# Patient Record
Sex: FEMALE | Race: BLACK OR AFRICAN AMERICAN | NOT HISPANIC OR LATINO | Employment: OTHER | URBAN - METROPOLITAN AREA
[De-identification: names, ages, dates, MRNs, and addresses within clinical notes are randomized per-mention and may not be internally consistent; named-entity substitution may affect disease eponyms.]

---

## 2017-01-03 DIAGNOSIS — M32.9 SLE (SYSTEMIC LUPUS ERYTHEMATOSUS): ICD-10-CM

## 2017-01-03 RX ORDER — HYDROXYCHLOROQUINE SULFATE 200 MG/1
200 TABLET, FILM COATED ORAL 2 TIMES DAILY
Qty: 180 TABLET | Refills: 0 | Status: SHIPPED | OUTPATIENT
Start: 2017-01-03 | End: 2017-03-15 | Stop reason: SDUPTHER

## 2017-01-19 ENCOUNTER — PATIENT MESSAGE (OUTPATIENT)
Dept: INTERNAL MEDICINE | Facility: CLINIC | Age: 57
End: 2017-01-19

## 2017-01-19 DIAGNOSIS — M79.671 PAIN IN BOTH FEET: Primary | ICD-10-CM

## 2017-01-19 DIAGNOSIS — M79.672 PAIN IN BOTH FEET: Primary | ICD-10-CM

## 2017-01-27 ENCOUNTER — TELEPHONE (OUTPATIENT)
Dept: RHEUMATOLOGY | Facility: CLINIC | Age: 57
End: 2017-01-27

## 2017-01-27 ENCOUNTER — LAB VISIT (OUTPATIENT)
Dept: LAB | Facility: HOSPITAL | Age: 57
End: 2017-01-27
Attending: INTERNAL MEDICINE
Payer: MEDICARE

## 2017-01-27 DIAGNOSIS — M32.9 SLE (SYSTEMIC LUPUS ERYTHEMATOSUS): ICD-10-CM

## 2017-01-27 LAB
ALBUMIN SERPL BCP-MCNC: 4 G/DL
ALP SERPL-CCNC: 77 U/L
ALT SERPL W/O P-5'-P-CCNC: 14 U/L
ANION GAP SERPL CALC-SCNC: 6 MMOL/L
AST SERPL-CCNC: 20 U/L
BASOPHILS # BLD AUTO: 0.03 K/UL
BASOPHILS NFR BLD: 0.7 %
BILIRUB SERPL-MCNC: 0.2 MG/DL
BUN SERPL-MCNC: 10 MG/DL
C3 SERPL-MCNC: 150 MG/DL
C4 SERPL-MCNC: 35 MG/DL
CALCIUM SERPL-MCNC: 9.4 MG/DL
CHLORIDE SERPL-SCNC: 107 MMOL/L
CO2 SERPL-SCNC: 27 MMOL/L
CREAT SERPL-MCNC: 0.8 MG/DL
CRP SERPL-MCNC: 1.2 MG/L
DIFFERENTIAL METHOD: ABNORMAL
DSDNA AB SER-ACNC: NORMAL [IU]/ML
EOSINOPHIL # BLD AUTO: 0 K/UL
EOSINOPHIL NFR BLD: 0.7 %
ERYTHROCYTE [DISTWIDTH] IN BLOOD BY AUTOMATED COUNT: 13.4 %
ERYTHROCYTE [SEDIMENTATION RATE] IN BLOOD BY WESTERGREN METHOD: 15 MM/HR
EST. GFR  (AFRICAN AMERICAN): >60 ML/MIN/1.73 M^2
EST. GFR  (NON AFRICAN AMERICAN): >60 ML/MIN/1.73 M^2
GLUCOSE SERPL-MCNC: 94 MG/DL
HCT VFR BLD AUTO: 38.4 %
HGB BLD-MCNC: 13.2 G/DL
LYMPHOCYTES # BLD AUTO: 1.3 K/UL
LYMPHOCYTES NFR BLD: 30.2 %
MCH RBC QN AUTO: 30.8 PG
MCHC RBC AUTO-ENTMCNC: 34.4 %
MCV RBC AUTO: 90 FL
MONOCYTES # BLD AUTO: 0.2 K/UL
MONOCYTES NFR BLD: 5.3 %
NEUTROPHILS # BLD AUTO: 2.6 K/UL
NEUTROPHILS NFR BLD: 62.6 %
PLATELET # BLD AUTO: 195 K/UL
PMV BLD AUTO: 10.5 FL
POTASSIUM SERPL-SCNC: 3.9 MMOL/L
PROT SERPL-MCNC: 7.9 G/DL
RBC # BLD AUTO: 4.29 M/UL
SODIUM SERPL-SCNC: 140 MMOL/L
WBC # BLD AUTO: 4.17 K/UL

## 2017-01-27 PROCEDURE — 86225 DNA ANTIBODY NATIVE: CPT

## 2017-01-27 PROCEDURE — 86140 C-REACTIVE PROTEIN: CPT

## 2017-01-27 PROCEDURE — 85651 RBC SED RATE NONAUTOMATED: CPT

## 2017-01-27 PROCEDURE — 86160 COMPLEMENT ANTIGEN: CPT

## 2017-01-27 PROCEDURE — 80053 COMPREHEN METABOLIC PANEL: CPT

## 2017-01-27 PROCEDURE — 86160 COMPLEMENT ANTIGEN: CPT | Mod: 59

## 2017-01-27 PROCEDURE — 36415 COLL VENOUS BLD VENIPUNCTURE: CPT

## 2017-01-27 PROCEDURE — 85025 COMPLETE CBC W/AUTO DIFF WBC: CPT

## 2017-02-01 ENCOUNTER — PATIENT MESSAGE (OUTPATIENT)
Dept: RHEUMATOLOGY | Facility: CLINIC | Age: 57
End: 2017-02-01

## 2017-02-02 ENCOUNTER — TELEPHONE (OUTPATIENT)
Dept: RHEUMATOLOGY | Facility: CLINIC | Age: 57
End: 2017-02-02

## 2017-02-02 DIAGNOSIS — M79.7 FIBROMYALGIA: ICD-10-CM

## 2017-02-02 RX ORDER — PREGABALIN 225 MG/1
225 CAPSULE ORAL 2 TIMES DAILY
Qty: 180 CAPSULE | Refills: 1 | Status: SHIPPED | OUTPATIENT
Start: 2017-02-02 | End: 2017-03-15 | Stop reason: SDUPTHER

## 2017-02-04 RX ORDER — LEVOTHYROXINE SODIUM 88 UG/1
88 TABLET ORAL DAILY
Qty: 90 TABLET | Refills: 3 | Status: SHIPPED | OUTPATIENT
Start: 2017-02-04 | End: 2017-04-30

## 2017-02-12 ENCOUNTER — PATIENT MESSAGE (OUTPATIENT)
Dept: RHEUMATOLOGY | Facility: CLINIC | Age: 57
End: 2017-02-12

## 2017-02-15 RX ORDER — LISINOPRIL 40 MG/1
40 TABLET ORAL DAILY
Qty: 90 TABLET | Refills: 3 | Status: SHIPPED | OUTPATIENT
Start: 2017-02-15 | End: 2017-02-22 | Stop reason: SDUPTHER

## 2017-02-22 RX ORDER — LISINOPRIL 40 MG/1
40 TABLET ORAL DAILY
Qty: 90 TABLET | Refills: 3 | Status: SHIPPED | OUTPATIENT
Start: 2017-02-22 | End: 2017-06-22 | Stop reason: SDUPTHER

## 2017-02-22 NOTE — TELEPHONE ENCOUNTER
----- Message from Kendra Antonio sent at 2/22/2017  8:36 AM CST -----  Contact: pt  _  1st Request  _  2nd Request  _  3rd Request    Please refill the medication(s) listed below. Please call the patient when the prescription(s) is ready for  at the phone number (___[phone]___-_____) .[phone]    Medication #1lisinopril (PRINIVIL,ZESTRIL) 40 MG tablet    Medication #2      Preferred Pharmacy: [facility]

## 2017-02-27 RX ORDER — AMLODIPINE BESYLATE 5 MG/1
5 TABLET ORAL DAILY
Qty: 90 TABLET | Refills: 0 | Status: SHIPPED | OUTPATIENT
Start: 2017-02-27 | End: 2017-06-22 | Stop reason: SDUPTHER

## 2017-02-27 RX ORDER — BUDESONIDE AND FORMOTEROL FUMARATE DIHYDRATE 160; 4.5 UG/1; UG/1
AEROSOL RESPIRATORY (INHALATION)
Qty: 10.2 G | Refills: 0 | Status: SHIPPED | OUTPATIENT
Start: 2017-02-27 | End: 2017-06-22 | Stop reason: SDUPTHER

## 2017-02-27 RX ORDER — BUDESONIDE AND FORMOTEROL FUMARATE DIHYDRATE 160; 4.5 UG/1; UG/1
AEROSOL RESPIRATORY (INHALATION)
Qty: 10.2 G | Refills: 0 | OUTPATIENT
Start: 2017-02-27

## 2017-03-15 ENCOUNTER — CLINICAL SUPPORT (OUTPATIENT)
Dept: INFECTIOUS DISEASES | Facility: CLINIC | Age: 57
End: 2017-03-15
Payer: MEDICARE

## 2017-03-15 ENCOUNTER — LAB VISIT (OUTPATIENT)
Dept: LAB | Facility: HOSPITAL | Age: 57
End: 2017-03-15
Attending: INTERNAL MEDICINE
Payer: MEDICARE

## 2017-03-15 ENCOUNTER — OFFICE VISIT (OUTPATIENT)
Dept: RHEUMATOLOGY | Facility: CLINIC | Age: 57
End: 2017-03-15
Payer: MEDICARE

## 2017-03-15 VITALS
HEART RATE: 74 BPM | BODY MASS INDEX: 27.08 KG/M2 | HEIGHT: 64 IN | SYSTOLIC BLOOD PRESSURE: 143 MMHG | WEIGHT: 158.63 LBS | DIASTOLIC BLOOD PRESSURE: 87 MMHG

## 2017-03-15 DIAGNOSIS — Z72.0 TOBACCO ABUSE: ICD-10-CM

## 2017-03-15 DIAGNOSIS — M32.19 OTHER SYSTEMIC LUPUS ERYTHEMATOSUS WITH OTHER ORGAN INVOLVEMENT: Chronic | ICD-10-CM

## 2017-03-15 DIAGNOSIS — M79.7 FIBROMYALGIA: Chronic | ICD-10-CM

## 2017-03-15 DIAGNOSIS — M32.9 SLE (SYSTEMIC LUPUS ERYTHEMATOSUS): ICD-10-CM

## 2017-03-15 DIAGNOSIS — M32.19 OTHER SYSTEMIC LUPUS ERYTHEMATOSUS WITH OTHER ORGAN INVOLVEMENT: Primary | Chronic | ICD-10-CM

## 2017-03-15 DIAGNOSIS — H15.001 SCLERITIS OF RIGHT EYE: ICD-10-CM

## 2017-03-15 DIAGNOSIS — M17.0 PRIMARY OSTEOARTHRITIS OF BOTH KNEES: ICD-10-CM

## 2017-03-15 LAB
ALBUMIN SERPL BCP-MCNC: 4.1 G/DL
ALP SERPL-CCNC: 76 U/L
ALT SERPL W/O P-5'-P-CCNC: 16 U/L
ANION GAP SERPL CALC-SCNC: 10 MMOL/L
AST SERPL-CCNC: 24 U/L
BASOPHILS # BLD AUTO: 0.02 K/UL
BASOPHILS NFR BLD: 0.5 %
BILIRUB SERPL-MCNC: 0.3 MG/DL
BUN SERPL-MCNC: 9 MG/DL
C3 SERPL-MCNC: 151 MG/DL
C4 SERPL-MCNC: 36 MG/DL
CALCIUM SERPL-MCNC: 9.6 MG/DL
CHLORIDE SERPL-SCNC: 106 MMOL/L
CHOLEST/HDLC SERPL: 2.8 {RATIO}
CO2 SERPL-SCNC: 26 MMOL/L
CREAT SERPL-MCNC: 0.8 MG/DL
CRP SERPL-MCNC: 0.9 MG/L
DIFFERENTIAL METHOD: NORMAL
EOSINOPHIL # BLD AUTO: 0 K/UL
EOSINOPHIL NFR BLD: 0.9 %
ERYTHROCYTE [DISTWIDTH] IN BLOOD BY AUTOMATED COUNT: 13.1 %
ERYTHROCYTE [SEDIMENTATION RATE] IN BLOOD BY WESTERGREN METHOD: 21 MM/HR
EST. GFR  (AFRICAN AMERICAN): >60 ML/MIN/1.73 M^2
EST. GFR  (NON AFRICAN AMERICAN): >60 ML/MIN/1.73 M^2
FERRITIN SERPL-MCNC: 216 NG/ML
GLUCOSE SERPL-MCNC: 93 MG/DL
HCT VFR BLD AUTO: 39.9 %
HDL/CHOLESTEROL RATIO: 35.1 %
HDLC SERPL-MCNC: 148 MG/DL
HDLC SERPL-MCNC: 52 MG/DL
HGB BLD-MCNC: 13.5 G/DL
IRON SERPL-MCNC: 53 UG/DL
LDLC SERPL CALC-MCNC: 83.6 MG/DL
LYMPHOCYTES # BLD AUTO: 1.8 K/UL
LYMPHOCYTES NFR BLD: 42.2 %
MCH RBC QN AUTO: 30.6 PG
MCHC RBC AUTO-ENTMCNC: 33.8 %
MCV RBC AUTO: 91 FL
MONOCYTES # BLD AUTO: 0.3 K/UL
MONOCYTES NFR BLD: 5.7 %
NEUTROPHILS # BLD AUTO: 2.2 K/UL
NEUTROPHILS NFR BLD: 50.7 %
NONHDLC SERPL-MCNC: 96 MG/DL
PLATELET # BLD AUTO: 201 K/UL
PMV BLD AUTO: 10.8 FL
POTASSIUM SERPL-SCNC: 4.1 MMOL/L
PROT SERPL-MCNC: 8.2 G/DL
RBC # BLD AUTO: 4.41 M/UL
SATURATED IRON: 13 %
SODIUM SERPL-SCNC: 142 MMOL/L
TOTAL IRON BINDING CAPACITY: 404 UG/DL
TRANSFERRIN SERPL-MCNC: 273 MG/DL
TRIGL SERPL-MCNC: 62 MG/DL
WBC # BLD AUTO: 4.36 K/UL

## 2017-03-15 PROCEDURE — 1160F RVW MEDS BY RX/DR IN RCRD: CPT | Mod: GC,S$GLB,, | Performed by: INTERNAL MEDICINE

## 2017-03-15 PROCEDURE — 99214 OFFICE O/P EST MOD 30 MIN: CPT | Mod: GC,S$GLB,, | Performed by: INTERNAL MEDICINE

## 2017-03-15 PROCEDURE — 90732 PPSV23 VACC 2 YRS+ SUBQ/IM: CPT | Mod: S$GLB,,, | Performed by: INTERNAL MEDICINE

## 2017-03-15 PROCEDURE — 99999 PR PBB SHADOW E&M-EST. PATIENT-LVL IV: CPT | Mod: PBBFAC,GC,, | Performed by: INTERNAL MEDICINE

## 2017-03-15 PROCEDURE — 3079F DIAST BP 80-89 MM HG: CPT | Mod: GC,S$GLB,, | Performed by: INTERNAL MEDICINE

## 2017-03-15 PROCEDURE — 99999 PR PBB SHADOW E&M-EST. PATIENT-LVL I: CPT | Mod: PBBFAC,,,

## 2017-03-15 PROCEDURE — G0009 ADMIN PNEUMOCOCCAL VACCINE: HCPCS | Mod: S$GLB,,, | Performed by: INTERNAL MEDICINE

## 2017-03-15 PROCEDURE — 3077F SYST BP >= 140 MM HG: CPT | Mod: GC,S$GLB,, | Performed by: INTERNAL MEDICINE

## 2017-03-15 PROCEDURE — 99499 UNLISTED E&M SERVICE: CPT | Mod: S$PBB,,, | Performed by: INTERNAL MEDICINE

## 2017-03-15 RX ORDER — HYDROXYCHLOROQUINE SULFATE 200 MG/1
200 TABLET, FILM COATED ORAL 2 TIMES DAILY
Qty: 180 TABLET | Refills: 0 | Status: SHIPPED | OUTPATIENT
Start: 2017-03-15 | End: 2017-06-14 | Stop reason: SDUPTHER

## 2017-03-15 RX ORDER — PREGABALIN 225 MG/1
225 CAPSULE ORAL 2 TIMES DAILY
Qty: 180 CAPSULE | Refills: 1 | Status: SHIPPED | OUTPATIENT
Start: 2017-03-15 | End: 2017-06-14 | Stop reason: SDUPTHER

## 2017-03-15 ASSESSMENT — ROUTINE ASSESSMENT OF PATIENT INDEX DATA (RAPID3)
FATIGUE SCORE: 7.5
PSYCHOLOGICAL DISTRESS SCORE: 4.4
AM STIFFNESS SCORE: 1, YES
PAIN SCORE: 5
MDHAQ FUNCTION SCORE: .5
TOTAL RAPID3 SCORE: 3.89
PATIENT GLOBAL ASSESSMENT SCORE: 5
WHEN YOU AWAKENED IN THE MORNING OVER THE LAST WEEK, PLEASE INDICATE THE AMOUNT OF TIME IT TAKES UNTIL YOU ARE AS LIMBER AS YOU WILL BE FOR THE DAY: 20 MINS

## 2017-03-15 ASSESSMENT — SYSTEMIC LUPUS ERYTHEMATOSUS DISEASE ACTIVITY INDEX (SLEDAI): TOTAL_SCORE: 0

## 2017-03-15 NOTE — PROGRESS NOTES
I  Have personally take the history and examined the patient and agree with fellow's note as stated above.      Results for EDI LINARES (MRN [id number]) as of 3/15/2017 09:46   Ref. Range 1/27/2017 09:11 1/27/2017 12:13   WBC Latest Ref Range: 3.90 - 12.70 K/uL 4.17    RBC Latest Ref Range: 4.00 - 5.40 M/uL 4.29    Hemoglobin Latest Ref Range: 12.0 - 16.0 g/dL 13.2    Hematocrit Latest Ref Range: 37.0 - 48.5 % 38.4    MCV Latest Ref Range: 82 - 98 fL 90    MCH Latest Ref Range: 27.0 - 31.0 pg 30.8    MCHC Latest Ref Range: 32.0 - 36.0 % 34.4    RDW Latest Ref Range: 11.5 - 14.5 % 13.4    Platelets Latest Ref Range: 150 - 350 K/uL 195    MPV Latest Ref Range: 9.2 - 12.9 fL 10.5    Gran% Latest Ref Range: 38.0 - 73.0 % 62.6    Gran # Latest Ref Range: 1.8 - 7.7 K/uL 2.6    Lymph% Latest Ref Range: 18.0 - 48.0 % 30.2    Lymph # Latest Ref Range: 1.0 - 4.8 K/uL 1.3    Mono% Latest Ref Range: 4.0 - 15.0 % 5.3    Mono # Latest Ref Range: 0.3 - 1.0 K/uL 0.2 (L)    Eosinophil% Latest Ref Range: 0.0 - 8.0 % 0.7    Eos # Latest Ref Range: 0.0 - 0.5 K/uL 0.0    Basophil% Latest Ref Range: 0.0 - 1.9 % 0.7    Baso # Latest Ref Range: 0.00 - 0.20 K/uL 0.03    Sed Rate Latest Ref Range: 0 - 20 mm/Hr 15    Sodium Latest Ref Range: 136 - 145 mmol/L 140    Potassium Latest Ref Range: 3.5 - 5.1 mmol/L 3.9    Chloride Latest Ref Range: 95 - 110 mmol/L 107    CO2 Latest Ref Range: 23 - 29 mmol/L 27    Anion Gap Latest Ref Range: 8 - 16 mmol/L 6 (L)    BUN, Bld Latest Ref Range: 6 - 20 mg/dL 10    Creatinine Latest Ref Range: 0.5 - 1.4 mg/dL 0.8    eGFR if non African American Latest Ref Range: >60 mL/min/1.73 m^2 >60.0    eGFR if African American Latest Ref Range: >60 mL/min/1.73 m^2 >60.0    Glucose Latest Ref Range: 70 - 110 mg/dL 94    Calcium Latest Ref Range: 8.7 - 10.5 mg/dL 9.4    Alkaline Phosphatase Latest Ref Range: 55 - 135 U/L 77    Total Protein Latest Ref Range: 6.0 - 8.4 g/dL 7.9    Albumin Latest Ref Range: 3.5  - 5.2 g/dL 4.0    Total Bilirubin Latest Ref Range: 0.1 - 1.0 mg/dL 0.2    AST Latest Ref Range: 10 - 40 U/L 20    ALT Latest Ref Range: 10 - 44 U/L 14    CRP Latest Ref Range: 0.0 - 8.2 mg/L 1.2    ds DNA Ab Latest Ref Range: Negative 1:10  Negative 1:10    Complement (C-3) Latest Ref Range: 50 - 180 mg/dL 150    Complement (C-4) Latest Ref Range: 11 - 44 mg/dL 35    Specimen UA Unknown  Urine, Unspecified   Color, UA Latest Ref Range: Yellow, Straw, Nini   Straw   pH, UA Latest Ref Range: 5.0 - 8.0   6.0   Specific Gravity, UA Latest Ref Range: 1.005 - 1.030   1.005   Appearance, UA Latest Ref Range: Clear   Hazy (A)   Protein, UA Latest Ref Range: Negative   Negative   Glucose, UA Latest Ref Range: Negative   Negative   Ketones, UA Latest Ref Range: Negative   Negative   Occult Blood UA Latest Ref Range: Negative   Negative   Nitrite, UA Latest Ref Range: Negative   Negative   Urobilinogen, UA Latest Ref Range: <2.0 EU/dL  Negative   Bilirubin (UA) Latest Ref Range: Negative   Negative   Leukocytes, UA Latest Ref Range: Negative   2+ (A)   RBC, UA Latest Ref Range: 0 - 4 /hpf  5 (H)   WBC, UA Latest Ref Range: 0 - 5 /hpf  9 (H)   Bacteria, UA Latest Ref Range: None-Occ /hpf  Occasional   Squam Epithel, UA Latest Units: /hpf  11   Microscopic Comment Unknown  SEE COMMENT   Prot/Creat Ratio, Ur Latest Ref Range: 0.00 - 0.20   Unable to calculate   Protein, Urine Random Latest Ref Range: 0 - 15 mg/dL  <7               Fibromyalgia   Hypertension, 143/87 not ideally controlled  SLE SLEDAI=0 pending U/A repeat  Scleritis, quiescent , s/p rituximab 1000mg IV x 1 4/29/16, original dosing 1000mg IV x2( 8/24/15& 9/15/15)  Osteopenia < NOF FRAX therapeutic threshold  Tobacco abuse, now 5 cigs/day  OA knees      f/u with Ophthalmology Dr. Meade to check status of scleritis  Lipid panel, Fe/TIBC, ferritin, vitamin D  today  Repeat u/a today  *Pneumovax today  Cont hydroxychloroquine 200mg twice daily  s/p rituximab  8/24/15 and 9/14/15, and 4/29/16  F/u Dr. Meade(Ophthalmology) for status of scleritis she will contact me if active scleritis and will schedule another rituximab, and likely add maintenance DMARD: mtx, aza or mmf.  F/u Dr. Cervantes for Plaquenil check  STOP SMOKING, has been on bupropion 100mg daily x 1 month, Did help her quit in past.   Add Nicotine patch, if still urge to smoke, Dr. Zheng can consider increasing buprobion  Prior auth for Synvisc-one each knee for OA   [employer], elliptical, stationary bike, yoga, TaiChi  Cont duloxetine 90mg daily , pregabalin 225mg twice daily.  Cont low carb diet  RTC 3months with standing lupus labs

## 2017-03-15 NOTE — PROGRESS NOTES
Subjective:       Patient ID: Breanna Guido is a 56 y.o. female.    Chief Complaint: Disease Management and Lupus    HPI     Pt is a 57 yo Female with Bipolar d/o, SLE (alopecia, malar rash, arthritis), scleritis,   and fibromyalgia here for follow up.  Pt was last seen in clinic on 7/15/2016 and reports feeling okay since her last visit. She has intermittent joint pain involving the hands but denies any swollen joints. Other joints include the upper arms, back, neck. Pt reports a feeling of ants crawling on her legs/tingling and burning sensation at night that improves with movement of her legs. Pt was seen by Dr. Cervantes for scleritis but exam did not reveal active disease. Pt reports 2 weeks ago she had a flare of scleritis that resolved in 2 days with the use of naprosyn. Pt has been treated in the past with rituximab 1000 mg*1 on 4/29/16 and 1000 mg IV *2 on 8/24/15 and 9/15/15.  Pt continues to smoke but has cut back with the use of Wellbutrin.  Pt currently denies eye pain, and blurry vision for the past few weeks. Has not been exercising as much as previously, and continues to smoke 5-6 cigarettes a day. She is attempting to quit using gum, patches, and wellbutrin that she receives from her psychiatrist. Denies any rash, fever, chills.    Widespread pain index  Note the areas which the patient has had pain over the last week:                   Shoulder-girdle, left X               Shoulder-girdle, rightX                         Upper arm left X                       Upper arm right X                         Lower arm left                       Lower arm right    Hip (buttock, trochanter) left X  Hip (buttock, trochanter) right                           Upper leg, left X                         Upper leg, right X                           Lower leg, left X                         Lower leg, right                                     Jaw, left                                   Jaw, right                                         Chest X                                  Abdomen                               Upper back                              Lower back X                                        Neck X  Score will be from 0-19:    WPI : 10                                         Symptom severity score  Fatigue 1  Waking Unrefreshed 2  Cognitive Symptoms 0   0 = no problem, 1=slight or mild problem 2= moderate; considerable problems often present and/or at a moderate level, 3 = severe, pervasive, continuous, life disturbing problem  For each of the 3 symptoms, indicate the level of severity over the past week using the Scale.  The symptom severity score is the sum of the severity of the 3 symptoms (fatigue, waking unrefreshed, and cognitive symptoms) plus the number of the following symptoms occurring during the previous 6 months:   Headaches 1  Pain or cramps in the lower abdomen 0  Depression 0  The final score is between 0 and 12    SSS : 4                                      Criteria  Patient has fibromyalgia if the following 3 conditions are met:  1.  Widespread pain index greater than or equal to 7 and symptom severity score greater than or equal to 5 or widespread pain index between 3- 6, and symptom severity score greater than or equal to 9.    2.  Symptoms have been present in a similar level for at least 3 months  3.  The patient does not have a disorder that would otherwise sufficiently explain the pain          Review of Systems   Constitutional: Negative for fatigue, fever and unexpected weight change.   HENT: Negative for mouth sores and trouble swallowing.         Chronic alopecia   Eyes: Negative for pain and redness.   Respiratory: Negative for cough and shortness of breath.    Cardiovascular: Negative for chest pain.   Gastrointestinal: Negative for constipation and diarrhea.   Genitourinary: Negative for dysuria and genital sores.   Musculoskeletal: Positive for arthralgias, back pain, myalgias and  "neck pain. Negative for joint swelling.   Skin: Negative for color change and rash.   Neurological: Positive for headaches (chronic). Negative for weakness and numbness.   Hematological: Negative for adenopathy. Does not bruise/bleed easily.         Objective:     BP (!) 143/87  Pulse 74  Ht 5' 3.6" (1.615 m)  Wt 71.9 kg (158 lb 9.6 oz)  BMI 27.57 kg/m2     Physical Exam   Constitutional: She is oriented to person, place, and time and well-developed, well-nourished, and in no distress. No distress.   HENT:   Head: Atraumatic.   Mouth/Throat: Oropharynx is clear and moist.   Eyes: Conjunctivae and EOM are normal. No scleral icterus.   Neck: Normal range of motion.   Cardiovascular: Normal rate, regular rhythm, normal heart sounds and intact distal pulses.  Exam reveals no gallop and no friction rub.    No murmur heard.  Pulmonary/Chest: Effort normal and breath sounds normal. No respiratory distress. She has no wheezes.   Abdominal: Soft. She exhibits no distension. There is no tenderness.       Right Side Rheumatological Exam     Examination finds the wrist, knee, 1st PIP, 1st MCP, 2nd PIP, 2nd MCP, 3rd PIP, 3rd MCP, 4th PIP, 4th MCP, 5th PIP and 5th MCP normal.    The patient is tender to palpation of the shoulder and elbow    Knee Exam   Patellofemoral Crepitus: positive    Muscle Strength (0-5 scale):  Neck Flexion:  5  Neck Extension: 5  Deltoid:  5  Biceps: 5/5   Triceps:  5  : 5/5   Iliopsoas: 5  Quadriceps:  5   Distal Lower Extremity: 5    Left Side Rheumatological Exam     Examination finds the wrist, knee, 1st PIP, 1st MCP, 2nd PIP, 2nd MCP, 3rd PIP, 3rd MCP, 4th PIP, 4th MCP, 5th PIP and 5th MCP normal.    The patient is tender to palpation of the shoulder and elbow.    Knee Exam     Patellofemoral Crepitus: positive    Muscle Strength (0-5 scale):  Neck Flexion:  5  Neck Extension: 5  Deltoid:  5  Biceps: 5/5   Triceps:  5  :  5/5   Iliopsoas: 5  Quadriceps:  5   Distal Lower Extremity: " 5      Neurological: She is alert and oriented to person, place, and time.   Skin: Skin is warm and dry. No rash noted.     Psychiatric: Mood and affect normal.   Musculoskeletal: Normal range of motion. She exhibits no edema.   Mild tenderness to palpation in upper arms, and thighs bilaterally             Labs:        Results for EDI LINARES (MRN [id number]) as of 3/15/2017 14:47   Ref. Range 3/15/2017 10:45   WBC Latest Ref Range: 3.90 - 12.70 K/uL 4.36   RBC Latest Ref Range: 4.00 - 5.40 M/uL 4.41   Hemoglobin Latest Ref Range: 12.0 - 16.0 g/dL 13.5   Hematocrit Latest Ref Range: 37.0 - 48.5 % 39.9   MCV Latest Ref Range: 82 - 98 fL 91   MCH Latest Ref Range: 27.0 - 31.0 pg 30.6   MCHC Latest Ref Range: 32.0 - 36.0 % 33.8   RDW Latest Ref Range: 11.5 - 14.5 % 13.1   Platelets Latest Ref Range: 150 - 350 K/uL 201       Results for EDI LINARES (MRN [id number]) as of 3/15/2017 14:47   Ref. Range 7/15/2016 09:18 10/12/2016 08:29 1/27/2017 09:11   CRP Latest Ref Range: 0.0 - 8.2 mg/L 1.8 1.0 1.2       Results for EDI LINARES (MRN [id number]) as of 3/15/2017 14:47   Ref. Range 7/15/2016 09:18 10/12/2016 08:29 1/27/2017 09:11   Sed Rate Latest Ref Range: 0 - 20 mm/Hr 20 15 15       Results for EDI LINARES (MRN [id number]) as of 3/15/2017 14:47   Ref. Range 1/27/2017 09:11   ds DNA Ab Latest Ref Range: Negative 1:10  Negative 1:10   Complement (C-3) Latest Ref Range: 50 - 180 mg/dL 150   Complement (C-4) Latest Ref Range: 11 - 44 mg/dL 35         Results for EDI LINARES (MRN [id number]) as of 3/15/2017 14:47   Ref. Range 1/27/2017 12:13   Specimen UA Unknown Urine, Unspecified   Color, UA Latest Ref Range: Yellow, Straw, Nini  Straw   pH, UA Latest Ref Range: 5.0 - 8.0  6.0   Specific Gravity, UA Latest Ref Range: 1.005 - 1.030  1.005   Appearance, UA Latest Ref Range: Clear  Hazy (A)   Protein, UA Latest Ref Range: Negative  Negative   Glucose, UA Latest Ref Range: Negative  Negative    Ketones, UA Latest Ref Range: Negative  Negative   Occult Blood UA Latest Ref Range: Negative  Negative   Nitrite, UA Latest Ref Range: Negative  Negative   Urobilinogen, UA Latest Ref Range: <2.0 EU/dL Negative   Bilirubin (UA) Latest Ref Range: Negative  Negative   Leukocytes, UA Latest Ref Range: Negative  2+ (A)   RBC, UA Latest Ref Range: 0 - 4 /hpf 5 (H)   WBC, UA Latest Ref Range: 0 - 5 /hpf 9 (H)   Bacteria, UA Latest Ref Range: None-Occ /hpf Occasional   Squam Epithel, UA Latest Units: /hpf 11   Microscopic Comment Unknown SEE COMMENT   Prot/Creat Ratio, Ur Latest Ref Range: 0.00 - 0.20  Unable to calculate   Protein, Urine Random Latest Ref Range: 0 - 15 mg/dL <7       4/26/17:     DEXA:       Impression       Low bone mass with a significant decrease of 3% in the lumbar BMD compared with the prior study. The estimated 10 year probability of hip fracture is 0.3% and of a major osteoporotic fracture 2.9%, respectively.    RECOMMENDATIONS:  1. Adequate calcium and Vitamin D, 1200 mg/day and 600 units/day, respectively.  2. Repeat BMD in 2 years.  3. STOP SMOKING!         Assessment:       1. Other systemic lupus erythematosus with other organ involvement    2. Fibromyalgia    3. Scleritis of right eye    4. Tobacco abuse        57 yo Female with Bipolar d/o, SLE (alopecia, malar rash, arthritis), scleritis,  and fibromyalgia here for follow up.  SLE, SLEDAI: 0 pending urinalysis.   Fibromyalgia WPI 10, SSS 4. Pain most likely due to fibromyalgia as it is more targeted to muscle pain instead of joint pain. Standard lupus labs normal. No detected inflammation.   Medication monitoring- no toxic effects from Plaquenil   Scleritis- stable, needs ongoing monitoring  Tobacco abuse- currently reducing amount of cigarettes.  Restless leg syndrome  Moderate OA knees.   Plan:   1. Repeat UA today given RBCs on previous.   2. Follow up with Dr. Meade opthalmology for scleritis. Rituximab therapy if needed  3. STOP  SMOKING - patient taking Wellbutrin 100mg daily.  4. Continue Cymbalta 90mg daily, Lyrica 225mg daily, and seroquel 300mg daily  5 Continue aerobic exercises daily   6. Pneumovax today, lipid panel today   7. Restless leg syndrome- check iron, TIBC, and ferritin. Lyrica can help but would not want to start agents such as requip given combination antidepressants.    8. Moderate OA knees- responded to injections in the past- will get prior authorization for Synvisc 1       RTC 3 months with standing labs.

## 2017-03-15 NOTE — PROGRESS NOTES
I  Have personally take the history and examined the patient and agree with fellow's note as stated above.

## 2017-03-16 LAB — DSDNA AB SER-ACNC: NORMAL [IU]/ML

## 2017-04-30 RX ORDER — LEVOTHYROXINE SODIUM 88 UG/1
TABLET ORAL
Qty: 90 TABLET | Refills: 3 | Status: SHIPPED | OUTPATIENT
Start: 2017-04-30 | End: 2017-06-22 | Stop reason: SDUPTHER

## 2017-05-30 ENCOUNTER — TELEPHONE (OUTPATIENT)
Dept: INTERNAL MEDICINE | Facility: CLINIC | Age: 57
End: 2017-05-30

## 2017-06-09 ENCOUNTER — HOSPITAL ENCOUNTER (OUTPATIENT)
Dept: RADIOLOGY | Facility: HOSPITAL | Age: 57
Discharge: HOME | End: 2017-06-09
Attending: OBSTETRICS & GYNECOLOGY
Payer: MEDICARE

## 2017-06-09 DIAGNOSIS — Z12.31 VISIT FOR SCREENING MAMMOGRAM: ICD-10-CM

## 2017-06-09 PROCEDURE — 77067 SCR MAMMO BI INCL CAD: CPT | Mod: TC

## 2017-06-09 PROCEDURE — 77063 BREAST TOMOSYNTHESIS BI: CPT | Mod: 26,,, | Performed by: RADIOLOGY

## 2017-06-09 PROCEDURE — 77067 SCR MAMMO BI INCL CAD: CPT | Mod: 26,,, | Performed by: RADIOLOGY

## 2017-06-14 ENCOUNTER — OFFICE VISIT (OUTPATIENT)
Dept: RHEUMATOLOGY | Facility: CLINIC | Age: 57
End: 2017-06-14
Payer: MEDICARE

## 2017-06-14 ENCOUNTER — PATIENT MESSAGE (OUTPATIENT)
Dept: RHEUMATOLOGY | Facility: CLINIC | Age: 57
End: 2017-06-14

## 2017-06-14 ENCOUNTER — LAB VISIT (OUTPATIENT)
Dept: LAB | Facility: HOSPITAL | Age: 57
End: 2017-06-14
Payer: MEDICARE

## 2017-06-14 VITALS
WEIGHT: 149.38 LBS | HEIGHT: 64 IN | BODY MASS INDEX: 25.5 KG/M2 | DIASTOLIC BLOOD PRESSURE: 80 MMHG | HEART RATE: 59 BPM | SYSTOLIC BLOOD PRESSURE: 120 MMHG

## 2017-06-14 DIAGNOSIS — Z51.81 MEDICATION MONITORING ENCOUNTER: ICD-10-CM

## 2017-06-14 DIAGNOSIS — M32.19 OTHER SYSTEMIC LUPUS ERYTHEMATOSUS WITH OTHER ORGAN INVOLVEMENT: Primary | Chronic | ICD-10-CM

## 2017-06-14 DIAGNOSIS — M32.19 OTHER SYSTEMIC LUPUS ERYTHEMATOSUS WITH OTHER ORGAN INVOLVEMENT: Chronic | ICD-10-CM

## 2017-06-14 DIAGNOSIS — M79.7 FIBROMYALGIA: Chronic | ICD-10-CM

## 2017-06-14 LAB
BACTERIA #/AREA URNS AUTO: NORMAL /HPF
BILIRUB UR QL STRIP: NEGATIVE
CLARITY UR REFRACT.AUTO: ABNORMAL
COLOR UR AUTO: YELLOW
CREAT UR-MCNC: 169 MG/DL
GLUCOSE UR QL STRIP: NEGATIVE
HGB UR QL STRIP: ABNORMAL
KETONES UR QL STRIP: NEGATIVE
LEUKOCYTE ESTERASE UR QL STRIP: ABNORMAL
MICROSCOPIC COMMENT: NORMAL
NITRITE UR QL STRIP: NEGATIVE
PH UR STRIP: 5 [PH] (ref 5–8)
PROT UR QL STRIP: NEGATIVE
PROT UR-MCNC: 10 MG/DL
PROT/CREAT RATIO, UR: 0.06
RBC #/AREA URNS AUTO: 1 /HPF (ref 0–4)
SP GR UR STRIP: 1.02 (ref 1–1.03)
SQUAMOUS #/AREA URNS AUTO: 7 /HPF
URN SPEC COLLECT METH UR: ABNORMAL
UROBILINOGEN UR STRIP-ACNC: NEGATIVE EU/DL
WBC #/AREA URNS AUTO: 3 /HPF (ref 0–5)

## 2017-06-14 PROCEDURE — 99214 OFFICE O/P EST MOD 30 MIN: CPT | Mod: GC,S$GLB,, | Performed by: INTERNAL MEDICINE

## 2017-06-14 PROCEDURE — 99999 PR PBB SHADOW E&M-EST. PATIENT-LVL IV: CPT | Mod: PBBFAC,GC,, | Performed by: INTERNAL MEDICINE

## 2017-06-14 PROCEDURE — 82570 ASSAY OF URINE CREATININE: CPT

## 2017-06-14 PROCEDURE — 81001 URINALYSIS AUTO W/SCOPE: CPT

## 2017-06-14 RX ORDER — PREGABALIN 150 MG/1
150 CAPSULE ORAL 3 TIMES DAILY
Qty: 270 CAPSULE | Refills: 0 | Status: SHIPPED | OUTPATIENT
Start: 2017-06-14 | End: 2017-09-13 | Stop reason: SDUPTHER

## 2017-06-14 RX ORDER — HYDROXYCHLOROQUINE SULFATE 200 MG/1
200 TABLET, FILM COATED ORAL 2 TIMES DAILY
Qty: 180 TABLET | Refills: 0 | Status: SHIPPED | OUTPATIENT
Start: 2017-06-14 | End: 2018-01-30 | Stop reason: SDUPTHER

## 2017-06-14 RX ORDER — PREGABALIN 150 MG/1
150 CAPSULE ORAL 3 TIMES DAILY
Qty: 90 CAPSULE | Refills: 3 | Status: SHIPPED | OUTPATIENT
Start: 2017-06-14 | End: 2017-06-14 | Stop reason: SDUPTHER

## 2017-06-14 ASSESSMENT — ROUTINE ASSESSMENT OF PATIENT INDEX DATA (RAPID3)
FATIGUE SCORE: 6
MDHAQ FUNCTION SCORE: .3
TOTAL RAPID3 SCORE: 5.33
PSYCHOLOGICAL DISTRESS SCORE: 2.2
AM STIFFNESS SCORE: 1, YES
PATIENT GLOBAL ASSESSMENT SCORE: 8
WHEN YOU AWAKENED IN THE MORNING OVER THE LAST WEEK, PLEASE INDICATE THE AMOUNT OF TIME IT TAKES UNTIL YOU ARE AS LIMBER AS YOU WILL BE FOR THE DAY: 20 MINS
PAIN SCORE: 7

## 2017-06-14 NOTE — PROGRESS NOTES
"Subjective:       Patient ID: Breanna Guido is a 57 y.o. female.    Chief Complaint: Disease Management    HPI     Pt is a 58 yo Female with Bipolar d/o, SLE (alopecia, malar rash, arthritis), scleritis,  and fibromyalgia here for follow up.  Last visit was 3/15/17.  Pt sts that she has been doing poorly over the last week due to diffuse pain from her "neck to her toes". Pt denies recent illness or stress. She has not noticed any swollen joints, mucosal ulcers, alopecia, rashes, fevers. Pt did not follow up with ophthalmology given of her scleritis. Pt reports a mild flare of her scleritis that lasted for 1 day. She has been treated in the past with rituximab 1000 mg*1 on 4/29/16 and 1000 mg IV *2 on 8/24/15 and 9/15/15. Pt has stopped smoking for the last 7 days and is off wellbutrin. She also has a 10 pound weight loss since the last visit with increasing activity. Pt reports taking 3 pills of lyrica 225 mg with increase in pain and notes increased grogginess.       Widespread pain index  Note the areas which the patient has had pain over the last week:                   Shoulder-girdle, left X               Shoulder-girdle, rightX                         Upper arm left X                       Upper arm right X                         Lower arm left x                       Lower arm rightx    Hip (buttock, trochanter) left X  Hip (buttock, trochanter) rightx                           Upper leg, left X                         Upper leg, right X                           Lower leg, left X                         Lower leg, rightx                                     Jaw, left                                   Jaw, right                                        Chest X                                  Abdomen                               Upper backx                              Lower back X                                        Neck X  Score will be from 0-19:    WPI : 15                                         " Symptom severity score  Fatigue 1  Waking Unrefreshed 2  Cognitive Symptoms 0   0 = no problem, 1=slight or mild problem 2= moderate; considerable problems often present and/or at a moderate level, 3 = severe, pervasive, continuous, life disturbing problem  For each of the 3 symptoms, indicate the level of severity over the past week using the Scale.  The symptom severity score is the sum of the severity of the 3 symptoms (fatigue, waking unrefreshed, and cognitive symptoms) plus the number of the following symptoms occurring during the previous 6 months:   Headaches 1  Pain or cramps in the lower abdomen 0  Depression 0  The final score is between 0 and 12    SSS : 4                                      Criteria  Patient has fibromyalgia if the following 3 conditions are met:  1.  Widespread pain index greater than or equal to 7 and symptom severity score greater than or equal to 5 or widespread pain index between 3- 6, and symptom severity score greater than or equal to 9.    2.  Symptoms have been present in a similar level for at least 3 months  3.  The patient does not have a disorder that would otherwise sufficiently explain the pain          Review of Systems   Constitutional: Negative for fatigue, fever and unexpected weight change.   HENT: Negative for mouth sores and trouble swallowing.         Chronic alopecia   Eyes: Negative for pain and redness.   Respiratory: Negative for cough and shortness of breath.    Cardiovascular: Negative for chest pain.   Gastrointestinal: Negative for constipation and diarrhea.   Genitourinary: Negative for dysuria and genital sores.   Musculoskeletal: Positive for arthralgias, back pain, myalgias and neck pain. Negative for joint swelling.   Skin: Negative for color change and rash.   Neurological: Positive for headaches (chronic). Negative for weakness and numbness.   Hematological: Negative for adenopathy. Does not bruise/bleed easily.         Objective:     /80    "Pulse (!) 59   Ht 5' 3.6" (1.615 m)   Wt 67.8 kg (149 lb 6.4 oz)   BMI 25.97 kg/m²      Physical Exam   Constitutional: She is oriented to person, place, and time and well-developed, well-nourished, and in no distress. No distress.   HENT:   Head: Atraumatic.   Mouth/Throat: Oropharynx is clear and moist.   Eyes: Conjunctivae and EOM are normal. No scleral icterus.   Neck: Normal range of motion.   Cardiovascular: Normal rate, regular rhythm, normal heart sounds and intact distal pulses.  Exam reveals no gallop and no friction rub.    No murmur heard.  Pulmonary/Chest: Effort normal and breath sounds normal. No respiratory distress. She has no wheezes.   Abdominal: Soft. She exhibits no distension. There is no tenderness.       Right Side Rheumatological Exam     Examination finds the wrist, 1st PIP, 1st MCP, 2nd PIP, 2nd MCP, 3rd PIP, 3rd MCP, 4th PIP, 4th MCP, 5th PIP and 5th MCP normal.    The patient is tender to palpation of the shoulder, elbow and knee    Knee Exam   Patellofemoral Crepitus: positive    Muscle Strength (0-5 scale):  Neck Flexion:  5  Neck Extension: 5  Deltoid:  5  Biceps: 5/5   Triceps:  5  : 5/5   Iliopsoas: 5  Quadriceps:  5   Distal Lower Extremity: 5    Left Side Rheumatological Exam     Examination finds the wrist, 1st PIP, 1st MCP, 2nd PIP, 2nd MCP, 3rd PIP, 3rd MCP, 4th PIP, 4th MCP, 5th PIP and 5th MCP normal.    The patient is tender to palpation of the shoulder, elbow and knee.    Knee Exam     Patellofemoral Crepitus: positive    Muscle Strength (0-5 scale):  Neck Flexion:  5  Neck Extension: 5  Deltoid:  5  Biceps: 5/5   Triceps:  5  :  5/5   Iliopsoas: 5  Quadriceps:  5   Distal Lower Extremity: 5      Neurological: She is alert and oriented to person, place, and time.   Skin: Skin is warm and dry. No rash noted.     Psychiatric: Mood and affect normal.   Musculoskeletal: Normal range of motion. She exhibits no edema.   Diffuse tenderness on exam               Labs:    " Old labs reviewed.     4/26/17:     DEXA:       Impression       Low bone mass with a significant decrease of 3% in the lumbar BMD compared with the prior study. The estimated 10 year probability of hip fracture is 0.3% and of a major osteoporotic fracture 2.9%, respectively.    RECOMMENDATIONS:  1. Adequate calcium and Vitamin D, 1200 mg/day and 600 units/day, respectively.  2. Repeat BMD in 2 years.  3. STOP SMOKING!         Assessment:       1. Other systemic lupus erythematosus with other organ involvement    2. Fibromyalgia    3. Medication monitoring encounter        56 yo Female with Bipolar d/o, SLE (alopecia, malar rash, arthritis), scleritis,  and fibromyalgia here for follow up.  SLE, SLEDAI:  Pending labs today. Lupus has been stable on plaquenil 200 mg bid   Fibromyalgia WPI 15, SSS 4. Pain most likely due to fibromyalgia and not a SLE flare. Her diffuse pain is likely related to stressors from nicotine cessation    Medication monitoring- no toxic effects from Plaquenil   Scleritis- stable, needs ongoing monitoring  Tobacco abuse- currently stopped smoking   Restless leg syndrome  Moderate OA knees.   Immunizations up to date  Plan:   -  Follow up with Dr. Meade opthalmology for scleritis. Rituximab therapy if needed  - Continue Cymbalta 90mg daily, Lyrica changed from 225 bid to 150 mg tid , and seroquel 300mg daily  - continue Plaquenil 200 mg bid.   - Continue aerobic exercises daily; agree with starting water aerobics    RTC 3 months with standing labs.       Signed,   Vlad Wallace

## 2017-06-22 ENCOUNTER — OFFICE VISIT (OUTPATIENT)
Dept: INTERNAL MEDICINE | Facility: CLINIC | Age: 57
End: 2017-06-22
Payer: MEDICARE

## 2017-06-22 ENCOUNTER — LAB VISIT (OUTPATIENT)
Dept: LAB | Facility: OTHER | Age: 57
End: 2017-06-22
Attending: INTERNAL MEDICINE
Payer: MEDICARE

## 2017-06-22 VITALS
DIASTOLIC BLOOD PRESSURE: 80 MMHG | SYSTOLIC BLOOD PRESSURE: 118 MMHG | WEIGHT: 148.38 LBS | BODY MASS INDEX: 26.29 KG/M2 | HEIGHT: 63 IN | HEART RATE: 71 BPM | OXYGEN SATURATION: 98 %

## 2017-06-22 DIAGNOSIS — J42 CHRONIC BRONCHITIS, UNSPECIFIED CHRONIC BRONCHITIS TYPE: Chronic | ICD-10-CM

## 2017-06-22 DIAGNOSIS — M32.19 OTHER SYSTEMIC LUPUS ERYTHEMATOSUS WITH OTHER ORGAN INVOLVEMENT: Chronic | ICD-10-CM

## 2017-06-22 DIAGNOSIS — F31.81 BIPOLAR 2 DISORDER: Chronic | ICD-10-CM

## 2017-06-22 DIAGNOSIS — R20.2 TINGLING IN EXTREMITIES: ICD-10-CM

## 2017-06-22 DIAGNOSIS — E03.9 ACQUIRED HYPOTHYROIDISM: Chronic | ICD-10-CM

## 2017-06-22 DIAGNOSIS — I10 ESSENTIAL HYPERTENSION, BENIGN: Primary | Chronic | ICD-10-CM

## 2017-06-22 DIAGNOSIS — M85.80 OSTEOPENIA, UNSPECIFIED LOCATION: ICD-10-CM

## 2017-06-22 DIAGNOSIS — Z12.11 COLON CANCER SCREENING: ICD-10-CM

## 2017-06-22 DIAGNOSIS — E78.2 MIXED HYPERLIPIDEMIA: Chronic | ICD-10-CM

## 2017-06-22 DIAGNOSIS — H15.001 SCLERITIS OF RIGHT EYE: ICD-10-CM

## 2017-06-22 DIAGNOSIS — M79.7 FIBROMYALGIA: Chronic | ICD-10-CM

## 2017-06-22 DIAGNOSIS — Z72.0 TOBACCO ABUSE: ICD-10-CM

## 2017-06-22 LAB
TSH SERPL DL<=0.005 MIU/L-ACNC: 1.14 UIU/ML
VIT B12 SERPL-MCNC: 266 PG/ML

## 2017-06-22 PROCEDURE — 99499 UNLISTED E&M SERVICE: CPT | Mod: S$PBB,,, | Performed by: INTERNAL MEDICINE

## 2017-06-22 PROCEDURE — 84165 PROTEIN E-PHORESIS SERUM: CPT | Mod: 26,,, | Performed by: PATHOLOGY

## 2017-06-22 PROCEDURE — 36415 COLL VENOUS BLD VENIPUNCTURE: CPT

## 2017-06-22 PROCEDURE — 84165 PROTEIN E-PHORESIS SERUM: CPT

## 2017-06-22 PROCEDURE — 84443 ASSAY THYROID STIM HORMONE: CPT

## 2017-06-22 PROCEDURE — 99999 PR PBB SHADOW E&M-EST. PATIENT-LVL III: CPT | Mod: PBBFAC,,, | Performed by: INTERNAL MEDICINE

## 2017-06-22 PROCEDURE — 82607 VITAMIN B-12: CPT

## 2017-06-22 PROCEDURE — 99214 OFFICE O/P EST MOD 30 MIN: CPT | Mod: S$GLB,,, | Performed by: INTERNAL MEDICINE

## 2017-06-22 RX ORDER — LISINOPRIL 40 MG/1
40 TABLET ORAL DAILY
Qty: 90 TABLET | Refills: 3 | Status: SHIPPED | OUTPATIENT
Start: 2017-06-22 | End: 2018-07-13 | Stop reason: SDUPTHER

## 2017-06-22 RX ORDER — BUDESONIDE AND FORMOTEROL FUMARATE DIHYDRATE 160; 4.5 UG/1; UG/1
AEROSOL RESPIRATORY (INHALATION)
Qty: 30.6 G | Refills: 3 | Status: SHIPPED | OUTPATIENT
Start: 2017-06-22 | End: 2018-07-13 | Stop reason: SDUPTHER

## 2017-06-22 RX ORDER — AMLODIPINE BESYLATE 5 MG/1
5 TABLET ORAL DAILY
Qty: 90 TABLET | Refills: 3 | Status: SHIPPED | OUTPATIENT
Start: 2017-06-22 | End: 2018-03-05 | Stop reason: SDUPTHER

## 2017-06-22 RX ORDER — LEVOTHYROXINE SODIUM 88 UG/1
88 TABLET ORAL DAILY
Qty: 90 TABLET | Refills: 3 | Status: SHIPPED | OUTPATIENT
Start: 2017-06-22 | End: 2018-07-13 | Stop reason: SDUPTHER

## 2017-06-22 RX ORDER — ATORVASTATIN CALCIUM 20 MG/1
20 TABLET, FILM COATED ORAL DAILY
Qty: 90 TABLET | Refills: 3 | Status: SHIPPED | OUTPATIENT
Start: 2017-06-22 | End: 2018-04-04 | Stop reason: SINTOL

## 2017-06-22 RX ORDER — PREGABALIN 225 MG/1
CAPSULE ORAL
Refills: 1 | COMMUNITY
Start: 2017-05-12 | End: 2017-06-22

## 2017-06-22 NOTE — PROGRESS NOTES
Subjective:       Patient ID: Braenna Guido is a 57 y.o. female.    Chief Complaint: Toe Pain; Headache; and Dizziness    Pt here for f/u. She c/o 15+ years bilat great toe pain and tingling. Sometimes extends up mid L leg. No associated weakness. She does have some sciatica symptoms on that side at times but this feels different. It has not worsened. She reports that she has seen multiple providers in the past and had several studies including nerve conduction studies without definitive diagnosis. No new back pain.     Migraines are fairly well controlled although she has had some generalized HAs with occasional light headedness for 2-3 weeks since stopping smoking. No associated neuro deficits, fever, vision changes. Not needing to take anything for this. Denies cp/sob/palpitations/tinnitus/hearing loss/ataxia/weakness.      BP is well controlled. Denies cp/sob/ha/vision or neuro changes.     HLD has been tx with lipitor which she has tolerated well.     She is clinically euthyroid. Due for updated labs.     Bipolar is followed by psychiatry regularly. No SI/HI. This is stable.     Pt sees Dr. Valencia in [facility] for fibromyalgia dx and lupus dx; however, her serology has been negative. It was recommended she be maintained on plaquenil which she takes. She has considered starting Rituxan or another agent due to ongoing uveitis/scleritis that had failed therapy but there has been some improvement. She is being followed regularly by ophtho as well. Her pain is manageable with baclofen and lyrica.    COPD is stable on current meds.       Hypertension   Associated symptoms include headaches. Pertinent negatives include no chest pain, neck pain, palpitations or shortness of breath.     Review of Systems   Constitutional: Positive for activity change. Negative for unexpected weight change.   HENT: Negative for hearing loss, rhinorrhea and trouble swallowing.    Eyes: Negative for discharge and visual disturbance.    Respiratory: Negative for chest tightness, shortness of breath and wheezing.    Cardiovascular: Negative for chest pain and palpitations.   Gastrointestinal: Negative for abdominal pain, blood in stool, constipation, diarrhea, nausea and vomiting.   Endocrine: Negative for polydipsia and polyuria.   Genitourinary: Negative for difficulty urinating, dysuria, frequency, hematuria and menstrual problem.   Musculoskeletal: Positive for arthralgias. Negative for joint swelling and neck pain.   Neurological: Positive for headaches. Negative for speech difficulty and weakness.   Psychiatric/Behavioral: Negative for confusion and dysphoric mood.       Objective:          Assessment:       1. Essential hypertension, benign    2. Chronic bronchitis, unspecified chronic bronchitis type    3. Bipolar 2 disorder    4. Acquired hypothyroidism    5. Mixed hyperlipidemia    6. Fibromyalgia    7. Other systemic lupus erythematosus with other organ involvement    8. Tobacco abuse    9. Osteopenia, unspecified location    10. Scleritis of right eye    11. Colon cancer screening    12. Tingling in extremities        Plan:       1. continue current meds   2. Keep f/u with specialists  3. Appropriate labs  4. c-scope referral  5. Suspect HA/lightheadedness is due to nicotine withdrawal and expect to improve over next 2 weeks; no evidence for more ominous/concerning process but rtc and ed prompts d/w pt and she understood  6. Will proceed with labs re: possible neuropathy affecting bilat toe pain/numbness but with prior w/u, it does not seem promising we will find a cause or solution; can consider neuro eval pending labs if pt would like to pursue               Physical Exam   Constitutional: She is oriented to person, place, and time. She appears well-developed and well-nourished.   HENT:   Head: Normocephalic and atraumatic.   Eyes: EOM are normal. Pupils are equal, round, and reactive to light.   Neck: Neck supple. Carotid bruit is  not present. No thyroid mass and no thyromegaly present.   Cardiovascular: Normal rate, regular rhythm, S1 normal and S2 normal.    Murmur heard.  Pulmonary/Chest: Effort normal and breath sounds normal. She has no wheezes.   Abdominal: Soft. Bowel sounds are normal. She exhibits no distension and no mass. There is no hepatosplenomegaly. There is no tenderness. There is no CVA tenderness.   Musculoskeletal: She exhibits no edema.        Lumbar back: Normal.   Lymphadenopathy:     She has no cervical adenopathy.   Neurological: She is alert and oriented to person, place, and time. She has normal strength. No cranial nerve deficit or sensory deficit. She displays a negative Romberg sign. Coordination and gait normal.   Reflex Scores:       Bicep reflexes are 2+ on the right side and 2+ on the left side.       Patellar reflexes are 2+ on the right side and 2+ on the left side.  Psychiatric: She has a normal mood and affect. Her behavior is normal.

## 2017-06-23 LAB
ALBUMIN SERPL ELPH-MCNC: 4.14 G/DL
ALPHA1 GLOB SERPL ELPH-MCNC: 0.28 G/DL
ALPHA2 GLOB SERPL ELPH-MCNC: 0.84 G/DL
B-GLOBULIN SERPL ELPH-MCNC: 0.82 G/DL
GAMMA GLOB SERPL ELPH-MCNC: 1.51 G/DL
PATHOLOGIST INTERPRETATION SPE: NORMAL
PROT SERPL-MCNC: 7.6 G/DL

## 2017-07-07 ENCOUNTER — PATIENT MESSAGE (OUTPATIENT)
Dept: INTERNAL MEDICINE | Facility: CLINIC | Age: 57
End: 2017-07-07

## 2017-09-13 ENCOUNTER — OFFICE VISIT (OUTPATIENT)
Dept: RHEUMATOLOGY | Facility: CLINIC | Age: 57
End: 2017-09-13
Payer: MEDICARE

## 2017-09-13 ENCOUNTER — LAB VISIT (OUTPATIENT)
Dept: LAB | Facility: HOSPITAL | Age: 57
End: 2017-09-13
Payer: MEDICARE

## 2017-09-13 VITALS
DIASTOLIC BLOOD PRESSURE: 81 MMHG | HEIGHT: 64 IN | WEIGHT: 148.5 LBS | SYSTOLIC BLOOD PRESSURE: 114 MMHG | HEART RATE: 65 BPM | BODY MASS INDEX: 25.35 KG/M2

## 2017-09-13 DIAGNOSIS — Z79.899 LONG-TERM USE OF PLAQUENIL: ICD-10-CM

## 2017-09-13 DIAGNOSIS — E53.8 DISORDER OF VITAMIN B12: ICD-10-CM

## 2017-09-13 DIAGNOSIS — M32.19 OTHER SYSTEMIC LUPUS ERYTHEMATOSUS WITH OTHER ORGAN INVOLVEMENT: Primary | Chronic | ICD-10-CM

## 2017-09-13 DIAGNOSIS — M32.19 OTHER SYSTEMIC LUPUS ERYTHEMATOSUS WITH OTHER ORGAN INVOLVEMENT: Chronic | ICD-10-CM

## 2017-09-13 DIAGNOSIS — H15.001 SCLERITIS OF RIGHT EYE: ICD-10-CM

## 2017-09-13 DIAGNOSIS — M79.7 FIBROMYALGIA: Chronic | ICD-10-CM

## 2017-09-13 PROCEDURE — 99214 OFFICE O/P EST MOD 30 MIN: CPT | Mod: GC,S$GLB,, | Performed by: INTERNAL MEDICINE

## 2017-09-13 PROCEDURE — 3074F SYST BP LT 130 MM HG: CPT | Mod: GC,S$GLB,, | Performed by: INTERNAL MEDICINE

## 2017-09-13 PROCEDURE — 3008F BODY MASS INDEX DOCD: CPT | Mod: GC,S$GLB,, | Performed by: INTERNAL MEDICINE

## 2017-09-13 PROCEDURE — 99999 PR PBB SHADOW E&M-EST. PATIENT-LVL V: CPT | Mod: PBBFAC,GC,, | Performed by: INTERNAL MEDICINE

## 2017-09-13 PROCEDURE — 3079F DIAST BP 80-89 MM HG: CPT | Mod: GC,S$GLB,, | Performed by: INTERNAL MEDICINE

## 2017-09-13 RX ORDER — PREGABALIN 150 MG/1
150 CAPSULE ORAL 3 TIMES DAILY
Qty: 270 CAPSULE | Refills: 0 | Status: SHIPPED | OUTPATIENT
Start: 2017-09-13 | End: 2018-01-30 | Stop reason: SDUPTHER

## 2017-09-13 ASSESSMENT — SYSTEMIC LUPUS ERYTHEMATOSUS DISEASE ACTIVITY INDEX (SLEDAI): TOTAL_SCORE: 0

## 2017-09-13 NOTE — PATIENT INSTRUCTIONS
Decrease Plaquenil to 1 and 1/2 pills once daily (300 mg total). You can take this all together  Hold statin for 1-2 weeks, let me know how your symptoms are after that time.   It's flu shot season, get your shot in October/November.

## 2017-09-13 NOTE — PROGRESS NOTES
"Subjective:       Patient ID: Breanna Guido is a 57 y.o. female.    Chief Complaint: No chief complaint on file.    HPI     Pt is a 56 yo Female with Bipolar d/o, SLE (alopecia, malar rash, arthritis), scleritis,  and fibromyalgia here for follow up.  Last visit was 3 months ago. Pt sts she has bee doing okay but still reports that for that last week she has had pain  from her "neck to her toes" as on the last visit. She reports some am hand swelling that resolves. She has muscle aches in the thighs and arms. She has decreased exercise during this time. Pt reports flare of her scleritis a week ago that resolved with naprosyn. She sts she was not able to make it to her ophthalmology appointment due to transportation issues. She has been treated in the past with rituximab 1000 mg*1 on 4/29/16 and 1000 mg IV *2 on 8/24/15 and 9/15/15.  Although she stopped smoking on the last visit she has started back with 1-2 cigarettes/d.  She has not noticed any swollen joints, mucosal ulcers, alopecia, rashes, fevers.       Widespread pain index  Note the areas which the patient has had pain over the last week:                   Shoulder-girdle, left X               Shoulder-girdle, rightX                         Upper arm left X                       Upper arm right X                         Lower arm left x                       Lower arm rightx    Hip (buttock, trochanter) left X  Hip (buttock, trochanter) rightx                           Upper leg, left X                         Upper leg, right X                           Lower leg, left X                         Lower leg, rightx                                     Jaw, left                                   Jaw, right                                        Chest X                                  Abdomen                               Upper backx                              Lower back X                                        Neck X  Score will be from 0-19:    WPI : " 15                                         Symptom severity score  Fatigue 1  Waking Unrefreshed 2  Cognitive Symptoms 0   0 = no problem, 1=slight or mild problem 2= moderate; considerable problems often present and/or at a moderate level, 3 = severe, pervasive, continuous, life disturbing problem  For each of the 3 symptoms, indicate the level of severity over the past week using the Scale.  The symptom severity score is the sum of the severity of the 3 symptoms (fatigue, waking unrefreshed, and cognitive symptoms) plus the number of the following symptoms occurring during the previous 6 months:   Headaches 1  Pain or cramps in the lower abdomen 0  Depression 0  The final score is between 0 and 12    SSS : 4                                      Criteria  Patient has fibromyalgia if the following 3 conditions are met:  1.  Widespread pain index greater than or equal to 7 and symptom severity score greater than or equal to 5 or widespread pain index between 3- 6, and symptom severity score greater than or equal to 9.    2.  Symptoms have been present in a similar level for at least 3 months  3.  The patient does not have a disorder that would otherwise sufficiently explain the pain          Review of Systems   Constitutional: Negative for fatigue, fever and unexpected weight change.   HENT: Negative for mouth sores and trouble swallowing.         Chronic alopecia   Eyes: Negative for pain and redness.   Respiratory: Negative for cough and shortness of breath.    Cardiovascular: Negative for chest pain.   Gastrointestinal: Negative for constipation and diarrhea.   Genitourinary: Negative for dysuria and genital sores.   Musculoskeletal: Positive for arthralgias, back pain, myalgias and neck pain. Negative for joint swelling.   Skin: Negative for color change and rash.   Neurological: Positive for headaches (chronic). Negative for weakness and numbness.   Hematological: Negative for adenopathy. Does not bruise/bleed  "easily.         Objective:     /81   Pulse 65   Ht 5' 3.6" (1.615 m)   Wt 67.4 kg (148 lb 8 oz)   BMI 25.81 kg/m²      Physical Exam   Constitutional: She is oriented to person, place, and time and well-developed, well-nourished, and in no distress. No distress.   HENT:   Head: Atraumatic.   Mouth/Throat: Oropharynx is clear and moist.   Eyes: Conjunctivae and EOM are normal. No scleral icterus.   Neck: Normal range of motion.   Cardiovascular: Normal rate, regular rhythm, normal heart sounds and intact distal pulses.  Exam reveals no gallop and no friction rub.    No murmur heard.  Pulmonary/Chest: Effort normal and breath sounds normal. No respiratory distress. She has no wheezes.   Abdominal: Soft. She exhibits no distension. There is no tenderness.       Right Side Rheumatological Exam     Examination finds the wrist, 1st PIP, 1st MCP, 2nd PIP, 2nd MCP, 3rd PIP, 3rd MCP, 4th PIP, 4th MCP, 5th PIP and 5th MCP normal.    The patient is tender to palpation of the shoulder, elbow and knee    Knee Exam   Patellofemoral Crepitus: positive    Muscle Strength (0-5 scale):  Neck Flexion:  5  Neck Extension: 5  Deltoid:  5  Biceps: 5/5   Triceps:  5  : 5/5   Iliopsoas: 5  Quadriceps:  5   Distal Lower Extremity: 5    Left Side Rheumatological Exam     Examination finds the wrist, 1st PIP, 1st MCP, 2nd PIP, 2nd MCP, 3rd PIP, 3rd MCP, 4th PIP, 4th MCP, 5th PIP and 5th MCP normal.    The patient is tender to palpation of the shoulder, elbow and knee.    Knee Exam     Patellofemoral Crepitus: positive    Muscle Strength (0-5 scale):  Neck Flexion:  5  Neck Extension: 5  Deltoid:  5  Biceps: 5/5   Triceps:  5  :  5/5   Iliopsoas: 5  Quadriceps:  5   Distal Lower Extremity: 5      Neurological: She is alert and oriented to person, place, and time.   Skin: Skin is warm and dry. No rash noted.     Psychiatric: Mood and affect normal.   Musculoskeletal: Normal range of motion. She exhibits no edema.   Diffuse " tenderness on exam               Labs:    Old labs reviewed. Lupus labs pending for today    4/26/17:     DEXA:       Impression       Low bone mass with a significant decrease of 3% in the lumbar BMD compared with the prior study. The estimated 10 year probability of hip fracture is 0.3% and of a major osteoporotic fracture 2.9%, respectively.    RECOMMENDATIONS:  1. Adequate calcium and Vitamin D, 1200 mg/day and 600 units/day, respectively.  2. Repeat BMD in 2 years.  3. STOP SMOKING!         Assessment:       1. Other systemic lupus erythematosus with other organ involvement    2. Fibromyalgia    3. Disorder of vitamin B12    4. Scleritis of right eye    5. Long-term use of Plaquenil        58 yo Female with Bipolar d/o, SLE (alopecia, malar rash, arthritis), scleritis,  and fibromyalgia here for follow up.  SLE, SLEDAI:  Pending labs today but expect this to be low. Lupus has been stable on plaquenil 200 mg bid   Fibromyalgia WPI 15, SSS 4. Pain most likely due to fibromyalgia and not a SLE flare.     Medication monitoring- no toxic effects from Plaquenil   Scleritis- stable, needs ongoing monitoring  Tobacco abuse- currently  smoking again  Restless leg syndrome- iron therapy  Moderate OA knees.   Immunizations up to date  Low normal vit B12- pt not on treatment.   Plan:   -  Follow up with Optometry for Plaquenil eye exam and +/- opthalmology for scleritis. Rituximab therapy if needed  - Continue Cymbalta 90mg daily  - Continue Lyrica  150 mg tid , and seroquel 300mg daily  - Decrease Plaquenil to 300 mg daily based on body weight.   - Continue aerobic exercises daily; agree with starting water aerobics  - standing lupus labs today  - check cpk, on statin and with muscle aches. Check A1c pt reports increased thirst and urination.   - Check methylmalonic acid- low normal vitamin B 12  - flu vaccination needed in Oct/November    RTC 3 months with standing labs.       Signed,   Vlad Wallace

## 2017-09-17 ENCOUNTER — PATIENT MESSAGE (OUTPATIENT)
Dept: RHEUMATOLOGY | Facility: CLINIC | Age: 57
End: 2017-09-17

## 2017-09-18 ENCOUNTER — TELEPHONE (OUTPATIENT)
Dept: RHEUMATOLOGY | Facility: CLINIC | Age: 57
End: 2017-09-18

## 2017-09-18 DIAGNOSIS — M54.12 LEFT CERVICAL RADICULOPATHY: Primary | ICD-10-CM

## 2017-09-18 DIAGNOSIS — M54.16 LEFT LUMBAR RADICULOPATHY: ICD-10-CM

## 2017-09-18 NOTE — TELEPHONE ENCOUNTER
Please schedule appt [facility] asap for left cervical radiculopathy and left lumbar radiculopathy Thanks KALEE

## 2017-09-20 ENCOUNTER — TELEPHONE (OUTPATIENT)
Dept: SPINE | Facility: CLINIC | Age: 57
End: 2017-09-20

## 2017-09-20 DIAGNOSIS — M54.2 NECK PAIN: Primary | ICD-10-CM

## 2017-09-22 NOTE — PROGRESS NOTES
Subjective:      Patient ID: Breanna Guido is a 57 y.o. female.    Chief Complaint: Neck Pain (Down to left arm)      HPI     History of bipolar, COPD, HTN, FM, lupus. Referral from rheumatology.     2 week history of constant neck pain with radiation to left arm into her entire hand. No right arm pain. Neck pain < left arm pain. She is right hand dominant. Pain is throbbing in nature. Pain is worse with laying flat and using her arm. Some improvement with heat/cold and lidoderm patches. She rates her pain as a 9 on a scale of 1-10. She has numbness, tingling, and weakness in her left arm. No history of neck issues. History of lumbar sciatica. She had a fall out of bed about 3 weeks ago, but pain started a week later.     Minimal relief with increased lyrica and vicodin. No PT, injections, or surgery on her neck. She's had lumbar ESIs in the past.     Patient denies fevers, chills, nausea, vomiting, and weight loss. She denies changes in handwriting, problems with hand dexterity, and frequent dropping of items. She admits to night sweats. History of chronic balance issues.       Review of Systems   Constitution: Positive for night sweats and weight loss. Negative for fever, weakness, malaise/fatigue and weight gain.   HENT: Negative for hearing loss, nosebleeds and odynophagia.    Eyes: Negative for blurred vision and double vision.        Positive for poor vision.    Cardiovascular: Negative for chest pain, irregular heartbeat and palpitations.   Respiratory: Negative for cough, hemoptysis, shortness of breath and wheezing.    Endocrine: Negative for cold intolerance and polydipsia.   Hematologic/Lymphatic: Does not bruise/bleed easily.   Skin: Positive for dry skin. Negative for poor wound healing, rash and suspicious lesions.   Musculoskeletal: Positive for back pain, muscle cramps and neck pain.        See HPI for pertinent positives.   Gastrointestinal: Negative for bloating, abdominal pain,  constipation, diarrhea, hematochezia, melena, nausea and vomiting.   Genitourinary: Negative for bladder incontinence, dysuria, hematuria, hesitancy and incomplete emptying.   Neurological: Positive for headaches, numbness and paresthesias. Negative for disturbances in coordination, dizziness, focal weakness, loss of balance and seizures.        Positive for abnormal arm/leg sensations and unsteady gait.    Psychiatric/Behavioral: Positive for depression. Negative for hallucinations. The patient is nervous/anxious.         Positive for mood swings.            Objective:        General: Breanna is well-developed, well-nourished, appears stated age, in no acute distress, alert and oriented to time, place and person.     General    Vitals reviewed.  Constitutional: She is oriented to person, place, and time. She appears well-developed and well-nourished.   Pulmonary/Chest: Effort normal.   Abdominal: She exhibits no distension.   Neurological: She is alert and oriented to person, place, and time.   Psychiatric: She has a normal mood and affect. Her behavior is normal. Judgment and thought content normal.           Gait: normal, Romberg is normal, tandem walking is normal and she is able to heel/toe stand.     On exam of the cervical spine, pt has mild posterior and left scapular tenderness.     Patient has reasonable ROM of cervical spine with minimal pain.     Skin in the cervical region is warm to the touch without visible rashes.     Strength Testing of Bilateral UEs shows  Right :  +5/5   Left :  +5/5  Right deltoid:  +5/5   Left deltoid:  +5/5  Right biceps:  +5/5   Left biceps:  +5/5  Right triceps:  +5/5   Left triceps:  +5/5  Right wrist extension:  +5/5  Left wrist extension:  +5/5  Right wrist flexion:  +5/5  Left wrist flexion:  +5/5  Right interosseus:  +5/5  Left interosseus:  +5/5    Sensation is grossly intact to light touch in C5, C6, C7, C8, T1 distribution.     Right shoulder has no pain with  IR/ER. Good ROM of shoulder and no tenderness.   Left shoulder has no pain with IR/ER. Good ROM of shoulder and no tenderness.     negative clonus in bilateral LEs.    negative hoffmans bilateral UEs.    DTRs:  Right biceps:  +2     Left biceps:  +2   Right brachioradialis:  +2  Left brachioradialis:  +2    On gross exam of bilateral LEs, patient has full painfree ROM with no signs of clubbing, laxity, cyanosis, edema, instability, and weakness.       XRAY INTERPRETATION:  X-rays of cervical spine (AP/lat/flex/ext) dated 9/25/17 are personally reviewed and show mild cervical spondylosis and DDD C5-C6 and C6-C7.        Assessment:       1. Fibromyalgia    2. Cervical spondylosis without myelopathy    3. Degeneration of cervical intervertebral disc    4. Brachial neuritis    5. Neck pain           Plan:       Orders Placed This Encounter    MRI Cervical Spine Without Contrast    hydrocodone-acetaminophen 5-325mg (NORCO) 5-325 mg per tablet    ketorolac injection 30 mg       2 week history of constant neck pain with radiation to left arm into her entire hand. No right arm pain. Neck pain < left arm pain. Known  mild cervical spondylosis and DDD C5-C6 and C6-C7. Also with known FM and lupus. Current pain is severe. Treatment options reviewed with patient along with above cervical XRs. Following plan made:     - MRI of cervical spine to further evaluate left UE radiculitis.   - Discussed medrol dose pack- will hold off as this has elevated her BP in the past.   - Continue lyrica from rheumatology.   - One time only prescription for norco. Discussed risks/benefits and side effects.   - Local toradol injection given into left buttock region without difficulty.   - Depending on MRI, will likely consider ESIs.     Follow-up: Return in 2 weeks (on 10/9/2017). If there are any questions prior to this, the patient was instructed to contact the office.

## 2017-09-25 ENCOUNTER — OFFICE VISIT (OUTPATIENT)
Dept: SPINE | Facility: CLINIC | Age: 57
End: 2017-09-25
Payer: MEDICARE

## 2017-09-25 ENCOUNTER — HOSPITAL ENCOUNTER (OUTPATIENT)
Dept: RADIOLOGY | Facility: OTHER | Age: 57
Discharge: HOME | End: 2017-09-25
Attending: PHYSICIAN ASSISTANT
Payer: MEDICARE

## 2017-09-25 VITALS
WEIGHT: 148 LBS | DIASTOLIC BLOOD PRESSURE: 79 MMHG | BODY MASS INDEX: 26.22 KG/M2 | SYSTOLIC BLOOD PRESSURE: 131 MMHG | HEART RATE: 74 BPM | HEIGHT: 63 IN

## 2017-09-25 DIAGNOSIS — M54.12 BRACHIAL NEURITIS: ICD-10-CM

## 2017-09-25 DIAGNOSIS — M47.812 CERVICAL SPONDYLOSIS WITHOUT MYELOPATHY: ICD-10-CM

## 2017-09-25 DIAGNOSIS — M50.30 DEGENERATION OF CERVICAL INTERVERTEBRAL DISC: ICD-10-CM

## 2017-09-25 DIAGNOSIS — M54.2 NECK PAIN: ICD-10-CM

## 2017-09-25 DIAGNOSIS — M79.7 FIBROMYALGIA: Primary | Chronic | ICD-10-CM

## 2017-09-25 PROCEDURE — 99999 PR PBB SHADOW E&M-EST. PATIENT-LVL IV: CPT | Mod: PBBFAC,,, | Performed by: PHYSICIAN ASSISTANT

## 2017-09-25 PROCEDURE — 3008F BODY MASS INDEX DOCD: CPT | Mod: S$GLB,,, | Performed by: PHYSICIAN ASSISTANT

## 2017-09-25 PROCEDURE — 3078F DIAST BP <80 MM HG: CPT | Mod: S$GLB,,, | Performed by: PHYSICIAN ASSISTANT

## 2017-09-25 PROCEDURE — 3075F SYST BP GE 130 - 139MM HG: CPT | Mod: S$GLB,,, | Performed by: PHYSICIAN ASSISTANT

## 2017-09-25 PROCEDURE — 72050 X-RAY EXAM NECK SPINE 4/5VWS: CPT | Mod: TC

## 2017-09-25 PROCEDURE — 96372 THER/PROPH/DIAG INJ SC/IM: CPT | Mod: S$GLB,,, | Performed by: PHYSICIAN ASSISTANT

## 2017-09-25 PROCEDURE — 72050 X-RAY EXAM NECK SPINE 4/5VWS: CPT | Mod: 26,,, | Performed by: RADIOLOGY

## 2017-09-25 PROCEDURE — 99204 OFFICE O/P NEW MOD 45 MIN: CPT | Mod: 25,S$GLB,, | Performed by: PHYSICIAN ASSISTANT

## 2017-09-25 RX ORDER — HYDROCODONE BITARTRATE AND ACETAMINOPHEN 5; 325 MG/1; MG/1
1 TABLET ORAL EVERY 8 HOURS PRN
Qty: 20 TABLET | Refills: 0 | Status: SHIPPED | OUTPATIENT
Start: 2017-09-25 | End: 2018-07-13

## 2017-09-25 RX ORDER — KETOROLAC TROMETHAMINE 30 MG/ML
30 INJECTION, SOLUTION INTRAMUSCULAR; INTRAVENOUS
Status: COMPLETED | OUTPATIENT
Start: 2017-09-25 | End: 2017-09-25

## 2017-09-25 RX ADMIN — KETOROLAC TROMETHAMINE 30 MG: 30 INJECTION, SOLUTION INTRAMUSCULAR; INTRAVENOUS at 09:09

## 2017-09-25 NOTE — LETTER
September 25, 2017      Eduardo Valencia MD  [address]  [facility] [address]           [facility]  [address], [address]  [facility] [address]  Phone: [phone]  Fax: [phone]          Patient: Breanna Guido   MR Number: [id number]   YOB: 1960   Date of Visit: 9/25/2017       Dear Dr. Eduardo Valencia:    Thank you for referring Breanna Guido to me for evaluation. Attached you will find relevant portions of my assessment and plan of care.    If you have questions, please do not hesitate to call me. I look forward to following Breanna Guido along with you.    Sincerely,    CAPO [place]  CC:  No Recipients    If you would like to receive this communication electronically, please contact [email or web address] or [phone] to request more information on [employer] Link access.    For providers and/or their staff who would like to refer a patient to [facility], please contact us through our one-stop-shop provider referral line, [facility], at [phone].    If you feel you have received this communication in error or would no longer like to receive these types of communications, please e-mail [email or web address]

## 2017-10-24 ENCOUNTER — INITIAL CONSULT (OUTPATIENT)
Dept: OPTOMETRY | Facility: CLINIC | Age: 57
End: 2017-10-24
Payer: MEDICARE

## 2017-10-24 ENCOUNTER — CLINICAL SUPPORT (OUTPATIENT)
Dept: OPHTHALMOLOGY | Facility: CLINIC | Age: 57
End: 2017-10-24
Payer: MEDICARE

## 2017-10-24 DIAGNOSIS — H52.4 HYPEROPIA WITH ASTIGMATISM AND PRESBYOPIA, BILATERAL: ICD-10-CM

## 2017-10-24 DIAGNOSIS — H52.03 HYPEROPIA WITH ASTIGMATISM AND PRESBYOPIA, BILATERAL: ICD-10-CM

## 2017-10-24 DIAGNOSIS — H15.001 SCLERITIS, RIGHT: ICD-10-CM

## 2017-10-24 DIAGNOSIS — H25.13 NUCLEAR SCLEROSIS, BILATERAL: ICD-10-CM

## 2017-10-24 DIAGNOSIS — Z79.899 HIGH RISK MEDICATION USE: Primary | ICD-10-CM

## 2017-10-24 DIAGNOSIS — H52.203 HYPEROPIA WITH ASTIGMATISM AND PRESBYOPIA, BILATERAL: ICD-10-CM

## 2017-10-24 DIAGNOSIS — M32.9 SYSTEMIC LUPUS ERYTHEMATOSUS, UNSPECIFIED SLE TYPE, UNSPECIFIED ORGAN INVOLVEMENT STATUS: ICD-10-CM

## 2017-10-24 PROCEDURE — 92134 CPTRZ OPH DX IMG PST SGM RTA: CPT | Mod: S$GLB,,, | Performed by: OPTOMETRIST

## 2017-10-24 PROCEDURE — 99999 PR PBB SHADOW E&M-EST. PATIENT-LVL II: CPT | Mod: PBBFAC,,, | Performed by: OPTOMETRIST

## 2017-10-24 PROCEDURE — 92014 COMPRE OPH EXAM EST PT 1/>: CPT | Mod: S$GLB,,, | Performed by: OPTOMETRIST

## 2017-10-24 PROCEDURE — 92083 EXTENDED VISUAL FIELD XM: CPT | Mod: S$GLB,,, | Performed by: OPTOMETRIST

## 2017-10-24 PROCEDURE — 92015 DETERMINE REFRACTIVE STATE: CPT | Mod: S$GLB,,, | Performed by: OPTOMETRIST

## 2017-10-24 PROCEDURE — 99499 UNLISTED E&M SERVICE: CPT | Mod: S$PBB,,, | Performed by: OPTOMETRIST

## 2017-10-24 NOTE — LETTER
October 25, 2017      Vlad Wallace MD  [address]  [facility] [address]           [facility]  [address]  [facility] [address]  Phone: [phone]  Fax: [phone]          Patient: Breanna Guido   MR Number: [id number]   YOB: 1960   Date of Visit: 10/24/2017       Dear Dr. Vlad Wallace:    Thank you for referring Breanna Guido to me for evaluation. Attached you will find relevant portions of my assessment and plan of care.    If you have questions, please do not hesitate to call me. I look forward to following Breanna Guido along with you.    Sincerely,    Kourtney Cervantes, OD    Enclosure  CC:  No Recipients    If you would like to receive this communication electronically, please contact [email or web address] or [phone] to request more information on [employer] Link access.    For providers and/or their staff who would like to refer a patient to [facility], please contact us through our one-stop-shop provider referral line, [facility], at [phone].    If you feel you have received this communication in error or would no longer like to receive these types of communications, please e-mail [email or web address]

## 2017-10-25 NOTE — PROGRESS NOTES
HPI     Last eye exam was 8/11/16 with Dr. Cervantes.    Plaquenil 200 mg Twice Daily PO. Pt c/o blurred vision for distance,   glasses are aprrx  2+years old. Patient denies flashes, floaters, pain and   double vision. Not using any gtts, no other ocular complaints. Pt has had   a few flare ups with sclerititis OD every couple months, treats with   naproxen. Feels when she is stressed it causes her to flare up.         Last edited by JAH Mcneal on 10/24/2017 10:28 AM.   (History)            Assessment /Plan     For exam results, see Encounter Report.    High risk medication use  -     [facility] - OU - Extended - Both Eyes  -     OCT- Retina    Systemic lupus erythematosus, unspecified SLE type, unspecified organ involvement status  -     [facility] - OU - Extended - Both Eyes  -     OCT- Retina    Nuclear sclerosis, bilateral    Scleritis, right    Hyperopia with astigmatism and presbyopia, bilateral            1-2.  All testing normal OU-no retinopathy.  Monitor 6 months.  3.  Early-monitor.  4.  Conjunctiva white and quiet today.  Flare-up on occasion but will resolve in 2-3 days with Naproxen.  5.  Bifocal rx given

## 2017-11-06 ENCOUNTER — TELEPHONE (OUTPATIENT)
Dept: SPINE | Facility: CLINIC | Age: 57
End: 2017-11-06

## 2017-11-06 NOTE — TELEPHONE ENCOUNTER
Called patient to inform her that she had to have her MRI taken before Herminia Leon could see.  Patient stated that she will reschedule her MRI and call us back to reschedule follow up with Herminia Leon.

## 2017-12-18 ENCOUNTER — PATIENT MESSAGE (OUTPATIENT)
Dept: OPHTHALMOLOGY | Facility: CLINIC | Age: 57
End: 2017-12-18

## 2017-12-22 ENCOUNTER — OFFICE VISIT (OUTPATIENT)
Dept: OPHTHALMOLOGY | Facility: CLINIC | Age: 57
End: 2017-12-22
Payer: MEDICARE

## 2017-12-22 DIAGNOSIS — H15.001 SCLERITIS OF RIGHT EYE: Primary | ICD-10-CM

## 2017-12-22 PROCEDURE — 99999 PR PBB SHADOW E&M-EST. PATIENT-LVL I: CPT | Mod: PBBFAC,,, | Performed by: OPHTHALMOLOGY

## 2017-12-22 PROCEDURE — 92014 COMPRE OPH EXAM EST PT 1/>: CPT | Mod: S$GLB,,, | Performed by: OPHTHALMOLOGY

## 2017-12-26 NOTE — PROGRESS NOTES
HPI     Eye Problem    Additional comments: check per patient           Comments   Billy pt    SLE - (The ds-DNA test for active lupus is negative. Lupus appears to be   in remission. RJQ )   Fibromyalgia     H/o scleritis     lyrica 225mg BID   Plaquenil 200 mg BID   Naproxen    4 days ago patient started with red painful eye OD. She started taking   naproxen which helped. Eye cleared up and no longer having pain       Last edited by Zita Meade MD on 12/22/2017  2:16 PM. (History)            Assessment /Plan     For exam results, see Encounter Report.    Scleritis of right eye        recurrent scleritis OD  - pt had severe flare 1 week ago and has been on naproxen ever since - continue for 2 more days.  - had had issues in the past with blood pressure and oral steroids     Fup as scheduled with Dr. Valencia.      Dr. Cervantes 3 mo.

## 2017-12-28 ENCOUNTER — HOSPITAL ENCOUNTER (EMERGENCY)
Facility: HOSPITAL | Age: 57
Discharge: HOME | End: 2017-12-28
Attending: FAMILY MEDICINE
Payer: MEDICARE

## 2017-12-28 VITALS
BODY MASS INDEX: 25.78 KG/M2 | OXYGEN SATURATION: 98 % | HEIGHT: 63 IN | HEART RATE: 97 BPM | WEIGHT: 145.5 LBS | RESPIRATION RATE: 18 BRPM | SYSTOLIC BLOOD PRESSURE: 138 MMHG | TEMPERATURE: 98 F | DIASTOLIC BLOOD PRESSURE: 76 MMHG

## 2017-12-28 DIAGNOSIS — M32.9 LUPUS: Chronic | ICD-10-CM

## 2017-12-28 DIAGNOSIS — R05.9 COUGH: ICD-10-CM

## 2017-12-28 DIAGNOSIS — B34.9 ACUTE VIRAL SYNDROME: Primary | ICD-10-CM

## 2017-12-28 LAB
ANION GAP SERPL CALC-SCNC: 9 MMOL/L
BACTERIA #/AREA URNS AUTO: ABNORMAL /HPF
BASOPHILS # BLD AUTO: 0.01 K/UL
BASOPHILS NFR BLD: 0.4 %
BILIRUB UR QL STRIP: NEGATIVE
BUN SERPL-MCNC: 11 MG/DL
CALCIUM SERPL-MCNC: 9 MG/DL
CHLORIDE SERPL-SCNC: 101 MMOL/L
CLARITY UR REFRACT.AUTO: ABNORMAL
CO2 SERPL-SCNC: 25 MMOL/L
COLOR UR AUTO: YELLOW
CREAT SERPL-MCNC: 0.8 MG/DL
DIFFERENTIAL METHOD: ABNORMAL
EOSINOPHIL # BLD AUTO: 0 K/UL
EOSINOPHIL NFR BLD: 0 %
ERYTHROCYTE [DISTWIDTH] IN BLOOD BY AUTOMATED COUNT: 12.7 %
EST. GFR  (AFRICAN AMERICAN): >60 ML/MIN/1.73 M^2
EST. GFR  (NON AFRICAN AMERICAN): >60 ML/MIN/1.73 M^2
FLUAV AG SPEC QL IA: NEGATIVE
FLUBV AG SPEC QL IA: NEGATIVE
GLUCOSE SERPL-MCNC: 90 MG/DL
GLUCOSE UR QL STRIP: NEGATIVE
HCT VFR BLD AUTO: 38.7 %
HGB BLD-MCNC: 13.7 G/DL
HGB UR QL STRIP: ABNORMAL
IMM GRANULOCYTES # BLD AUTO: 0.01 K/UL
IMM GRANULOCYTES NFR BLD AUTO: 0.4 %
KETONES UR QL STRIP: ABNORMAL
LEUKOCYTE ESTERASE UR QL STRIP: ABNORMAL
LYMPHOCYTES # BLD AUTO: 0.8 K/UL
LYMPHOCYTES NFR BLD: 29.8 %
MCH RBC QN AUTO: 30.9 PG
MCHC RBC AUTO-ENTMCNC: 35.4 G/DL
MCV RBC AUTO: 87 FL
MICROSCOPIC COMMENT: ABNORMAL
MONOCYTES # BLD AUTO: 0.4 K/UL
MONOCYTES NFR BLD: 14.9 %
NEUTROPHILS # BLD AUTO: 1.5 K/UL
NEUTROPHILS NFR BLD: 54.5 %
NITRITE UR QL STRIP: NEGATIVE
NRBC BLD-RTO: 0 /100 WBC
PH UR STRIP: 5 [PH] (ref 5–8)
PLATELET # BLD AUTO: 119 K/UL
PMV BLD AUTO: 10.8 FL
POTASSIUM SERPL-SCNC: 3.4 MMOL/L
PROT UR QL STRIP: NEGATIVE
RBC # BLD AUTO: 4.44 M/UL
RBC #/AREA URNS AUTO: 5 /HPF (ref 0–4)
SODIUM SERPL-SCNC: 135 MMOL/L
SP GR UR STRIP: 1.02 (ref 1–1.03)
SPECIMEN SOURCE: NORMAL
SQUAMOUS #/AREA URNS AUTO: 1 /HPF
URN SPEC COLLECT METH UR: ABNORMAL
UROBILINOGEN UR STRIP-ACNC: NEGATIVE EU/DL
WBC # BLD AUTO: 2.75 K/UL
WBC #/AREA URNS AUTO: 12 /HPF (ref 0–5)

## 2017-12-28 PROCEDURE — 25000003 PHARM REV CODE 250: Performed by: FAMILY MEDICINE

## 2017-12-28 PROCEDURE — 99284 EMERGENCY DEPT VISIT MOD MDM: CPT | Mod: 25

## 2017-12-28 PROCEDURE — 96374 THER/PROPH/DIAG INJ IV PUSH: CPT

## 2017-12-28 PROCEDURE — 63600175 PHARM REV CODE 636 W HCPCS: Performed by: FAMILY MEDICINE

## 2017-12-28 PROCEDURE — 87400 INFLUENZA A/B EACH AG IA: CPT

## 2017-12-28 PROCEDURE — 87086 URINE CULTURE/COLONY COUNT: CPT

## 2017-12-28 PROCEDURE — 80048 BASIC METABOLIC PNL TOTAL CA: CPT

## 2017-12-28 PROCEDURE — 85025 COMPLETE CBC W/AUTO DIFF WBC: CPT

## 2017-12-28 PROCEDURE — 81001 URINALYSIS AUTO W/SCOPE: CPT

## 2017-12-28 PROCEDURE — 96361 HYDRATE IV INFUSION ADD-ON: CPT

## 2017-12-28 PROCEDURE — 99284 EMERGENCY DEPT VISIT MOD MDM: CPT | Mod: ,,, | Performed by: FAMILY MEDICINE

## 2017-12-28 RX ORDER — ONDANSETRON 4 MG/1
4 TABLET, FILM COATED ORAL EVERY 8 HOURS PRN
Qty: 12 TABLET | Refills: 0 | Status: SHIPPED | OUTPATIENT
Start: 2017-12-28 | End: 2018-04-04

## 2017-12-28 RX ORDER — ONDANSETRON 2 MG/ML
4 INJECTION INTRAMUSCULAR; INTRAVENOUS
Status: COMPLETED | OUTPATIENT
Start: 2017-12-28 | End: 2017-12-28

## 2017-12-28 RX ORDER — BENZONATATE 200 MG/1
200 CAPSULE ORAL 3 TIMES DAILY PRN
Qty: 20 CAPSULE | Refills: 0 | Status: SHIPPED | OUTPATIENT
Start: 2017-12-28 | End: 2018-04-04

## 2017-12-28 RX ADMIN — ONDANSETRON 4 MG: 2 INJECTION INTRAMUSCULAR; INTRAVENOUS at 11:12

## 2017-12-28 RX ADMIN — SODIUM CHLORIDE 1000 ML: 0.9 INJECTION, SOLUTION INTRAVENOUS at 11:12

## 2017-12-28 NOTE — ED TRIAGE NOTES
Presents to ER with chest congestion, nasal congestion and soreness to her rib area and back from coughing.  States that she has been sick since 12/25/17.    GENERAL: The patient is well-developed and well-nourished in no apparent distress. Alert and oriented x4.                                                HEENT: Head is normocephalic and atraumatic. Extraocular muscles are intact. Pupils are equal, round, and reactive to light and accommodation. Nares appeared normal. Mouth is well hydrated and without lesions. Mucous membranes are moist. Posterior pharynx clear of any exudate or lesions.    NECK: Supple. No carotid bruits. No lymphadenopathy or thyromegaly.    LUNGS: Clear to auscultation.    HEART: Regular rate and rhythm without murmur.     ABDOMEN: Soft, nontender, and nondistended. Positive bowel sounds. No hepatosplenomegaly was noted.     EXTREMITIES: Without any cyanosis, clubbing, rash, lesions or edema.     NEUROLOGIC: Cranial nerves II through XII are grossly intact.     PSYCHIATRIC: Flat affect, but denies suicidal or homicidal ideations.    SKIN: No ulceration or induration present.

## 2017-12-28 NOTE — ED PROVIDER NOTES
Encounter Date: 12/28/2017    SCRIBE #1 NOTE: I, Bianca Guevara, am scribing for, and in the presence of,  Dr. Coon. I have scribed the entire note.       History     Chief Complaint   Patient presents with    Generalized Body Aches     cough, headache     Time patient was seen by the provider: 11:05 AM      The patient is a 57 y.o. female with hx of: COPD, HLD, HTN, thyroid disease, fibromyalgia, arthritis, who presents to the ED with a complaint of chest pain and tightness along with back pain that presented early this morning. The patient also states that on Turner morning she woke up with a sore throat and cough and is still on-going. Patient states that her last meal was on Turner day and has only been drinking some water this past week to avoid vomiting. Patient denies any blood in her cough, stool, or urine.          The history is provided by the patient and medical records.     Review of patient's allergies indicates:   Allergen Reactions    Bactrim [sulfamethoxazole-trimethoprim] Nausea And Vomiting    Sulfa (sulfonamide antibiotics)      Agitation^     Past Medical History:   Diagnosis Date    Anxiety     Arthritis     Bipolar 1 disorder     Cataract     COPD (chronic obstructive pulmonary disease)     Depression     bipolar 2    Eyelid lesion     Fibromyalgia     H/O corneal abrasion 2001    left eye with lead-based paint chips    Hyperlipidemia     Hypertension     Lupus     Migraine headache     Thyroid disease      Past Surgical History:   Procedure Laterality Date    HYSTERECTOMY      for sterilization, ovaries intact     Family History   Problem Relation Age of Onset    Depression Father      suicide    Cataracts Mother     Rheum arthritis Mother     Lupus Mother     Cataracts Sister     Rheum arthritis Sister     Breast cancer Maternal Aunt     Thyroid disease Daughter     No Known Problems Daughter     No Known Problems Daughter     Colon cancer Brother      No Known Problems Maternal Uncle     No Known Problems Paternal Aunt     No Known Problems Paternal Uncle     No Known Problems Maternal Grandmother     No Known Problems Maternal Grandfather     No Known Problems Paternal Grandmother     No Known Problems Paternal Grandfather     Ovarian cancer Neg Hx     Melanoma Neg Hx     Amblyopia Neg Hx     Blindness Neg Hx     Cancer Neg Hx     Diabetes Neg Hx     Glaucoma Neg Hx     Hypertension Neg Hx     Macular degeneration Neg Hx     Retinal detachment Neg Hx     Strabismus Neg Hx     Stroke Neg Hx      Social History   Substance Use Topics    Smoking status: Current Every Day Smoker     Packs/day: 0.25     Years: 40.00     Types: Cigarettes     Last attempt to quit: 6/1/2017    Smokeless tobacco: Never Used    Alcohol use No      Comment: rare     Review of Systems   Constitutional: Negative for fever.   HENT: Positive for sore throat.    Eyes: Negative for visual disturbance.   Respiratory: Positive for cough and chest tightness.    Cardiovascular: Positive for chest pain.   Gastrointestinal: Negative for abdominal pain.   Genitourinary: Negative for dysuria.   Musculoskeletal: Positive for back pain.   Skin: Negative for rash.   Neurological: Negative for syncope.       Physical Exam     Initial Vitals [12/28/17 1024]   BP Pulse Resp Temp SpO2   135/69 97 18 98.7 °F (37.1 °C) 97 %      MAP       91         Physical Exam    Constitutional: She appears well-developed.   HENT:   Head: Atraumatic.   Mouth/Throat: Oropharynx is clear and moist.   Mucus membranes are tacky   Eyes: EOM are normal. Pupils are equal, round, and reactive to light.   Neck: Normal range of motion. Neck supple.   Cardiovascular: Normal rate, regular rhythm and normal heart sounds.   Pulmonary/Chest: Breath sounds normal.   Abdominal: There is tenderness.   Mild abdominal muscular tenderness   Musculoskeletal: Normal range of motion.   Neurological: She is alert and oriented to  person, place, and time.         ED Course   Procedures  Labs Reviewed   INFLUENZA A AND B ANTIGEN   CBC W/ AUTO DIFFERENTIAL   BASIC METABOLIC PANEL   URINALYSIS, REFLEX TO URINE CULTURE             Medical Decision Making:   ED Management:  Patient instructions:  Chest x-ray shows no pneumonia.   Blood and urine tests do not show evidence of dangerous infection or dehydration.  Kidney function is normal.   Your influenza swab is negative, so the anti-flu medicine will not work for you.   We will write prescriptions to help control your cough and nausea.  Use OTC Tylenol for aches and pains.   F/U w/ your DrMelissa If not feeling better in 3-4 days.             Scribe Attestation:   Scribe #1: I performed the above scribed service and the documentation accurately describes the services I performed. I attest to the accuracy of the note.            ED Course      Clinical Impression:   The primary encounter diagnosis was Acute viral syndrome. A diagnosis of Cough was also pertinent to this visit.     I, Dr. Jose Antonio Coon, personally performed the services described in this documentation. All medical record entries made by the scribe were at my direction and in my presence.  I have reviewed the chart and agree that the record reflects my personal performance and is accurate and complete. Jose Antonio Coon MD.  12:33 PM 12/28/2017                             Jose Antonio Coon MD  12/28/17 [id number]

## 2017-12-28 NOTE — DISCHARGE INSTRUCTIONS
Chest x-ray shows no pneumonia.   Blood and urine tests do not show evidence of dangerous infection or dehydration.  Kidney function is normal.   Your influenza swab is negative, so the anti-flu medicine will not work for you.   We will write prescriptions to help control your cough and nausea.  Use OTC Tylenol for aches and pains.   F/U w/ your Dr. If not feeling better in 3-4 days.

## 2017-12-29 LAB — BACTERIA UR CULT: NORMAL

## 2018-01-30 DIAGNOSIS — M79.7 FIBROMYALGIA: Chronic | ICD-10-CM

## 2018-01-30 DIAGNOSIS — M32.19 OTHER SYSTEMIC LUPUS ERYTHEMATOSUS WITH OTHER ORGAN INVOLVEMENT: Chronic | ICD-10-CM

## 2018-01-30 RX ORDER — HYDROXYCHLOROQUINE SULFATE 200 MG/1
200 TABLET, FILM COATED ORAL 2 TIMES DAILY
Qty: 180 TABLET | Refills: 0 | Status: SHIPPED | OUTPATIENT
Start: 2018-01-30 | End: 2018-02-20 | Stop reason: SDUPTHER

## 2018-01-30 RX ORDER — PREGABALIN 150 MG/1
150 CAPSULE ORAL 3 TIMES DAILY
Qty: 270 CAPSULE | Refills: 0 | Status: SHIPPED | OUTPATIENT
Start: 2018-01-30 | End: 2018-02-20 | Stop reason: SDUPTHER

## 2018-02-19 ENCOUNTER — PATIENT MESSAGE (OUTPATIENT)
Dept: RHEUMATOLOGY | Facility: CLINIC | Age: 58
End: 2018-02-19

## 2018-02-20 DIAGNOSIS — M79.7 FIBROMYALGIA: Chronic | ICD-10-CM

## 2018-02-20 DIAGNOSIS — M32.19 OTHER SYSTEMIC LUPUS ERYTHEMATOSUS WITH OTHER ORGAN INVOLVEMENT: Chronic | ICD-10-CM

## 2018-02-20 RX ORDER — PREGABALIN 150 MG/1
150 CAPSULE ORAL 3 TIMES DAILY
Qty: 270 CAPSULE | Refills: 0 | Status: SHIPPED | OUTPATIENT
Start: 2018-02-20 | End: 2018-08-06 | Stop reason: SDUPTHER

## 2018-02-20 RX ORDER — HYDROXYCHLOROQUINE SULFATE 200 MG/1
200 TABLET, FILM COATED ORAL 2 TIMES DAILY
Qty: 180 TABLET | Refills: 3 | Status: SHIPPED | OUTPATIENT
Start: 2018-02-20 | End: 2019-04-16 | Stop reason: SDUPTHER

## 2018-03-05 RX ORDER — AMLODIPINE BESYLATE 5 MG/1
5 TABLET ORAL DAILY
Qty: 90 TABLET | Refills: 3 | Status: SHIPPED | OUTPATIENT
Start: 2018-03-05 | End: 2019-04-13 | Stop reason: SDUPTHER

## 2018-04-04 ENCOUNTER — OFFICE VISIT (OUTPATIENT)
Dept: RHEUMATOLOGY | Facility: CLINIC | Age: 58
End: 2018-04-04
Payer: MEDICARE

## 2018-04-04 ENCOUNTER — LAB VISIT (OUTPATIENT)
Dept: LAB | Facility: HOSPITAL | Age: 58
End: 2018-04-04
Payer: MEDICARE

## 2018-04-04 VITALS
HEIGHT: 64 IN | SYSTOLIC BLOOD PRESSURE: 124 MMHG | BODY MASS INDEX: 24.69 KG/M2 | HEART RATE: 70 BPM | DIASTOLIC BLOOD PRESSURE: 86 MMHG | WEIGHT: 144.63 LBS

## 2018-04-04 DIAGNOSIS — Z79.899 LONG-TERM USE OF PLAQUENIL: ICD-10-CM

## 2018-04-04 DIAGNOSIS — H15.001 SCLERITIS OF RIGHT EYE: ICD-10-CM

## 2018-04-04 DIAGNOSIS — M32.19 OTHER SYSTEMIC LUPUS ERYTHEMATOSUS WITH OTHER ORGAN INVOLVEMENT: Chronic | ICD-10-CM

## 2018-04-04 DIAGNOSIS — R41.3 MEMORY DIFFICULTIES: ICD-10-CM

## 2018-04-04 DIAGNOSIS — Z51.81 MEDICATION MONITORING ENCOUNTER: ICD-10-CM

## 2018-04-04 DIAGNOSIS — M79.7 FIBROMYALGIA: ICD-10-CM

## 2018-04-04 DIAGNOSIS — M32.19 OTHER SYSTEMIC LUPUS ERYTHEMATOSUS WITH OTHER ORGAN INVOLVEMENT: Primary | ICD-10-CM

## 2018-04-04 DIAGNOSIS — Z72.0 TOBACCO ABUSE: ICD-10-CM

## 2018-04-04 LAB
ALBUMIN SERPL BCP-MCNC: 3.9 G/DL
ALP SERPL-CCNC: 67 U/L
ALT SERPL W/O P-5'-P-CCNC: 13 U/L
ANION GAP SERPL CALC-SCNC: 8 MMOL/L
AST SERPL-CCNC: 20 U/L
BASOPHILS # BLD AUTO: 0.05 K/UL
BASOPHILS NFR BLD: 1.1 %
BILIRUB SERPL-MCNC: 0.4 MG/DL
BUN SERPL-MCNC: 11 MG/DL
C3 SERPL-MCNC: 146 MG/DL
C4 SERPL-MCNC: 33 MG/DL
CALCIUM SERPL-MCNC: 9.5 MG/DL
CHLORIDE SERPL-SCNC: 106 MMOL/L
CHOLEST SERPL-MCNC: 212 MG/DL
CHOLEST/HDLC SERPL: 4.1 {RATIO}
CK SERPL-CCNC: 200 U/L
CO2 SERPL-SCNC: 25 MMOL/L
CREAT SERPL-MCNC: 0.7 MG/DL
CRP SERPL-MCNC: 1.3 MG/L
DIFFERENTIAL METHOD: ABNORMAL
EOSINOPHIL # BLD AUTO: 0.1 K/UL
EOSINOPHIL NFR BLD: 1.1 %
ERYTHROCYTE [DISTWIDTH] IN BLOOD BY AUTOMATED COUNT: 13.7 %
ERYTHROCYTE [SEDIMENTATION RATE] IN BLOOD BY WESTERGREN METHOD: 23 MM/HR
EST. GFR  (AFRICAN AMERICAN): >60 ML/MIN/1.73 M^2
EST. GFR  (NON AFRICAN AMERICAN): >60 ML/MIN/1.73 M^2
GLUCOSE SERPL-MCNC: 84 MG/DL
HCT VFR BLD AUTO: 39 %
HDLC SERPL-MCNC: 52 MG/DL
HDLC SERPL: 24.5 %
HGB BLD-MCNC: 13.1 G/DL
IGA SERPL-MCNC: 167 MG/DL
IGG SERPL-MCNC: 1535 MG/DL
IGM SERPL-MCNC: 52 MG/DL
IMM GRANULOCYTES # BLD AUTO: 0 K/UL
IMM GRANULOCYTES NFR BLD AUTO: 0 %
LDLC SERPL CALC-MCNC: 141.2 MG/DL
LYMPHOCYTES # BLD AUTO: 2.3 K/UL
LYMPHOCYTES NFR BLD: 49.4 %
MCH RBC QN AUTO: 31.1 PG
MCHC RBC AUTO-ENTMCNC: 33.6 G/DL
MCV RBC AUTO: 93 FL
MONOCYTES # BLD AUTO: 0.4 K/UL
MONOCYTES NFR BLD: 9.1 %
NEUTROPHILS # BLD AUTO: 1.9 K/UL
NEUTROPHILS NFR BLD: 39.3 %
NONHDLC SERPL-MCNC: 160 MG/DL
NRBC BLD-RTO: 0 /100 WBC
PLATELET # BLD AUTO: 164 K/UL
PLATELET BLD QL SMEAR: ABNORMAL
PMV BLD AUTO: 10.6 FL
POTASSIUM SERPL-SCNC: 3.7 MMOL/L
PROT SERPL-MCNC: 7.8 G/DL
RBC # BLD AUTO: 4.21 M/UL
SODIUM SERPL-SCNC: 139 MMOL/L
TRIGL SERPL-MCNC: 94 MG/DL
WBC # BLD AUTO: 4.72 K/UL

## 2018-04-04 PROCEDURE — 85025 COMPLETE CBC W/AUTO DIFF WBC: CPT

## 2018-04-04 PROCEDURE — 36415 COLL VENOUS BLD VENIPUNCTURE: CPT

## 2018-04-04 PROCEDURE — 99214 OFFICE O/P EST MOD 30 MIN: CPT | Mod: GC,S$GLB,, | Performed by: INTERNAL MEDICINE

## 2018-04-04 PROCEDURE — 3079F DIAST BP 80-89 MM HG: CPT | Mod: CPTII,GC,S$GLB, | Performed by: INTERNAL MEDICINE

## 2018-04-04 PROCEDURE — 86160 COMPLEMENT ANTIGEN: CPT

## 2018-04-04 PROCEDURE — 82550 ASSAY OF CK (CPK): CPT

## 2018-04-04 PROCEDURE — 80053 COMPREHEN METABOLIC PANEL: CPT

## 2018-04-04 PROCEDURE — 80061 LIPID PANEL: CPT

## 2018-04-04 PROCEDURE — 86140 C-REACTIVE PROTEIN: CPT

## 2018-04-04 PROCEDURE — 86160 COMPLEMENT ANTIGEN: CPT | Mod: 59

## 2018-04-04 PROCEDURE — 99999 PR PBB SHADOW E&M-EST. PATIENT-LVL IV: CPT | Mod: PBBFAC,GC,, | Performed by: INTERNAL MEDICINE

## 2018-04-04 PROCEDURE — 82784 ASSAY IGA/IGD/IGG/IGM EACH: CPT | Mod: 59

## 2018-04-04 PROCEDURE — 85651 RBC SED RATE NONAUTOMATED: CPT

## 2018-04-04 PROCEDURE — 3074F SYST BP LT 130 MM HG: CPT | Mod: CPTII,GC,S$GLB, | Performed by: INTERNAL MEDICINE

## 2018-04-04 ASSESSMENT — ROUTINE ASSESSMENT OF PATIENT INDEX DATA (RAPID3)
PSYCHOLOGICAL DISTRESS SCORE: 3.3
FATIGUE SCORE: 3.5
PATIENT GLOBAL ASSESSMENT SCORE: 5
WHEN YOU AWAKENED IN THE MORNING OVER THE LAST WEEK, PLEASE INDICATE THE AMOUNT OF TIME IT TAKES UNTIL YOU ARE AS LIMBER AS YOU WILL BE FOR THE DAY: 20 MINS
AM STIFFNESS SCORE: 1, YES
TOTAL RAPID3 SCORE: 3.28
PAIN SCORE: 3.5
MDHAQ FUNCTION SCORE: .4

## 2018-04-04 ASSESSMENT — SYSTEMIC LUPUS ERYTHEMATOSUS DISEASE ACTIVITY INDEX (SLEDAI): TOTAL_SCORE: 0

## 2018-04-04 NOTE — PROGRESS NOTES
The 10-year ASCVD risk score (Ashley NAVA Jr., et al., 2013) is: 8.9%    Values used to calculate the score:      Age: 57 years      Sex: Female      Is Non- : Yes      Diabetic: No      Tobacco smoker: Yes      Systolic Blood Pressure: 124 mmHg      Is BP treated: Yes      HDL Cholesterol: 52 mg/dL      Total Cholesterol: 148 mg/dL

## 2018-04-04 NOTE — PATIENT INSTRUCTIONS
Decrease Lyrica to twice daily instead of three times daily  If you are able cut back on the Xanax as well.

## 2018-04-04 NOTE — PROGRESS NOTES
I  Have personally take the history and examined the patient and agree with fellow's note as stated above.She is doing excellently, she feels the best since I have followed her. Only 1 cigarette/day compared with 2-3 ppd in past, physically more active, including yardwork/gardening. SLEDAI=0 pending labs. BP well controlled. Only brief flare scleritis responded to short course of naproxen/omeprazole. Myalgias resolved after stopping atorvastatin after last visit. Has been taking pregabalin 150m tid only several times/week. Remains on duloxetine, quietipine and alprazolam from Dr. Zheng her psychiatrist.  Agree with Rodrigo's assessment and plans. KALEE

## 2018-04-04 NOTE — PROGRESS NOTES
Subjective:       Patient ID: Breanna Guido is a 57 y.o. female.    Chief Complaint: No chief complaint on file.    HPI     Pt is a 58 yo Female with Bipolar d/o, SLE (alopecia, malar rash, arthritis), scleritis,  and fibromyalgia here for follow up.  Last visit was 9/2017. Pt sts she has been doing very well. She reports feeling the best she has felt in a long time. Pt reports exercising more and has cut down on her cigarettes significantly. She uses them intermittently instead of 2 packs per day. Pt reports her previous muscle aches in the arms and thighs resolved after stopping atorvastatin. CPK was minimally elevated at 230.   Her scleritis has been stable but she reports a flare last week that resolved quickly with Naprosyn. Ms. Guido sts her eye will usually flare with stress and resolves after the stress improves.    She has been treated in the past with rituximab 1000 mg*1 on 4/29/16 and 1000 mg IV *2 on 8/24/15 and 9/15/15 for scleritis.    She has not noticed any swollen joints, mucosal ulcers, alopecia, rashes, fevers, SOB, pleurisy.   She takes  mg/d and Cymbalta 90 mg/d and uses lyrica prn.     Pt reports difficulties with finding words, which has seem to worsen over the years, she is concerned given her family history of dementia.         Review of Systems   Constitutional: Negative for fatigue, fever and unexpected weight change.   HENT: Negative for mouth sores and trouble swallowing.         Chronic alopecia   Eyes: Negative for pain and redness.   Respiratory: Negative for cough and shortness of breath.    Cardiovascular: Negative for chest pain.   Gastrointestinal: Negative for constipation and diarrhea.   Genitourinary: Negative for dysuria and genital sores.   Musculoskeletal: Negative for arthralgias, back pain, joint swelling, myalgias and neck pain.   Skin: Negative for color change and rash.   Neurological: Positive for headaches (chronic). Negative for weakness and numbness.  "       Memory difficulties     Hematological: Negative for adenopathy. Does not bruise/bleed easily.         Objective:     /86   Pulse 70   Ht 5' 3.6" (1.615 m)   Wt 65.6 kg (144 lb 9.6 oz)   BMI 25.13 kg/m²      Physical Exam   Constitutional: She is oriented to person, place, and time and well-developed, well-nourished, and in no distress. No distress.   HENT:   Head: Atraumatic.   Mouth/Throat: Oropharynx is clear and moist.   Eyes: Conjunctivae and EOM are normal. No scleral icterus.   Neck: Normal range of motion.   Cardiovascular: Normal rate, regular rhythm, normal heart sounds and intact distal pulses.  Exam reveals no gallop and no friction rub.    No murmur heard.  Pulmonary/Chest: Effort normal and breath sounds normal. No respiratory distress. She has no wheezes.   Abdominal: Soft. She exhibits no distension. There is no tenderness.       Right Side Rheumatological Exam     Examination finds the shoulder, elbow, wrist, knee, 1st PIP, 1st MCP, 2nd PIP, 2nd MCP, 3rd PIP, 3rd MCP, 4th PIP, 4th MCP, 5th PIP and 5th MCP normal.    Knee Exam   Patellofemoral Crepitus: positive    Muscle Strength (0-5 scale):  Neck Flexion:  5  Neck Extension: 5  Deltoid:  5  Biceps: 5/5   Triceps:  5  : 5/5   Iliopsoas: 5  Quadriceps:  5   Distal Lower Extremity: 5    Left Side Rheumatological Exam     Examination finds the shoulder, elbow, wrist, knee, 1st PIP, 1st MCP, 2nd PIP, 2nd MCP, 3rd PIP, 3rd MCP, 4th PIP, 4th MCP, 5th PIP and 5th MCP normal.    Knee Exam     Patellofemoral Crepitus: positive    Muscle Strength (0-5 scale):  Neck Flexion:  5  Neck Extension: 5  Deltoid:  5  Biceps: 5/5   Triceps:  5  :  5/5   Iliopsoas: 5  Quadriceps:  5   Distal Lower Extremity: 5      Neurological: She is alert and oriented to person, place, and time.   Skin: Skin is warm and dry. No rash noted.     Psychiatric: Mood and affect normal.   Musculoskeletal: Normal range of motion. She exhibits no edema.   Diffuse " tenderness on exam               Labs:    Old labs reviewed. Lupus labs pending for today    Results for orders placed or performed during the hospital encounter of 12/28/17   CBC auto differential   Result Value Ref Range    WBC 2.75 (L) 3.90 - 12.70 K/uL    RBC 4.44 4.00 - 5.40 M/uL    Hemoglobin 13.7 12.0 - 16.0 g/dL    Hematocrit 38.7 37.0 - 48.5 %    MCV 87 82 - 98 fL    MCH 30.9 27.0 - 31.0 pg    MCHC 35.4 32.0 - 36.0 g/dL    RDW 12.7 11.5 - 14.5 %    Platelets 119 (L) 150 - 350 K/uL    MPV 10.8 9.2 - 12.9 fL    Immature Granulocytes 0.4 0.0 - 0.5 %    Gran # (ANC) 1.5 (L) 1.8 - 7.7 K/uL    Immature Grans (Abs) 0.01 0.00 - 0.04 K/uL    Lymph # 0.8 (L) 1.0 - 4.8 K/uL    Mono # 0.4 0.3 - 1.0 K/uL    Eos # 0.0 0.0 - 0.5 K/uL    Baso # 0.01 0.00 - 0.20 K/uL    nRBC 0 0 /100 WBC    Gran% 54.5 38.0 - 73.0 %    Lymph% 29.8 18.0 - 48.0 %    Mono% 14.9 4.0 - 15.0 %    Eosinophil% 0.0 0.0 - 8.0 %    Basophil% 0.4 0.0 - 1.9 %    Differential Method Automated      Results for orders placed or performed in visit on 09/13/17   COMPREHENSIVE METABOLIC PANEL   Result Value Ref Range    Sodium 142 136 - 145 mmol/L    Potassium 4.0 3.5 - 5.1 mmol/L    Chloride 107 95 - 110 mmol/L    CO2 26 23 - 29 mmol/L    Glucose 91 70 - 110 mg/dL    BUN, Bld 15 6 - 20 mg/dL    Creatinine 0.8 0.5 - 1.4 mg/dL    Calcium 9.5 8.7 - 10.5 mg/dL    Total Protein 8.4 6.0 - 8.4 g/dL    Albumin 4.1 3.5 - 5.2 g/dL    Total Bilirubin 0.5 0.1 - 1.0 mg/dL    Alkaline Phosphatase 82 55 - 135 U/L    AST 21 10 - 40 U/L    ALT 15 10 - 44 U/L    Anion Gap 9 8 - 16 mmol/L    eGFR if African American >60.0 >60 mL/min/1.73 m^2    eGFR if non African American >60.0 >60 mL/min/1.73 m^2     Results for orders placed or performed in visit on 01/13/15   Vitamin D   Result Value Ref Range    Vit D, 25-Hydroxy 31 30 - 96 ng/mL     No results found for this or any previous visit.  Results for orders placed or performed in visit on 07/15/15   Quantiferon Gold TB   Result  Value Ref Range    NIL 0.03 See text IU/mL    TB Antigen 0.11 See text IU/mL    TB Antigen - Nil 0.09 See text IU/mL    Mitogen - Nil 9.61 See text IU/mL    TB Gold Negative      Results for orders placed or performed in visit on 09/13/17   SEDIMENTATION RATE, MANUAL   Result Value Ref Range    Sed Rate 20 0 - 20 mm/Hr     Results for orders placed or performed in visit on 09/13/17   C-REACTIVE PROTEIN   Result Value Ref Range    CRP 3.2 0.0 - 8.2 mg/L     Results for orders placed or performed in visit on 09/10/13   DELON   Result Value Ref Range    DELON Screen Negative <1:160 Negative <1:160     No results found for this or any previous visit.  Results for orders placed or performed in visit on 08/17/15   IGG 1, 2, 3, AND 4   Result Value Ref Range    IgG 1 1,070 490 - 1140 mg/dL    IgG 2 248 150 - 640 mg/dL    IgG 3 43 11 - 85 mg/dL    IgG 4 10 3 - 201 mg/dL     No results found for this or any previous visit.  No results found for this or any previous visit.  Results for orders placed or performed in visit on 09/13/17   ANTI-DNA ANTIBODY, DOUBLE-STRANDED   Result Value Ref Range    ds DNA Ab Negative 1:10 Negative 1:10     No results found for this or any previous visit.  Results for orders placed or performed in visit on 07/15/15   Proteinase 3 Autoantibodies   Result Value Ref Range    ANCA Proteinase 3 <0.2 <0.4 (Negative) U     Results for orders placed or performed in visit on 07/15/15   ANTI-NEUTROPHILIC CYTOPLASMIC ANTIBODY   Result Value Ref Range    Cytoplasmic Neutrophilic Ab <1:20 <1:20 Titer    Perinuclear (P-ANCA) <1:20 <1:20 Titer     Results for orders placed or performed in visit on 07/15/15   Myeloperoxidase Antibody (MPO)   Result Value Ref Range    MPO 1 <=20 UNITS     No results found for this or any previous visit.  Results for orders placed or performed in visit on 09/13/17   Urinalysis   Result Value Ref Range    Specimen UA Urine, Unspecified     Color, UA Yellow Yellow, Straw, Nini     Appearance, UA Hazy (A) Clear    pH, UA 5.0 5.0 - 8.0    Specific Gravity, UA 1.025 1.005 - 1.030    Protein, UA Negative Negative    Glucose, UA Negative Negative    Ketones, UA Negative Negative    Bilirubin (UA) Negative Negative    Occult Blood UA 1+ (A) Negative    Nitrite, UA Negative Negative    Urobilinogen, UA Negative <2.0 EU/dL    Leukocytes, UA 2+ (A) Negative     Results for orders placed or performed in visit on 09/13/17   Protein / creatinine ratio, urine   Result Value Ref Range    Protein, Urine Random 14 0 - 15 mg/dL    Creatinine, Random Ur 217.0 15.0 - 325.0 mg/dL    Prot/Creat Ratio, Ur 0.06 0.00 - 0.20         The 10-year ASCVD risk score (Ashley NAVA Jr., et al., 2013) is: 8.9%    Values used to calculate the score:      Age: 57 years      Sex: Female      Is Non- : Yes      Diabetic: No      Tobacco smoker: Yes      Systolic Blood Pressure: 124 mmHg      Is BP treated: Yes      HDL Cholesterol: 52 mg/dL      Total Cholesterol: 148 mg/dL      4/26/17:     DEXA:       Impression       Low bone mass with a significant decrease of 3% in the lumbar BMD compared with the prior study. The estimated 10 year probability of hip fracture is 0.3% and of a major osteoporotic fracture 2.9%, respectively.    RECOMMENDATIONS:  1. Adequate calcium and Vitamin D, 1200 mg/day and 600 units/day, respectively.  2. Repeat BMD in 2 years.  3. STOP SMOKING!         Assessment:       1. Other systemic lupus erythematosus with other organ involvement    2. Fibromyalgia    3. Memory difficulties    4. Tobacco abuse    5. Medication monitoring encounter    6. Scleritis of right eye    7. Long-term use of Plaquenil        58 yo Female with Bipolar d/o, SLE (alopecia, malar rash, arthritis), scleritis,  and fibromyalgia here for follow up.  SLE, SLEDAI:0  Pending labs today but expect this to be low. Lupus has been stable on plaquenil 200 mg bid   Fibromyalgia much improved.     Medication monitoring-  no toxic effects from Plaquenil, has upcoming eye exam this month   Scleritis- stable, needs ongoing monitoring, will hold RTX unless non responsive to naprosyn, seems to be stress related.   Tobacco abuse- currently  smoking but pt has decreased this significantly   Myalgias- likely statin induced, has been off since 9/2017.   Immunizations up to date  Memory Difficulties- pt has issue with recalling words, possibly medication related given high dose of Lyrica 150 mg bid and Xanax.   Plan:   -  Follow up with Optometry for Plaquenil eye exam and +/- opthalmology for scleritis. Rituximab therapy if needed but will reserve for now. Get updated immunoglobulins  - Continue Cymbalta 90mg daily  - Continue Lyrica  150 mg but decrease from tid to bid. Advised pt to cut down/wean off Xanax.   - Continue Plaquenil 300 mg daily based on body weight.   - Continue aerobic exercises daily  - standing lupus labs today and urine studies.   - check cpk to see if a change off statin therapy.  Also will repeat lipids off statin  - Referral to neurology (Dr. Garcia)  for memory evaluation.   - DEXA prior to next visit.     RTC 3 months with standing labs.       Signed,   Vlad Wallace

## 2018-04-30 ENCOUNTER — HOSPITAL ENCOUNTER (OUTPATIENT)
Dept: RADIOLOGY | Facility: OTHER | Age: 58
Discharge: HOME | End: 2018-04-30
Attending: INTERNAL MEDICINE
Payer: MEDICARE

## 2018-04-30 DIAGNOSIS — M32.19 OTHER SYSTEMIC LUPUS ERYTHEMATOSUS WITH OTHER ORGAN INVOLVEMENT: ICD-10-CM

## 2018-04-30 DIAGNOSIS — Z72.0 TOBACCO ABUSE: ICD-10-CM

## 2018-04-30 PROCEDURE — 77080 DXA BONE DENSITY AXIAL: CPT | Mod: TC

## 2018-04-30 PROCEDURE — 77080 DXA BONE DENSITY AXIAL: CPT | Mod: 26,,, | Performed by: RADIOLOGY

## 2018-07-03 RX ORDER — LISINOPRIL 40 MG/1
40 TABLET ORAL DAILY
Qty: 90 TABLET | Refills: 3 | OUTPATIENT
Start: 2018-07-03 | End: 2019-07-03

## 2018-07-03 RX ORDER — LEVOTHYROXINE SODIUM 88 UG/1
88 TABLET ORAL DAILY
Qty: 90 TABLET | Refills: 3 | OUTPATIENT
Start: 2018-07-03

## 2018-07-03 RX ORDER — ATORVASTATIN CALCIUM 20 MG/1
20 TABLET, FILM COATED ORAL DAILY
Qty: 90 TABLET | Refills: 3 | OUTPATIENT
Start: 2018-07-03

## 2018-07-03 NOTE — TELEPHONE ENCOUNTER
----- Message from Johana Oliva sent at 7/3/2018  4:43 PM CDT -----  Contact: EDI LINARES [[id number]]  Can the clinic reply in [facility]: Yes      Please refill the medication(s) listed below. The patient can be reached at this phone number [phone] once it is called into the pharmacy.      Medication #1 levothyroxine (SYNTHROID) 88 MCG tablet      Medication #2 lisinopril (PRINIVIL,ZESTRIL) 40 MG tablet      Preferred Pharmacy: [facility] [id number]

## 2018-07-05 ENCOUNTER — TELEPHONE (OUTPATIENT)
Dept: INTERNAL MEDICINE | Facility: CLINIC | Age: 58
End: 2018-07-05

## 2018-07-05 NOTE — TELEPHONE ENCOUNTER
----- Message from Yakelin Hamilton sent at 7/5/2018 12:52 PM CDT -----            Name of Who is Calling: EDI LINARES [[id number]]    What is the request in detail: pt is returning a call from your office    Can the clinic reply by [facility]: no      What Number to Call Back if not in [facility]: [phone]

## 2018-07-05 NOTE — TELEPHONE ENCOUNTER
Hello, left message in regards to scheduling appt so pt can continue receiving refills as it has been over a year since the doctor has seen her.

## 2018-07-12 ENCOUNTER — PATIENT OUTREACH (OUTPATIENT)
Dept: ADMINISTRATIVE | Facility: HOSPITAL | Age: 58
End: 2018-07-12

## 2018-07-13 ENCOUNTER — OFFICE VISIT (OUTPATIENT)
Dept: INTERNAL MEDICINE | Facility: CLINIC | Age: 58
End: 2018-07-13
Payer: MEDICARE

## 2018-07-13 VITALS
DIASTOLIC BLOOD PRESSURE: 72 MMHG | HEART RATE: 76 BPM | SYSTOLIC BLOOD PRESSURE: 112 MMHG | HEIGHT: 64 IN | BODY MASS INDEX: 24.42 KG/M2 | WEIGHT: 143.06 LBS

## 2018-07-13 DIAGNOSIS — J41.0 SIMPLE CHRONIC BRONCHITIS: Chronic | ICD-10-CM

## 2018-07-13 DIAGNOSIS — M85.89 OSTEOPENIA OF MULTIPLE SITES: ICD-10-CM

## 2018-07-13 DIAGNOSIS — Z72.0 TOBACCO ABUSE: ICD-10-CM

## 2018-07-13 DIAGNOSIS — H15.001 SCLERITIS OF RIGHT EYE: ICD-10-CM

## 2018-07-13 DIAGNOSIS — Z12.31 ENCOUNTER FOR SCREENING MAMMOGRAM FOR BREAST CANCER: ICD-10-CM

## 2018-07-13 DIAGNOSIS — E03.9 ACQUIRED HYPOTHYROIDISM: ICD-10-CM

## 2018-07-13 DIAGNOSIS — E78.2 MIXED HYPERLIPIDEMIA: Chronic | ICD-10-CM

## 2018-07-13 DIAGNOSIS — F31.81 BIPOLAR 2 DISORDER: Chronic | ICD-10-CM

## 2018-07-13 DIAGNOSIS — M79.7 FIBROMYALGIA: Chronic | ICD-10-CM

## 2018-07-13 DIAGNOSIS — I10 ESSENTIAL HYPERTENSION, BENIGN: Primary | Chronic | ICD-10-CM

## 2018-07-13 DIAGNOSIS — M32.8 OTHER FORMS OF SYSTEMIC LUPUS ERYTHEMATOSUS, UNSPECIFIED ORGAN INVOLVEMENT STATUS: Chronic | ICD-10-CM

## 2018-07-13 DIAGNOSIS — Z51.81 MEDICATION MONITORING ENCOUNTER: ICD-10-CM

## 2018-07-13 PROCEDURE — 99499 UNLISTED E&M SERVICE: CPT | Mod: S$GLB,,, | Performed by: INTERNAL MEDICINE

## 2018-07-13 PROCEDURE — 3008F BODY MASS INDEX DOCD: CPT | Mod: CPTII,S$GLB,, | Performed by: INTERNAL MEDICINE

## 2018-07-13 PROCEDURE — 99214 OFFICE O/P EST MOD 30 MIN: CPT | Mod: S$GLB,,, | Performed by: INTERNAL MEDICINE

## 2018-07-13 PROCEDURE — 3078F DIAST BP <80 MM HG: CPT | Mod: CPTII,S$GLB,, | Performed by: INTERNAL MEDICINE

## 2018-07-13 PROCEDURE — 3074F SYST BP LT 130 MM HG: CPT | Mod: CPTII,S$GLB,, | Performed by: INTERNAL MEDICINE

## 2018-07-13 PROCEDURE — 99999 PR PBB SHADOW E&M-EST. PATIENT-LVL IV: CPT | Mod: PBBFAC,,, | Performed by: INTERNAL MEDICINE

## 2018-07-13 RX ORDER — ROSUVASTATIN CALCIUM 40 MG/1
40 TABLET, COATED ORAL NIGHTLY
Qty: 90 TABLET | Refills: 3 | Status: SHIPPED | OUTPATIENT
Start: 2018-07-13 | End: 2019-04-15 | Stop reason: SDUPTHER

## 2018-07-13 RX ORDER — LEVOTHYROXINE SODIUM 88 UG/1
88 TABLET ORAL DAILY
Qty: 90 TABLET | Refills: 3 | Status: SHIPPED | OUTPATIENT
Start: 2018-07-13 | End: 2019-10-05 | Stop reason: SDUPTHER

## 2018-07-13 RX ORDER — BUDESONIDE AND FORMOTEROL FUMARATE DIHYDRATE 160; 4.5 UG/1; UG/1
AEROSOL RESPIRATORY (INHALATION)
Qty: 30.6 G | Refills: 3 | Status: SHIPPED | OUTPATIENT
Start: 2018-07-13 | End: 2019-05-22 | Stop reason: SDUPTHER

## 2018-07-13 RX ORDER — ATORVASTATIN CALCIUM 40 MG/1
40 TABLET, FILM COATED ORAL DAILY
COMMUNITY
End: 2018-07-13

## 2018-07-13 RX ORDER — LISINOPRIL 40 MG/1
40 TABLET ORAL DAILY
Qty: 90 TABLET | Refills: 3 | Status: SHIPPED | OUTPATIENT
Start: 2018-07-13 | End: 2019-02-06

## 2018-07-13 NOTE — PROGRESS NOTES
Subjective:       Patient ID: Breanna Guido is a 58 y.o. female.    Chief Complaint: Annual Exam    Pt here for f/u. Migraines are well controlled on current meds.      BP is well controlled. Denies cp/sob/ha/vision or neuro changes. Not checking at home regularly.     HLD has been tx with lipitor which she has tolerated well but most recent lipid panel still elevated so we discussed changing to crestor which she is agreeable to.     She is clinically euthyroid. Due for updated labs.     Bipolar is followed by psychiatry regularly. No SI/HI. This is stable.     Pt sees Dr. Valencia in [facility] for fibromyalgia dx and lupus dx; however, her serology has been negative. It was recommended she be maintained on plaquenil which she takes. She is being followed regularly by ophtho as well. Her pain is manageable with baclofen and lyrica.    COPD is stable on current meds.     She has osteopenia on most recent DEXA 4/2018. She is on ca/d.       Hypertension   Pertinent negatives include no chest pain, headaches, neck pain, palpitations or shortness of breath.     Review of Systems   Constitutional: Positive for activity change. Negative for unexpected weight change.   HENT: Negative for hearing loss, rhinorrhea and trouble swallowing.    Eyes: Negative for discharge and visual disturbance.   Respiratory: Negative for chest tightness, shortness of breath and wheezing.    Cardiovascular: Negative for chest pain and palpitations.   Gastrointestinal: Negative for abdominal pain, blood in stool, constipation, diarrhea, nausea and vomiting.   Endocrine: Negative for polydipsia and polyuria.   Genitourinary: Negative for difficulty urinating, dysuria, frequency, hematuria and menstrual problem.   Musculoskeletal: Positive for arthralgias. Negative for joint swelling and neck pain.   Neurological: Negative for speech difficulty, weakness and headaches.   Psychiatric/Behavioral: Negative for confusion and dysphoric mood.        Objective:          Assessment:       1. Essential hypertension, benign    2. Mixed hyperlipidemia    3. Simple chronic bronchitis    4. Scleritis of right eye    5. Bipolar 2 disorder    6. Other forms of systemic lupus erythematosus, unspecified organ involvement status    7. Fibromyalgia    8. Osteopenia of multiple sites    9. Tobacco abuse    10. Medication monitoring encounter    11. Acquired hypothyroidism    12. Encounter for screening mammogram for breast cancer        Plan:       1. continue current meds   2. Keep f/u with specialists  3. Recent labs reviewed  4. Change lipitor to crestor and recheck labs in 8 wks  5. Tobacco cessation  6. Mammogram           Physical Exam   Constitutional: She is oriented to person, place, and time. She appears well-developed and well-nourished.   HENT:   Head: Normocephalic and atraumatic.   Eyes: EOM are normal. Pupils are equal, round, and reactive to light.   Neck: Neck supple. Carotid bruit is not present. No thyroid mass and no thyromegaly present.   Cardiovascular: Normal rate, regular rhythm, S1 normal and S2 normal.    Murmur heard.  Pulmonary/Chest: Effort normal and breath sounds normal. She has no wheezes.   Abdominal: Soft. Bowel sounds are normal. She exhibits no distension and no mass. There is no hepatosplenomegaly. There is no tenderness. There is no CVA tenderness.   Musculoskeletal: She exhibits no edema.        Lumbar back: Normal.   Lymphadenopathy:     She has no cervical adenopathy.   Neurological: She is alert and oriented to person, place, and time. She has normal strength. No cranial nerve deficit or sensory deficit. She displays a negative Romberg sign. Coordination and gait normal.   Reflex Scores:       Bicep reflexes are 2+ on the right side and 2+ on the left side.       Patellar reflexes are 2+ on the right side and 2+ on the left side.  Psychiatric: She has a normal mood and affect. Her behavior is normal.

## 2018-07-27 ENCOUNTER — TELEPHONE (OUTPATIENT)
Dept: ADMINISTRATIVE | Facility: HOSPITAL | Age: 58
End: 2018-07-27

## 2018-08-01 ENCOUNTER — PATIENT MESSAGE (OUTPATIENT)
Dept: INTERNAL MEDICINE | Facility: CLINIC | Age: 58
End: 2018-08-01

## 2018-08-01 DIAGNOSIS — Z72.0 TOBACCO ABUSE: Primary | ICD-10-CM

## 2018-08-06 ENCOUNTER — LAB VISIT (OUTPATIENT)
Dept: LAB | Facility: HOSPITAL | Age: 58
End: 2018-08-06
Payer: MEDICARE

## 2018-08-06 ENCOUNTER — TELEPHONE (OUTPATIENT)
Dept: RHEUMATOLOGY | Facility: CLINIC | Age: 58
End: 2018-08-06

## 2018-08-06 ENCOUNTER — OFFICE VISIT (OUTPATIENT)
Dept: RHEUMATOLOGY | Facility: CLINIC | Age: 58
End: 2018-08-06
Payer: MEDICARE

## 2018-08-06 VITALS
HEART RATE: 68 BPM | BODY MASS INDEX: 24.69 KG/M2 | DIASTOLIC BLOOD PRESSURE: 74 MMHG | HEIGHT: 64 IN | SYSTOLIC BLOOD PRESSURE: 119 MMHG | WEIGHT: 144.63 LBS

## 2018-08-06 DIAGNOSIS — Z23 NEED FOR SHINGLES VACCINE: ICD-10-CM

## 2018-08-06 DIAGNOSIS — H15.001 SCLERITIS OF RIGHT EYE: ICD-10-CM

## 2018-08-06 DIAGNOSIS — M32.19 OTHER SYSTEMIC LUPUS ERYTHEMATOSUS WITH OTHER ORGAN INVOLVEMENT: Chronic | ICD-10-CM

## 2018-08-06 DIAGNOSIS — Z79.899 LONG-TERM USE OF PLAQUENIL: ICD-10-CM

## 2018-08-06 DIAGNOSIS — Z72.0 TOBACCO ABUSE: ICD-10-CM

## 2018-08-06 DIAGNOSIS — M32.8 OTHER FORMS OF SYSTEMIC LUPUS ERYTHEMATOSUS, UNSPECIFIED ORGAN INVOLVEMENT STATUS: Primary | ICD-10-CM

## 2018-08-06 DIAGNOSIS — R74.8 ABNORMAL CK: ICD-10-CM

## 2018-08-06 DIAGNOSIS — E87.6 HYPOKALEMIA: Primary | ICD-10-CM

## 2018-08-06 DIAGNOSIS — M79.7 FIBROMYALGIA: Chronic | ICD-10-CM

## 2018-08-06 LAB
ALBUMIN SERPL BCP-MCNC: 3.9 G/DL
ALP SERPL-CCNC: 68 U/L
ALT SERPL W/O P-5'-P-CCNC: 15 U/L
ANION GAP SERPL CALC-SCNC: 9 MMOL/L
AST SERPL-CCNC: 21 U/L
BASOPHILS # BLD AUTO: 0.04 K/UL
BASOPHILS NFR BLD: 1.1 %
BILIRUB SERPL-MCNC: 0.5 MG/DL
BUN SERPL-MCNC: 10 MG/DL
C3 SERPL-MCNC: 135 MG/DL
C4 SERPL-MCNC: 33 MG/DL
CALCIUM SERPL-MCNC: 9.4 MG/DL
CHLORIDE SERPL-SCNC: 108 MMOL/L
CO2 SERPL-SCNC: 24 MMOL/L
CREAT SERPL-MCNC: 0.8 MG/DL
CRP SERPL-MCNC: 0.6 MG/L
DIFFERENTIAL METHOD: ABNORMAL
EOSINOPHIL # BLD AUTO: 0 K/UL
EOSINOPHIL NFR BLD: 1.1 %
ERYTHROCYTE [DISTWIDTH] IN BLOOD BY AUTOMATED COUNT: 13 %
ERYTHROCYTE [SEDIMENTATION RATE] IN BLOOD BY WESTERGREN METHOD: 16 MM/HR
EST. GFR  (AFRICAN AMERICAN): >60 ML/MIN/1.73 M^2
EST. GFR  (NON AFRICAN AMERICAN): >60 ML/MIN/1.73 M^2
GLUCOSE SERPL-MCNC: 128 MG/DL
HCT VFR BLD AUTO: 40.3 %
HGB BLD-MCNC: 13.7 G/DL
IMM GRANULOCYTES # BLD AUTO: 0.01 K/UL
IMM GRANULOCYTES NFR BLD AUTO: 0.3 %
LYMPHOCYTES # BLD AUTO: 1.4 K/UL
LYMPHOCYTES NFR BLD: 38.8 %
MCH RBC QN AUTO: 31.6 PG
MCHC RBC AUTO-ENTMCNC: 34 G/DL
MCV RBC AUTO: 93 FL
MONOCYTES # BLD AUTO: 0.3 K/UL
MONOCYTES NFR BLD: 8.1 %
NEUTROPHILS # BLD AUTO: 1.9 K/UL
NEUTROPHILS NFR BLD: 50.6 %
NRBC BLD-RTO: 0 /100 WBC
PLATELET # BLD AUTO: 181 K/UL
PMV BLD AUTO: 10.8 FL
POTASSIUM SERPL-SCNC: 3.4 MMOL/L
PROT SERPL-MCNC: 7.7 G/DL
RBC # BLD AUTO: 4.33 M/UL
SODIUM SERPL-SCNC: 141 MMOL/L
WBC # BLD AUTO: 3.71 K/UL

## 2018-08-06 PROCEDURE — 99999 PR PBB SHADOW E&M-EST. PATIENT-LVL III: CPT | Mod: PBBFAC,GC,, | Performed by: INTERNAL MEDICINE

## 2018-08-06 PROCEDURE — 86160 COMPLEMENT ANTIGEN: CPT

## 2018-08-06 PROCEDURE — 86140 C-REACTIVE PROTEIN: CPT

## 2018-08-06 PROCEDURE — 86160 COMPLEMENT ANTIGEN: CPT | Mod: 59

## 2018-08-06 PROCEDURE — 3008F BODY MASS INDEX DOCD: CPT | Mod: CPTII,GC,S$GLB, | Performed by: INTERNAL MEDICINE

## 2018-08-06 PROCEDURE — 3074F SYST BP LT 130 MM HG: CPT | Mod: CPTII,GC,S$GLB, | Performed by: INTERNAL MEDICINE

## 2018-08-06 PROCEDURE — 85025 COMPLETE CBC W/AUTO DIFF WBC: CPT

## 2018-08-06 PROCEDURE — 3078F DIAST BP <80 MM HG: CPT | Mod: CPTII,GC,S$GLB, | Performed by: INTERNAL MEDICINE

## 2018-08-06 PROCEDURE — 99214 OFFICE O/P EST MOD 30 MIN: CPT | Mod: GC,S$GLB,, | Performed by: INTERNAL MEDICINE

## 2018-08-06 PROCEDURE — 36415 COLL VENOUS BLD VENIPUNCTURE: CPT

## 2018-08-06 PROCEDURE — 80053 COMPREHEN METABOLIC PANEL: CPT

## 2018-08-06 PROCEDURE — 85651 RBC SED RATE NONAUTOMATED: CPT

## 2018-08-06 RX ORDER — PREGABALIN 150 MG/1
150 CAPSULE ORAL 2 TIMES DAILY
Qty: 180 CAPSULE | Refills: 0 | Status: SHIPPED | OUTPATIENT
Start: 2018-08-06 | End: 2019-04-15 | Stop reason: SDUPTHER

## 2018-08-06 ASSESSMENT — ROUTINE ASSESSMENT OF PATIENT INDEX DATA (RAPID3)
PAIN SCORE: 6.5
MDHAQ FUNCTION SCORE: .7
TOTAL RAPID3 SCORE: 4.78
PATIENT GLOBAL ASSESSMENT SCORE: 5.5
FATIGUE SCORE: 5.5
AM STIFFNESS SCORE: 0, NO
PSYCHOLOGICAL DISTRESS SCORE: 4.4

## 2018-08-06 ASSESSMENT — SYSTEMIC LUPUS ERYTHEMATOSUS DISEASE ACTIVITY INDEX (SLEDAI): TOTAL_SCORE: 0

## 2018-08-06 NOTE — PROGRESS NOTES
Subjective:       Patient ID: Breanna Guido is a 58 y.o. female.    Chief Complaint: follow up for fibromyalgia and lupus  HPI     Pt is a 58 yo Female with Bipolar d/o, SLE (alopecia, malar rash, arthritis dx at [facility] in late 1990s), COPD, HTN, HLD, scleritis, and fibromyalgia here for follow up. Patient is currently plaquenil 200mg BID and cymbalta 90mg and Lyrica 150mg daily.    Patient stated she has her good days and her bad days, today is an okay day, but she has some slight pain in her knees and shoulders b/l.  Admitted to intermittent joint pains depending on the day.  Pt reports exercising more and has cut down on her cigarettes significantly. She uses them intermittently smoking 2 cigarettes per day and has an appointment on Wednesday to start smoking cessation.     Pt previously reported previous muscle aches in the arms and thighs resolved after stopping atorvastatin.  Pt just started rosuvastatin a few weeks ago, no worsening muscle aches since starting that medication.    She has been treated in the past with rituximab 1000 mg*1 on 4/29/16 and 1000 mg IV *2 on 8/24/15 and 9/15/15 for scleritis. Pt stated her last flare was around December 2017 which she took naprosyn for which helped clear it up.  No flares since that time.  She has not noticed any worsening or new swollen joints, mucosal ulcers, alopecia, rashes, fevers, SOB, pleurisy.     Pt is currently on disability but previously worked as a  for a hotel.  Older sister has lupus and RA and fibromyalgia, her mother had lupus and arthritis and niece with lupus.    Review of Systems   Constitutional: Negative for fatigue, fever and unexpected weight change.   HENT: Negative for congestion, mouth sores and trouble swallowing.         Chronic alopecia   Eyes: Negative for pain and redness.   Respiratory: Negative for cough and shortness of breath.    Cardiovascular: Negative for chest pain.   Gastrointestinal: Negative for  "abdominal pain, constipation, diarrhea and nausea.   Genitourinary: Negative for dysuria, genital sores and hematuria.   Musculoskeletal: Positive for arthralgias and myalgias. Negative for back pain, joint swelling and neck pain.   Skin: Negative for color change and rash.   Neurological: Negative for weakness, numbness and headaches (chronic).        Memory difficulties           Objective:     /74   Pulse 68   Ht 5' 3.6" (1.615 m)   Wt 65.6 kg (144 lb 9.6 oz)   BMI 25.13 kg/m²      Physical Exam   Constitutional: She is oriented to person, place, and time and well-developed, well-nourished, and in no distress. No distress.   HENT:   Head: Atraumatic.   Mouth/Throat: Oropharynx is clear and moist.   Eyes: Conjunctivae and EOM are normal. No scleral icterus.   Neck: Normal range of motion.   Cardiovascular: Normal rate, regular rhythm and normal heart sounds.  Exam reveals no gallop and no friction rub.    No murmur heard.  Pulmonary/Chest: Effort normal and breath sounds normal. No respiratory distress. She has no wheezes.   Abdominal: Soft. Bowel sounds are normal. She exhibits no distension. There is no tenderness.       Right Side Rheumatological Exam     Muscle Strength (0-5 scale):  Deltoid:  5  Biceps: 5/5   Triceps:  5  : 5/5   Iliopsoas: 5  Quadriceps:  5   Distal Lower Extremity: 5    Left Side Rheumatological Exam     Muscle Strength (0-5 scale):  Deltoid:  5  Biceps: 5/5   Triceps:  5  :  5/5   Iliopsoas: 5  Quadriceps:  5   Distal Lower Extremity: 5      Neurological: She is alert and oriented to person, place, and time.   Skin: Skin is warm and dry. No rash noted.     Psychiatric: Mood and affect normal.   Musculoskeletal: Normal range of motion. She exhibits no edema.   Cspine no tenderness  Tspine  no tenderness  Lspine  no tenderness.  Shoulders: FROM; no synovitis;  Elbows: FROM; no synovitis; no tophi or nodules  Wrists: FROM; no synovitis;   MCPs: FROM; no synovitis; no " metacarpalgia;  ok;  PIPs:FROM; no synovitis;   DIPs: FROM; no synovitis;   HIPS: FROM  Knees: FROM; no synovitis; no instability; b/l crepitus  Ankles: FROM: no synovitis   Toes: ok; no metatarsalgia                 Labs:       Results for orders placed or performed in visit on 08/06/18   CBC W/ AUTO DIFFERENTIAL   Result Value Ref Range    WBC 3.71 (L) 3.90 - 12.70 K/uL    RBC 4.33 4.00 - 5.40 M/uL    Hemoglobin 13.7 12.0 - 16.0 g/dL    Hematocrit 40.3 37.0 - 48.5 %    MCV 93 82 - 98 fL    MCH 31.6 (H) 27.0 - 31.0 pg    MCHC 34.0 32.0 - 36.0 g/dL    RDW 13.0 11.5 - 14.5 %    Platelets 181 150 - 350 K/uL    MPV 10.8 9.2 - 12.9 fL    Immature Granulocytes 0.3 0.0 - 0.5 %    Gran # (ANC) 1.9 1.8 - 7.7 K/uL    Immature Grans (Abs) 0.01 0.00 - 0.04 K/uL    Lymph # 1.4 1.0 - 4.8 K/uL    Mono # 0.3 0.3 - 1.0 K/uL    Eos # 0.0 0.0 - 0.5 K/uL    Baso # 0.04 0.00 - 0.20 K/uL    nRBC 0 0 /100 WBC    Gran% 50.6 38.0 - 73.0 %    Lymph% 38.8 18.0 - 48.0 %    Mono% 8.1 4.0 - 15.0 %    Eosinophil% 1.1 0.0 - 8.0 %    Basophil% 1.1 0.0 - 1.9 %    Differential Method Automated      Results for orders placed or performed in visit on 08/06/18   COMPREHENSIVE METABOLIC PANEL   Result Value Ref Range    Sodium 141 136 - 145 mmol/L    Potassium 3.4 (L) 3.5 - 5.1 mmol/L    Chloride 108 95 - 110 mmol/L    CO2 24 23 - 29 mmol/L    Glucose 128 (H) 70 - 110 mg/dL    BUN, Bld 10 6 - 20 mg/dL    Creatinine 0.8 0.5 - 1.4 mg/dL    Calcium 9.4 8.7 - 10.5 mg/dL    Total Protein 7.7 6.0 - 8.4 g/dL    Albumin 3.9 3.5 - 5.2 g/dL    Total Bilirubin 0.5 0.1 - 1.0 mg/dL    Alkaline Phosphatase 68 55 - 135 U/L    AST 21 10 - 40 U/L    ALT 15 10 - 44 U/L    Anion Gap 9 8 - 16 mmol/L    eGFR if African American >60.0 >60 mL/min/1.73 m^2    eGFR if non African American >60.0 >60 mL/min/1.73 m^2     Results for orders placed or performed in visit on 01/13/15   Vitamin D   Result Value Ref Range    Vit D, 25-Hydroxy 31 30 - 96 ng/mL     No results found  for this or any previous visit.  Results for orders placed or performed in visit on 07/15/15   Quantiferon Gold TB   Result Value Ref Range    NIL 0.03 See text IU/mL    TB Antigen 0.11 See text IU/mL    TB Antigen - Nil 0.09 See text IU/mL    Mitogen - Nil 9.61 See text IU/mL    TB Gold Negative      Results for orders placed or performed in visit on 04/04/18   SEDIMENTATION RATE, MANUAL   Result Value Ref Range    Sed Rate 23 (H) 0 - 20 mm/Hr     Results for orders placed or performed in visit on 08/06/18   C-REACTIVE PROTEIN   Result Value Ref Range    CRP 0.6 0.0 - 8.2 mg/L     Results for orders placed or performed in visit on 09/10/13   DELON   Result Value Ref Range    DELON Screen Negative <1:160 Negative <1:160     No results found for this or any previous visit.  Results for orders placed or performed in visit on 08/17/15   IGG 1, 2, 3, AND 4   Result Value Ref Range    IgG 1 1,070 490 - 1140 mg/dL    IgG 2 248 150 - 640 mg/dL    IgG 3 43 11 - 85 mg/dL    IgG 4 10 3 - 201 mg/dL       Results for orders placed or performed in visit on 09/13/17   ANTI-DNA ANTIBODY, DOUBLE-STRANDED   Result Value Ref Range    ds DNA Ab Negative 1:10 Negative 1:10     Results for orders placed or performed in visit on 07/15/15   Proteinase 3 Autoantibodies   Result Value Ref Range    ANCA Proteinase 3 <0.2 <0.4 (Negative) U     Results for orders placed or performed in visit on 07/15/15   ANTI-NEUTROPHILIC CYTOPLASMIC ANTIBODY   Result Value Ref Range    Cytoplasmic Neutrophilic Ab <1:20 <1:20 Titer    Perinuclear (P-ANCA) <1:20 <1:20 Titer     Results for orders placed or performed in visit on 07/15/15   Myeloperoxidase Antibody (MPO)   Result Value Ref Range    MPO 1 <=20 UNITS     Results for orders placed or performed in visit on 04/04/18   Urinalysis   Result Value Ref Range    Specimen UA Urine, Unspecified     Color, UA Yellow Yellow, Straw, Nini    Appearance, UA Hazy (A) Clear    pH, UA 6.0 5.0 - 8.0    Specific Gravity,  UA 1.015 1.005 - 1.030    Protein, UA Negative Negative    Glucose, UA Negative Negative    Ketones, UA Negative Negative    Bilirubin (UA) Negative Negative    Occult Blood UA Negative Negative    Nitrite, UA Negative Negative    Urobilinogen, UA Negative <2.0 EU/dL    Leukocytes, UA 2+ (A) Negative     Results for orders placed or performed in visit on 09/13/17   Protein / creatinine ratio, urine   Result Value Ref Range    Protein, Urine Random 14 0 - 15 mg/dL    Creatinine, Random Ur 217.0 15.0 - 325.0 mg/dL    Prot/Creat Ratio, Ur 0.06 0.00 - 0.20       4/26/17:     DEXA:       Impression       Low bone mass with a significant decrease of 3% in the lumbar BMD compared with the prior study. The estimated 10 year probability of hip fracture is 0.3% and of a major osteoporotic fracture 2.9%, respectively.    RECOMMENDATIONS:  1. Adequate calcium and Vitamin D, 1200 mg/day and 600 units/day, respectively.  2. Repeat BMD in 2 years.  3. STOP SMOKING!         Assessment:       1. Other forms of systemic lupus erythematosus, unspecified organ involvement status    2. Abnormal CK    3. Fibromyalgia    4. Need for shingles vaccine    5. Tobacco abuse    6. Long-term use of Plaquenil    7. Scleritis of right eye        56 yo Female with Bipolar d/o, SLE (alopecia, malar rash, arthritis), scleritis,  and fibromyalgia here for follow up.  SLE, SLEDAI:0  Pending labs today. Lupus has been stable on plaquenil 200 mg bid   Plan:   -  Follow up with Optometry for Plaquenil eye exam and +/- opthalmology for scleritis. Rituximab therapy in future if needed but will reserve for now.   -check dsDNA and urine studies  -referral for shingrix given today.  - Continue Cymbalta 90mg daily  - Continue Lyrica  150 mg BID. Refill given today.  - Continue Plaquenil 200 mg BID  - Continue aerobic exercises daily   - check cpk  - patient to call office back to schedule neurology appointment for memory loss.    RTC 3 months with standing  labs.

## 2018-08-12 ENCOUNTER — NURSE TRIAGE (OUTPATIENT)
Dept: ADMINISTRATIVE | Facility: CLINIC | Age: 58
End: 2018-08-12

## 2018-08-12 NOTE — TELEPHONE ENCOUNTER
Patient called to report the following:     -patient fell in street yesterday   -abrasions on left side of body   -I felt ok yesterday  -today my neck started to hurts  -across my shoulder hurts   -my neck feels like whiplash   -hx lupus arthritis and fibromyalgia       Education completed per [facility] On Call Care Advice including follow up with provider within 4 hours, home care, and when to call back. Patient verbalized understating.       Reason for Disposition   High-risk adult (e.g., rheumatoid arthritis, ankylosing spondylitis, cervical spine abnormalities)    Protocols used: ST TRAUMA - NECK-A-AH

## 2018-08-13 ENCOUNTER — HOSPITAL ENCOUNTER (EMERGENCY)
Facility: HOSPITAL | Age: 58
Discharge: HOME | End: 2018-08-13
Attending: EMERGENCY MEDICINE
Payer: MEDICARE

## 2018-08-13 VITALS
RESPIRATION RATE: 16 BRPM | HEIGHT: 63 IN | TEMPERATURE: 98 F | DIASTOLIC BLOOD PRESSURE: 73 MMHG | HEART RATE: 77 BPM | BODY MASS INDEX: 25.52 KG/M2 | WEIGHT: 144 LBS | SYSTOLIC BLOOD PRESSURE: 119 MMHG | OXYGEN SATURATION: 96 %

## 2018-08-13 DIAGNOSIS — T14.8XXA MUSCLE STRAIN: ICD-10-CM
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
## 2018-08-13 PROCEDURE — 99283 EMERGENCY DEPT VISIT LOW MDM: CPT | Mod: ,,, | Performed by: EMERGENCY MEDICINE

## 2018-08-13 PROCEDURE — 99283 EMERGENCY DEPT VISIT LOW MDM: CPT

## 2018-08-13 PROCEDURE — 25000003 PHARM REV CODE 250: Performed by: EMERGENCY MEDICINE

## 2018-08-13 RX ORDER — CYCLOBENZAPRINE HCL 10 MG
10 TABLET ORAL
Status: COMPLETED | OUTPATIENT
Start: 2018-08-13 | End: 2018-08-13

## 2018-08-13 RX ORDER — FAMOTIDINE 20 MG/1
20 TABLET, FILM COATED ORAL 2 TIMES DAILY
Qty: 30 TABLET | Refills: 0 | Status: SHIPPED | OUTPATIENT
Start: 2018-08-13 | End: 2020-02-28

## 2018-08-13 RX ORDER — CYCLOBENZAPRINE HCL 10 MG
10 TABLET ORAL 3 TIMES DAILY PRN
Qty: 15 TABLET | Refills: 0 | Status: SHIPPED | OUTPATIENT
Start: 2018-08-13 | End: 2018-08-18

## 2018-08-13 RX ORDER — NAPROXEN 500 MG/1
500 TABLET ORAL 2 TIMES DAILY PRN
Qty: 20 TABLET | Refills: 0 | Status: SHIPPED | OUTPATIENT
Start: 2018-08-13 | End: 2020-02-28

## 2018-08-13 RX ORDER — FAMOTIDINE 20 MG/1
20 TABLET, FILM COATED ORAL
Status: COMPLETED | OUTPATIENT
Start: 2018-08-13 | End: 2018-08-13

## 2018-08-13 RX ORDER — NAPROXEN 500 MG/1
500 TABLET ORAL
Status: COMPLETED | OUTPATIENT
Start: 2018-08-13 | End: 2018-08-13

## 2018-08-13 RX ADMIN — NAPROXEN 500 MG: 500 TABLET ORAL at 11:08

## 2018-08-13 RX ADMIN — FAMOTIDINE 20 MG: 20 TABLET, FILM COATED ORAL at 11:08

## 2018-08-13 RX ADMIN — CYCLOBENZAPRINE HYDROCHLORIDE 10 MG: 10 TABLET, FILM COATED ORAL at 11:08

## 2018-08-13 NOTE — ED PROVIDER NOTES
"Encounter Date: 8/13/2018    SCRIBE #1 NOTE: IRamses, am scribing for, and in the presence of,  John . I have scribed the entire note.       History     Chief Complaint   Patient presents with    Fall     fell on sat, neck and side pain, denies loc     Time patient was seen by the provider: 11:10 AM    The patient is a 58 y.o. female who presents to the ED with a chief complaint of fall. Pt tripped while walking in the street 2 days prior. Patient does not completely remember what hit the ground first, but denies LOC. Witness told her that she apparently stumbled in the street. She woke up yesterday AM with worsening stiffness along left neck, left shoulder pain, left back pain, and "throbbing" L shin pain. Patient is currently on baclofen and lyrica for chronic pain. No weakness or paresthesias. Tetanus UTD. Pt has not taken baclofen, because she uses it as needed.             Review of patient's allergies indicates:   Allergen Reactions    Bactrim [sulfamethoxazole-trimethoprim] Nausea And Vomiting    Sulfa (sulfonamide antibiotics)      Agitation^     Past Medical History:   Diagnosis Date    Anxiety     Arthritis     Bipolar 1 disorder     Cataract     COPD (chronic obstructive pulmonary disease)     Depression     bipolar 2    Eyelid lesion     Fibromyalgia     H/O corneal abrasion 2001    left eye with lead-based paint chips    Hyperlipidemia     Hypertension     Lupus     Migraine headache     Thyroid disease      Past Surgical History:   Procedure Laterality Date    HYSTERECTOMY      for sterilization, ovaries intact     Family History   Problem Relation Age of Onset    Depression Father         suicide    Cataracts Mother     Rheum arthritis Mother     Lupus Mother     Cataracts Sister     Rheum arthritis Sister     Breast cancer Maternal Aunt     Thyroid disease Daughter     No Known Problems Daughter     No Known Problems Daughter     Colon cancer Brother     No Known " Problems Maternal Uncle     No Known Problems Paternal Aunt     No Known Problems Paternal Uncle     No Known Problems Maternal Grandmother     No Known Problems Maternal Grandfather     No Known Problems Paternal Grandmother     No Known Problems Paternal Grandfather     Ovarian cancer Neg Hx     Melanoma Neg Hx     Amblyopia Neg Hx     Blindness Neg Hx     Cancer Neg Hx     Diabetes Neg Hx     Glaucoma Neg Hx     Hypertension Neg Hx     Macular degeneration Neg Hx     Retinal detachment Neg Hx     Strabismus Neg Hx     Stroke Neg Hx      Social History     Tobacco Use    Smoking status: Current Every Day Smoker     Packs/day: 0.25     Years: 40.00     Pack years: 10.00     Types: Cigarettes     Last attempt to quit: 2017     Years since quittin.2    Smokeless tobacco: Never Used   Substance Use Topics    Alcohol use: No     Comment: rare    Drug use: No     Review of Systems   Constitutional: Negative for fever.   HENT: Negative for congestion and ear discharge.    Eyes: Negative for pain.   Respiratory: Negative for choking.    Cardiovascular: Negative for chest pain.   Gastrointestinal: Negative for abdominal pain.   Genitourinary: Negative for hematuria.   Musculoskeletal: Positive for back pain, myalgias (L sided), neck pain and neck stiffness.   Skin: Negative for rash.   Neurological: Negative for dizziness.       Physical Exam     Initial Vitals [18 1031]   BP Pulse Resp Temp SpO2   119/73 77 16 98.2 °F (36.8 °C) 96 %      MAP       --         Physical Exam    Nursing note and vitals reviewed.  Constitutional: She appears well-developed and well-nourished. She is not diaphoretic. No distress.   HENT:   Head: Normocephalic and atraumatic.   Mouth/Throat: Oropharynx is clear and moist.   Eyes: EOM are normal. Right eye exhibits no discharge. Left eye exhibits no discharge.   Neck: Normal range of motion. Neck supple.   Cardiovascular: Normal rate, regular rhythm, normal  heart sounds and intact distal pulses. Exam reveals no gallop and no friction rub.    No murmur heard.  Peripheral pulses intact   Pulmonary/Chest: Breath sounds normal. No respiratory distress.   Abdominal: Soft. Bowel sounds are normal. She exhibits no distension. There is no tenderness. There is no rebound and no guarding.   Musculoskeletal: Normal range of motion. She exhibits no tenderness.   no midline cervical tenderness, without step offs or deformities  tenderness w/ spasms to L scapula  pelvis stable   FROM in shoulder  no decreased sensation to light touch to calves bilaterally    Neurological: She is alert and oriented to person, place, and time. She has normal strength.   Skin: Skin is warm and dry. Capillary refill takes less than 2 seconds.   1cm abraision on L knee  1 cm abrasion on dorsum left foot          ED Course   Procedures  Labs Reviewed - No data to display       Imaging Results    None          Medical Decision Making:   History:   Old Medical Records: I decided to obtain old medical records.  Initial Assessment:   59 yo W presents s/p fall. There is no bony tenderness to palpation throughout to suggest acute fracture. She has desi muscular spasms. Will discharge on NSAIDS and antispasmodics with return precautions. Her tetanus is UTD.             Scribe Attestation:   Scribe #1: I performed the above scribed service and the documentation accurately describes the services I performed. I attest to the accuracy of the note.    Attending Attestation:   Physician Attestation Statement for Resident:  As the supervising MD I have reviewed the following: old records at this facility.          Physician Attestation for Scribe:      Comments: I, Dr. London Marte, personally performed the services described in this documentation. All medical record entries made by the scribe were at my direction and in my presence.  I have reviewed the chart and agree that the record reflects my personal performance  and is accurate and complete. London Marte MD.  4:20 PM 08/13/2018                 Clinical Impression:   The primary encounter diagnosis was Fall, initial encounter. A diagnosis of Muscle strain was also pertinent to this visit.      Disposition:   Disposition: Discharged  Condition: Stable                        London Marte MD  08/13/18 [id number]

## 2018-08-13 NOTE — ED TRIAGE NOTES
Presents to ER with compliant of left neck and shoulder pain after falling Saturday.  Patient's name and date of birth checked and is correct.  LOC: The patient is awake, alert and aware of environment with an appropriate affect, the patient is oriented x 3 and speaking appropriately.  APPEARANCE: Patient resting comfortably and in no acute distress, patient is clean and well groomed, patient's clothing is properly fastened.  CARDIOVASCULAR:  Heart rate regular and even with no peripheral edema noted.  SKIN: The skin is warm and dry, patient has normal skin turgor and moist mucus membranes, skin intact, no breakdown or brusing noted. MUSKULOSKELETAL: Patient moving all extremities well, no obvious swelling or deformities noted.  RESPIRATORY: Airway is open and patent, respirations are spontaneous, patient has a normal effort and rate.

## 2018-08-14 ENCOUNTER — TELEPHONE (OUTPATIENT)
Dept: SMOKING CESSATION | Facility: CLINIC | Age: 58
End: 2018-08-14

## 2018-08-14 NOTE — TELEPHONE ENCOUNTER
Patient rescheduled visit for tobacco cessation program for 8/22/18 at 10 am. Patient requested a reminder call on 8/21 or 8/20.

## 2018-08-20 ENCOUNTER — LAB VISIT (OUTPATIENT)
Dept: LAB | Facility: OTHER | Age: 58
End: 2018-08-20
Payer: MEDICARE

## 2018-08-20 DIAGNOSIS — E87.6 HYPOKALEMIA: ICD-10-CM

## 2018-08-20 LAB — POTASSIUM SERPL-SCNC: 3.9 MMOL/L

## 2018-08-20 PROCEDURE — 84132 ASSAY OF SERUM POTASSIUM: CPT

## 2018-08-20 PROCEDURE — 36415 COLL VENOUS BLD VENIPUNCTURE: CPT

## 2018-08-21 ENCOUNTER — TELEPHONE (OUTPATIENT)
Dept: SMOKING CESSATION | Facility: CLINIC | Age: 58
End: 2018-08-21

## 2018-08-21 NOTE — TELEPHONE ENCOUNTER
Left the patient a message about intake appointment on 8/22/18 at 10 am on the 9th floor of the clinic tower at the main campus of [facility]. With my phone number of [phone].

## 2018-08-22 ENCOUNTER — CLINICAL SUPPORT (OUTPATIENT)
Dept: SMOKING CESSATION | Facility: CLINIC | Age: 58
End: 2018-08-22
Payer: COMMERCIAL

## 2018-08-22 DIAGNOSIS — F17.210 LIGHT CIGARETTE SMOKER (1-9 CIGARETTES PER DAY): Primary | ICD-10-CM

## 2018-08-22 PROCEDURE — 99404 PREV MED CNSL INDIV APPRX 60: CPT | Mod: S$GLB,,,

## 2018-08-22 PROCEDURE — 99999 PR PBB SHADOW E&M-EST. PATIENT-LVL I: CPT | Mod: PBBFAC,,,

## 2018-08-22 RX ORDER — IBUPROFEN 200 MG
1 TABLET ORAL DAILY
Qty: 14 PATCH | Refills: 0 | Status: SHIPPED | OUTPATIENT
Start: 2018-08-22 | End: 2019-08-28

## 2018-08-22 NOTE — Clinical Note
Patient will be participating in weekly tobacco cessation meetings and will begin the prescribed tobacco cessation medication regimen of 21 mg patches.FTND of 0 indicates no dependence to tobacco but patient is still smoking 3 cigarettes per day and not able to quit. YANNI-D of 1 is perceived as no mental distress or depression at this time.

## 2018-08-27 ENCOUNTER — TELEPHONE (OUTPATIENT)
Dept: SMOKING CESSATION | Facility: CLINIC | Age: 58
End: 2018-08-27

## 2018-08-27 NOTE — TELEPHONE ENCOUNTER
Left message about missed group session and about medication regimen. Left my name Dayna Sanders and phone number [phone].

## 2018-09-10 ENCOUNTER — TELEPHONE (OUTPATIENT)
Dept: SMOKING CESSATION | Facility: CLINIC | Age: 58
End: 2018-09-10

## 2018-09-10 NOTE — TELEPHONE ENCOUNTER
Left message about missed group session and about medication regimen. Left my name Dayna Sanders and phone number [phone].

## 2018-11-02 ENCOUNTER — TELEPHONE (OUTPATIENT)
Dept: RHEUMATOLOGY | Facility: CLINIC | Age: 58
End: 2018-11-02

## 2018-11-02 ENCOUNTER — LAB VISIT (OUTPATIENT)
Dept: LAB | Facility: OTHER | Age: 58
End: 2018-11-02
Payer: MEDICARE

## 2018-11-02 DIAGNOSIS — R74.8 ABNORMAL CK: ICD-10-CM

## 2018-11-02 DIAGNOSIS — M32.8 OTHER FORMS OF SYSTEMIC LUPUS ERYTHEMATOSUS, UNSPECIFIED ORGAN INVOLVEMENT STATUS: ICD-10-CM

## 2018-11-02 LAB
ALBUMIN SERPL BCP-MCNC: 4.2 G/DL
ALP SERPL-CCNC: 62 U/L
ALT SERPL W/O P-5'-P-CCNC: 18 U/L
ANION GAP SERPL CALC-SCNC: 11 MMOL/L
AST SERPL-CCNC: 27 U/L
BASOPHILS # BLD AUTO: 0.04 K/UL
BASOPHILS NFR BLD: 1.1 %
BILIRUB SERPL-MCNC: 0.3 MG/DL
BUN SERPL-MCNC: 7 MG/DL
C3 SERPL-MCNC: 145 MG/DL
C4 SERPL-MCNC: 35 MG/DL
CALCIUM SERPL-MCNC: 9.7 MG/DL
CHLORIDE SERPL-SCNC: 104 MMOL/L
CK SERPL-CCNC: 193 U/L
CO2 SERPL-SCNC: 23 MMOL/L
CREAT SERPL-MCNC: 0.8 MG/DL
CRP SERPL-MCNC: 0.7 MG/L
DIFFERENTIAL METHOD: ABNORMAL
EOSINOPHIL # BLD AUTO: 0.1 K/UL
EOSINOPHIL NFR BLD: 1.3 %
ERYTHROCYTE [DISTWIDTH] IN BLOOD BY AUTOMATED COUNT: 13.3 %
ERYTHROCYTE [SEDIMENTATION RATE] IN BLOOD: 20 MM/HR
EST. GFR  (AFRICAN AMERICAN): >60 ML/MIN/1.73 M^2
EST. GFR  (NON AFRICAN AMERICAN): >60 ML/MIN/1.73 M^2
GLUCOSE SERPL-MCNC: 96 MG/DL
HCT VFR BLD AUTO: 40.4 %
HGB BLD-MCNC: 13.6 G/DL
LYMPHOCYTES # BLD AUTO: 1.6 K/UL
LYMPHOCYTES NFR BLD: 41.7 %
MCH RBC QN AUTO: 30.7 PG
MCHC RBC AUTO-ENTMCNC: 33.7 G/DL
MCV RBC AUTO: 91 FL
MONOCYTES # BLD AUTO: 0.4 K/UL
MONOCYTES NFR BLD: 10.6 %
NEUTROPHILS # BLD AUTO: 1.7 K/UL
NEUTROPHILS NFR BLD: 45 %
PLATELET # BLD AUTO: 192 K/UL
PMV BLD AUTO: 11 FL
POTASSIUM SERPL-SCNC: 3.9 MMOL/L
PROT SERPL-MCNC: 8.4 G/DL
RBC # BLD AUTO: 4.43 M/UL
SODIUM SERPL-SCNC: 138 MMOL/L
WBC # BLD AUTO: 3.79 K/UL

## 2018-11-02 PROCEDURE — 86225 DNA ANTIBODY NATIVE: CPT

## 2018-11-02 PROCEDURE — 86140 C-REACTIVE PROTEIN: CPT

## 2018-11-02 PROCEDURE — 86160 COMPLEMENT ANTIGEN: CPT

## 2018-11-02 PROCEDURE — 85025 COMPLETE CBC W/AUTO DIFF WBC: CPT

## 2018-11-02 PROCEDURE — 80053 COMPREHEN METABOLIC PANEL: CPT

## 2018-11-02 PROCEDURE — 85651 RBC SED RATE NONAUTOMATED: CPT

## 2018-11-02 PROCEDURE — 86160 COMPLEMENT ANTIGEN: CPT | Mod: 59

## 2018-11-02 PROCEDURE — 36415 COLL VENOUS BLD VENIPUNCTURE: CPT

## 2018-11-02 PROCEDURE — 82550 ASSAY OF CK (CPK): CPT

## 2018-11-04 ENCOUNTER — PATIENT MESSAGE (OUTPATIENT)
Dept: RHEUMATOLOGY | Facility: CLINIC | Age: 58
End: 2018-11-04

## 2018-11-04 ENCOUNTER — TELEPHONE (OUTPATIENT)
Dept: RHEUMATOLOGY | Facility: CLINIC | Age: 58
End: 2018-11-04

## 2018-11-04 DIAGNOSIS — N30.00 ACUTE CYSTITIS WITHOUT HEMATURIA: Primary | ICD-10-CM

## 2018-11-04 RX ORDER — NITROFURANTOIN 25; 75 MG/1; MG/1
100 CAPSULE ORAL 2 TIMES DAILY
Qty: 10 CAPSULE | Refills: 0 | Status: SHIPPED | OUTPATIENT
Start: 2018-11-04 | End: 2019-02-06

## 2018-11-05 ENCOUNTER — TELEPHONE (OUTPATIENT)
Dept: RHEUMATOLOGY | Facility: CLINIC | Age: 58
End: 2018-11-05

## 2018-11-05 LAB — DSDNA AB SER-ACNC: NORMAL [IU]/ML

## 2018-11-06 ENCOUNTER — OFFICE VISIT (OUTPATIENT)
Dept: RHEUMATOLOGY | Facility: CLINIC | Age: 58
End: 2018-11-06
Payer: MEDICARE

## 2018-11-06 VITALS
BODY MASS INDEX: 25.51 KG/M2 | SYSTOLIC BLOOD PRESSURE: 115 MMHG | HEIGHT: 63 IN | HEART RATE: 70 BPM | DIASTOLIC BLOOD PRESSURE: 80 MMHG

## 2018-11-06 DIAGNOSIS — H15.001 SCLERITIS OF RIGHT EYE: ICD-10-CM

## 2018-11-06 DIAGNOSIS — M85.80 OSTEOPENIA, UNSPECIFIED LOCATION: ICD-10-CM

## 2018-11-06 DIAGNOSIS — M32.8 OTHER FORMS OF SYSTEMIC LUPUS ERYTHEMATOSUS, UNSPECIFIED ORGAN INVOLVEMENT STATUS: Primary | ICD-10-CM

## 2018-11-06 DIAGNOSIS — L65.9 HAIR LOSS: ICD-10-CM

## 2018-11-06 DIAGNOSIS — N39.0 URINARY TRACT INFECTION WITHOUT HEMATURIA, SITE UNSPECIFIED: ICD-10-CM

## 2018-11-06 DIAGNOSIS — Z79.899 LONG-TERM USE OF PLAQUENIL: ICD-10-CM

## 2018-11-06 DIAGNOSIS — R41.3 MEMORY LOSS: ICD-10-CM

## 2018-11-06 DIAGNOSIS — Z51.81 MEDICATION MONITORING ENCOUNTER: ICD-10-CM

## 2018-11-06 DIAGNOSIS — M17.0 PRIMARY OSTEOARTHRITIS OF BOTH KNEES: ICD-10-CM

## 2018-11-06 DIAGNOSIS — M79.7 FIBROMYALGIA: ICD-10-CM

## 2018-11-06 PROCEDURE — 3079F DIAST BP 80-89 MM HG: CPT | Mod: CPTII,GC,S$GLB, | Performed by: INTERNAL MEDICINE

## 2018-11-06 PROCEDURE — 99999 PR PBB SHADOW E&M-EST. PATIENT-LVL IV: CPT | Mod: PBBFAC,GC,, | Performed by: INTERNAL MEDICINE

## 2018-11-06 PROCEDURE — 99214 OFFICE O/P EST MOD 30 MIN: CPT | Mod: GC,S$GLB,, | Performed by: INTERNAL MEDICINE

## 2018-11-06 PROCEDURE — 3008F BODY MASS INDEX DOCD: CPT | Mod: CPTII,GC,S$GLB, | Performed by: INTERNAL MEDICINE

## 2018-11-06 PROCEDURE — 3074F SYST BP LT 130 MM HG: CPT | Mod: CPTII,GC,S$GLB, | Performed by: INTERNAL MEDICINE

## 2018-11-06 RX ORDER — PNEUMOCOCCAL VACCINE POLYVALENT 25; 25; 25; 25; 25; 25; 25; 25; 25; 25; 25; 25; 25; 25; 25; 25; 25; 25; 25; 25; 25; 25; 25 UG/.5ML; UG/.5ML; UG/.5ML; UG/.5ML; UG/.5ML; UG/.5ML; UG/.5ML; UG/.5ML; UG/.5ML; UG/.5ML; UG/.5ML; UG/.5ML; UG/.5ML; UG/.5ML; UG/.5ML; UG/.5ML; UG/.5ML; UG/.5ML; UG/.5ML; UG/.5ML; UG/.5ML; UG/.5ML; UG/.5ML
INJECTION, SOLUTION INTRAMUSCULAR; SUBCUTANEOUS
Refills: 0 | COMMUNITY
Start: 2018-09-29 | End: 2019-02-06

## 2018-11-06 ASSESSMENT — SYSTEMIC LUPUS ERYTHEMATOSUS DISEASE ACTIVITY INDEX (SLEDAI): TOTAL_SCORE: 1

## 2018-11-06 NOTE — PROGRESS NOTES
I  Have personally take the history and examined the patient and agree with fellow's note as stated above. The SLEDAI = 0, as the leukopenia is > 3000. KALEE

## 2018-11-06 NOTE — PROGRESS NOTES
Subjective:       Patient ID: Breanna Guido is a 58 y.o. female.    Chief Complaint: follow up for fibromyalgia and lupus  HPI     Pt is a 57 yo Female with Bipolar d/o, SLE (alopecia, malar rash, arthritis dx at [facility] in late 1990s), COPD, HTN, HLD, scleritis, and fibromyalgia here for follow up. Patient is currently plaquenil 200mg BID and cymbalta 90mg and Lyrica 150mg BID.    Pt currently on Macrobid for UTI which she started yesterday.  She feels her lower back pain has improved slightly since starting the macrobid.    Patient admitted to intermittent joint pain in her wrists and knees, rating the pain a 5-7/10.  Pt does not take OTC pain medication unless its really bad then she takes a naprosyn which helps.  Admitted to intermittent joint pains depending on the day.  Morning stiffness lasts about 30 minutes.  Pt reports exercising more and has been using the patch and gum to help her quit smoking.  She stated she had not had a cigarette in over a month, but she had 1 just yesterday.    Pt previously reported previous muscle aches in the arms and thighs resolved after stopping atorvastatin.  Pt is now taking rosuvastatin, no worsening muscle aches since starting that medication.    She has been treated in the past with rituximab 1000 mg*1 on 4/29/16 and 1000 mg IV *2 on 8/24/15 and 9/15/15 for scleritis. Pt stated her last flare was around December 2017 which she took naprosyn for which helped clear it up.  No flares since that time.    She has not noticed any worsening or new swollen joints, mucosal ulcers, alopecia, rashes, fevers, SOB, pleurisy.     Pt is currently on disability but previously worked as a  for a [employer].  Older sister has lupus and RA and fibromyalgia, her mother had lupus and arthritis and niece with lupus.    Review of Systems   Constitutional: Negative for chills, diaphoresis, fatigue, fever and unexpected weight change.   HENT: Negative for congestion, mouth  "sores and trouble swallowing.         Chronic alopecia   Eyes: Negative for pain and redness.   Respiratory: Negative for cough and shortness of breath.    Cardiovascular: Negative for chest pain.   Gastrointestinal: Negative for abdominal pain, constipation, diarrhea and nausea.   Genitourinary: Negative for dysuria, genital sores and hematuria.   Musculoskeletal: Positive for arthralgias and myalgias. Negative for back pain, joint swelling and neck pain.   Skin: Negative for color change and rash.   Neurological: Negative for weakness, numbness and headaches (chronic).        Memory difficulties     Hematological: Does not bruise/bleed easily.         Objective:     /80   Pulse 70   Ht 5' 3" (1.6 m)   BMI 25.51 kg/m²      Physical Exam   Constitutional: She is oriented to person, place, and time and well-developed, well-nourished, and in no distress. No distress.   HENT:   Head: Atraumatic.   Mouth/Throat: Oropharynx is clear and moist.   Eyes: Conjunctivae and EOM are normal. No scleral icterus.   Neck: Normal range of motion.   Cardiovascular: Normal rate, regular rhythm and normal heart sounds.  Exam reveals no gallop and no friction rub.    No murmur heard.  Pulmonary/Chest: Effort normal and breath sounds normal. No respiratory distress. She has no wheezes.   Abdominal: Soft. Bowel sounds are normal. She exhibits no distension. There is no tenderness.       Right Side Rheumatological Exam     Muscle Strength (0-5 scale):  Deltoid:  5  Biceps: 5/5   Triceps:  5  : 5/5   Iliopsoas: 5  Quadriceps:  5   Distal Lower Extremity: 5    Left Side Rheumatological Exam     Muscle Strength (0-5 scale):  Deltoid:  5  Biceps: 5/5   Triceps:  5  :  5/5   Iliopsoas: 5  Quadriceps:  5   Distal Lower Extremity: 5      Neurological: She is alert and oriented to person, place, and time.   Skin: Skin is warm and dry. No rash noted.     Psychiatric: Mood and affect normal.   Musculoskeletal: Normal range of " motion. She exhibits no edema.   Cspine no tenderness  Tspine  no tenderness  Lspine  no tenderness.  Shoulders: FROM; no synovitis;  Elbows: FROM; no synovitis; no tophi or nodules  Wrists: FROM; no synovitis;   MCPs: FROM; no synovitis; no metacarpalgia;  ok;  PIPs:FROM; no synovitis;   DIPs: FROM; no synovitis;   HIPS: FROM  Knees: FROM; no synovitis; no instability; b/l crepitus  Ankles: FROM: no synovitis   Toes: ok; no metatarsalgia                 Labs:       Results for orders placed or performed in visit on 11/02/18   CBC auto differential   Result Value Ref Range    WBC 3.79 (L) 3.90 - 12.70 K/uL    RBC 4.43 4.00 - 5.40 M/uL    Hemoglobin 13.6 12.0 - 16.0 g/dL    Hematocrit 40.4 37.0 - 48.5 %    MCV 91 82 - 98 fL    MCH 30.7 27.0 - 31.0 pg    MCHC 33.7 32.0 - 36.0 g/dL    RDW 13.3 11.5 - 14.5 %    Platelets 192 150 - 350 K/uL    MPV 11.0 9.2 - 12.9 fL    Gran # (ANC) 1.7 (L) 1.8 - 7.7 K/uL    Lymph # 1.6 1.0 - 4.8 K/uL    Mono # 0.4 0.3 - 1.0 K/uL    Eos # 0.1 0.0 - 0.5 K/uL    Baso # 0.04 0.00 - 0.20 K/uL    Gran% 45.0 38.0 - 73.0 %    Lymph% 41.7 18.0 - 48.0 %    Mono% 10.6 4.0 - 15.0 %    Eosinophil% 1.3 0.0 - 8.0 %    Basophil% 1.1 0.0 - 1.9 %    Differential Method Automated      Results for orders placed or performed in visit on 11/02/18   Comprehensive metabolic panel   Result Value Ref Range    Sodium 138 136 - 145 mmol/L    Potassium 3.9 3.5 - 5.1 mmol/L    Chloride 104 95 - 110 mmol/L    CO2 23 23 - 29 mmol/L    Glucose 96 70 - 110 mg/dL    BUN, Bld 7 6 - 20 mg/dL    Creatinine 0.8 0.5 - 1.4 mg/dL    Calcium 9.7 8.7 - 10.5 mg/dL    Total Protein 8.4 6.0 - 8.4 g/dL    Albumin 4.2 3.5 - 5.2 g/dL    Total Bilirubin 0.3 0.1 - 1.0 mg/dL    Alkaline Phosphatase 62 55 - 135 U/L    AST 27 10 - 40 U/L    ALT 18 10 - 44 U/L    Anion Gap 11 8 - 16 mmol/L    eGFR if African American >60 >60 mL/min/1.73 m^2    eGFR if non African American >60 >60 mL/min/1.73 m^2     Results for orders placed or performed  in visit on 01/13/15   Vitamin D   Result Value Ref Range    Vit D, 25-Hydroxy 31 30 - 96 ng/mL     No results found for this or any previous visit.  Results for orders placed or performed in visit on 07/15/15   Quantiferon Gold TB   Result Value Ref Range    NIL 0.03 See text IU/mL    TB Antigen 0.11 See text IU/mL    TB Antigen - Nil 0.09 See text IU/mL    Mitogen - Nil 9.61 See text IU/mL    TB Gold Negative      Results for orders placed or performed in visit on 11/02/18   Sedimentation rate   Result Value Ref Range    Sed Rate 20 0 - 20 mm/Hr     Results for orders placed or performed in visit on 11/02/18   C-reactive protein   Result Value Ref Range    CRP 0.7 0.0 - 8.2 mg/L     Results for orders placed or performed in visit on 09/10/13   DELON   Result Value Ref Range    DELON Screen Negative <1:160 Negative <1:160     No results found for this or any previous visit.  Results for orders placed or performed in visit on 08/17/15   IGG 1, 2, 3, AND 4   Result Value Ref Range    IgG 1 1,070 490 - 1140 mg/dL    IgG 2 248 150 - 640 mg/dL    IgG 3 43 11 - 85 mg/dL    IgG 4 10 3 - 201 mg/dL       Results for orders placed or performed in visit on 11/02/18   Anti-DNA antibody, double-stranded   Result Value Ref Range    ds DNA Ab Negative 1:10 Negative 1:10     Results for orders placed or performed in visit on 07/15/15   Proteinase 3 Autoantibodies   Result Value Ref Range    ANCA Proteinase 3 <0.2 <0.4 (Negative) U     Results for orders placed or performed in visit on 07/15/15   ANTI-NEUTROPHILIC CYTOPLASMIC ANTIBODY   Result Value Ref Range    Cytoplasmic Neutrophilic Ab <1:20 <1:20 Titer    Perinuclear (P-ANCA) <1:20 <1:20 Titer     Results for orders placed or performed in visit on 07/15/15   Myeloperoxidase Antibody (MPO)   Result Value Ref Range    MPO 1 <=20 UNITS     Results for orders placed or performed in visit on 11/02/18   Urinalysis   Result Value Ref Range    Specimen UA Urine, Unspecified     Color, UA  Yellow Yellow, Straw, Nini    Appearance, UA Hazy (A) Clear    pH, UA 6.0 5.0 - 8.0    Specific Gravity, UA 1.010 1.005 - 1.030    Protein, UA Negative Negative    Glucose, UA Negative Negative    Ketones, UA Negative Negative    Bilirubin (UA) Negative Negative    Occult Blood UA Trace (A) Negative    Nitrite, UA Negative Negative    Urobilinogen, UA Negative <2.0 EU/dL    Leukocytes, UA 3+ (A) Negative     Results for orders placed or performed in visit on 11/02/18   Protein / creatinine ratio, urine   Result Value Ref Range    Protein, Urine Random <7 0 - 15 mg/dL    Creatinine, Random Ur 61.8 15.0 - 325.0 mg/dL    Prot/Creat Ratio, Ur Unable to calculate 0.00 - 0.20       4/26/17:     DEXA:       Impression       Low bone mass with a significant decrease of 3% in the lumbar BMD compared with the prior study. The estimated 10 year probability of hip fracture is 0.3% and of a major osteoporotic fracture 2.9%, respectively.    RECOMMENDATIONS:  1. Adequate calcium and Vitamin D, 1200 mg/day and 600 units/day, respectively.  2. Repeat BMD in 2 years.  3. STOP SMOKING!         Assessment:       1. Other forms of systemic lupus erythematosus, unspecified organ involvement status    2. Fibromyalgia    3. Long-term use of Plaquenil    4. Scleritis of right eye    5. Medication monitoring encounter    6. Primary osteoarthritis of both knees    7. Osteopenia, unspecified location    8. Hair loss    9. Memory loss    10. Urinary tract infection without hematuria, site unspecified        57 yo Female with Bipolar d/o, SLE (alopecia, malar rash, arthritis), scleritis,  and fibromyalgia here for follow up.  SLE, SLEDAI: 1 given leukopenia. Lupus has been stable on plaquenil 200 mg bid   Plan:   -  Follow up with Opthalmology for Plaquenil eye exam and +/- opthalmology for scleritis  -recent labs reviewed  - Continue Cymbalta 90mg daily  - Continue Lyrica 150 mg BID.  -continue Macrobid for UTI.  - Continue Plaquenil 200 mg  BID  - Continue aerobic exercises daily   -will need repeat BMD in 4/2020  -pt up to date on immunizations  - referral for neurology given for memory loss  -given referral to dermatology for hair loss    RTC 3 months with standing labs.

## 2018-12-04 ENCOUNTER — TELEPHONE (OUTPATIENT)
Dept: INTERNAL MEDICINE | Facility: CLINIC | Age: 58
End: 2018-12-04

## 2018-12-04 NOTE — TELEPHONE ENCOUNTER
Call place to pt regarding recall on medication amlodipine. Left message for pt to call office with any questions or concerns.   Madeline Mireles LPN

## 2018-12-04 NOTE — TELEPHONE ENCOUNTER
----- Message from Paulkishore Devin sent at 12/4/2018 11:35 AM CST -----  Contact: EDI LINARES [[id number]]  Name of Who is Calling: EDI LINARES [[id number]]      What is the request in detail: Pt called to advise amLODIPine (NORVASC) 5 MG tablet is on the recall list. Please advise pt if she is to continue taking medication.  Thanks-          Can the clinic reply by [facility]: N    What Number to Call Back if not in [facility]: [phone]

## 2018-12-20 ENCOUNTER — CLINICAL SUPPORT (OUTPATIENT)
Dept: SMOKING CESSATION | Facility: CLINIC | Age: 58
End: 2018-12-20
Payer: COMMERCIAL

## 2018-12-20 DIAGNOSIS — F17.200 NICOTINE DEPENDENCE: Primary | ICD-10-CM

## 2018-12-20 PROCEDURE — 99407 BEHAV CHNG SMOKING > 10 MIN: CPT | Mod: S$GLB,,,

## 2018-12-20 NOTE — PROGRESS NOTES
Successful contact with patient regarding tobacco cessation quit #1. Pt states, she was unable to become tobacco free, she currently smoke six cigarettes per day, and she is determined to quit. Pt scheduled for quit #2 on 1/15/2019.  Pt informed of her benefit status, future telephone follow ups, and contact information. Will update the tobacco cessation smart form for 3 months on quit #1.

## 2019-01-08 ENCOUNTER — PATIENT MESSAGE (OUTPATIENT)
Dept: INTERNAL MEDICINE | Facility: CLINIC | Age: 59
End: 2019-01-08

## 2019-01-09 RX ORDER — OSELTAMIVIR PHOSPHATE 75 MG/1
75 CAPSULE ORAL 2 TIMES DAILY
Qty: 10 CAPSULE | Refills: 0 | Status: SHIPPED | OUTPATIENT
Start: 2019-01-09 | End: 2019-01-14

## 2019-02-06 ENCOUNTER — TELEPHONE (OUTPATIENT)
Dept: INTERNAL MEDICINE | Facility: CLINIC | Age: 59
End: 2019-02-06

## 2019-02-06 ENCOUNTER — LAB VISIT (OUTPATIENT)
Dept: LAB | Facility: OTHER | Age: 59
End: 2019-02-06
Payer: MEDICARE

## 2019-02-06 ENCOUNTER — OFFICE VISIT (OUTPATIENT)
Dept: INTERNAL MEDICINE | Facility: CLINIC | Age: 59
End: 2019-02-06
Payer: MEDICARE

## 2019-02-06 VITALS
WEIGHT: 145.06 LBS | BODY MASS INDEX: 25.7 KG/M2 | HEART RATE: 71 BPM | HEIGHT: 63 IN | DIASTOLIC BLOOD PRESSURE: 80 MMHG | SYSTOLIC BLOOD PRESSURE: 132 MMHG

## 2019-02-06 DIAGNOSIS — M85.89 OSTEOPENIA OF MULTIPLE SITES: ICD-10-CM

## 2019-02-06 DIAGNOSIS — I10 ESSENTIAL HYPERTENSION, BENIGN: Chronic | ICD-10-CM

## 2019-02-06 DIAGNOSIS — E03.9 ACQUIRED HYPOTHYROIDISM: ICD-10-CM

## 2019-02-06 DIAGNOSIS — F31.81 BIPOLAR 2 DISORDER: Chronic | ICD-10-CM

## 2019-02-06 DIAGNOSIS — E03.9 ACQUIRED HYPOTHYROIDISM: Chronic | ICD-10-CM

## 2019-02-06 DIAGNOSIS — J41.0 SIMPLE CHRONIC BRONCHITIS: Primary | Chronic | ICD-10-CM

## 2019-02-06 DIAGNOSIS — M32.8 OTHER FORMS OF SYSTEMIC LUPUS ERYTHEMATOSUS, UNSPECIFIED ORGAN INVOLVEMENT STATUS: ICD-10-CM

## 2019-02-06 DIAGNOSIS — E78.2 MIXED HYPERLIPIDEMIA: Chronic | ICD-10-CM

## 2019-02-06 DIAGNOSIS — M32.8 OTHER FORMS OF SYSTEMIC LUPUS ERYTHEMATOSUS, UNSPECIFIED ORGAN INVOLVEMENT STATUS: Chronic | ICD-10-CM

## 2019-02-06 DIAGNOSIS — M79.7 FIBROMYALGIA: Chronic | ICD-10-CM

## 2019-02-06 LAB
ALBUMIN SERPL BCP-MCNC: 4.1 G/DL
ALP SERPL-CCNC: 65 U/L
ALT SERPL W/O P-5'-P-CCNC: 31 U/L
ANION GAP SERPL CALC-SCNC: 8 MMOL/L
AST SERPL-CCNC: 34 U/L
BASOPHILS # BLD AUTO: 0.02 K/UL
BASOPHILS NFR BLD: 0.4 %
BILIRUB SERPL-MCNC: 0.4 MG/DL
BUN SERPL-MCNC: 14 MG/DL
C3 SERPL-MCNC: 145 MG/DL
C4 SERPL-MCNC: 33 MG/DL
CALCIUM SERPL-MCNC: 9.5 MG/DL
CHLORIDE SERPL-SCNC: 106 MMOL/L
CHOLEST SERPL-MCNC: 129 MG/DL
CHOLEST/HDLC SERPL: 2.5 {RATIO}
CK SERPL-CCNC: 277 U/L
CO2 SERPL-SCNC: 27 MMOL/L
CREAT SERPL-MCNC: 0.7 MG/DL
CRP SERPL-MCNC: 1.9 MG/L
DIFFERENTIAL METHOD: NORMAL
EOSINOPHIL # BLD AUTO: 0.1 K/UL
EOSINOPHIL NFR BLD: 1.5 %
ERYTHROCYTE [DISTWIDTH] IN BLOOD BY AUTOMATED COUNT: 13.3 %
ERYTHROCYTE [SEDIMENTATION RATE] IN BLOOD: 25 MM/HR
EST. GFR  (AFRICAN AMERICAN): >60 ML/MIN/1.73 M^2
EST. GFR  (NON AFRICAN AMERICAN): >60 ML/MIN/1.73 M^2
GLUCOSE SERPL-MCNC: 90 MG/DL
HCT VFR BLD AUTO: 38.7 %
HDLC SERPL-MCNC: 51 MG/DL
HDLC SERPL: 39.5 %
HGB BLD-MCNC: 13.1 G/DL
LDLC SERPL CALC-MCNC: 68.4 MG/DL
LYMPHOCYTES # BLD AUTO: 1.9 K/UL
LYMPHOCYTES NFR BLD: 41.5 %
MCH RBC QN AUTO: 30.6 PG
MCHC RBC AUTO-ENTMCNC: 33.9 G/DL
MCV RBC AUTO: 90 FL
MONOCYTES # BLD AUTO: 0.6 K/UL
MONOCYTES NFR BLD: 12.2 %
NEUTROPHILS # BLD AUTO: 2.1 K/UL
NEUTROPHILS NFR BLD: 44.2 %
NONHDLC SERPL-MCNC: 78 MG/DL
PLATELET # BLD AUTO: 180 K/UL
PMV BLD AUTO: 11 FL
POTASSIUM SERPL-SCNC: 4.4 MMOL/L
PROT SERPL-MCNC: 7.9 G/DL
RBC # BLD AUTO: 4.28 M/UL
SODIUM SERPL-SCNC: 141 MMOL/L
TRIGL SERPL-MCNC: 48 MG/DL
TSH SERPL DL<=0.005 MIU/L-ACNC: 0.51 UIU/ML
WBC # BLD AUTO: 4.68 K/UL

## 2019-02-06 PROCEDURE — 99499 UNLISTED E&M SERVICE: CPT | Mod: S$GLB,,, | Performed by: INTERNAL MEDICINE

## 2019-02-06 PROCEDURE — 86160 COMPLEMENT ANTIGEN: CPT

## 2019-02-06 PROCEDURE — 3008F BODY MASS INDEX DOCD: CPT | Mod: CPTII,S$GLB,, | Performed by: INTERNAL MEDICINE

## 2019-02-06 PROCEDURE — 85651 RBC SED RATE NONAUTOMATED: CPT

## 2019-02-06 PROCEDURE — 99214 PR OFFICE/OUTPT VISIT, EST, LEVL IV, 30-39 MIN: ICD-10-PCS | Mod: S$GLB,,, | Performed by: INTERNAL MEDICINE

## 2019-02-06 PROCEDURE — 82550 ASSAY OF CK (CPK): CPT

## 2019-02-06 PROCEDURE — 3075F PR MOST RECENT SYSTOLIC BLOOD PRESS GE 130-139MM HG: ICD-10-PCS | Mod: CPTII,S$GLB,, | Performed by: INTERNAL MEDICINE

## 2019-02-06 PROCEDURE — 3008F PR BODY MASS INDEX (BMI) DOCUMENTED: ICD-10-PCS | Mod: CPTII,S$GLB,, | Performed by: INTERNAL MEDICINE

## 2019-02-06 PROCEDURE — 85025 COMPLETE CBC W/AUTO DIFF WBC: CPT

## 2019-02-06 PROCEDURE — 3079F DIAST BP 80-89 MM HG: CPT | Mod: CPTII,S$GLB,, | Performed by: INTERNAL MEDICINE

## 2019-02-06 PROCEDURE — 99499 RISK ADDL DX/OHS AUDIT: ICD-10-PCS | Mod: S$GLB,,, | Performed by: INTERNAL MEDICINE

## 2019-02-06 PROCEDURE — 99999 PR PBB SHADOW E&M-EST. PATIENT-LVL III: CPT | Mod: PBBFAC,,, | Performed by: INTERNAL MEDICINE

## 2019-02-06 PROCEDURE — 86160 COMPLEMENT ANTIGEN: CPT | Mod: 59

## 2019-02-06 PROCEDURE — 80053 COMPREHEN METABOLIC PANEL: CPT

## 2019-02-06 PROCEDURE — 84443 ASSAY THYROID STIM HORMONE: CPT

## 2019-02-06 PROCEDURE — 86140 C-REACTIVE PROTEIN: CPT

## 2019-02-06 PROCEDURE — 99214 OFFICE O/P EST MOD 30 MIN: CPT | Mod: S$GLB,,, | Performed by: INTERNAL MEDICINE

## 2019-02-06 PROCEDURE — 36415 COLL VENOUS BLD VENIPUNCTURE: CPT

## 2019-02-06 PROCEDURE — 80061 LIPID PANEL: CPT

## 2019-02-06 PROCEDURE — 86225 DNA ANTIBODY NATIVE: CPT

## 2019-02-06 PROCEDURE — 3075F SYST BP GE 130 - 139MM HG: CPT | Mod: CPTII,S$GLB,, | Performed by: INTERNAL MEDICINE

## 2019-02-06 PROCEDURE — 99999 PR PBB SHADOW E&M-EST. PATIENT-LVL III: ICD-10-PCS | Mod: PBBFAC,,, | Performed by: INTERNAL MEDICINE

## 2019-02-06 PROCEDURE — 3079F PR MOST RECENT DIASTOLIC BLOOD PRESSURE 80-89 MM HG: ICD-10-PCS | Mod: CPTII,S$GLB,, | Performed by: INTERNAL MEDICINE

## 2019-02-06 RX ORDER — LOSARTAN POTASSIUM 100 MG/1
100 TABLET ORAL DAILY
Qty: 90 TABLET | Refills: 3 | Status: SHIPPED | OUTPATIENT
Start: 2019-02-06 | End: 2020-01-23

## 2019-02-06 NOTE — PROGRESS NOTES
Subjective:       Patient ID: Breanna Guido is a 58 y.o. female.    Chief Complaint: Adenopathy    Pt here for f/u. Migraines are well controlled on current meds.      BP is well controlled. Denies cp/sob/ha/vision or neuro changes. Not checking at home regularly.     HLD has been tx with crestor which she has tolerated well.     She is clinically euthyroid. Due for updated labs.     Bipolar is followed by psychiatry regularly. No SI/HI. This is stable.     Pt sees Dr. Valencia in [facility] for fibromyalgia dx and lupus dx; however, her serology has been negative. It was recommended she be maintained on plaquenil which she takes. She is being followed regularly by ophtho as well. Her pain is manageable with baclofen and lyrica.    COPD is stable on current meds.     She has osteopenia on most recent DEXA 4/2018. She is on ca/d.     Pt had teeth pulled 2 days ago b/c of dental caries. She started with tender and enlarged LNs in cervical region a few days prior. No fever. No sore throat or URI symptoms. She was given abx by dentist which she has been taking for 2 days thus far.       Hypertension   Pertinent negatives include no chest pain, headaches, neck pain, palpitations or shortness of breath.     Review of Systems   Constitutional: Positive for activity change. Negative for unexpected weight change.   HENT: Negative for hearing loss, rhinorrhea and trouble swallowing.    Eyes: Negative for discharge and visual disturbance.   Respiratory: Negative for chest tightness, shortness of breath and wheezing.    Cardiovascular: Negative for chest pain and palpitations.   Gastrointestinal: Negative for abdominal pain, blood in stool, constipation, diarrhea, nausea and vomiting.   Endocrine: Negative for polydipsia and polyuria.   Genitourinary: Negative for difficulty urinating, dysuria, frequency, hematuria and menstrual problem.   Musculoskeletal: Positive for arthralgias. Negative for joint swelling and neck pain.    Neurological: Negative for speech difficulty, weakness and headaches.   Hematological: Positive for adenopathy.   Psychiatric/Behavioral: Negative for confusion and dysphoric mood.       Objective:          Assessment:       1. Simple chronic bronchitis    2. Bipolar 2 disorder    3. Essential hypertension, benign    4. Mixed hyperlipidemia    5. Other forms of systemic lupus erythematosus, unspecified organ involvement status    6. Acquired hypothyroidism    7. Fibromyalgia    8. Osteopenia of multiple sites        Plan:       1. continue current meds   2. Keep f/u with specialists  3. Appropriate labs previously ordered  4. Tobacco cessation  5. Change lisinopril to losartan considering recent study linking ACEI and lung ca  6. Suspect cervical LAD is due to dental infections; complete PCN from dentist; if LAD is not resolving in next 1-2 weeks, she understands to let me know               Physical Exam   Constitutional: She is oriented to person, place, and time. She appears well-developed and well-nourished.   HENT:   Head: Normocephalic and atraumatic.   Eyes: EOM are normal. Pupils are equal, round, and reactive to light.   Neck: Neck supple. Carotid bruit is not present. No thyroid mass and no thyromegaly present.   Cardiovascular: Normal rate, regular rhythm, S1 normal and S2 normal.   Murmur heard.  Pulmonary/Chest: Effort normal and breath sounds normal. She has no wheezes.   Abdominal: Soft. Bowel sounds are normal. She exhibits no distension and no mass. There is no hepatosplenomegaly. There is no tenderness. There is no CVA tenderness.   Musculoskeletal: She exhibits no edema.        Lumbar back: Normal.   Lymphadenopathy:     She has cervical adenopathy.   Tender cervical LNs bilat   Neurological: She is alert and oriented to person, place, and time. She has normal strength. No cranial nerve deficit or sensory deficit. She displays a negative Romberg sign. Coordination and gait normal.   Reflex  Scores:       Bicep reflexes are 2+ on the right side and 2+ on the left side.       Patellar reflexes are 2+ on the right side and 2+ on the left side.  Psychiatric: She has a normal mood and affect. Her behavior is normal.

## 2019-02-07 LAB — DSDNA AB SER-ACNC: NORMAL [IU]/ML

## 2019-02-18 ENCOUNTER — OFFICE VISIT (OUTPATIENT)
Dept: RHEUMATOLOGY | Facility: CLINIC | Age: 59
End: 2019-02-18
Payer: MEDICARE

## 2019-02-18 VITALS
DIASTOLIC BLOOD PRESSURE: 77 MMHG | BODY MASS INDEX: 25.59 KG/M2 | HEIGHT: 64 IN | SYSTOLIC BLOOD PRESSURE: 124 MMHG | WEIGHT: 149.88 LBS | HEART RATE: 76 BPM

## 2019-02-18 DIAGNOSIS — R74.8 ELEVATED CPK: ICD-10-CM

## 2019-02-18 DIAGNOSIS — M85.80 OSTEOPENIA, UNSPECIFIED LOCATION: ICD-10-CM

## 2019-02-18 DIAGNOSIS — Z72.0 TOBACCO ABUSE: ICD-10-CM

## 2019-02-18 DIAGNOSIS — M79.7 FIBROMYALGIA: ICD-10-CM

## 2019-02-18 DIAGNOSIS — R82.81 PYURIA: ICD-10-CM

## 2019-02-18 DIAGNOSIS — M32.8 OTHER FORMS OF SYSTEMIC LUPUS ERYTHEMATOSUS, UNSPECIFIED ORGAN INVOLVEMENT STATUS: Primary | ICD-10-CM

## 2019-02-18 DIAGNOSIS — Z79.899 HIGH RISK MEDICATIONS (NOT ANTICOAGULANTS) LONG-TERM USE: ICD-10-CM

## 2019-02-18 DIAGNOSIS — Z79.899 LONG-TERM USE OF PLAQUENIL: ICD-10-CM

## 2019-02-18 DIAGNOSIS — M17.0 PRIMARY OSTEOARTHRITIS OF BOTH KNEES: ICD-10-CM

## 2019-02-18 PROCEDURE — 3008F PR BODY MASS INDEX (BMI) DOCUMENTED: ICD-10-PCS | Mod: CPTII,GC,S$GLB, | Performed by: INTERNAL MEDICINE

## 2019-02-18 PROCEDURE — 3078F DIAST BP <80 MM HG: CPT | Mod: CPTII,GC,S$GLB, | Performed by: INTERNAL MEDICINE

## 2019-02-18 PROCEDURE — 99215 PR OFFICE/OUTPT VISIT, EST, LEVL V, 40-54 MIN: ICD-10-PCS | Mod: GC,S$GLB,, | Performed by: INTERNAL MEDICINE

## 2019-02-18 PROCEDURE — 99215 OFFICE O/P EST HI 40 MIN: CPT | Mod: GC,S$GLB,, | Performed by: INTERNAL MEDICINE

## 2019-02-18 PROCEDURE — 3078F PR MOST RECENT DIASTOLIC BLOOD PRESSURE < 80 MM HG: ICD-10-PCS | Mod: CPTII,GC,S$GLB, | Performed by: INTERNAL MEDICINE

## 2019-02-18 PROCEDURE — 3074F SYST BP LT 130 MM HG: CPT | Mod: CPTII,GC,S$GLB, | Performed by: INTERNAL MEDICINE

## 2019-02-18 PROCEDURE — 3008F BODY MASS INDEX DOCD: CPT | Mod: CPTII,GC,S$GLB, | Performed by: INTERNAL MEDICINE

## 2019-02-18 PROCEDURE — 3074F PR MOST RECENT SYSTOLIC BLOOD PRESSURE < 130 MM HG: ICD-10-PCS | Mod: CPTII,GC,S$GLB, | Performed by: INTERNAL MEDICINE

## 2019-02-18 PROCEDURE — 99999 PR PBB SHADOW E&M-EST. PATIENT-LVL IV: ICD-10-PCS | Mod: PBBFAC,GC,, | Performed by: INTERNAL MEDICINE

## 2019-02-18 PROCEDURE — 99999 PR PBB SHADOW E&M-EST. PATIENT-LVL IV: CPT | Mod: PBBFAC,GC,, | Performed by: INTERNAL MEDICINE

## 2019-02-18 ASSESSMENT — ROUTINE ASSESSMENT OF PATIENT INDEX DATA (RAPID3)
PATIENT GLOBAL ASSESSMENT SCORE: 5
TOTAL RAPID3 SCORE: 3.44
PSYCHOLOGICAL DISTRESS SCORE: 3.3
AM STIFFNESS SCORE: 0, NO
PAIN SCORE: 4
MDHAQ FUNCTION SCORE: .4
FATIGUE SCORE: 8

## 2019-02-18 ASSESSMENT — SYSTEMIC LUPUS ERYTHEMATOSUS DISEASE ACTIVITY INDEX (SLEDAI): TOTAL_SCORE: 0

## 2019-02-18 NOTE — PROGRESS NOTES
Subjective:       Patient ID: Breanna Guido is a 58 y.o. female.    Chief Complaint: follow up for fibromyalgia and lupus  HPI     Pt is a 57 yo Female with Bipolar d/o, SLE (alopecia, malar rash, arthritis dx at [facility] in late 1990s), COPD, HTN, HLD, scleritis, and fibromyalgia here for follow up. Patient is currently plaquenil 200mg BID and cymbalta 90mg and Lyrica 150mg BID.    Patient admitted to intermittent joint pain in her wrists and knees, rating the pain a 4/10.  Pt does not take OTC pain medication unless its really bad then she takes a naprosyn which helps.  Admitted to intermittent joint pains depending on the day.  Morning stiffness lasts about 30 minutes which is not everyday. She stated as the weather improves and it is warmer outside she is doing more yardwork and her joint pain and stiffness improves.  She stated the more she moves the less she hurts.    Pt reports exercising more and has been using the patches to help her quit smoking.  She stated she has about 2-3 cigarettes a week. She has not noticed any worsening or new swollen joints, mucosal ulcers, alopecia, rashes, fevers, SOB, pleurisy.     Pt stated she has gone from a size 14 to a size 9 over the last year.  She thought at first it was due to some dieting she was doing, but she has since not been dieting.  Per the weight chart in computer however pt has gained weight.   Pt discussed this with her PCP as well she stated.  Per patient she is up to date on her c-scope, mammogram, papsmear which were normal.    Pt previously reported previous muscle aches in the arms and thighs resolved after stopping atorvastatin.  Pt is now taking rosuvastatin, no worsening muscle aches since starting that medication.    She has been treated in the past with rituximab 1000 mg*1 on 4/29/16 and 1000 mg IV *2 on 8/24/15 and 9/15/15 for scleritis. Pt stated her last flare was around December 2017 which she took naprosyn for which helped clear  "it up.  No flares since that time.    Previously worked as a  for a hotel.  Older sister has lupus and RA and fibromyalgia, her mother had lupus and arthritis and niece with lupus.    Review of Systems   Constitutional: Negative for chills, diaphoresis, fatigue, fever and unexpected weight change.   HENT: Negative for congestion, mouth sores and trouble swallowing.         Chronic alopecia   Eyes: Negative for pain and redness.   Respiratory: Negative for cough and shortness of breath.    Cardiovascular: Negative for chest pain.   Gastrointestinal: Negative for abdominal pain, constipation, diarrhea and nausea.   Genitourinary: Negative for dysuria, genital sores and hematuria.   Musculoskeletal: Positive for arthralgias. Negative for back pain, joint swelling, myalgias and neck pain.   Skin: Negative for color change and rash.   Neurological: Negative for weakness, numbness and headaches (chronic).        Memory difficulties     Hematological: Does not bruise/bleed easily.         Objective:     /77   Pulse 76   Ht 5' 3.6" (1.615 m)   Wt 68 kg (149 lb 14.4 oz)   BMI 26.05 kg/m²      Physical Exam   Constitutional: She is oriented to person, place, and time and well-developed, well-nourished, and in no distress. No distress.   HENT:   Head: Normocephalic and atraumatic.   Right Ear: External ear normal.   Left Ear: External ear normal.   Mouth/Throat: Oropharynx is clear and moist. No oropharyngeal exudate.   Eyes: Conjunctivae and EOM are normal. Right eye exhibits no discharge. Left eye exhibits no discharge. No scleral icterus.   Neck: Normal range of motion.   nontender submandibular adenopathy b/l   Cardiovascular: Normal rate, regular rhythm and normal heart sounds.  Exam reveals no gallop and no friction rub.    No murmur heard.  Pulmonary/Chest: Effort normal and breath sounds normal. No respiratory distress. She has no wheezes.   Abdominal: Soft. Bowel sounds are normal. She exhibits no " distension. There is no tenderness.       Right Side Rheumatological Exam     Muscle Strength (0-5 scale):  Deltoid:  5  Biceps: 5/5   Triceps:  5  : 5/5   Iliopsoas: 5  Quadriceps:  5   Distal Lower Extremity: 5    Left Side Rheumatological Exam     Muscle Strength (0-5 scale):  Deltoid:  5  Biceps: 5/5   Triceps:  5  :  5/5   Iliopsoas: 5  Quadriceps:  5   Distal Lower Extremity: 5      Neurological: She is alert and oriented to person, place, and time.   Skin: Skin is warm and dry. No rash noted. She is not diaphoretic.     Psychiatric: Mood and affect normal.   Musculoskeletal: Normal range of motion. She exhibits no edema.   Cspine no tenderness  Tspine  no tenderness  Lspine  no tenderness.  Shoulders: FROM; no synovitis;  Elbows: FROM; no synovitis; no tophi or nodules  Wrists: FROM; no synovitis;   MCPs: FROM; no synovitis; no metacarpalgia;  ok;  PIPs:FROM; no synovitis;   DIPs: FROM; no synovitis;   HIPS: FROM  Knees: FROM; no synovitis; no instability; b/l crepitus  Ankles: FROM: no synovitis   Toes: ok; no metatarsalgia             Labs:   Results for EDI LINARES (MRN [id number]) as of 2/18/2019 09:59   Ref. Range 2/6/2019 09:09   WBC Latest Ref Range: 3.90 - 12.70 K/uL 4.68   RBC Latest Ref Range: 4.00 - 5.40 M/uL 4.28   Hemoglobin Latest Ref Range: 12.0 - 16.0 g/dL 13.1   Hematocrit Latest Ref Range: 37.0 - 48.5 % 38.7   MCV Latest Ref Range: 82 - 98 fL 90   MCH Latest Ref Range: 27.0 - 31.0 pg 30.6   MCHC Latest Ref Range: 32.0 - 36.0 g/dL 33.9   RDW Latest Ref Range: 11.5 - 14.5 % 13.3   Platelets Latest Ref Range: 150 - 350 K/uL 180   MPV Latest Ref Range: 9.2 - 12.9 fL 11.0   Gran% Latest Ref Range: 38.0 - 73.0 % 44.2   Gran # (ANC) Latest Ref Range: 1.8 - 7.7 K/uL 2.1   Lymph% Latest Ref Range: 18.0 - 48.0 % 41.5   Lymph # Latest Ref Range: 1.0 - 4.8 K/uL 1.9   Mono% Latest Ref Range: 4.0 - 15.0 % 12.2   Mono # Latest Ref Range: 0.3 - 1.0 K/uL 0.6   Eosinophil% Latest Ref Range:  0.0 - 8.0 % 1.5   Eos # Latest Ref Range: 0.0 - 0.5 K/uL 0.1   Basophil% Latest Ref Range: 0.0 - 1.9 % 0.4   Baso # Latest Ref Range: 0.00 - 0.20 K/uL 0.02   Differential Method Unknown Automated   Sed Rate Latest Ref Range: 0 - 20 mm/Hr 25 (H)   Sodium Latest Ref Range: 136 - 145 mmol/L 141   Potassium Latest Ref Range: 3.5 - 5.1 mmol/L 4.4   Chloride Latest Ref Range: 95 - 110 mmol/L 106   CO2 Latest Ref Range: 23 - 29 mmol/L 27   Anion Gap Latest Ref Range: 8 - 16 mmol/L 8   BUN, Bld Latest Ref Range: 6 - 20 mg/dL 14   Creatinine Latest Ref Range: 0.5 - 1.4 mg/dL 0.7   eGFR if non African American Latest Ref Range: >60 mL/min/1.73 m^2 >60   eGFR if  Latest Ref Range: >60 mL/min/1.73 m^2 >60   Glucose Latest Ref Range: 70 - 110 mg/dL 90   Calcium Latest Ref Range: 8.7 - 10.5 mg/dL 9.5   Alkaline Phosphatase Latest Ref Range: 55 - 135 U/L 65   Total Protein Latest Ref Range: 6.0 - 8.4 g/dL 7.9   Albumin Latest Ref Range: 3.5 - 5.2 g/dL 4.1   Total Bilirubin Latest Ref Range: 0.1 - 1.0 mg/dL 0.4   AST Latest Ref Range: 10 - 40 U/L 34   ALT Latest Ref Range: 10 - 44 U/L 31   CRP Latest Ref Range: 0.0 - 8.2 mg/L 1.9   CPK Latest Ref Range: 20 - 180 U/L 277 (H)   ds DNA Ab Latest Ref Range: Negative 1:10  Negative 1:10   Complement (C-3) Latest Ref Range: 50 - 180 mg/dL 145   Complement (C-4) Latest Ref Range: 11 - 44 mg/dL 33     Results for VIJAY STEFANIMARILIA FLOWERS (MRN [id number]) as of 2/18/2019 09:59   Ref. Range 8/29/2013 08:13   DELON Latest Ref Range: Neg <1:160  Pos, Titer to follow (A)   DELON HEP-2 Titer Latest Ref Range: Neg <1:160 titer Pos 1:160 (A)   DELON Hep-2 Pattern Unknown Atypical Speckled (A)     Results for STEFANI LINARESTIKIEMERSON LIONEL (MRN [id number]) as of 2/18/2019 09:59   Ref. Range 2/6/2019 09:08   Specimen UA Unknown Urine, Unspecified   Color, UA Latest Ref Range: Yellow, Straw, Nini  Yellow   pH, UA Latest Ref Range: 5.0 - 8.0  6.0   Specific Gravity, UA Latest Ref Range: 1.005 - 1.030  1.025    Appearance, UA Latest Ref Range: Clear  Clear   Protein, UA Latest Ref Range: Negative  Negative   Glucose, UA Latest Ref Range: Negative  Negative   Ketones, UA Latest Ref Range: Negative  Negative   Occult Blood UA Latest Ref Range: Negative  Trace (A)   Nitrite, UA Latest Ref Range: Negative  Negative   Urobilinogen, UA Latest Ref Range: <2.0 EU/dL Negative   Bilirubin (UA) Latest Ref Range: Negative  Negative   Leukocytes, UA Latest Ref Range: Negative  2+ (A)   RBC, UA Latest Ref Range: 0 - 4 /hpf 0   WBC, UA Latest Ref Range: 0 - 5 /hpf 10 (H)   WBC Clumps, UA Latest Ref Range: None-Rare  Rare   Bacteria, UA Latest Ref Range: None-Occ /hpf Moderate (A)   Yeast, UA Latest Ref Range: None  None   Squam Epithel, UA Latest Units: /hpf 12   Hyaline Casts, UA Latest Ref Range: 0-1/lpf /lpf 0   Non-Squam Epith Latest Ref Range: <1/hpf /hpf 0   Microscopic Comment Unknown SEE COMMENT   Prot/Creat Ratio, Ur Latest Ref Range: 0.00 - 0.20  0.07   Protein, Urine Random Latest Ref Range: 0 - 15 mg/dL 9   Creatinine, Random Ur Latest Ref Range: 15.0 - 325.0 mg/dL 136.1         DEXA: 4/4/2018: Compared to the young normal reference, this study indicates normal bone mineral density of the Lumbar spine and osteopenia bilateral hips.    Assessment:       1. Other forms of systemic lupus erythematosus, unspecified organ involvement status    2. Fibromyalgia    3. Long-term use of Plaquenil    4. Primary osteoarthritis of both knees    5. Tobacco abuse    6. Osteopenia, unspecified location    7. High risk medications (not anticoagulants) long-term use    8. Elevated CPK    9. Pyuria        59 yo Female with Bipolar d/o, SLE (alopecia, malar rash, arthritis), scleritis,  and fibromyalgia here for follow up.  SLE, SLEDAI: 0 Lupus has been stable on plaquenil 200 mg bid  Plan:   -  Follow up with Opthalmology for Plaquenil eye exam and ophthalmology for scleritis  -pt to follow up with gynecology for her pyuria. Check urine  culture today  -recent labs reviewed  - Continue Cymbalta 90mg daily  - Continue Lyrica 150 mg BID.  - Continue Plaquenil 200 mg BID  - Continue aerobic exercises daily   -will need repeat BMD in 4/2020, current DEXA in 2018 showing osteopenia of both hips, but no indication for therapy.  -pt up to date on immunizations  - referred to neurology given for memory loss  -referred to dermatology for hair loss  -if patient develops muscle weakness will then pursue MRI to evaluate for myopathy however no weakness on exam currently.    RTC 3 months with standing labs and HMGCR, myomarker and quantiferon gold prior to visit.

## 2019-02-18 NOTE — PROGRESS NOTES
I  Have personally take the history and examined the patient and agree with fellow's note as stated above. Pt advised also to get Shingrix. KALEE

## 2019-03-11 ENCOUNTER — PATIENT MESSAGE (OUTPATIENT)
Dept: INTERNAL MEDICINE | Facility: CLINIC | Age: 59
End: 2019-03-11

## 2019-03-11 NOTE — TELEPHONE ENCOUNTER
DAVIDSONM scheduled her to come tomorrow 3/12 with Dr. Murrieta at 10:40 am.  msg on [facility] also to call us to r/s if needed

## 2019-03-12 ENCOUNTER — NURSE TRIAGE (OUTPATIENT)
Dept: ADMINISTRATIVE | Facility: CLINIC | Age: 59
End: 2019-03-12

## 2019-03-12 ENCOUNTER — LAB VISIT (OUTPATIENT)
Dept: LAB | Facility: OTHER | Age: 59
End: 2019-03-12
Attending: INTERNAL MEDICINE
Payer: MEDICARE

## 2019-03-12 ENCOUNTER — OFFICE VISIT (OUTPATIENT)
Dept: INTERNAL MEDICINE | Facility: CLINIC | Age: 59
End: 2019-03-12
Attending: INTERNAL MEDICINE
Payer: MEDICARE

## 2019-03-12 VITALS
SYSTOLIC BLOOD PRESSURE: 120 MMHG | WEIGHT: 143.94 LBS | HEIGHT: 63 IN | HEART RATE: 70 BPM | OXYGEN SATURATION: 98 % | BODY MASS INDEX: 25.5 KG/M2 | DIASTOLIC BLOOD PRESSURE: 80 MMHG

## 2019-03-12 DIAGNOSIS — R04.2 HEMOPTYSIS: ICD-10-CM

## 2019-03-12 DIAGNOSIS — Z12.9 SCREENING FOR CANCER: ICD-10-CM

## 2019-03-12 DIAGNOSIS — J44.9 CHRONIC OBSTRUCTIVE PULMONARY DISEASE, UNSPECIFIED COPD TYPE: ICD-10-CM

## 2019-03-12 DIAGNOSIS — R05.9 COUGH: Primary | ICD-10-CM

## 2019-03-12 DIAGNOSIS — Z72.0 TOBACCO ABUSE: ICD-10-CM

## 2019-03-12 DIAGNOSIS — R59.1 LAD (LYMPHADENOPATHY): ICD-10-CM

## 2019-03-12 DIAGNOSIS — F17.210 CIGARETTE NICOTINE DEPENDENCE, UNCOMPLICATED: ICD-10-CM

## 2019-03-12 PROCEDURE — 99999 PR PBB SHADOW E&M-EST. PATIENT-LVL V: ICD-10-PCS | Mod: PBBFAC,,, | Performed by: INTERNAL MEDICINE

## 2019-03-12 PROCEDURE — 99214 OFFICE O/P EST MOD 30 MIN: CPT | Mod: S$GLB,,, | Performed by: INTERNAL MEDICINE

## 2019-03-12 PROCEDURE — 36415 COLL VENOUS BLD VENIPUNCTURE: CPT

## 2019-03-12 PROCEDURE — 3008F BODY MASS INDEX DOCD: CPT | Mod: CPTII,S$GLB,, | Performed by: INTERNAL MEDICINE

## 2019-03-12 PROCEDURE — 3079F DIAST BP 80-89 MM HG: CPT | Mod: CPTII,S$GLB,, | Performed by: INTERNAL MEDICINE

## 2019-03-12 PROCEDURE — 3008F PR BODY MASS INDEX (BMI) DOCUMENTED: ICD-10-PCS | Mod: CPTII,S$GLB,, | Performed by: INTERNAL MEDICINE

## 2019-03-12 PROCEDURE — 99499 RISK ADDL DX/OHS AUDIT: ICD-10-PCS | Mod: S$GLB,,, | Performed by: INTERNAL MEDICINE

## 2019-03-12 PROCEDURE — 99214 PR OFFICE/OUTPT VISIT, EST, LEVL IV, 30-39 MIN: ICD-10-PCS | Mod: S$GLB,,, | Performed by: INTERNAL MEDICINE

## 2019-03-12 PROCEDURE — 99499 UNLISTED E&M SERVICE: CPT | Mod: S$GLB,,, | Performed by: INTERNAL MEDICINE

## 2019-03-12 PROCEDURE — 99999 PR PBB SHADOW E&M-EST. PATIENT-LVL V: CPT | Mod: PBBFAC,,, | Performed by: INTERNAL MEDICINE

## 2019-03-12 PROCEDURE — 3079F PR MOST RECENT DIASTOLIC BLOOD PRESSURE 80-89 MM HG: ICD-10-PCS | Mod: CPTII,S$GLB,, | Performed by: INTERNAL MEDICINE

## 2019-03-12 PROCEDURE — 3074F PR MOST RECENT SYSTOLIC BLOOD PRESSURE < 130 MM HG: ICD-10-PCS | Mod: CPTII,S$GLB,, | Performed by: INTERNAL MEDICINE

## 2019-03-12 PROCEDURE — 86480 TB TEST CELL IMMUN MEASURE: CPT

## 2019-03-12 PROCEDURE — 3074F SYST BP LT 130 MM HG: CPT | Mod: CPTII,S$GLB,, | Performed by: INTERNAL MEDICINE

## 2019-03-12 RX ORDER — ALPRAZOLAM 2 MG/1
TABLET ORAL
COMMUNITY
Start: 2013-08-02 | End: 2019-03-20 | Stop reason: SDUPTHER

## 2019-03-12 RX ORDER — CHLORHEXIDINE GLUCONATE ORAL RINSE 1.2 MG/ML
SOLUTION DENTAL
Refills: 0 | COMMUNITY
Start: 2019-02-21 | End: 2019-08-20

## 2019-03-12 RX ORDER — CELECOXIB 200 MG/1
CAPSULE ORAL
COMMUNITY
Start: 2013-08-02 | End: 2019-08-20

## 2019-03-12 RX ORDER — LEVOTHYROXINE SODIUM 200 UG/1
200 TABLET ORAL
COMMUNITY
Start: 2013-08-02 | End: 2019-08-28

## 2019-03-12 RX ORDER — AZITHROMYCIN 250 MG/1
TABLET, FILM COATED ORAL
Qty: 6 TABLET | Refills: 0 | Status: SHIPPED | OUTPATIENT
Start: 2019-03-12 | End: 2019-03-17

## 2019-03-12 RX ORDER — PREDNISONE 10 MG/1
TABLET ORAL
Qty: 11 TABLET | Refills: 0 | Status: SHIPPED | OUTPATIENT
Start: 2019-03-12 | End: 2019-08-20

## 2019-03-12 RX ORDER — OXCARBAZEPINE 300 MG/1
TABLET, FILM COATED ORAL
COMMUNITY
Start: 2013-08-02 | End: 2019-08-20

## 2019-03-12 RX ORDER — BENZTROPINE MESYLATE 0.5 MG/1
0.5 TABLET ORAL
COMMUNITY
Start: 2013-08-02 | End: 2019-08-20

## 2019-03-12 RX ORDER — PENICILLIN V POTASSIUM 500 MG/1
TABLET, FILM COATED ORAL
Refills: 0 | COMMUNITY
Start: 2019-02-21 | End: 2019-08-20

## 2019-03-12 RX ORDER — ARIPIPRAZOLE 15 MG/1
15 TABLET ORAL
COMMUNITY
Start: 2013-08-02 | End: 2019-08-28

## 2019-03-12 NOTE — PROGRESS NOTES
"Subjective:   Patient ID: Breanna Guido is a 58 y.o. female  Chief complaint:   Chief Complaint   Patient presents with    Cough     bloody mucus       HPI   Pt here for UC appt   Pcp: Dr. Arambula    Pt has hx of tob dependence, COPD on symbicort and albuterol, SLE on plaquenil, cymbalta and lyrica  Here for UC appt to evaluate cough and hemoptysis and LAD    Reports had 'bad flu' in Jan and has had inc cough from baseline since then  Coughing up blood intermittently x 3-4 days - at times it is small streaks - no clots   - has periods of coughing during the day   - No nosebleeds  - No aspirin use    Hx of COPD  - still smoking 4-5 cigs/week - ha cut down significantly over past year  No fevers or chills but does have night sweats since Jan - does not occur every night - no measured fevers    Has neg Quantiferon TB in 2015  - no incarceration or homelessness      - reports feels like LN are more prominent - first noticed enlarged and tender with flu and they have decreased in size - less prominent but not back to baseline   - had dental work in Feb and went through 2 courses of PCN after pulled a couple of teeth   - no oral pain or tooth infection that she has noticed at this time     Hx of tob since high school and inc tob after [place] to 1-2 ppd x 14 years and cut back over past year     No sore throat or ear pain   Is having wheezing at night   Is c/w symbicort and albuterol    Sister with pulm fibrosis and sarcoid  Other sister has pulm HTN    - old brother dx with lung cancer + tob use 4ppd    LAD: no cats of her own - has stray cats in yard   Review of Systems    Objective:  Vitals:    03/12/19 1047   BP: 120/80   Pulse: 70   SpO2: 98%   Weight: 65.3 kg (143 lb 15.4 oz)   Height: 5' 3" (1.6 m)     Body mass index is 25.5 kg/m².    Physical Exam   Constitutional: She is oriented to person, place, and time. She appears well-developed and well-nourished.   HENT:   Head: Normocephalic and atraumatic.   Right " Ear: External ear normal.   Left Ear: External ear normal.   Nose: Nose normal.   Mouth/Throat: Oropharynx is clear and moist. No oropharyngeal exudate.   No blood in nares  Cerumen in B ears obstructing view of TM   Poor dentition   No ttp over gums or teeth  No ttp over max sinuses   No skin lesions or cellulitis at scalp or face    Eyes: Conjunctivae and EOM are normal.   Neck: Normal range of motion. Neck supple.   Cardiovascular: Normal rate, regular rhythm and intact distal pulses.   Pulmonary/Chest: Effort normal. No stridor. No respiratory distress. She has no wheezes. She has no rales.   Abdominal: Soft. Normal appearance and bowel sounds are normal.   Musculoskeletal: She exhibits no edema or tenderness.   Lymphadenopathy:        Head (right side): Submandibular adenopathy present.        Head (left side): Submandibular adenopathy present.     She has no axillary adenopathy.        Right: No supraclavicular and no epitrochlear adenopathy present.        Left: No supraclavicular and no epitrochlear adenopathy present.   Neurological: She is alert and oriented to person, place, and time. She has normal strength. Gait normal.   Skin: Skin is warm, dry and intact. No cyanosis. Nails show no clubbing.   Psychiatric: She has a normal mood and affect. Her speech is normal and behavior is normal. Cognition and memory are normal.   Vitals reviewed.      Assessment:  1. Cough    2. Screening for cancer    3. Chronic obstructive pulmonary disease, unspecified COPD type    4. Tobacco abuse    5. Hemoptysis    6. Cigarette nicotine dependence, uncomplicated     7. LAD (lymphadenopathy)        Plan:  Breanna was seen today for cough.    Diagnoses and all orders for this visit:    Cough  -     X-Ray Chest PA And Lateral; Future  Check cxr   Cont copd inhalers   Sx worse x 2 months   Give pdn taper as pt reports did not alberta high dose taper in past   If any issues with pdn will stop   zpack   Check  Quant gold test   If  does not resolve will get diagnostic ct lung and cancel screening in 4 weeks     Screening for cancer  -     CT Chest Lung Screening Low Dose; Future    Chronic obstructive pulmonary disease, unspecified COPD type  -     QUANTIFERON GOLD TB; Future  -     CT Chest Lung Screening Low Dose; Future  -     Complete PFT with bronchodilator; Future  -     Ambulatory Referral to Pulmonology  Cont inhalers   Check PFTs and screening lung CT in 4 weeks if sx above resolve   - pt meets 30pack year hx after further discussion per hpi    Tobacco abuse  -     CT Chest Lung Screening Low Dose; Future  counseled to quit    Hemoptysis  -     QUANTIFERON GOLD TB; Future  -     Ambulatory Referral to Pulmonology  suspect due to bronchitis - see above   Check cxr and quant gold   If does not resolve or worsens then f/u with pulm     Cigarette nicotine dependence, uncomplicated   -     CT Chest Lung Screening Low Dose; Future  As above   counseled to quit tob    LAD (lymphadenopathy)  -     Comprehensive metabolic panel; Future  -     CBC auto differential; Future  -     US Soft Tissue Head Neck Thyroid; Future  -     Ambulatory Referral to ENT  Will get US  Check basic labs as above   Per pt prominent LN inc from baseline x 2 months   Will check studies above and zpack for c/f copd exacerbation   No cats but reports exposed to stool in her yard from strays   - will consider toxo testing pending f/u   - also hx of tob use, cough and tooth likely contributing   Refer to ent in 3-4 weeks if does not return to baseline or worsens     Other orders  -     azithromycin (Z-RHONDA) 250 MG tablet; Take 2 tablets by mouth on day 1; Take 1 tablet by mouth on days 2-5  -     predniSONE (DELTASONE) 10 MG tablet; Take 4 tabs daily x 1 day, then 3 tabs daily x 1 day, then 2 tabs daily x 1 day then 1 tab daily x 2 days    rec f/u with pcp in 1 week or sooner prn for recheck   Health Maintenance   Topic Date Due    Zoster Vaccine  05/02/2018     Mammogram  06/09/2019    TETANUS VACCINE  07/17/2023    Lipid Panel  02/06/2024    Colonoscopy  05/22/2028    Hepatitis C Screening  Completed    Pneumococcal Vaccine (Medium Risk)  Completed    Influenza Vaccine  Completed

## 2019-03-12 NOTE — TELEPHONE ENCOUNTER
Saw the md today, she prescribed prednisone, tapering dose.  She thinks she messed up, tok 4 prednisone at once.  Unsure if this is correct.  Advised this is ok.    Reason for Disposition   Caller has medication question only, adult not sick, and triager answers question    Protocols used: MEDICATION QUESTION CALL-A-

## 2019-03-13 ENCOUNTER — TELEPHONE (OUTPATIENT)
Dept: INTERNAL MEDICINE | Facility: CLINIC | Age: 59
End: 2019-03-13

## 2019-03-13 NOTE — TELEPHONE ENCOUNTER
Left voice message for patient to schedule appointment from referral to ENT and Pulmonology Clinics.  Dani LUKE  [phone]

## 2019-03-14 ENCOUNTER — HOSPITAL ENCOUNTER (OUTPATIENT)
Dept: RADIOLOGY | Facility: OTHER | Age: 59
Discharge: HOME | End: 2019-03-14
Attending: INTERNAL MEDICINE
Payer: MEDICARE

## 2019-03-14 ENCOUNTER — TELEPHONE (OUTPATIENT)
Dept: INTERNAL MEDICINE | Facility: CLINIC | Age: 59
End: 2019-03-14

## 2019-03-14 DIAGNOSIS — R77.8 ELEVATED TOTAL PROTEIN: Primary | ICD-10-CM

## 2019-03-14 DIAGNOSIS — R05.9 COUGH: ICD-10-CM

## 2019-03-14 DIAGNOSIS — R59.1 LAD (LYMPHADENOPATHY): ICD-10-CM

## 2019-03-14 LAB
M TB IFN-G CD4+ BCKGRND COR BLD-ACNC: -0.02 IU/ML
MITOGEN IGNF BCKGRD COR BLD-ACNC: 7.95 IU/ML
MITOGEN IGNF BCKGRD COR BLD-ACNC: NEGATIVE [IU]/ML
NIL: 0.06 IU/ML
TB2 - NIL: -0.03 IU/ML

## 2019-03-14 PROCEDURE — 71046 X-RAY EXAM CHEST 2 VIEWS: CPT | Mod: TC,FY

## 2019-03-14 PROCEDURE — 76536 US EXAM OF HEAD AND NECK: CPT | Mod: 26,,, | Performed by: RADIOLOGY

## 2019-03-14 PROCEDURE — 76536 US EXAM OF HEAD AND NECK: CPT | Mod: TC

## 2019-03-14 PROCEDURE — 71046 X-RAY EXAM CHEST 2 VIEWS: CPT | Mod: 26,,, | Performed by: RADIOLOGY

## 2019-03-14 PROCEDURE — 71046 XR CHEST PA AND LATERAL: ICD-10-PCS | Mod: 26,,, | Performed by: RADIOLOGY

## 2019-03-14 PROCEDURE — 76536 US SOFT TISSUE HEAD NECK THYROID: ICD-10-PCS | Mod: 26,,, | Performed by: RADIOLOGY

## 2019-03-14 NOTE — TELEPHONE ENCOUNTER
Message sent to pt via my chart with lab results and updates to plan.     Please arrange spep and light chains labs and UPEP (pt will need 24h urine collection container to complete this- please arrange for her to pick this up from our clinic)

## 2019-03-18 ENCOUNTER — TELEPHONE (OUTPATIENT)
Dept: INTERNAL MEDICINE | Facility: CLINIC | Age: 59
End: 2019-03-18

## 2019-03-18 NOTE — TELEPHONE ENCOUNTER
----- Message from Zulma Romero sent at 3/18/2019  9:26 AM CDT -----  Contact: Pt   Name of Who is Calling: EDI LINARES [[id number]]    What is the request in detail: Pt came to  her culture and states it's for a stool sample. Pt wanted to make sure that is the test she suppose to take. Please call to further discuss and advise.     Can the clinic reply by [facility]: No     What Number to Call Back if not in [facility]: [phone]

## 2019-03-18 NOTE — TELEPHONE ENCOUNTER
Spoke to pt and explained she had wrong instructions and this is a 24 hr urine collection and gave her the instructions over the phone.pt verbalized understanding

## 2019-03-19 ENCOUNTER — PATIENT OUTREACH (OUTPATIENT)
Dept: ADMINISTRATIVE | Facility: HOSPITAL | Age: 59
End: 2019-03-19

## 2019-03-20 ENCOUNTER — LAB VISIT (OUTPATIENT)
Dept: LAB | Facility: OTHER | Age: 59
End: 2019-03-20
Attending: INTERNAL MEDICINE
Payer: MEDICARE

## 2019-03-20 ENCOUNTER — OFFICE VISIT (OUTPATIENT)
Dept: INTERNAL MEDICINE | Facility: CLINIC | Age: 59
End: 2019-03-20
Payer: MEDICARE

## 2019-03-20 VITALS
BODY MASS INDEX: 25.9 KG/M2 | HEART RATE: 79 BPM | DIASTOLIC BLOOD PRESSURE: 70 MMHG | OXYGEN SATURATION: 98 % | SYSTOLIC BLOOD PRESSURE: 124 MMHG | WEIGHT: 146.19 LBS | HEIGHT: 63 IN

## 2019-03-20 DIAGNOSIS — R05.9 COUGH: Primary | ICD-10-CM

## 2019-03-20 DIAGNOSIS — Z12.39 SCREENING FOR BREAST CANCER: ICD-10-CM

## 2019-03-20 DIAGNOSIS — R77.8 ELEVATED TOTAL PROTEIN: ICD-10-CM

## 2019-03-20 LAB
PROT 24H UR-MRATE: 100 MG/SPEC
PROT UR-MCNC: 8 MG/DL
URINE COLLECTION DURATION: 24 HR
URINE VOLUME: 1250 ML

## 2019-03-20 PROCEDURE — 3074F PR MOST RECENT SYSTOLIC BLOOD PRESSURE < 130 MM HG: ICD-10-PCS | Mod: CPTII,S$GLB,, | Performed by: INTERNAL MEDICINE

## 2019-03-20 PROCEDURE — 84166 PROTEIN E-PHORESIS/URINE/CSF: CPT | Mod: 26,,, | Performed by: PATHOLOGY

## 2019-03-20 PROCEDURE — 99999 PR PBB SHADOW E&M-EST. PATIENT-LVL III: ICD-10-PCS | Mod: PBBFAC,,, | Performed by: INTERNAL MEDICINE

## 2019-03-20 PROCEDURE — 99999 PR PBB SHADOW E&M-EST. PATIENT-LVL III: CPT | Mod: PBBFAC,,, | Performed by: INTERNAL MEDICINE

## 2019-03-20 PROCEDURE — 84156 ASSAY OF PROTEIN URINE: CPT

## 2019-03-20 PROCEDURE — 3008F PR BODY MASS INDEX (BMI) DOCUMENTED: ICD-10-PCS | Mod: CPTII,S$GLB,, | Performed by: INTERNAL MEDICINE

## 2019-03-20 PROCEDURE — 99214 OFFICE O/P EST MOD 30 MIN: CPT | Mod: S$GLB,,, | Performed by: INTERNAL MEDICINE

## 2019-03-20 PROCEDURE — 3074F SYST BP LT 130 MM HG: CPT | Mod: CPTII,S$GLB,, | Performed by: INTERNAL MEDICINE

## 2019-03-20 PROCEDURE — 99214 PR OFFICE/OUTPT VISIT, EST, LEVL IV, 30-39 MIN: ICD-10-PCS | Mod: S$GLB,,, | Performed by: INTERNAL MEDICINE

## 2019-03-20 PROCEDURE — 84166 PATHOLOGIST INTERPRETATION UPE: ICD-10-PCS | Mod: 26,,, | Performed by: PATHOLOGY

## 2019-03-20 PROCEDURE — 84166 PROTEIN E-PHORESIS/URINE/CSF: CPT

## 2019-03-20 PROCEDURE — 3078F PR MOST RECENT DIASTOLIC BLOOD PRESSURE < 80 MM HG: ICD-10-PCS | Mod: CPTII,S$GLB,, | Performed by: INTERNAL MEDICINE

## 2019-03-20 PROCEDURE — 3078F DIAST BP <80 MM HG: CPT | Mod: CPTII,S$GLB,, | Performed by: INTERNAL MEDICINE

## 2019-03-20 PROCEDURE — 3008F BODY MASS INDEX DOCD: CPT | Mod: CPTII,S$GLB,, | Performed by: INTERNAL MEDICINE

## 2019-03-20 NOTE — PROGRESS NOTES
Subjective:       Patient ID: Breanna Guido is a 58 y.o. female.    Chief Complaint: Cough    Pt here for f/u as she recently saw one of my partners for cough with hemoptysis and what appeared to be enlarged cervical LNs. W/u thus far has been normal which we reviewed. She was placed on zpak which she completed and has helped with symptoms. U/s of neck showed large submandibular gland but no LAD. This is not bothering her now. We reviewed r/b of CT low dose for lung ca screening which was ordered and she would like to proceed which is reasonable. She was also found to have elevated total protein so SPEP/UPEP was ordered and is pending.       Review of Systems   Constitutional: Negative for activity change, fever and unexpected weight change.   HENT: Negative for ear pain, hearing loss, rhinorrhea, sore throat and trouble swallowing.    Eyes: Negative for discharge and visual disturbance.   Respiratory: Negative for cough, chest tightness, shortness of breath and wheezing.    Cardiovascular: Negative for chest pain and palpitations.   Gastrointestinal: Negative for blood in stool, constipation, diarrhea and vomiting.   Endocrine: Negative for polydipsia and polyuria.   Genitourinary: Negative for difficulty urinating, dysuria, hematuria and menstrual problem.   Musculoskeletal: Positive for arthralgias and joint swelling. Negative for neck pain.   Neurological: Negative for weakness and headaches.   Psychiatric/Behavioral: Negative for confusion and dysphoric mood.       Objective:      Physical Exam   Constitutional: She is oriented to person, place, and time. She appears well-developed and well-nourished.   HENT:   Right Ear: Tympanic membrane, external ear and ear canal normal.   Left Ear: Tympanic membrane, external ear and ear canal normal.   Nose: No mucosal edema or rhinorrhea.   Mouth/Throat: No oropharyngeal exudate or posterior oropharyngeal erythema.   Neck: Neck supple. No thyromegaly present.    Cardiovascular: Normal rate, regular rhythm and normal heart sounds.   Pulmonary/Chest: Effort normal and breath sounds normal. She has no wheezes. She has no rales.   Lymphadenopathy:     She has no cervical adenopathy.   Neurological: She is alert and oriented to person, place, and time.   Psychiatric: She has a normal mood and affect.       Assessment:       1. Cough    2. Screening for breast cancer        Plan:       1. mammo  2. Symptoms have resolved but will proceed with CT low dose lung scan next month  3. Complete w/u for elevated total protein

## 2019-03-21 LAB — PROT PATTERN UR ELPH-IMP: NORMAL

## 2019-03-22 LAB — PATHOLOGIST INTERPRETATION UPE: NORMAL

## 2019-04-09 ENCOUNTER — HOSPITAL ENCOUNTER (OUTPATIENT)
Dept: RADIOLOGY | Facility: OTHER | Age: 59
Discharge: HOME | End: 2019-04-09
Attending: INTERNAL MEDICINE
Payer: MEDICARE

## 2019-04-09 DIAGNOSIS — J44.9 CHRONIC OBSTRUCTIVE PULMONARY DISEASE, UNSPECIFIED COPD TYPE: ICD-10-CM

## 2019-04-09 DIAGNOSIS — Z12.9 SCREENING FOR CANCER: ICD-10-CM

## 2019-04-09 DIAGNOSIS — F17.210 CIGARETTE NICOTINE DEPENDENCE, UNCOMPLICATED: ICD-10-CM

## 2019-04-09 DIAGNOSIS — Z72.0 TOBACCO ABUSE: ICD-10-CM

## 2019-04-09 DIAGNOSIS — Z12.39 SCREENING FOR BREAST CANCER: ICD-10-CM

## 2019-04-09 PROCEDURE — 77063 BREAST TOMOSYNTHESIS BI: CPT | Mod: 26,,, | Performed by: RADIOLOGY

## 2019-04-09 PROCEDURE — 77067 SCR MAMMO BI INCL CAD: CPT | Mod: TC

## 2019-04-09 PROCEDURE — 77067 MAMMO DIGITAL SCREENING BILAT WITH TOMOSYNTHESIS_CAD: ICD-10-PCS | Mod: 26,,, | Performed by: RADIOLOGY

## 2019-04-09 PROCEDURE — 77067 SCR MAMMO BI INCL CAD: CPT | Mod: 26,,, | Performed by: RADIOLOGY

## 2019-04-09 PROCEDURE — 77063 MAMMO DIGITAL SCREENING BILAT WITH TOMOSYNTHESIS_CAD: ICD-10-PCS | Mod: 26,,, | Performed by: RADIOLOGY

## 2019-04-09 PROCEDURE — G0297 LDCT FOR LUNG CA SCREEN: HCPCS | Mod: 26,,, | Performed by: RADIOLOGY

## 2019-04-09 PROCEDURE — G0297 CT CHEST LUNG SCREENING LOW DOSE: ICD-10-PCS | Mod: 26,,, | Performed by: RADIOLOGY

## 2019-04-09 PROCEDURE — G0297 LDCT FOR LUNG CA SCREEN: HCPCS | Mod: TC

## 2019-04-11 ENCOUNTER — TELEPHONE (OUTPATIENT)
Dept: INTERNAL MEDICINE | Facility: CLINIC | Age: 59
End: 2019-04-11

## 2019-04-11 DIAGNOSIS — R91.1 LUNG NODULE: ICD-10-CM

## 2019-04-11 DIAGNOSIS — N28.1 RENAL CYST: Primary | ICD-10-CM

## 2019-04-11 DIAGNOSIS — Z87.891 HISTORY OF TOBACCO ABUSE: ICD-10-CM

## 2019-04-11 NOTE — TELEPHONE ENCOUNTER
Message sent to pt via my chart with lab results and updates to plan.     Please schedule CT chest in 6 months   Please schedule renal US in 1-2 weeks

## 2019-04-13 RX ORDER — AMLODIPINE BESYLATE 5 MG/1
TABLET ORAL
Qty: 90 TABLET | Refills: 3 | Status: SHIPPED | OUTPATIENT
Start: 2019-04-13 | End: 2019-11-27 | Stop reason: SDUPTHER

## 2019-04-15 DIAGNOSIS — M79.7 FIBROMYALGIA: Chronic | ICD-10-CM

## 2019-04-15 RX ORDER — ROSUVASTATIN CALCIUM 40 MG/1
40 TABLET, COATED ORAL NIGHTLY
Qty: 90 TABLET | Refills: 3 | Status: SHIPPED | OUTPATIENT
Start: 2019-04-15 | End: 2019-11-27 | Stop reason: SDUPTHER

## 2019-04-15 RX ORDER — PREGABALIN 150 MG/1
150 CAPSULE ORAL 2 TIMES DAILY
Qty: 180 CAPSULE | Refills: 1 | Status: SHIPPED | OUTPATIENT
Start: 2019-04-15 | End: 2019-10-07 | Stop reason: SDUPTHER

## 2019-04-16 DIAGNOSIS — M32.19 OTHER SYSTEMIC LUPUS ERYTHEMATOSUS WITH OTHER ORGAN INVOLVEMENT: Chronic | ICD-10-CM

## 2019-04-17 ENCOUNTER — HOSPITAL ENCOUNTER (OUTPATIENT)
Dept: RADIOLOGY | Facility: OTHER | Age: 59
Discharge: HOME | End: 2019-04-17
Attending: INTERNAL MEDICINE
Payer: MEDICARE

## 2019-04-17 DIAGNOSIS — N28.1 RENAL CYST: ICD-10-CM

## 2019-04-17 PROCEDURE — 76770 US EXAM ABDO BACK WALL COMP: CPT | Mod: 26,,, | Performed by: INTERNAL MEDICINE

## 2019-04-17 PROCEDURE — 76770 US RETROPERITONEAL COMPLETE: ICD-10-PCS | Mod: 26,,, | Performed by: INTERNAL MEDICINE

## 2019-04-17 PROCEDURE — 76770 US EXAM ABDO BACK WALL COMP: CPT | Mod: TC

## 2019-04-17 RX ORDER — HYDROXYCHLOROQUINE SULFATE 200 MG/1
200 TABLET, FILM COATED ORAL 2 TIMES DAILY
Qty: 180 TABLET | Refills: 3 | Status: SHIPPED | OUTPATIENT
Start: 2019-04-17 | End: 2020-01-09 | Stop reason: SDUPTHER

## 2019-04-18 ENCOUNTER — PATIENT MESSAGE (OUTPATIENT)
Dept: INTERNAL MEDICINE | Facility: CLINIC | Age: 59
End: 2019-04-18

## 2019-04-18 ENCOUNTER — PATIENT MESSAGE (OUTPATIENT)
Dept: OPHTHALMOLOGY | Facility: CLINIC | Age: 59
End: 2019-04-18

## 2019-04-18 ENCOUNTER — PATIENT MESSAGE (OUTPATIENT)
Dept: RHEUMATOLOGY | Facility: CLINIC | Age: 59
End: 2019-04-18

## 2019-05-22 RX ORDER — BUDESONIDE AND FORMOTEROL FUMARATE DIHYDRATE 160; 4.5 UG/1; UG/1
AEROSOL RESPIRATORY (INHALATION)
Qty: 10.2 G | Refills: 5 | Status: SHIPPED | OUTPATIENT
Start: 2019-05-22 | End: 2019-11-27 | Stop reason: SDUPTHER

## 2019-06-19 ENCOUNTER — TELEPHONE (OUTPATIENT)
Dept: RHEUMATOLOGY | Facility: CLINIC | Age: 59
End: 2019-06-19

## 2019-06-19 ENCOUNTER — PATIENT MESSAGE (OUTPATIENT)
Dept: RHEUMATOLOGY | Facility: CLINIC | Age: 59
End: 2019-06-19

## 2019-08-06 ENCOUNTER — TELEPHONE (OUTPATIENT)
Dept: INTERNAL MEDICINE | Facility: CLINIC | Age: 59
End: 2019-08-06

## 2019-08-20 ENCOUNTER — OFFICE VISIT (OUTPATIENT)
Dept: OPTOMETRY | Facility: CLINIC | Age: 59
End: 2019-08-20
Payer: MEDICARE

## 2019-08-20 ENCOUNTER — CLINICAL SUPPORT (OUTPATIENT)
Dept: OPHTHALMOLOGY | Facility: CLINIC | Age: 59
End: 2019-08-20
Payer: MEDICARE

## 2019-08-20 DIAGNOSIS — Z79.899 HIGH RISK MEDICATION USE: Primary | ICD-10-CM

## 2019-08-20 DIAGNOSIS — H52.03 HYPEROPIA WITH ASTIGMATISM AND PRESBYOPIA, BILATERAL: ICD-10-CM

## 2019-08-20 DIAGNOSIS — H25.13 NUCLEAR SCLEROSIS, BILATERAL: ICD-10-CM

## 2019-08-20 DIAGNOSIS — H52.203 HYPEROPIA WITH ASTIGMATISM AND PRESBYOPIA, BILATERAL: ICD-10-CM

## 2019-08-20 DIAGNOSIS — M32.9 SYSTEMIC LUPUS ERYTHEMATOSUS, UNSPECIFIED SLE TYPE, UNSPECIFIED ORGAN INVOLVEMENT STATUS: ICD-10-CM

## 2019-08-20 DIAGNOSIS — H15.001 SCLERITIS, RIGHT: ICD-10-CM

## 2019-08-20 DIAGNOSIS — H02.9 LESION OF LEFT EYELID: ICD-10-CM

## 2019-08-20 DIAGNOSIS — H52.4 HYPEROPIA WITH ASTIGMATISM AND PRESBYOPIA, BILATERAL: ICD-10-CM

## 2019-08-20 PROCEDURE — 99999 PR PBB SHADOW E&M-EST. PATIENT-LVL II: CPT | Mod: PBBFAC,,, | Performed by: OPTOMETRIST

## 2019-08-20 PROCEDURE — 92015 DETERMINE REFRACTIVE STATE: CPT | Mod: S$GLB,,, | Performed by: OPTOMETRIST

## 2019-08-20 PROCEDURE — 99499 RISK ADDL DX/OHS AUDIT: ICD-10-PCS | Mod: S$GLB,,, | Performed by: OPTOMETRIST

## 2019-08-20 PROCEDURE — 92134 CPTRZ OPH DX IMG PST SGM RTA: CPT | Mod: S$GLB,,, | Performed by: OPTOMETRIST

## 2019-08-20 PROCEDURE — 92134 POSTERIOR SEGMENT OCT RETINA (OCULAR COHERENCE TOMOGRAPHY)-BOTH EYES: ICD-10-PCS | Mod: S$GLB,,, | Performed by: OPTOMETRIST

## 2019-08-20 PROCEDURE — 92014 COMPRE OPH EXAM EST PT 1/>: CPT | Mod: S$GLB,,, | Performed by: OPTOMETRIST

## 2019-08-20 PROCEDURE — 92083 EXTENDED VISUAL FIELD XM: CPT | Mod: S$GLB,,, | Performed by: OPTOMETRIST

## 2019-08-20 PROCEDURE — 99999 PR PBB SHADOW E&M-EST. PATIENT-LVL II: ICD-10-PCS | Mod: PBBFAC,,, | Performed by: OPTOMETRIST

## 2019-08-20 PROCEDURE — 99499 UNLISTED E&M SERVICE: CPT | Mod: S$GLB,,, | Performed by: OPTOMETRIST

## 2019-08-20 PROCEDURE — 92015 PR REFRACTION: ICD-10-PCS | Mod: S$GLB,,, | Performed by: OPTOMETRIST

## 2019-08-20 PROCEDURE — 92014 PR EYE EXAM, EST PATIENT,COMPREHESV: ICD-10-PCS | Mod: S$GLB,,, | Performed by: OPTOMETRIST

## 2019-08-20 PROCEDURE — 92083 HUMPHREY VISUAL FIELD - OU - BOTH EYES: ICD-10-PCS | Mod: S$GLB,,, | Performed by: OPTOMETRIST

## 2019-08-20 RX ORDER — VARENICLINE TARTRATE 1 MG/1
TABLET, FILM COATED ORAL
Refills: 0 | COMMUNITY
Start: 2019-07-31 | End: 2020-01-09

## 2019-08-20 NOTE — PROGRESS NOTES
HPI     Last eye exam was 12/22/17 with Dr. Medae.  Patient states overall vision has decreased since last exam-didn't update   rx after last visit. Wanted to get an updated glasses rx today.  Patient denies diplopia, headaches, flashes/floaters, and pain.    Plaquenil 200 mg Twice Daily PO        Last edited by aBrbara Rodriguez on 8/20/2019 10:14 AM. (History)            Assessment /Plan     For exam results, see Encounter Report.    High risk medication use  -     Wood Visual Field - OU - Extended - Both Eyes  -     OCT- Retina    Systemic lupus erythematosus, unspecified SLE type, unspecified organ involvement status  -     Wood Visual Field - OU - Extended - Both Eyes  -     OCT- Retina    Nuclear sclerosis, bilateral    Scleritis, right    Lesion of left eyelid    Hyperopia with astigmatism and presbyopia, bilateral            1-2.  All testing normal OU-no retinopathy.  Monitor yearly.    3.  Early-monitor.  4.  Conjunctiva white and quiet today.  Patient reports no recent flare-ups.  Monitor.    5.  Growth outer canthus of left eye.  Patient states it is growing and becoming bothersome.  Refer to Dr. Fox.    6.  Bifocal rx given

## 2019-08-28 ENCOUNTER — PATIENT MESSAGE (OUTPATIENT)
Dept: INTERNAL MEDICINE | Facility: CLINIC | Age: 59
End: 2019-08-28

## 2019-08-28 ENCOUNTER — OFFICE VISIT (OUTPATIENT)
Dept: INTERNAL MEDICINE | Facility: CLINIC | Age: 59
End: 2019-08-28
Payer: MEDICARE

## 2019-08-28 VITALS
DIASTOLIC BLOOD PRESSURE: 74 MMHG | SYSTOLIC BLOOD PRESSURE: 110 MMHG | HEIGHT: 63 IN | WEIGHT: 153.25 LBS | HEART RATE: 75 BPM | OXYGEN SATURATION: 98 % | BODY MASS INDEX: 27.15 KG/M2

## 2019-08-28 DIAGNOSIS — F31.81 BIPOLAR 2 DISORDER: Chronic | ICD-10-CM

## 2019-08-28 DIAGNOSIS — M32.8 OTHER FORMS OF SYSTEMIC LUPUS ERYTHEMATOSUS, UNSPECIFIED ORGAN INVOLVEMENT STATUS: Chronic | ICD-10-CM

## 2019-08-28 DIAGNOSIS — J41.0 SIMPLE CHRONIC BRONCHITIS: Chronic | ICD-10-CM

## 2019-08-28 DIAGNOSIS — I10 ESSENTIAL HYPERTENSION, BENIGN: Primary | Chronic | ICD-10-CM

## 2019-08-28 DIAGNOSIS — Z72.0 TOBACCO ABUSE: ICD-10-CM

## 2019-08-28 DIAGNOSIS — H15.001 SCLERITIS OF RIGHT EYE: ICD-10-CM

## 2019-08-28 DIAGNOSIS — E78.2 MIXED HYPERLIPIDEMIA: Chronic | ICD-10-CM

## 2019-08-28 DIAGNOSIS — M85.89 OSTEOPENIA OF MULTIPLE SITES: ICD-10-CM

## 2019-08-28 DIAGNOSIS — E03.9 ACQUIRED HYPOTHYROIDISM: Chronic | ICD-10-CM

## 2019-08-28 DIAGNOSIS — M79.7 FIBROMYALGIA: Chronic | ICD-10-CM

## 2019-08-28 PROCEDURE — 99214 PR OFFICE/OUTPT VISIT, EST, LEVL IV, 30-39 MIN: ICD-10-PCS | Mod: S$GLB,,, | Performed by: INTERNAL MEDICINE

## 2019-08-28 PROCEDURE — 3008F PR BODY MASS INDEX (BMI) DOCUMENTED: ICD-10-PCS | Mod: CPTII,S$GLB,, | Performed by: INTERNAL MEDICINE

## 2019-08-28 PROCEDURE — 3008F BODY MASS INDEX DOCD: CPT | Mod: CPTII,S$GLB,, | Performed by: INTERNAL MEDICINE

## 2019-08-28 PROCEDURE — 3074F SYST BP LT 130 MM HG: CPT | Mod: CPTII,S$GLB,, | Performed by: INTERNAL MEDICINE

## 2019-08-28 PROCEDURE — 99999 PR PBB SHADOW E&M-EST. PATIENT-LVL III: CPT | Mod: PBBFAC,,, | Performed by: INTERNAL MEDICINE

## 2019-08-28 PROCEDURE — 3074F PR MOST RECENT SYSTOLIC BLOOD PRESSURE < 130 MM HG: ICD-10-PCS | Mod: CPTII,S$GLB,, | Performed by: INTERNAL MEDICINE

## 2019-08-28 PROCEDURE — 3078F DIAST BP <80 MM HG: CPT | Mod: CPTII,S$GLB,, | Performed by: INTERNAL MEDICINE

## 2019-08-28 PROCEDURE — 3078F PR MOST RECENT DIASTOLIC BLOOD PRESSURE < 80 MM HG: ICD-10-PCS | Mod: CPTII,S$GLB,, | Performed by: INTERNAL MEDICINE

## 2019-08-28 PROCEDURE — 99214 OFFICE O/P EST MOD 30 MIN: CPT | Mod: S$GLB,,, | Performed by: INTERNAL MEDICINE

## 2019-08-28 PROCEDURE — 99999 PR PBB SHADOW E&M-EST. PATIENT-LVL III: ICD-10-PCS | Mod: PBBFAC,,, | Performed by: INTERNAL MEDICINE

## 2019-08-28 NOTE — PROGRESS NOTES
Subjective:       Patient ID: Breanna Guido is a 59 y.o. female.    Chief Complaint: Hypertension    Pt here for f/u. Counseled on exercise goals.    Migraines are well controlled on current meds. No new features or red flag symptoms.       BP is well controlled. Denies cp/sob/ha/vision or neuro changes. Not checking at home regularly.     HLD has been tx with crestor which she has tolerated well.     She is clinically euthyroid. Due for updated labs.     Bipolar is followed by psychiatry regularly. No SI/HI. This is stable.     Pt sees Dr. Valencia in [facility] for fibromyalgia dx and lupus dx; however, her serology has been negative. It was recommended she be maintained on plaquenil which she takes. She is being followed regularly by ophtho as well. 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 Her pain is manageable with baclofen and lyrica.    COPD is stable on current meds.     She has osteopenia on most recent DEXA 4/2018. She is on ca/d.       Hypertension   Pertinent negatives include no chest pain, headaches, neck pain, palpitations or shortness of breath.     Review of Systems   Constitutional: Positive for activity change. Negative for unexpected weight change.   HENT: Negative for hearing loss, rhinorrhea and trouble swallowing.    Eyes: Negative for discharge and visual disturbance.   Respiratory: Negative for chest tightness, shortness of breath and wheezing.    Cardiovascular: Negative for chest pain and palpitations.   Gastrointestinal: Negative for abdominal pain, blood in stool, constipation, diarrhea, nausea and vomiting.   Endocrine: Negative for polydipsia and polyuria.   Genitourinary: Negative for difficulty urinating, dysuria, frequency, hematuria and menstrual problem.   Musculoskeletal: Positive for arthralgias. Negative for joint swelling and neck pain.   Neurological: Negative for speech difficulty, weakness and headaches.   Hematological: Negative for adenopathy.    Psychiatric/Behavioral: Negative for confusion and dysphoric mood.       Objective:          Assessment:       1. Essential hypertension, benign    2. Simple chronic bronchitis    3. Bipolar 2 disorder    4. Scleritis of right eye    5. Mixed hyperlipidemia    6. Acquired hypothyroidism    7. Other forms of systemic lupus erythematosus, unspecified organ involvement status    8. Fibromyalgia    9. Osteopenia of multiple sites    10. Tobacco abuse        Plan:       1. continue current meds   2. Keep f/u with specialists  3. Prior labs reviewed  4. Tobacco cessation             Physical Exam   Constitutional: She is oriented to person, place, and time. She appears well-developed and well-nourished.   HENT:   Head: Normocephalic and atraumatic.   Eyes: Pupils are equal, round, and reactive to light. EOM are normal.   Neck: Neck supple. Carotid bruit is not present. No thyroid mass and no thyromegaly present.   Cardiovascular: Normal rate, regular rhythm, S1 normal and S2 normal.   Murmur heard.  Pulmonary/Chest: Effort normal and breath sounds normal. She has no wheezes.   Abdominal: Soft. Bowel sounds are normal. She exhibits no distension and no mass. There is no hepatosplenomegaly. There is no tenderness. There is no CVA tenderness.   Musculoskeletal: She exhibits no edema.        Lumbar back: Normal.   Neurological: She is alert and oriented to person, place, and time. She has normal strength. No cranial nerve deficit or sensory deficit. She displays a negative Romberg sign. Coordination and gait normal.   Reflex Scores:       Bicep reflexes are 2+ on the right side and 2+ on the left side.       Patellar reflexes are 2+ on the right side and 2+ on the left side.  Psychiatric: She has a normal mood and affect. Her behavior is normal.

## 2019-09-16 ENCOUNTER — NURSE TRIAGE (OUTPATIENT)
Dept: ADMINISTRATIVE | Facility: CLINIC | Age: 59
End: 2019-09-16

## 2019-09-17 ENCOUNTER — TELEPHONE (OUTPATIENT)
Dept: INTERNAL MEDICINE | Facility: CLINIC | Age: 59
End: 2019-09-17

## 2019-09-17 ENCOUNTER — HOSPITAL ENCOUNTER (EMERGENCY)
Facility: OTHER | Age: 59
Discharge: HOME | End: 2019-09-17
Attending: EMERGENCY MEDICINE
Payer: MEDICARE

## 2019-09-17 VITALS
SYSTOLIC BLOOD PRESSURE: 136 MMHG | HEART RATE: 69 BPM | HEIGHT: 63 IN | WEIGHT: 153.25 LBS | RESPIRATION RATE: 16 BRPM | TEMPERATURE: 98 F | DIASTOLIC BLOOD PRESSURE: 85 MMHG | BODY MASS INDEX: 27.15 KG/M2 | OXYGEN SATURATION: 99 %

## 2019-09-17 DIAGNOSIS — S61.211A LACERATION OF LEFT INDEX FINGER WITHOUT FOREIGN BODY WITHOUT DAMAGE TO NAIL, INITIAL ENCOUNTER: Primary | ICD-10-CM

## 2019-09-17 DIAGNOSIS — H57.89 IRRITATION OF RIGHT EYE: ICD-10-CM

## 2019-09-17 PROCEDURE — 25000003 PHARM REV CODE 250: Performed by: EMERGENCY MEDICINE

## 2019-09-17 PROCEDURE — 99283 EMERGENCY DEPT VISIT LOW MDM: CPT

## 2019-09-17 RX ORDER — PROPARACAINE HYDROCHLORIDE 5 MG/ML
1 SOLUTION/ DROPS OPHTHALMIC
Status: COMPLETED | OUTPATIENT
Start: 2019-09-17 | End: 2019-09-17

## 2019-09-17 RX ORDER — ACETAMINOPHEN 500 MG
1000 TABLET ORAL
Status: COMPLETED | OUTPATIENT
Start: 2019-09-17 | End: 2019-09-17

## 2019-09-17 RX ORDER — ERYTHROMYCIN 5 MG/G
OINTMENT OPHTHALMIC
Qty: 1 TUBE | Refills: 0 | Status: SHIPPED | OUTPATIENT
Start: 2019-09-17 | End: 2020-02-28

## 2019-09-17 RX ADMIN — BACITRACIN ZINC, NEOMYCIN SULFATE, AND POLYMYXIN B SULFATE 1 EACH: 400; 3.5; 5 OINTMENT TOPICAL at 09:09

## 2019-09-17 RX ADMIN — FLUORESCEIN SODIUM 1 EACH: 1 STRIP OPHTHALMIC at 09:09

## 2019-09-17 RX ADMIN — PROPARACAINE HYDROCHLORIDE 1 DROP: 5 SOLUTION/ DROPS OPHTHALMIC at 09:09

## 2019-09-17 RX ADMIN — ACETAMINOPHEN 1000 MG: 500 TABLET ORAL at 11:09

## 2019-09-17 NOTE — ED PROVIDER NOTES
"Encounter Date: 9/17/2019    SCRIBE #1 NOTE: I, Linda Clay, am scribing for, and in the presence of, Dr. Plascencia.       History     Chief Complaint   Patient presents with    Laceration     Pt c/o left index finger pain, swelling & laceration. Pt cut her finger on razor wire when pulling down zaina yesterday. Last tetanus >10 yrs.    Eye Injury     Pt c/o right eye tearing after being stuck in the eye by a pineapple plant yesterday.     Time seen by provider: 9:43 AM    This is a 59 y.o. female who presents with multiple complaints. She c/o R eye laceration and pain stating "I was poked by a pineapple plant, it feels like there is trash in my eye, and it ran all night". She also c/o L index finger pain, swelling & laceration from a razor wire when pulling down zaina yesterday. Reports rinsing out the area immediately and applying iodine. She has a ring on the finger. Her tetanus is UTD.     The history is provided by the patient.     Review of patient's allergies indicates:   Allergen Reactions    Bactrim [sulfamethoxazole-trimethoprim] Nausea And Vomiting    Sulfa (sulfonamide antibiotics)      Agitation^     Past Medical History:   Diagnosis Date    Anxiety     Arthritis     Bipolar 1 disorder     Cataract     COPD (chronic obstructive pulmonary disease)     Depression     bipolar 2    Eyelid lesion     Fibromyalgia     H/O corneal abrasion 2001    left eye with lead-based paint chips    Hyperlipidemia     Hypertension     Lupus     Migraine headache     Scleritis     Thyroid disease      Past Surgical History:   Procedure Laterality Date    HYSTERECTOMY      for sterilization, ovaries intact     Family History   Problem Relation Age of Onset    Depression Father         suicide    Cataracts Mother     Rheum arthritis Mother     Lupus Mother     Cataracts Sister     Rheum arthritis Sister     Breast cancer Maternal Aunt     Thyroid disease Daughter     No Known Problems Daughter "     No Known Problems Daughter     Colon cancer Brother     No Known Problems Maternal Uncle     No Known Problems Paternal Aunt     No Known Problems Paternal Uncle     No Known Problems Maternal Grandmother     No Known Problems Maternal Grandfather     No Known Problems Paternal Grandmother     No Known Problems Paternal Grandfather     Ovarian cancer Neg Hx     Melanoma Neg Hx     Amblyopia Neg Hx     Blindness Neg Hx     Cancer Neg Hx     Diabetes Neg Hx     Glaucoma Neg Hx     Hypertension Neg Hx     Macular degeneration Neg Hx     Retinal detachment Neg Hx     Strabismus Neg Hx     Stroke Neg Hx      Social History     Tobacco Use    Smoking status: Current Every Day Smoker     Packs/day: 0.25     Years: 40.00     Pack years: 10.00     Types: Cigarettes     Last attempt to quit: 2017     Years since quittin.2    Smokeless tobacco: Never Used    Tobacco comment: reports more tob after 2005 1-2ppd x 14years - cut down 2018   Substance Use Topics    Alcohol use: No     Comment: rare    Drug use: No     Review of Systems   Constitutional: Negative for chills and fever.   HENT: Negative for congestion, rhinorrhea and sore throat.    Eyes: Positive for pain. Negative for visual disturbance.   Respiratory: Negative for cough and shortness of breath.    Cardiovascular: Negative for chest pain.   Gastrointestinal: Negative for abdominal pain, diarrhea, nausea and vomiting.   Genitourinary: Negative for dysuria.   Musculoskeletal: Negative for back pain.        Positive for laceration, swelling and pain to L index finger.    Skin: Negative for rash.   Neurological: Negative for dizziness, weakness and light-headedness.   Psychiatric/Behavioral: Negative for confusion.       Physical Exam     Initial Vitals [19 0850]   BP Pulse Resp Temp SpO2   120/81 78 18 99.8 °F (37.7 °C) 99 %      MAP       --         Physical Exam    Nursing note and vitals reviewed.  Constitutional: She  appears well-developed and well-nourished. She is not diaphoretic. No distress.   HENT:   Head: Normocephalic and atraumatic.   Right Ear: External ear normal.   Left Ear: External ear normal.   Eyes: Left eye exhibits no discharge.   No conjunctival injection. Clear tearing. No chemosis. No fluorescein uptake. No obvious foreign body.    Neck: Normal range of motion. Neck supple.   Pulmonary/Chest: No respiratory distress.   Musculoskeletal: Normal range of motion.   1 cm diagonal laceration overlying to L index finger over MCP joint.    Neurological: She is alert and oriented to person, place, and time.   Skin: Skin is warm and dry. No rash noted. No erythema.   Psychiatric: She has a normal mood and affect. Her behavior is normal.         ED Course   Procedures  Labs Reviewed - No data to display       Imaging Results    None          Medical Decision Making:   History:   Old Medical Records: I decided to obtain old medical records.  Initial Assessment:   60 yo F with recent superficial laceration to the L index finger on wire and then facial trauma, sustaining scratch to the R eye yesterday while working in the garden. Here VA improved from priors, no photophobia. I do not suspect globe rupture at this time. No fluoro uptake and Michael lens used with improvement in irritation. L finger with clean superficial lac- tet UTD and no need to repair given size. Pt educated regarding wound care, dc home with emiric abx ointment.             Scribe Attestation:   Scribe #1: I performed the above scribed service and the documentation accurately describes the services I performed. I attest to the accuracy of the note.    Attending Attestation:           Physician Attestation for Scribe:  Physician Attestation Statement for Scribe #1: I, Dr. Plascencia, reviewed documentation, as scribed by Linda Clay in my presence, and it is both accurate and complete.                    Clinical Impression:   No diagnosis  found.        Disposition:   Disposition: Discharged  Condition: Juan Plascencia MD  09/17/19 [id number]

## 2019-09-17 NOTE — TELEPHONE ENCOUNTER
----- Message from Kyaw Boiwe sent at 9/17/2019 10:19 AM CDT -----  Contact: pt  Type:  Patient Returning Call    Who Called: EDI LINARES [[id number]]    Who Left Message for Patient: Mirlande     Does the patient know what this is regarding?:Yes     Best Call Back Number:[phone]    Additional Information:

## 2019-09-17 NOTE — TELEPHONE ENCOUNTER
Pt informed office that she was evaluated in the ED. Everything ok and discharged home. Informed pt that if finger continues to swell, call office to make appt. Pt verbalized understanding.

## 2019-09-17 NOTE — ED NOTES
1cm diagonal laceration to left index finger joint, swelling proximal to laceration.  Some tearing to right eye

## 2019-10-05 RX ORDER — LEVOTHYROXINE SODIUM 88 UG/1
TABLET ORAL
Qty: 90 TABLET | Refills: 3 | Status: SHIPPED | OUTPATIENT
Start: 2019-10-05 | End: 2019-10-07

## 2019-10-07 ENCOUNTER — TELEPHONE (OUTPATIENT)
Dept: RHEUMATOLOGY | Facility: CLINIC | Age: 59
End: 2019-10-07

## 2019-10-07 DIAGNOSIS — M79.7 FIBROMYALGIA: Chronic | ICD-10-CM

## 2019-10-07 DIAGNOSIS — M32.19 OTHER SYSTEMIC LUPUS ERYTHEMATOSUS WITH OTHER ORGAN INVOLVEMENT: Primary | ICD-10-CM

## 2019-10-07 DIAGNOSIS — E03.9 ACQUIRED HYPOTHYROIDISM: Primary | Chronic | ICD-10-CM

## 2019-10-07 RX ORDER — PREGABALIN 150 MG/1
150 CAPSULE ORAL 2 TIMES DAILY
Qty: 180 CAPSULE | Refills: 1 | Status: SHIPPED | OUTPATIENT
Start: 2019-10-07 | End: 2020-01-09 | Stop reason: SDUPTHER

## 2019-10-07 RX ORDER — LEVOTHYROXINE SODIUM 88 UG/1
88 TABLET ORAL DAILY
Qty: 90 TABLET | Refills: 2 | Status: SHIPPED | OUTPATIENT
Start: 2019-10-07 | End: 2020-08-28 | Stop reason: SDUPTHER

## 2019-10-07 NOTE — TELEPHONE ENCOUNTER
Please schedule standing lupus labs including urines this week and also needs f/u appt. Thanks KALEE

## 2019-10-19 ENCOUNTER — PATIENT MESSAGE (OUTPATIENT)
Dept: INTERNAL MEDICINE | Facility: CLINIC | Age: 59
End: 2019-10-19

## 2019-10-21 NOTE — TELEPHONE ENCOUNTER
Ok to go ahead and schedule CT as ordered by Dr. Carrasco previously. Does not need to see me before this.

## 2019-10-22 ENCOUNTER — PATIENT MESSAGE (OUTPATIENT)
Dept: RHEUMATOLOGY | Facility: CLINIC | Age: 59
End: 2019-10-22

## 2019-10-28 ENCOUNTER — HOSPITAL ENCOUNTER (OUTPATIENT)
Dept: RADIOLOGY | Facility: OTHER | Age: 59
Discharge: HOME | End: 2019-10-28
Attending: INTERNAL MEDICINE
Payer: MEDICARE

## 2019-10-28 DIAGNOSIS — Z87.891 HISTORY OF TOBACCO ABUSE: ICD-10-CM

## 2019-10-28 DIAGNOSIS — R91.1 LUNG NODULE: ICD-10-CM

## 2019-10-28 PROCEDURE — 71250 CT THORAX DX C-: CPT | Mod: 26,,, | Performed by: RADIOLOGY

## 2019-10-28 PROCEDURE — 71250 CT THORAX DX C-: CPT | Mod: TC

## 2019-10-28 PROCEDURE — 71250 CT CHEST WITHOUT CONTRAST: ICD-10-PCS | Mod: 26,,, | Performed by: RADIOLOGY

## 2019-10-29 ENCOUNTER — TELEPHONE (OUTPATIENT)
Dept: RHEUMATOLOGY | Facility: CLINIC | Age: 59
End: 2019-10-29

## 2019-10-29 ENCOUNTER — PATIENT MESSAGE (OUTPATIENT)
Dept: RHEUMATOLOGY | Facility: CLINIC | Age: 59
End: 2019-10-29

## 2019-10-29 NOTE — TELEPHONE ENCOUNTER
Trichomonas is considered STD and if found screening for other STDs and treatment is recommended. As this is an incidental finding, unrelated to our checking urine for lupus involvement, would suggest you contact/make appt with Dr. Iris Pisano your gynecologist or Dr. Arambula your internist for advice about treatment and screening for other STD, as it is far afield of my rheumatologic area of expertise. KALEE

## 2019-10-30 ENCOUNTER — PATIENT MESSAGE (OUTPATIENT)
Dept: INTERNAL MEDICINE | Facility: CLINIC | Age: 59
End: 2019-10-30

## 2019-10-30 NOTE — TELEPHONE ENCOUNTER
Patient would benefit from being seen by gyn.  She could use [facility] downstairs.

## 2019-11-04 ENCOUNTER — TELEPHONE (OUTPATIENT)
Dept: INTERNAL MEDICINE | Facility: CLINIC | Age: 59
End: 2019-11-04

## 2019-11-04 DIAGNOSIS — R91.1 PULMONARY NODULE: ICD-10-CM

## 2019-11-04 DIAGNOSIS — N28.1 RENAL CYST: Primary | ICD-10-CM

## 2019-11-04 DIAGNOSIS — Z72.0 TOBACCO ABUSE: ICD-10-CM

## 2019-11-04 NOTE — TELEPHONE ENCOUNTER
Message sent to pt via my chart with CT results and updates to plan.     - please arrange appt with urology   - please arrange appt with pulm

## 2019-11-05 ENCOUNTER — OFFICE VISIT (OUTPATIENT)
Dept: UROLOGY | Facility: CLINIC | Age: 59
End: 2019-11-05
Attending: INTERNAL MEDICINE
Payer: MEDICARE

## 2019-11-05 ENCOUNTER — OFFICE VISIT (OUTPATIENT)
Dept: OBSTETRICS AND GYNECOLOGY | Facility: CLINIC | Age: 59
End: 2019-11-05
Payer: MEDICARE

## 2019-11-05 VITALS
SYSTOLIC BLOOD PRESSURE: 142 MMHG | BODY MASS INDEX: 26.64 KG/M2 | WEIGHT: 150.38 LBS | DIASTOLIC BLOOD PRESSURE: 83 MMHG | HEIGHT: 63 IN | SYSTOLIC BLOOD PRESSURE: 142 MMHG | HEIGHT: 63 IN | DIASTOLIC BLOOD PRESSURE: 86 MMHG | HEART RATE: 68 BPM | BODY MASS INDEX: 26.58 KG/M2 | WEIGHT: 150 LBS

## 2019-11-05 DIAGNOSIS — A59.01 TRICHOMONAL VULVOVAGINITIS: ICD-10-CM

## 2019-11-05 DIAGNOSIS — N28.1 RENAL CYST: Primary | ICD-10-CM

## 2019-11-05 DIAGNOSIS — N89.8 VAGINAL DISCHARGE: Primary | ICD-10-CM

## 2019-11-05 LAB
BILIRUB SERPL-MCNC: ABNORMAL MG/DL
BLOOD URINE, POC: ABNORMAL
COLOR, POC UA: ABNORMAL
GLUCOSE UR QL STRIP: ABNORMAL
KETONES UR QL STRIP: ABNORMAL
LEUKOCYTE ESTERASE URINE, POC: ABNORMAL
NITRITE, POC UA: ABNORMAL
PH, POC UA: 6
PROTEIN, POC: ABNORMAL
SPECIFIC GRAVITY, POC UA: 1.02
UROBILINOGEN, POC UA: ABNORMAL

## 2019-11-05 PROCEDURE — 3077F SYST BP >= 140 MM HG: CPT | Mod: CPTII,S$GLB,, | Performed by: OBSTETRICS & GYNECOLOGY

## 2019-11-05 PROCEDURE — 3008F BODY MASS INDEX DOCD: CPT | Mod: CPTII,S$GLB,, | Performed by: OBSTETRICS & GYNECOLOGY

## 2019-11-05 PROCEDURE — 3077F PR MOST RECENT SYSTOLIC BLOOD PRESSURE >= 140 MM HG: ICD-10-PCS | Mod: CPTII,S$GLB,, | Performed by: OBSTETRICS & GYNECOLOGY

## 2019-11-05 PROCEDURE — 3079F PR MOST RECENT DIASTOLIC BLOOD PRESSURE 80-89 MM HG: ICD-10-PCS | Mod: CPTII,S$GLB,, | Performed by: OBSTETRICS & GYNECOLOGY

## 2019-11-05 PROCEDURE — 99202 PR OFFICE/OUTPT VISIT, NEW, LEVL II, 15-29 MIN: ICD-10-PCS | Mod: S$GLB,,, | Performed by: OBSTETRICS & GYNECOLOGY

## 2019-11-05 PROCEDURE — 99999 PR PBB SHADOW E&M-EST. PATIENT-LVL III: CPT | Mod: PBBFAC,,, | Performed by: OBSTETRICS & GYNECOLOGY

## 2019-11-05 PROCEDURE — 87481 CANDIDA DNA AMP PROBE: CPT | Mod: 59

## 2019-11-05 PROCEDURE — 87801 DETECT AGNT MULT DNA AMPLI: CPT

## 2019-11-05 PROCEDURE — 3079F DIAST BP 80-89 MM HG: CPT | Mod: CPTII,S$GLB,, | Performed by: UROLOGY

## 2019-11-05 PROCEDURE — 3079F DIAST BP 80-89 MM HG: CPT | Mod: CPTII,S$GLB,, | Performed by: OBSTETRICS & GYNECOLOGY

## 2019-11-05 PROCEDURE — 81002 URINALYSIS NONAUTO W/O SCOPE: CPT | Mod: S$GLB,,, | Performed by: UROLOGY

## 2019-11-05 PROCEDURE — 3079F PR MOST RECENT DIASTOLIC BLOOD PRESSURE 80-89 MM HG: ICD-10-PCS | Mod: CPTII,S$GLB,, | Performed by: UROLOGY

## 2019-11-05 PROCEDURE — 99203 PR OFFICE/OUTPT VISIT, NEW, LEVL III, 30-44 MIN: ICD-10-PCS | Mod: 25,S$GLB,, | Performed by: UROLOGY

## 2019-11-05 PROCEDURE — 3008F BODY MASS INDEX DOCD: CPT | Mod: CPTII,S$GLB,, | Performed by: UROLOGY

## 2019-11-05 PROCEDURE — 99203 OFFICE O/P NEW LOW 30 MIN: CPT | Mod: 25,S$GLB,, | Performed by: UROLOGY

## 2019-11-05 PROCEDURE — 99999 PR PBB SHADOW E&M-EST. PATIENT-LVL III: ICD-10-PCS | Mod: PBBFAC,,, | Performed by: OBSTETRICS & GYNECOLOGY

## 2019-11-05 PROCEDURE — 3077F PR MOST RECENT SYSTOLIC BLOOD PRESSURE >= 140 MM HG: ICD-10-PCS | Mod: CPTII,S$GLB,, | Performed by: UROLOGY

## 2019-11-05 PROCEDURE — 87491 CHLMYD TRACH DNA AMP PROBE: CPT | Mod: 59

## 2019-11-05 PROCEDURE — 3077F SYST BP >= 140 MM HG: CPT | Mod: CPTII,S$GLB,, | Performed by: UROLOGY

## 2019-11-05 PROCEDURE — 99202 OFFICE O/P NEW SF 15 MIN: CPT | Mod: S$GLB,,, | Performed by: OBSTETRICS & GYNECOLOGY

## 2019-11-05 PROCEDURE — 81002 POCT URINE DIPSTICK WITHOUT MICROSCOPE: ICD-10-PCS | Mod: S$GLB,,, | Performed by: UROLOGY

## 2019-11-05 PROCEDURE — 3008F PR BODY MASS INDEX (BMI) DOCUMENTED: ICD-10-PCS | Mod: CPTII,S$GLB,, | Performed by: UROLOGY

## 2019-11-05 PROCEDURE — 3008F PR BODY MASS INDEX (BMI) DOCUMENTED: ICD-10-PCS | Mod: CPTII,S$GLB,, | Performed by: OBSTETRICS & GYNECOLOGY

## 2019-11-05 NOTE — PROGRESS NOTES
Gynecology    SUBJECTIVE:     Chief Complaint: STD CHECK       History of Present Illness:  59 year old who presents because there was a urine test that was positive for trichomonas.  Patient has not been sexually active since 2008.  Does not use any sex toys.  Denies discahrge, odor, burning, irritation lately.  S/p hysterectomy.    Review of Systems:  Review of Systems   Constitutional: Negative for fever.   Gastrointestinal: Negative for abdominal pain.   Genitourinary: Negative for menorrhagia, menstrual problem, pelvic pain, vaginal bleeding, vaginal discharge, vaginal pain, postmenopausal bleeding and vaginal odor.   Musculoskeletal: Positive for back pain.        OBJECTIVE:     Physical Exam:  Physical Exam   Constitutional: She is oriented to person, place, and time. She appears well-developed and well-nourished.   Pulmonary/Chest: Effort normal.   Abdominal: Soft.   Genitourinary: No labial fusion. There is no rash, tenderness, lesion or injury on the right labia. There is no rash, tenderness, lesion or injury on the left labia. No erythema, tenderness or bleeding in the vagina. No foreign body in the vagina. No signs of injury around the vagina. Vaginal discharge found.   Genitourinary Comments: Urethra: normal appearing urethra with no masses, tenderness or lesions  Urethral meatus: normal size, anterior vaginal wall with no prolapse, no lesions   Neurological: She is alert and oriented to person, place, and time.   Psychiatric: She has a normal mood and affect. Her behavior is normal. Judgment and thought content normal.   Nursing note and vitals reviewed.        ASSESSMENT:       ICD-10-CM ICD-9-CM    1. Vaginal discharge N89.8 623.5 Vaginosis Screen by DNA Probe      C. trachomatis/N. gonorrhoeae by AMP DNA   2. Trichomonal vulvovaginitis  A59.01 131.01 C. trachomatis/N. gonorrhoeae by AMP DNA          Plan:      Breanna was seen today for std check.    Diagnoses and all orders for this  visit:    Vaginal discharge  -     Vaginosis Screen by DNA Probe  -     C. trachomatis/N. gonorrhoeae by AMP DNA    Trichomonal vulvovaginitis   -     C. trachomatis/N. gonorrhoeae by AMP DNA    - vaginosis screening today   - gc/ct in urine  - has had HIV, Syphilis (RPR), Hepatitis in 2015, all negative    Orders Placed This Encounter   Procedures    Vaginosis Screen by DNA Probe    C. trachomatis/N. gonorrhoeae by AMP DNA       Follow up for annual or prn.    Alice Spann

## 2019-11-05 NOTE — LETTER
November 5, 2019      Jazmyn Carrasco MD  [address]  [facility] [address]           [facility] [address] [address]  Phone: [phone]  Fax: [phone]          Patient: Breanna Guido   MR Number: [id number]   YOB: 1960   Date of Visit: 11/5/2019       Dear Dr. Jazmyn Carrasco:    Thank you for referring Breanna Guido to me for evaluation. Attached you will find relevant portions of my assessment and plan of care.    If you have questions, please do not hesitate to call me. I look forward to following Breanna Guido along with you.    Sincerely,    Farhad Clemons MD    Enclosure  CC:  No Recipients    If you would like to receive this communication electronically, please contact [email or web address] or [phone] to request more information on [employer] Link access.    For providers and/or their staff who would like to refer a patient to [facility], please contact us through our one-stop-shop provider referral line, [facility], at [phone].    If you feel you have received this communication in error or would no longer like to receive these types of communications, please e-mail [email or web address]

## 2019-11-05 NOTE — PROGRESS NOTES
"Subjective:      Breanna Guido is a 59 y.o. female who was referred by PCP for evaluation of renal cyst.    Abdominal ultrasound earlier this year reveals bilateral less than 1 cm renal cyst.  Not symptomatic          The following portions of the patient's history were reviewed and updated as appropriate: allergies, current medications, past family history, past medical history, past social history, past surgical history and problem list.         Objective:   Vital Signs:BP (!) 142/83   Pulse 68   Ht 5' 3" (1.6 m)   Wt 68 kg (150 lb)   BMI 26.57 kg/m²     Physical Exam   General: alert and oriented, no acute distress  Head: normocephalic, atraumatic  Neck: normal ROM  Respiratory: Symmetric expansion, non-labored breathing  Cardiovascular: no peripheral edema  Abdomen:  Nondistended  Neuro: alert and oriented x3, no gross deficits  Psych: normal judgment and insight, normal mood/affect and non-anxious    Physical Exam    Lab Review   Urinalysis demonstrates dipstick trace leukocytes and blood  Microscopic urinalysis 0-2 white cells, 0-1 red cells no bacteria    Lab Results   Component Value Date    WBC 4.27 10/28/2019    HGB 14.2 10/28/2019    HCT 41.6 10/28/2019    MCV 90 10/28/2019     10/28/2019     Lab Results   Component Value Date    CREATININE 0.8 10/28/2019    BUN 10 10/28/2019       Imaging  CLINICAL HISTORY:  Cyst of kidney, acquired    TECHNIQUE:  Ultrasound of the kidneys and urinary bladder was performed including color flow and Doppler evaluation of the kidneys.    COMPARISON:  None.    FINDINGS:  Right kidney: The right kidney measures 9.9 cm.  Perfusion is satisfactory.  The resistive index is normal, 0.63.  There is a 8 mm cyst at the lower pole.  There is a 9 mm cyst at the upper pole.  No calculus or hydronephrosis.    Left kidney: The left kidney measures 10.1 cm.  Perfusion is satisfactory.  The resistive index is normal, 0.67.  There is a 9 mm cyst at the upper pole.  There " is no calculus or hydronephrosis.    Bladder is unremarkable      Impression       Tiny cyst bilaterally      Electronically signed by: Princess Roque MD  Date: 04/17/2019  Time: 12:02     Reviewed images.  The cyst are very small and appears simple  Assessment:     1. Renal cyst      Simple  Plan:       No follow-up is needed  Follow-up with primary physician    Return to clinic p.r.n.

## 2019-11-05 NOTE — TELEPHONE ENCOUNTER
lvm   I see pulmonology and urology appts were already scheduled if you have any questions you can call us back

## 2019-11-06 ENCOUNTER — TELEPHONE (OUTPATIENT)
Dept: OBSTETRICS AND GYNECOLOGY | Facility: CLINIC | Age: 59
End: 2019-11-06

## 2019-11-06 DIAGNOSIS — B37.9 CANDIDA GLABRATA INFECTION: ICD-10-CM

## 2019-11-06 DIAGNOSIS — A59.9 TRICHOMONIASIS: Primary | ICD-10-CM

## 2019-11-06 LAB
BACTERIAL VAGINOSIS DNA: NEGATIVE
C TRACH DNA SPEC QL NAA+PROBE: NOT DETECTED
CANDIDA GLABRATA DNA: POSITIVE
CANDIDA KRUSEI DNA: NEGATIVE
CANDIDA RRNA VAG QL PROBE: NEGATIVE
N GONORRHOEA DNA SPEC QL NAA+PROBE: NOT DETECTED
T VAGINALIS RRNA GENITAL QL PROBE: POSITIVE

## 2019-11-06 RX ORDER — METRONIDAZOLE 500 MG/1
2000 TABLET ORAL ONCE
Qty: 4 TABLET | Refills: 0 | Status: SHIPPED | OUTPATIENT
Start: 2019-11-06 | End: 2019-11-09

## 2019-11-06 NOTE — TELEPHONE ENCOUNTER
Patient called and notified of candida glabrata and trichomonas.  Discussed treatment for both.  rx to kim.

## 2019-11-07 ENCOUNTER — TELEPHONE (OUTPATIENT)
Dept: PULMONOLOGY | Facility: CLINIC | Age: 59
End: 2019-11-07

## 2019-11-07 NOTE — TELEPHONE ENCOUNTER
I called to offer patient a sooner appointment due to patient being on wait list for today at 11am with Dr Chang. Patient declined and stated she would need 3 day notice for transportation. I will call back when something else is available and enough notice for patient. Patient verbalized understanding.

## 2019-11-27 DIAGNOSIS — J44.9 CHRONIC OBSTRUCTIVE PULMONARY DISEASE, UNSPECIFIED COPD TYPE: Primary | ICD-10-CM

## 2019-11-27 RX ORDER — ROSUVASTATIN CALCIUM 40 MG/1
40 TABLET, COATED ORAL NIGHTLY
Qty: 90 TABLET | Refills: 3 | Status: SHIPPED | OUTPATIENT
Start: 2019-11-27 | End: 2020-08-28 | Stop reason: SDUPTHER

## 2019-11-27 RX ORDER — AMLODIPINE BESYLATE 5 MG/1
TABLET ORAL
Qty: 90 TABLET | Refills: 3 | Status: SHIPPED | OUTPATIENT
Start: 2019-11-27 | End: 2020-06-15

## 2019-11-27 RX ORDER — ALBUTEROL SULFATE 90 UG/1
2 AEROSOL, METERED RESPIRATORY (INHALATION) EVERY 6 HOURS PRN
Qty: 8.5 G | Refills: 5 | Status: SHIPPED | OUTPATIENT
Start: 2019-11-27 | End: 2021-08-08 | Stop reason: SDUPTHER

## 2019-11-27 RX ORDER — BUDESONIDE AND FORMOTEROL FUMARATE DIHYDRATE 160; 4.5 UG/1; UG/1
AEROSOL RESPIRATORY (INHALATION)
Qty: 10.2 G | Refills: 5 | Status: SHIPPED | OUTPATIENT
Start: 2019-11-27 | End: 2021-08-08 | Stop reason: SDUPTHER

## 2020-01-09 ENCOUNTER — LAB VISIT (OUTPATIENT)
Dept: LAB | Facility: HOSPITAL | Age: 60
End: 2020-01-09
Attending: INTERNAL MEDICINE
Payer: MEDICARE

## 2020-01-09 ENCOUNTER — OFFICE VISIT (OUTPATIENT)
Dept: PULMONOLOGY | Facility: CLINIC | Age: 60
End: 2020-01-09
Payer: MEDICARE

## 2020-01-09 ENCOUNTER — OFFICE VISIT (OUTPATIENT)
Dept: RHEUMATOLOGY | Facility: CLINIC | Age: 60
End: 2020-01-09
Payer: MEDICARE

## 2020-01-09 ENCOUNTER — TELEPHONE (OUTPATIENT)
Dept: RHEUMATOLOGY | Facility: CLINIC | Age: 60
End: 2020-01-09

## 2020-01-09 VITALS
HEART RATE: 80 BPM | BODY MASS INDEX: 27.87 KG/M2 | HEIGHT: 62 IN | OXYGEN SATURATION: 98 % | DIASTOLIC BLOOD PRESSURE: 62 MMHG | SYSTOLIC BLOOD PRESSURE: 120 MMHG | WEIGHT: 151.44 LBS

## 2020-01-09 VITALS
SYSTOLIC BLOOD PRESSURE: 146 MMHG | HEART RATE: 76 BPM | DIASTOLIC BLOOD PRESSURE: 94 MMHG | BODY MASS INDEX: 28.43 KG/M2 | HEIGHT: 62 IN | WEIGHT: 154.5 LBS

## 2020-01-09 DIAGNOSIS — M32.19 OTHER SYSTEMIC LUPUS ERYTHEMATOSUS WITH OTHER ORGAN INVOLVEMENT: Chronic | ICD-10-CM

## 2020-01-09 DIAGNOSIS — R06.02 SHORTNESS OF BREATH: ICD-10-CM

## 2020-01-09 DIAGNOSIS — R77.8 ELEVATED TOTAL PROTEIN: Primary | ICD-10-CM

## 2020-01-09 DIAGNOSIS — M85.80 OSTEOPENIA, UNSPECIFIED LOCATION: ICD-10-CM

## 2020-01-09 DIAGNOSIS — L65.9 HAIR LOSS: ICD-10-CM

## 2020-01-09 DIAGNOSIS — R91.1 LUNG NODULE: Primary | ICD-10-CM

## 2020-01-09 DIAGNOSIS — M32.19 OTHER SYSTEMIC LUPUS ERYTHEMATOSUS WITH OTHER ORGAN INVOLVEMENT: ICD-10-CM

## 2020-01-09 DIAGNOSIS — L65.9 ALOPECIA: ICD-10-CM

## 2020-01-09 DIAGNOSIS — M32.9 LUPUS: Chronic | ICD-10-CM

## 2020-01-09 DIAGNOSIS — F17.200 SMOKER: ICD-10-CM

## 2020-01-09 DIAGNOSIS — Z72.0 TOBACCO ABUSE: Primary | ICD-10-CM

## 2020-01-09 DIAGNOSIS — M81.8 OTHER OSTEOPOROSIS WITHOUT CURRENT PATHOLOGICAL FRACTURE: ICD-10-CM

## 2020-01-09 DIAGNOSIS — M79.7 FIBROMYALGIA: Chronic | ICD-10-CM

## 2020-01-09 LAB
ALBUMIN SERPL BCP-MCNC: 4.4 G/DL (ref 3.5–5.2)
ALP SERPL-CCNC: 72 U/L (ref 55–135)
ALT SERPL W/O P-5'-P-CCNC: 18 U/L (ref 10–44)
ANION GAP SERPL CALC-SCNC: 9 MMOL/L (ref 8–16)
AST SERPL-CCNC: 25 U/L (ref 10–40)
BASOPHILS # BLD AUTO: 0.04 K/UL (ref 0–0.2)
BASOPHILS NFR BLD: 1 % (ref 0–1.9)
BILIRUB SERPL-MCNC: 0.6 MG/DL (ref 0.1–1)
BUN SERPL-MCNC: 7 MG/DL (ref 8–23)
C3 SERPL-MCNC: 174 MG/DL (ref 50–180)
C4 SERPL-MCNC: 39 MG/DL (ref 11–44)
CALCIUM SERPL-MCNC: 10 MG/DL (ref 8.7–10.5)
CHLORIDE SERPL-SCNC: 105 MMOL/L (ref 95–110)
CO2 SERPL-SCNC: 28 MMOL/L (ref 23–29)
CREAT SERPL-MCNC: 0.7 MG/DL (ref 0.5–1.4)
CRP SERPL-MCNC: 1.4 MG/L (ref 0–8.2)
DIFFERENTIAL METHOD: NORMAL
EOSINOPHIL # BLD AUTO: 0 K/UL (ref 0–0.5)
EOSINOPHIL NFR BLD: 0.5 % (ref 0–8)
ERYTHROCYTE [DISTWIDTH] IN BLOOD BY AUTOMATED COUNT: 13.1 % (ref 11.5–14.5)
ERYTHROCYTE [SEDIMENTATION RATE] IN BLOOD BY WESTERGREN METHOD: 18 MM/HR (ref 0–36)
EST. GFR  (AFRICAN AMERICAN): >60 ML/MIN/1.73 M^2
EST. GFR  (NON AFRICAN AMERICAN): >60 ML/MIN/1.73 M^2
GLUCOSE SERPL-MCNC: 99 MG/DL (ref 70–110)
HCT VFR BLD AUTO: 45 % (ref 37–48.5)
HGB BLD-MCNC: 14.6 G/DL (ref 12–16)
IMM GRANULOCYTES # BLD AUTO: 0.01 K/UL (ref 0–0.04)
IMM GRANULOCYTES NFR BLD AUTO: 0.2 % (ref 0–0.5)
LYMPHOCYTES # BLD AUTO: 1.9 K/UL (ref 1–4.8)
LYMPHOCYTES NFR BLD: 47.7 % (ref 18–48)
MCH RBC QN AUTO: 30.7 PG (ref 27–31)
MCHC RBC AUTO-ENTMCNC: 32.4 G/DL (ref 32–36)
MCV RBC AUTO: 95 FL (ref 82–98)
MONOCYTES # BLD AUTO: 0.3 K/UL (ref 0.3–1)
MONOCYTES NFR BLD: 7.4 % (ref 4–15)
NEUTROPHILS # BLD AUTO: 1.8 K/UL (ref 1.8–7.7)
NEUTROPHILS NFR BLD: 43.2 % (ref 38–73)
NRBC BLD-RTO: 0 /100 WBC
PLATELET # BLD AUTO: 174 K/UL (ref 150–350)
PMV BLD AUTO: 11.5 FL (ref 9.2–12.9)
POTASSIUM SERPL-SCNC: 3.7 MMOL/L (ref 3.5–5.1)
PROT SERPL-MCNC: 8.7 G/DL (ref 6–8.4)
RBC # BLD AUTO: 4.76 M/UL (ref 4–5.4)
SODIUM SERPL-SCNC: 142 MMOL/L (ref 136–145)
WBC # BLD AUTO: 4.05 K/UL (ref 3.9–12.7)

## 2020-01-09 PROCEDURE — 85652 RBC SED RATE AUTOMATED: CPT

## 2020-01-09 PROCEDURE — 99999 PR PBB SHADOW E&M-EST. PATIENT-LVL V: ICD-10-PCS | Mod: PBBFAC,,, | Performed by: INTERNAL MEDICINE

## 2020-01-09 PROCEDURE — 99499 UNLISTED E&M SERVICE: CPT | Mod: S$GLB,,, | Performed by: INTERNAL MEDICINE

## 2020-01-09 PROCEDURE — 99204 OFFICE O/P NEW MOD 45 MIN: CPT | Mod: S$GLB,,, | Performed by: NURSE PRACTITIONER

## 2020-01-09 PROCEDURE — 3080F DIAST BP >= 90 MM HG: CPT | Mod: CPTII,S$GLB,, | Performed by: INTERNAL MEDICINE

## 2020-01-09 PROCEDURE — 3008F PR BODY MASS INDEX (BMI) DOCUMENTED: ICD-10-PCS | Mod: CPTII,S$GLB,, | Performed by: NURSE PRACTITIONER

## 2020-01-09 PROCEDURE — 86225 DNA ANTIBODY NATIVE: CPT

## 2020-01-09 PROCEDURE — 86160 COMPLEMENT ANTIGEN: CPT | Mod: 59

## 2020-01-09 PROCEDURE — 3074F PR MOST RECENT SYSTOLIC BLOOD PRESSURE < 130 MM HG: ICD-10-PCS | Mod: CPTII,S$GLB,, | Performed by: NURSE PRACTITIONER

## 2020-01-09 PROCEDURE — 3080F PR MOST RECENT DIASTOLIC BLOOD PRESSURE >= 90 MM HG: ICD-10-PCS | Mod: CPTII,S$GLB,, | Performed by: INTERNAL MEDICINE

## 2020-01-09 PROCEDURE — 99204 PR OFFICE/OUTPT VISIT, NEW, LEVL IV, 45-59 MIN: ICD-10-PCS | Mod: S$GLB,,, | Performed by: NURSE PRACTITIONER

## 2020-01-09 PROCEDURE — 85025 COMPLETE CBC W/AUTO DIFF WBC: CPT

## 2020-01-09 PROCEDURE — 99214 PR OFFICE/OUTPT VISIT, EST, LEVL IV, 30-39 MIN: ICD-10-PCS | Mod: S$GLB,,, | Performed by: INTERNAL MEDICINE

## 2020-01-09 PROCEDURE — 99999 PR PBB SHADOW E&M-EST. PATIENT-LVL III: CPT | Mod: PBBFAC,,, | Performed by: NURSE PRACTITIONER

## 2020-01-09 PROCEDURE — 3008F BODY MASS INDEX DOCD: CPT | Mod: CPTII,S$GLB,, | Performed by: INTERNAL MEDICINE

## 2020-01-09 PROCEDURE — 99499 RISK ADDL DX/OHS AUDIT: ICD-10-PCS | Mod: S$GLB,,, | Performed by: INTERNAL MEDICINE

## 2020-01-09 PROCEDURE — 86140 C-REACTIVE PROTEIN: CPT

## 2020-01-09 PROCEDURE — 3008F BODY MASS INDEX DOCD: CPT | Mod: CPTII,S$GLB,, | Performed by: NURSE PRACTITIONER

## 2020-01-09 PROCEDURE — 80053 COMPREHEN METABOLIC PANEL: CPT

## 2020-01-09 PROCEDURE — 99214 OFFICE O/P EST MOD 30 MIN: CPT | Mod: S$GLB,,, | Performed by: INTERNAL MEDICINE

## 2020-01-09 PROCEDURE — 3077F SYST BP >= 140 MM HG: CPT | Mod: CPTII,S$GLB,, | Performed by: INTERNAL MEDICINE

## 2020-01-09 PROCEDURE — 99999 PR PBB SHADOW E&M-EST. PATIENT-LVL V: CPT | Mod: PBBFAC,,, | Performed by: INTERNAL MEDICINE

## 2020-01-09 PROCEDURE — 3008F PR BODY MASS INDEX (BMI) DOCUMENTED: ICD-10-PCS | Mod: CPTII,S$GLB,, | Performed by: INTERNAL MEDICINE

## 2020-01-09 PROCEDURE — 3074F SYST BP LT 130 MM HG: CPT | Mod: CPTII,S$GLB,, | Performed by: NURSE PRACTITIONER

## 2020-01-09 PROCEDURE — 99999 PR PBB SHADOW E&M-EST. PATIENT-LVL III: ICD-10-PCS | Mod: PBBFAC,,, | Performed by: NURSE PRACTITIONER

## 2020-01-09 PROCEDURE — 3078F PR MOST RECENT DIASTOLIC BLOOD PRESSURE < 80 MM HG: ICD-10-PCS | Mod: CPTII,S$GLB,, | Performed by: NURSE PRACTITIONER

## 2020-01-09 PROCEDURE — 36415 COLL VENOUS BLD VENIPUNCTURE: CPT

## 2020-01-09 PROCEDURE — 86160 COMPLEMENT ANTIGEN: CPT

## 2020-01-09 PROCEDURE — 3077F PR MOST RECENT SYSTOLIC BLOOD PRESSURE >= 140 MM HG: ICD-10-PCS | Mod: CPTII,S$GLB,, | Performed by: INTERNAL MEDICINE

## 2020-01-09 PROCEDURE — 3078F DIAST BP <80 MM HG: CPT | Mod: CPTII,S$GLB,, | Performed by: NURSE PRACTITIONER

## 2020-01-09 RX ORDER — PREGABALIN 150 MG/1
150 CAPSULE ORAL 2 TIMES DAILY
Qty: 180 CAPSULE | Refills: 1 | Status: SHIPPED | OUTPATIENT
Start: 2020-01-09 | End: 2020-07-07 | Stop reason: SDUPTHER

## 2020-01-09 RX ORDER — HYDROXYCHLOROQUINE SULFATE 200 MG/1
200 TABLET, FILM COATED ORAL 2 TIMES DAILY
Qty: 180 TABLET | Refills: 1 | Status: SHIPPED | OUTPATIENT
Start: 2020-01-09 | End: 2020-07-07 | Stop reason: SDUPTHER

## 2020-01-09 ASSESSMENT — ROUTINE ASSESSMENT OF PATIENT INDEX DATA (RAPID3)
PAIN SCORE: 5.5
FATIGUE SCORE: .5
AM STIFFNESS SCORE: 1, YES
TOTAL RAPID3 SCORE: 4.44
PSYCHOLOGICAL DISTRESS SCORE: 2.2
WHEN YOU AWAKENED IN THE MORNING OVER THE LAST WEEK, PLEASE INDICATE THE AMOUNT OF TIME IT TAKES UNTIL YOU ARE AS LIMBER AS YOU WILL BE FOR THE DAY: 20 MINS
MDHAQ FUNCTION SCORE: .7
PATIENT GLOBAL ASSESSMENT SCORE: 5.5

## 2020-01-09 ASSESSMENT — SYSTEMIC LUPUS ERYTHEMATOSUS DISEASE ACTIVITY INDEX (SLEDAI): TOTAL_SCORE: 2

## 2020-01-09 NOTE — PROGRESS NOTES
Subjective:       Patient ID: Breanna Guido is a 59 y.o. female.    Chief Complaint: SLE; Fibromyalgia    Pt is a 59 yo Female with Bipolar d/o, SLE, COPD, HTN, HLD, scleritis, and fibromyalgia here for follow up. She was last seen in our clinic in February 2019. In the interim, she has done well. She has been at her baseline except for a couple instances of brief, transient rash across her shoulders and one instance of increased shoulder pain. She says bilateral shoulder pain is typical of a fibromyalgia flare for her. She has rare headaches which resolve with Excedrin. She got her flu shot and is interested in the Shingrix series. Unfortunately, she is smoking more again. She would like to return to smoking cessation.     Review of Systems   Constitutional: Negative for activity change, appetite change, fatigue, fever and unexpected weight change.   HENT: Positive for postnasal drip. Negative for congestion, ear pain, facial swelling, mouth sores, nosebleeds, sore throat, trouble swallowing and voice change.    Eyes: Negative for pain and redness.   Respiratory: Positive for cough. Negative for chest tightness, shortness of breath and wheezing.         Chronic smoker's cough   Cardiovascular: Negative for chest pain and palpitations.   Gastrointestinal: Negative for abdominal pain, constipation, diarrhea, nausea and vomiting.   Genitourinary: Positive for urgency. Negative for dysuria.   Musculoskeletal: Positive for arthralgias. Negative for gait problem, joint swelling and myalgias.        Mild knee stiffness in the morning (resolves in 10 minutes)   Skin: Negative for color change and rash.   Neurological: Negative for dizziness, syncope, weakness and numbness.   Hematological: Negative for adenopathy.   Psychiatric/Behavioral: Negative for sleep disturbance.         Objective:   There were no vitals taken for this visit.     Physical Exam   Constitutional: She is oriented to person, place, and time and  well-developed, well-nourished, and in no distress. No distress.   HENT:   Head: Normocephalic and atraumatic.   Right Ear: External ear normal.   Left Ear: External ear normal.   Mouth/Throat: No oropharyngeal exudate.   Mild erythema of the retrotonsillar throat   Eyes: EOM are normal. Pupils are equal, round, and reactive to light. Right eye exhibits no discharge. Left eye exhibits no discharge. No scleral icterus.   Neck: Normal range of motion.   Cardiovascular: Normal rate and regular rhythm.    Pulmonary/Chest: Effort normal and breath sounds normal. She has no wheezes. She has no rales.   Abdominal: Soft. There is no tenderness.   Lymphadenopathy:     She has no cervical adenopathy.   Neurological: She is alert and oriented to person, place, and time. No cranial nerve deficit. Gait normal.   Skin: Skin is warm and dry. No rash noted. She is not diaphoretic. No erythema.     Psychiatric: Mood, memory, affect and judgment normal.   Musculoskeletal: Normal range of motion. She exhibits no edema, tenderness or deformity.           Assessment/Plan   - Continue Cymbalta 90mg daily  - Continue Lyrica 150 mg BID  - Continue Plaquenil 200 mg BID  - Continue aerobic exercises daily   - rpt DXA in May 2020  - standing labs + lupus studies today  - Shingrix #1 ordered  - Referrals to Dermatology re: hair loss and Smoking Cessation  - RTC 6m      Juan Mitchell MD  [employer] [facility], PGY-1          Tobacco abuse  -     Ambulatory referral to Smoking Cessation Program    Other systemic lupus erythematosus with other organ involvement  -     DXA Bone Density Spine And Hip; Future; Expected date: 05/09/2020  -     hydroxychloroquine (PLAQUENIL) 200 mg tablet; Take 1 tablet (200 mg total) by mouth 2 (two) times daily.  Dispense: 180 tablet; Refill: 1    Fibromyalgia    Hair loss  -     Ambulatory Referral to Dermatology    Osteopenia, unspecified location  -     DXA Bone Density Spine And Hip; Future; Expected date:  05/09/2020    Other osteoporosis without current pathological fracture   -     DXA Bone Density Spine And Hip; Future; Expected date: 05/09/2020    Alopecia    Lupus    Other orders  -     (In Office Administered) Zoster Recombinant Vaccine       Problem List Items Addressed This Visit        Derm    Alopecia       Immunology/Multi System    Lupus (Chronic)    Relevant Medications    hydroxychloroquine (PLAQUENIL) 200 mg tablet       Orthopedic    Fibromyalgia (Chronic)    Osteopenia    Relevant Orders    DXA Bone Density Spine And Hip       Other    Tobacco abuse - Primary    Relevant Orders    Ambulatory referral to Smoking Cessation Program      Other Visit Diagnoses     Other systemic lupus erythematosus with other organ involvement  (Chronic)       Relevant Medications    hydroxychloroquine (PLAQUENIL) 200 mg tablet    Other Relevant Orders    DXA Bone Density Spine And Hip    Hair loss        Relevant Orders    Ambulatory Referral to Dermatology    Other osteoporosis without current pathological fracture         Relevant Orders    DXA Bone Density Spine And Hip

## 2020-01-09 NOTE — PROGRESS NOTES
Subjective:       Patient ID: Breanna Guido is a 59 y.o. female.    Chief Complaint: Pulmonary Nodules    HPI:   Breanna Guido is a 59 y.o. female who presents with evaluation for pulmonary nodules.   Started smoking at age 14, has been trying to quit.  Up to about a pack. Ready to quit  Uses albuterol PRN, not every day, very rarely  Taking Plaquenil, followed by Rheum for Lupus  Using Symbicort, finds that it helps  Has had some increasing night sweats    Review of Systems   Constitutional: Positive for night sweats (increasing over last 2 months). Negative for chills, activity change and fatigue.   HENT: Negative for postnasal drip, rhinorrhea, trouble swallowing and congestion.    Eyes: Negative for itching.   Respiratory: Positive for cough, sputum production and wheezing. Negative for hemoptysis, choking, chest tightness, shortness of breath, dyspnea on extertion and use of rescue inhaler.    Cardiovascular: Negative for chest pain and palpitations.   Genitourinary: Negative for difficulty urinating.   Endocrine: Negative for cold intolerance and heat intolerance.    Musculoskeletal: Negative for arthralgias.   Skin: Negative for rash.   Gastrointestinal: Negative for nausea, vomiting and acid reflux.   Neurological: Negative for dizziness and light-headedness.   Hematological: Negative for adenopathy.   Psychiatric/Behavioral: Positive for sleep disturbance.         Social History     Tobacco Use    Smoking status: Current Every Day Smoker     Packs/day: 0.25     Years: 40.00     Pack years: 10.00     Types: Cigarettes     Last attempt to quit: 2017     Years since quittin.6    Smokeless tobacco: Never Used    Tobacco comment: reports more tob after 2005 1-2ppd x 14years - cut down 2018   Substance Use Topics    Alcohol use: No     Comment: rare       Review of patient's allergies indicates:   Allergen Reactions    Bactrim [sulfamethoxazole-trimethoprim] Nausea And Vomiting     Sulfa (sulfonamide antibiotics)      Agitation^     Past Medical History:   Diagnosis Date    Anxiety     Arthritis     Bipolar 1 disorder     Cataract     COPD (chronic obstructive pulmonary disease)     Depression     bipolar 2    Eyelid lesion     Fibromyalgia     H/O corneal abrasion 2001    left eye with lead-based paint chips    Hyperlipidemia     Hypertension     Lupus     Migraine headache     Scleritis     Thyroid disease      Past Surgical History:   Procedure Laterality Date    HYSTERECTOMY      for sterilization, ovaries intact     Current Outpatient Medications on File Prior to Visit   Medication Sig    albuterol (PROAIR HFA) 90 mcg/actuation inhaler Inhale 2 puffs into the lungs every 6 (six) hours as needed for Wheezing or Shortness of Breath. Rescue    alprazolam (XANAX) 2 MG Tab Take 2 mg by mouth 2 (two) times daily as needed (pt takes 1/2 tab BID and 1 tab  at night).    amLODIPine (NORVASC) 5 MG tablet TAKE ONE TABLET DAILY BY MOUTH EVERY DAY    budesonide-formoterol 160-4.5 mcg (SYMBICORT) 160-4.5 mcg/actuation HFAA INHALE 2 PUFFS BY MOUTH EVERY 12 HOURS    duloxetine (CYMBALTA) 30 MG capsule Take 30 mg by mouth once daily.     duloxetine (CYMBALTA) 60 MG capsule Take one capsule along with cymbalta 30mg daily to equal 90mg daily    erythromycin (ROMYCIN) ophthalmic ointment Place a 1/2 inch ribbon of ointment into the lower eyelid.    hydroxychloroquine (PLAQUENIL) 200 mg tablet Take 1 tablet (200 mg total) by mouth 2 (two) times daily.    levothyroxine (SYNTHROID) 88 MCG tablet Take 1 tablet (88 mcg total) by mouth once daily.    losartan (COZAAR) 100 MG tablet Take 1 tablet (100 mg total) by mouth once daily.    naproxen (NAPROSYN) 500 MG tablet Take 1 tablet (500 mg total) by mouth 2 (two) times daily as needed.    pregabalin (LYRICA) 150 MG capsule Take 1 capsule (150 mg total) by mouth 2 (two) times daily.    quetiapine (SEROQUEL) 300 MG Tab Take 300 mg by  mouth once daily.     rosuvastatin (CRESTOR) 40 MG Tab Take 1 tablet (40 mg total) by mouth every evening.    vitamin D 1000 units Tab Take 185 mg by mouth once daily.    baclofen (LIORESAL) 10 MG tablet Take 1 tablet (10 mg total) by mouth daily as needed. (Patient not taking: Reported on 11/5/2019)    famotidine (PEPCID) 20 MG tablet Take 1 tablet (20 mg total) by mouth 2 (two) times daily. for 15 days (Patient not taking: Reported on 11/5/2019)    [DISCONTINUED] CHANTIX CONTINUING MONTH BOX 1 mg Tab TK 1 T PO UTD    [DISCONTINUED] hydroxychloroquine (PLAQUENIL) 200 mg tablet Take 1 tablet (200 mg total) by mouth 2 (two) times daily.    [DISCONTINUED] pregabalin (LYRICA) 150 MG capsule Take 1 capsule (150 mg total) by mouth 2 (two) times daily.     No current facility-administered medications on file prior to visit.        Objective:      Vitals:    01/09/20 1103   BP: 120/62   Pulse: 80     Physical Exam   Constitutional: She is oriented to person, place, and time. She appears well-developed and well-nourished. No distress.   HENT:   Head: Normocephalic.   Neck: Normal range of motion. Neck supple.   Cardiovascular: Normal rate and regular rhythm.   No murmur heard.  Pulmonary/Chest: Normal expansion, symmetric chest wall expansion, effort normal and breath sounds normal. No respiratory distress. She has no decreased breath sounds. She has no wheezes. She has no rhonchi. She has no rales.   Abdominal: Soft. She exhibits no distension. There is no hepatosplenomegaly. There is no tenderness.   Musculoskeletal: Normal range of motion. She exhibits no edema.   Lymphadenopathy:     She has no cervical adenopathy.   Neurological: She is alert and oriented to person, place, and time. Gait normal.   Skin: Skin is warm and dry. No cyanosis. Nails show no clubbing.   Psychiatric: She has a normal mood and affect.   Vitals reviewed.    Personal Diagnostic Review    Imaging personally reviewed with patient CT  10/28/19        Assessment:     Problem List Items Addressed This Visit        Pulmonary    Lung nodule - Primary    Overview     Noted on CT chest in April 2019.  Repeat CT showed an interval decrease in size, nodule now measures 6-7 mm. She is a current daily smoker and does report night sweats.         Current Assessment & Plan     Plan to repeat CT in 6 months, possibly sooner based on discussion with staff MD.          Relevant Orders    CT Chest Without Contrast      Other Visit Diagnoses     Shortness of breath        Relevant Orders    Spirometry with/without bronchodilator    DLCO-Carbon Monoxide Diffusing Capacity    LUNG VOLUMES    Stress test, pulmonary    Smoker        Relevant Orders    Ambulatory referral to Smoking Cessation Program

## 2020-01-09 NOTE — PROGRESS NOTES
I have personally taken the history and examined the patient and agree with the resident,s note as stated above     SLE, SLEDAI 2  Fibromyalgia stable  Tobacco abuse, worse. Headache with Chantix, has tried Nicorette gum and patch, not together. Motivated to quit  Hypertension 146/94, but aggravated as drive 45 min late to bring her to appt  Scleritis in remission    Standing lupus labs today  Continue duloxetine 90mg daily  Continue pregabalin 150mg twice daily  Continue hydroxychloroquine 200mg twice daily  Continue quetiapine 300mg daily  Continue rosuvastatin 40mg nightly  Ref to Dermatology for alopecia  Ref back to Smoking Cessation  DXA May 2020  RTC 6 months with standing lupus labs.

## 2020-01-10 ENCOUNTER — TELEPHONE (OUTPATIENT)
Dept: RHEUMATOLOGY | Facility: CLINIC | Age: 60
End: 2020-01-10

## 2020-01-10 LAB — DSDNA AB SER-ACNC: NORMAL [IU]/ML

## 2020-01-13 ENCOUNTER — PATIENT MESSAGE (OUTPATIENT)
Dept: RHEUMATOLOGY | Facility: CLINIC | Age: 60
End: 2020-01-13

## 2020-01-14 ENCOUNTER — PATIENT MESSAGE (OUTPATIENT)
Dept: PULMONOLOGY | Facility: CLINIC | Age: 60
End: 2020-01-14

## 2020-01-14 PROBLEM — R91.1 LUNG NODULE: Status: ACTIVE | Noted: 2020-01-14

## 2020-01-15 ENCOUNTER — LAB VISIT (OUTPATIENT)
Dept: LAB | Facility: OTHER | Age: 60
End: 2020-01-15
Attending: INTERNAL MEDICINE
Payer: MEDICARE

## 2020-01-15 DIAGNOSIS — R77.8 ELEVATED TOTAL PROTEIN: ICD-10-CM

## 2020-01-15 PROCEDURE — 84165 PROTEIN E-PHORESIS SERUM: CPT | Mod: 26,,, | Performed by: PATHOLOGY

## 2020-01-15 PROCEDURE — 84165 PROTEIN E-PHORESIS SERUM: CPT

## 2020-01-15 PROCEDURE — 86334 IMMUNOFIX E-PHORESIS SERUM: CPT

## 2020-01-15 PROCEDURE — 84165 PATHOLOGIST INTERPRETATION SPE: ICD-10-PCS | Mod: 26,,, | Performed by: PATHOLOGY

## 2020-01-15 PROCEDURE — 86334 PATHOLOGIST INTERPRETATION IFE: ICD-10-PCS | Mod: 26,,, | Performed by: PATHOLOGY

## 2020-01-15 PROCEDURE — 86334 IMMUNOFIX E-PHORESIS SERUM: CPT | Mod: 26,,, | Performed by: PATHOLOGY

## 2020-01-15 PROCEDURE — 36415 COLL VENOUS BLD VENIPUNCTURE: CPT

## 2020-01-17 LAB
ALBUMIN SERPL ELPH-MCNC: 4.1 G/DL (ref 3.35–5.55)
ALPHA1 GLOB SERPL ELPH-MCNC: 0.29 G/DL (ref 0.17–0.41)
ALPHA2 GLOB SERPL ELPH-MCNC: 0.87 G/DL (ref 0.43–0.99)
B-GLOBULIN SERPL ELPH-MCNC: 0.8 G/DL (ref 0.5–1.1)
GAMMA GLOB SERPL ELPH-MCNC: 1.45 G/DL (ref 0.67–1.58)
INTERPRETATION SERPL IFE-IMP: NORMAL
PATHOLOGIST INTERPRETATION IFE: NORMAL
PATHOLOGIST INTERPRETATION SPE: NORMAL
PROT SERPL-MCNC: 7.5 G/DL (ref 6–8.4)

## 2020-01-23 RX ORDER — LOSARTAN POTASSIUM 100 MG/1
TABLET ORAL
Qty: 90 TABLET | Refills: 3 | Status: SHIPPED | OUTPATIENT
Start: 2020-01-23 | End: 2021-01-26 | Stop reason: SDUPTHER

## 2020-02-28 ENCOUNTER — OFFICE VISIT (OUTPATIENT)
Dept: INTERNAL MEDICINE | Facility: CLINIC | Age: 60
End: 2020-02-28
Payer: MEDICARE

## 2020-02-28 ENCOUNTER — LAB VISIT (OUTPATIENT)
Dept: LAB | Facility: OTHER | Age: 60
End: 2020-02-28
Attending: INTERNAL MEDICINE
Payer: MEDICARE

## 2020-02-28 VITALS
OXYGEN SATURATION: 97 % | WEIGHT: 153.44 LBS | BODY MASS INDEX: 28.24 KG/M2 | HEART RATE: 68 BPM | HEIGHT: 62 IN | DIASTOLIC BLOOD PRESSURE: 78 MMHG | SYSTOLIC BLOOD PRESSURE: 112 MMHG

## 2020-02-28 DIAGNOSIS — I10 ESSENTIAL HYPERTENSION, BENIGN: Primary | Chronic | ICD-10-CM

## 2020-02-28 DIAGNOSIS — Z72.0 TOBACCO ABUSE: ICD-10-CM

## 2020-02-28 DIAGNOSIS — I10 ESSENTIAL HYPERTENSION, BENIGN: Chronic | ICD-10-CM

## 2020-02-28 DIAGNOSIS — J41.0 SIMPLE CHRONIC BRONCHITIS: Chronic | ICD-10-CM

## 2020-02-28 DIAGNOSIS — R91.1 LUNG NODULE: ICD-10-CM

## 2020-02-28 DIAGNOSIS — M85.89 OSTEOPENIA OF MULTIPLE SITES: ICD-10-CM

## 2020-02-28 DIAGNOSIS — E03.9 ACQUIRED HYPOTHYROIDISM: Chronic | ICD-10-CM

## 2020-02-28 DIAGNOSIS — E78.2 MIXED HYPERLIPIDEMIA: Chronic | ICD-10-CM

## 2020-02-28 DIAGNOSIS — H15.001 SCLERITIS OF RIGHT EYE: ICD-10-CM

## 2020-02-28 DIAGNOSIS — Z12.31 ENCOUNTER FOR SCREENING MAMMOGRAM FOR BREAST CANCER: ICD-10-CM

## 2020-02-28 DIAGNOSIS — M32.9 LUPUS: Chronic | ICD-10-CM

## 2020-02-28 DIAGNOSIS — F31.81 BIPOLAR 2 DISORDER: Chronic | ICD-10-CM

## 2020-02-28 DIAGNOSIS — M79.7 FIBROMYALGIA: Chronic | ICD-10-CM

## 2020-02-28 LAB
ALBUMIN SERPL BCP-MCNC: 4.3 G/DL (ref 3.5–5.2)
ALP SERPL-CCNC: 69 U/L (ref 55–135)
ALT SERPL W/O P-5'-P-CCNC: 14 U/L (ref 10–44)
ANION GAP SERPL CALC-SCNC: 8 MMOL/L (ref 8–16)
AST SERPL-CCNC: 21 U/L (ref 10–40)
BASOPHILS # BLD AUTO: 0.04 K/UL (ref 0–0.2)
BASOPHILS NFR BLD: 0.9 % (ref 0–1.9)
BILIRUB SERPL-MCNC: 0.5 MG/DL (ref 0.1–1)
BUN SERPL-MCNC: 10 MG/DL (ref 8–23)
CALCIUM SERPL-MCNC: 9.4 MG/DL (ref 8.7–10.5)
CHLORIDE SERPL-SCNC: 107 MMOL/L (ref 95–110)
CHOLEST SERPL-MCNC: 127 MG/DL (ref 120–199)
CHOLEST/HDLC SERPL: 2.4 {RATIO} (ref 2–5)
CO2 SERPL-SCNC: 26 MMOL/L (ref 23–29)
CREAT SERPL-MCNC: 0.8 MG/DL (ref 0.5–1.4)
DIFFERENTIAL METHOD: NORMAL
EOSINOPHIL # BLD AUTO: 0.1 K/UL (ref 0–0.5)
EOSINOPHIL NFR BLD: 1.5 % (ref 0–8)
ERYTHROCYTE [DISTWIDTH] IN BLOOD BY AUTOMATED COUNT: 13.2 % (ref 11.5–14.5)
EST. GFR  (AFRICAN AMERICAN): >60 ML/MIN/1.73 M^2
EST. GFR  (NON AFRICAN AMERICAN): >60 ML/MIN/1.73 M^2
GLUCOSE SERPL-MCNC: 96 MG/DL (ref 70–110)
HCT VFR BLD AUTO: 41.6 % (ref 37–48.5)
HDLC SERPL-MCNC: 53 MG/DL (ref 40–75)
HDLC SERPL: 41.7 % (ref 20–50)
HGB BLD-MCNC: 13.7 G/DL (ref 12–16)
IMM GRANULOCYTES # BLD AUTO: 0.01 K/UL (ref 0–0.04)
IMM GRANULOCYTES NFR BLD AUTO: 0.2 % (ref 0–0.5)
LDLC SERPL CALC-MCNC: 63.8 MG/DL (ref 63–159)
LYMPHOCYTES # BLD AUTO: 2.1 K/UL (ref 1–4.8)
LYMPHOCYTES NFR BLD: 45.4 % (ref 18–48)
MCH RBC QN AUTO: 30.6 PG (ref 27–31)
MCHC RBC AUTO-ENTMCNC: 32.9 G/DL (ref 32–36)
MCV RBC AUTO: 93 FL (ref 82–98)
MONOCYTES # BLD AUTO: 0.4 K/UL (ref 0.3–1)
MONOCYTES NFR BLD: 9.5 % (ref 4–15)
NEUTROPHILS # BLD AUTO: 2 K/UL (ref 1.8–7.7)
NEUTROPHILS NFR BLD: 42.5 % (ref 38–73)
NONHDLC SERPL-MCNC: 74 MG/DL
NRBC BLD-RTO: 0 /100 WBC
PLATELET # BLD AUTO: 193 K/UL (ref 150–350)
PMV BLD AUTO: 11.5 FL (ref 9.2–12.9)
POTASSIUM SERPL-SCNC: 3.8 MMOL/L (ref 3.5–5.1)
PROT SERPL-MCNC: 8 G/DL (ref 6–8.4)
RBC # BLD AUTO: 4.47 M/UL (ref 4–5.4)
SODIUM SERPL-SCNC: 141 MMOL/L (ref 136–145)
TRIGL SERPL-MCNC: 51 MG/DL (ref 30–150)
TSH SERPL DL<=0.005 MIU/L-ACNC: 1.12 UIU/ML (ref 0.4–4)
WBC # BLD AUTO: 4.65 K/UL (ref 3.9–12.7)

## 2020-02-28 PROCEDURE — 99214 PR OFFICE/OUTPT VISIT, EST, LEVL IV, 30-39 MIN: ICD-10-PCS | Mod: S$GLB,,, | Performed by: INTERNAL MEDICINE

## 2020-02-28 PROCEDURE — 99999 PR PBB SHADOW E&M-EST. PATIENT-LVL III: CPT | Mod: PBBFAC,,, | Performed by: INTERNAL MEDICINE

## 2020-02-28 PROCEDURE — 84443 ASSAY THYROID STIM HORMONE: CPT

## 2020-02-28 PROCEDURE — 99999 PR PBB SHADOW E&M-EST. PATIENT-LVL III: ICD-10-PCS | Mod: PBBFAC,,, | Performed by: INTERNAL MEDICINE

## 2020-02-28 PROCEDURE — 3008F BODY MASS INDEX DOCD: CPT | Mod: CPTII,S$GLB,, | Performed by: INTERNAL MEDICINE

## 2020-02-28 PROCEDURE — 36415 COLL VENOUS BLD VENIPUNCTURE: CPT

## 2020-02-28 PROCEDURE — 99499 UNLISTED E&M SERVICE: CPT | Mod: S$GLB,,, | Performed by: INTERNAL MEDICINE

## 2020-02-28 PROCEDURE — 80053 COMPREHEN METABOLIC PANEL: CPT

## 2020-02-28 PROCEDURE — 3074F PR MOST RECENT SYSTOLIC BLOOD PRESSURE < 130 MM HG: ICD-10-PCS | Mod: CPTII,S$GLB,, | Performed by: INTERNAL MEDICINE

## 2020-02-28 PROCEDURE — 3008F PR BODY MASS INDEX (BMI) DOCUMENTED: ICD-10-PCS | Mod: CPTII,S$GLB,, | Performed by: INTERNAL MEDICINE

## 2020-02-28 PROCEDURE — 85025 COMPLETE CBC W/AUTO DIFF WBC: CPT

## 2020-02-28 PROCEDURE — 80061 LIPID PANEL: CPT

## 2020-02-28 PROCEDURE — 3074F SYST BP LT 130 MM HG: CPT | Mod: CPTII,S$GLB,, | Performed by: INTERNAL MEDICINE

## 2020-02-28 PROCEDURE — 99499 RISK ADDL DX/OHS AUDIT: ICD-10-PCS | Mod: S$GLB,,, | Performed by: INTERNAL MEDICINE

## 2020-02-28 PROCEDURE — 3078F PR MOST RECENT DIASTOLIC BLOOD PRESSURE < 80 MM HG: ICD-10-PCS | Mod: CPTII,S$GLB,, | Performed by: INTERNAL MEDICINE

## 2020-02-28 PROCEDURE — 3078F DIAST BP <80 MM HG: CPT | Mod: CPTII,S$GLB,, | Performed by: INTERNAL MEDICINE

## 2020-02-28 PROCEDURE — 99214 OFFICE O/P EST MOD 30 MIN: CPT | Mod: S$GLB,,, | Performed by: INTERNAL MEDICINE

## 2020-02-28 NOTE — PROGRESS NOTES
Subjective:       Patient ID: Breanna Guido is a 59 y.o. female.    Chief Complaint: Hypertension    Pt here for f/u. Counseled on exercise goals. Feeling well but does report a couple of months of night sweats that wet her night gown and hair. No wt loss or LAD. Feels well o/w. She is being followed by pulm for lung nodule but has been stable. She also had SPEP with rheum due to mildly elevated total protein but this was normal.     Migraines are well controlled on current meds. No new features or red flag symptoms.       BP is well controlled. Denies cp/sob/ha/vision or neuro changes. Not checking at home regularly.     HLD has been tx with crestor which she has tolerated well.     She is clinically euthyroid. Due for updated labs.     Bipolar is followed by psychiatry regularly. No SI/HI. This is stable.     Pt sees Dr. Valencia in [facility] for fibromyalgia dx and lupus dx; however, her serology has been negative. It was recommended she be maintained on plaquenil which she takes. She is being followed regularly by ophtho as well. 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 Her pain is manageable with baclofen and lyrica.    COPD is stable on current meds. She has RUL lung nodule for which she saw pulmonary. This has been stable and she has f/u with pulmonary in April with plans for repeat CT.     She has osteopenia on most recent DEXA 4/2018. She is on ca/d.       Hypertension   Pertinent negatives include no chest pain, headaches, neck pain, palpitations or shortness of breath.     Review of Systems   Constitutional: Positive for activity change. Negative for unexpected weight change.   HENT: Negative for hearing loss, rhinorrhea and trouble swallowing.    Eyes: Negative for discharge and visual disturbance.   Respiratory: Negative for chest tightness, shortness of breath and wheezing.    Cardiovascular: Negative for chest pain and palpitations.   Gastrointestinal: Negative for abdominal  pain, blood in stool, constipation, diarrhea, nausea and vomiting.   Endocrine: Negative for polydipsia and polyuria.   Genitourinary: Negative for difficulty urinating, dysuria, frequency, hematuria and menstrual problem.   Musculoskeletal: Positive for arthralgias. Negative for joint swelling and neck pain.   Neurological: Negative for speech difficulty, weakness and headaches.   Hematological: Negative for adenopathy.   Psychiatric/Behavioral: Negative for confusion and dysphoric mood.       Objective:          Assessment:       1. Essential hypertension, benign    2. Mixed hyperlipidemia    3. Simple chronic bronchitis    4. Lung nodule    5. Bipolar 2 disorder    6. Scleritis of right eye    7. Lupus    8. Acquired hypothyroidism    9. Fibromyalgia    10. Osteopenia of multiple sites    11. Tobacco abuse    12. Encounter for screening mammogram for breast cancer        Plan:       1. continue current meds   2. Keep f/u with specialists  3. labs pending  4. Tobacco cessation  5. Mammogram  6. She understands to let us know if wt loss occurs or if night sweats continue over next 4 weeks           Physical Exam   Constitutional: She is oriented to person, place, and time. She appears well-developed and well-nourished.   HENT:   Head: Normocephalic and atraumatic.   Eyes: Pupils are equal, round, and reactive to light. EOM are normal.   Neck: Neck supple. Carotid bruit is not present. No thyroid mass and no thyromegaly present.   Cardiovascular: Normal rate, regular rhythm, S1 normal and S2 normal.   Murmur heard.  Pulmonary/Chest: Effort normal and breath sounds normal. She has no wheezes.   Abdominal: Soft. Bowel sounds are normal. She exhibits no distension and no mass. There is no hepatosplenomegaly. There is no tenderness. There is no CVA tenderness.   Musculoskeletal: She exhibits no edema.        Lumbar back: Normal.   Neurological: She is alert and oriented to person, place, and time. She has normal  strength. No cranial nerve deficit or sensory deficit. She displays a negative Romberg sign. Coordination and gait normal.   Reflex Scores:       Bicep reflexes are 2+ on the right side and 2+ on the left side.       Patellar reflexes are 2+ on the right side and 2+ on the left side.  Psychiatric: She has a normal mood and affect. Her behavior is normal.

## 2020-02-28 NOTE — MEDICAL/APP STUDENT
Subjective:       Patient ID: Braenna Guido is a 59 y.o. female.    Chief Complaint: Hypertension    HPI   Hypertension: well-controlled.On losartan 100mg & amlodipine 5mg daily. BP is 112/78 today. Denies cp/sob/ha/vision or neuro changes. Checks BP at home intermittently, readings 110s/70s. Occasional lightheadedness with exertion (gardening, tending to grandchildren).     Counseled on exercise goals. Currently walking about 1 hour per day.      Migraines are well controlled. Last episode >1 year ago. Not currently taking any prescription medications for this, using Excedrin migraine PRN. No new features or red flag symptoms.        HLD: on rosuvastatin, tolerating well.      She is clinically euthyroid. Due for updated labs. Patient reports night sweats, onset 2 months ago, occur every night. No weight loss. No fatigue. Hx hysterectomy w/o oophorectomy in 1984, feels menopause symptoms (hot flashes etc.) 15 years ago. Denies peripheral edema, palpitations, visual changes, fevers, chills.      Bipolar is followed by psychiatry every 2 months. No SI/HI. This is stable. Taking Xanax 1mg bid. On quetiapine 300mg & duloxetine 90mg.      Patient followed by Dr. Valencia in rheumatology for fibromyalgia dx and lupus dx; however, her serology has been negative. It was recommended she be maintained on plaquenil which she takes. Her pain is manageable with lyrica. Using Baclofen PRN for diffuse 'muscle spasms', last episode >1 year ago.     H/O R eye scleritis. Resolved & stable. Followed regularly by ophtho as well. 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      COPD is stable on current meds. No SOB, chest pain, PND. On Symbicort bid & albuterol PRN.      She has osteopenia on most recent DEXA 4/2018. She is taking calcium & vitamin D.     Patient had lab work done this AM, prior to appointment.     Drinks alcohol occasionally.   Currently smoking 1/2 pack per day. 1/2 - 2 ppd for 45 years.  Trying to quit. Was on Chantix, working well, started having intense headaches. Has tried Wellbutrin in the past, quit for >1 year.     Review of Systems   Constitutional: Negative for activity change, appetite change, chills, fatigue, fever and unexpected weight change.        Night sweats   HENT: Negative for congestion, facial swelling, hearing loss, nosebleeds, rhinorrhea, sinus pressure, sinus pain, sneezing, sore throat, tinnitus, trouble swallowing and voice change.    Eyes: Negative for photophobia, pain, discharge, redness and visual disturbance.   Respiratory: Negative for apnea, cough, choking, chest tightness, shortness of breath and wheezing.    Cardiovascular: Negative for chest pain, palpitations and leg swelling.   Gastrointestinal: Negative for abdominal distention, abdominal pain, blood in stool, constipation, diarrhea, nausea and vomiting.   Endocrine: Negative for polydipsia, polyphagia and polyuria.   Genitourinary: Negative for difficulty urinating, dysuria, flank pain, frequency, hematuria, urgency, vaginal bleeding, vaginal discharge and vaginal pain.   Musculoskeletal: Negative for arthralgias, back pain, gait problem, joint swelling, myalgias, neck pain and neck stiffness.   Skin: Negative for rash and wound.   Neurological: Negative for dizziness, tremors, seizures, syncope, weakness, light-headedness, numbness and headaches.   Hematological: Does not bruise/bleed easily.   Psychiatric/Behavioral: Negative for confusion, decreased concentration, hallucinations, sleep disturbance and suicidal ideas. The patient is not nervous/anxious.        Objective:      Physical Exam   Constitutional: She is oriented to person, place, and time. She appears well-developed and well-nourished. No distress.   HENT:   Head: Normocephalic and atraumatic.   Right Ear: External ear normal.   Left Ear: External ear normal.   Nose: Nose normal.   Mouth/Throat: Oropharynx is clear and moist. No oropharyngeal  exudate.   Eyes: Pupils are equal, round, and reactive to light. Conjunctivae and EOM are normal. Right eye exhibits no discharge. Left eye exhibits no discharge. No scleral icterus.   Neck: Normal range of motion. Neck supple. No JVD present. No tracheal deviation present. No thyromegaly present.   Bilateral enlargement of sub-mandibular glands   Cardiovascular: Normal rate, regular rhythm, normal heart sounds and intact distal pulses. Exam reveals no gallop and no friction rub.   No murmur heard.  Pulmonary/Chest: Effort normal and breath sounds normal. No stridor. No respiratory distress. She has no wheezes. She has no rales. She exhibits no tenderness.   Abdominal: Soft. Bowel sounds are normal. She exhibits no distension and no mass. There is no tenderness. There is no rebound and no guarding.   Musculoskeletal: Normal range of motion. She exhibits no edema, tenderness or deformity.   Lymphadenopathy:     She has no cervical adenopathy.   Neurological: She is alert and oriented to person, place, and time. No cranial nerve deficit.   Skin: Skin is warm and dry. Capillary refill takes less than 2 seconds. No rash noted. She is not diaphoretic.   Psychiatric: She has a normal mood and affect. Her behavior is normal. Judgment and thought content normal.       Assessment & Plan:     Health Maintenance   - Due for shingles vaccine, patient declined  - Due for mammogram in April, 2020, scheduled   - DEXA due, scheduled  - Continue on calcium & vitamin D supplementation for osteopenia    Essential Hypertension  - BP today is 112/78, well controlled  - Continue on Losartan 100mg & amlodipine 5mg    Hyperlipidemia  - Lipid panel ordered, completed, results pending  - Continue on rosuvastatin 40mg    Hypothyroidism  - TSH ordered, completed, results pending  - Will evaluate dose of levothyroxine in light of new onset night sweats  - For now, continue on levothyroxine 88mcg    Bipolar Disorder  - Well controlled, mood is  stable  - Continue on quetiapine 300mg & duloxetine 90mg, xanax 2mg PRN  - Follow with psychiatry    Tobacco Use Disorder  - Motivated to quit smoking!  - Discuss retrial of Chantix or Wellbutrin with psychiatrist  - Recommend dual rout nicotine replacement with low dose patch + gum PRN    Scleritis of right eye  - Followed by ophthalmology    Lung Nodule  - Recommend repeat CT scan due to new onset night sweats  - Followed by pulmonology    Lupus  - Doing well on hydroxychloroquine  - Pain well managed on pregabalin & baclofen PRN  - Follows with rheumatology

## 2020-03-18 ENCOUNTER — PATIENT MESSAGE (OUTPATIENT)
Dept: INTERNAL MEDICINE | Facility: CLINIC | Age: 60
End: 2020-03-18

## 2020-04-06 ENCOUNTER — HOSPITAL ENCOUNTER (EMERGENCY)
Facility: OTHER | Age: 60
Discharge: HOME | End: 2020-04-06
Attending: EMERGENCY MEDICINE
Payer: MEDICARE

## 2020-04-06 ENCOUNTER — NURSE TRIAGE (OUTPATIENT)
Dept: ADMINISTRATIVE | Facility: CLINIC | Age: 60
End: 2020-04-06

## 2020-04-06 VITALS
BODY MASS INDEX: 27.11 KG/M2 | HEART RATE: 75 BPM | RESPIRATION RATE: 17 BRPM | SYSTOLIC BLOOD PRESSURE: 175 MMHG | OXYGEN SATURATION: 97 % | TEMPERATURE: 98 F | HEIGHT: 63 IN | DIASTOLIC BLOOD PRESSURE: 78 MMHG | WEIGHT: 153 LBS

## 2020-04-06 DIAGNOSIS — R06.02 SOB (SHORTNESS OF BREATH): ICD-10-CM

## 2020-04-06 DIAGNOSIS — R05.9 COUGH: Primary | ICD-10-CM

## 2020-04-06 LAB — SARS-COV-2 RNA AMPLIFICATION, QUAL: NEGATIVE

## 2020-04-06 PROCEDURE — 93010 EKG 12-LEAD: ICD-10-PCS | Mod: ,,, | Performed by: INTERNAL MEDICINE

## 2020-04-06 PROCEDURE — 93010 ELECTROCARDIOGRAM REPORT: CPT | Mod: ,,, | Performed by: INTERNAL MEDICINE

## 2020-04-06 PROCEDURE — 93005 ELECTROCARDIOGRAM TRACING: CPT

## 2020-04-06 PROCEDURE — 99285 EMERGENCY DEPT VISIT HI MDM: CPT | Mod: 25

## 2020-04-06 PROCEDURE — U0002 COVID-19 LAB TEST NON-CDC: HCPCS

## 2020-04-06 RX ORDER — BENZONATATE 100 MG/1
100 CAPSULE ORAL 3 TIMES DAILY PRN
Qty: 30 CAPSULE | Refills: 0 | Status: SHIPPED | OUTPATIENT
Start: 2020-04-06 | End: 2020-04-16

## 2020-04-06 NOTE — TELEPHONE ENCOUNTER
Reason for Disposition   SEVERE or constant chest pain (Exception: mild central chest pain, present only when coughing)    Additional Information   Negative: SEVERE difficulty breathing (e.g., struggling for each breath, speaks in single words)   Negative: Difficult to awaken or acting confused (e.g., disoriented, slurred speech)   Negative: Bluish (or gray) lips or face now   Negative: Shock suspected (e.g., cold/pale/clammy skin, too weak to stand, low BP, rapid pulse)   Negative: Sounds like a life-threatening emergency to the triager    Protocols used: CORONAVIRUS (COVID-19) DIAGNOSED OR [id number]    Patient has history of COPD and states her chest feels tight right now. Her daughter is recovering from covid19 and she was around her 2 weeks ago before she was diagnosed. Patient advised to call 911 because she doesn't have anyone but her daughter who could take her. Patient verbalized understanding of care advice.

## 2020-04-06 NOTE — ED PROVIDER NOTES
Encounter Date: 4/6/2020    SCRIBE #1 NOTE: IPaige, am scribing for, and in the presence of, Dr. Nielsen.       History     Chief Complaint   Patient presents with    Cough     Cough for the past several days with chest tightness since last night. Recently had visit from her daughter who is covid pos. Denies fever     Time seen by provider: 7:34 AM    This is a 59 y.o. female who presents with complaint of cough that began 3 days ago. There is associated mild sore throat that she believes is from the coughing. Patient reports slight chest tightness that began last night at 7PM also associated with the coughing, but that has resolved. She denies shortness of breath at this time.  Patient states she does not feel like she is having COPD exacerbation.  Her daughter tested positive for COVID 3 weeks ago, and states her daughter visited her prior to the positive result. She denies fever, chills, nausea, diarrhea, loss of taste or smell or vomiting.    The history is provided by the patient.     Review of patient's allergies indicates:   Allergen Reactions    Bactrim [sulfamethoxazole-trimethoprim] Nausea And Vomiting    Sulfa (sulfonamide antibiotics)      Agitation^     Past Medical History:   Diagnosis Date    Anxiety     Arthritis     Bipolar 1 disorder     Cataract     COPD (chronic obstructive pulmonary disease)     Depression     bipolar 2    Eyelid lesion     Fibromyalgia     H/O corneal abrasion 2001    left eye with lead-based paint chips    Hyperlipidemia     Hypertension     Lupus     Migraine headache     Scleritis     Thyroid disease      Past Surgical History:   Procedure Laterality Date    HYSTERECTOMY      for sterilization, ovaries intact     Family History   Problem Relation Age of Onset    Depression Father         suicide    Cataracts Mother     Rheum arthritis Mother     Lupus Mother     Cataracts Sister     Rheum arthritis Sister     Breast cancer Maternal Aunt      Thyroid disease Daughter     No Known Problems Daughter     No Known Problems Daughter     Colon cancer Brother     No Known Problems Maternal Uncle     No Known Problems Paternal Aunt     No Known Problems Paternal Uncle     No Known Problems Maternal Grandmother     No Known Problems Maternal Grandfather     No Known Problems Paternal Grandmother     No Known Problems Paternal Grandfather     Ovarian cancer Neg Hx     Melanoma Neg Hx     Amblyopia Neg Hx     Blindness Neg Hx     Cancer Neg Hx     Diabetes Neg Hx     Glaucoma Neg Hx     Hypertension Neg Hx     Macular degeneration Neg Hx     Retinal detachment Neg Hx     Strabismus Neg Hx     Stroke Neg Hx      Social History     Tobacco Use    Smoking status: Current Every Day Smoker     Packs/day: 0.25     Years: 40.00     Pack years: 10.00     Types: Cigarettes     Last attempt to quit: 2017     Years since quittin.8    Smokeless tobacco: Never Used    Tobacco comment: reports more tob after 2005 1-2ppd x 14years - cut down 2018   Substance Use Topics    Alcohol use: No     Comment: rare    Drug use: No     Review of Systems   Constitutional: Negative for chills and fever.   HENT: Positive for sore throat. Negative for congestion.    Respiratory: Positive for cough and chest tightness.    Cardiovascular: Negative for palpitations.   Gastrointestinal: Negative for nausea and vomiting.   Neurological: Negative for weakness.   All other systems reviewed and are negative.      Physical Exam     Initial Vitals [20 0718]   BP Pulse Resp Temp SpO2   (!) 144/70 95 20 98 °F (36.7 °C) 99 %      MAP       --         Physical Exam    Nursing note and vitals reviewed.  Constitutional: She appears well-developed and well-nourished. She is not diaphoretic. No distress.   HENT:   Head: Normocephalic and atraumatic.   Right Ear: External ear normal.   Left Ear: External ear normal.   Eyes: Conjunctivae and EOM are normal. Pupils are  equal, round, and reactive to light.   Neck: Normal range of motion. Neck supple. No tracheal deviation present. No JVD present.   Cardiovascular: Normal rate, regular rhythm, normal heart sounds and intact distal pulses. Exam reveals no gallop and no friction rub.    No murmur heard.  Intact distal pulses.   Pulmonary/Chest: Breath sounds normal. No tachypnea. No respiratory distress. She has no wheezes. She has no rhonchi. She has no rales. She exhibits no tenderness.   Not tachypneic. Speaking in full sentences. No retractions.   Abdominal: Soft. Bowel sounds are normal. She exhibits no distension and no mass. There is no tenderness. There is no rebound and no guarding.   Musculoskeletal: Normal range of motion. She exhibits no edema or tenderness.   Neurological: She is alert and oriented to person, place, and time. She has normal strength. No cranial nerve deficit.   Skin: Skin is warm and dry. Capillary refill takes less than 2 seconds. No rash noted. No erythema.   Psychiatric:   Anxious, cooperative         ED Course   Procedures  Labs Reviewed   SARS-COV-2 RNA AMPLIFICATION, QUAL     EKG Readings: (Independently Interpreted)   Initial Reading: No STEMI.   Normal sinus rhythm at a rate of 80 bpm. Baseline artifact inhibits complete interpretation.       Imaging Results          X-Ray Chest AP Portable (Final result)  Result time 04/06/20 08:04:46    Final result by Benitez Aguilar MD (04/06/20 08:04:46)                 Impression:      There is no radiographic evidence for acute intrathoracic process.      Electronically signed by: Benitez Aguilar  Date:    04/06/2020  Time:    08:04             Narrative:    EXAMINATION:  XR CHEST AP PORTABLE    CLINICAL HISTORY:  Cough    TECHNIQUE:  Single frontal view of the chest was performed.    COMPARISON:  March 14, 2019    FINDINGS:  Single chest view is submitted.  The cardiomediastinal silhouette appears stable.  The intrathoracic appearance is stable there is  no evidence for superimposed confluent infiltrate or consolidation, significant pleural effusion or pneumothorax.  The osseous structures appear intact with chronic change noted.                                 Medical Decision Making:   History:   Old Medical Records: I decided to obtain old medical records.  Differential Diagnosis:   COVID19, Viral syndrome, influenza, sepsis, pneumonia, central nervous system infection, thyroid storm, drug reaction, neuroleptic malignant syndrome, serotonin syndrome, malignant hyperthermia, cavernous sinus thrombosis, pneumonia, acute respiratory distress syndrome, respiratory failure, endocarditis, pericarditis, meningitis, encephalitis, malaria, novel viruses, acute retroviral infection, peritonsillar abscess, retropharyngeal abscess, epiglottitis, dental infections, COPD exacerbation, CHF exacerbation    Independently Interpreted Test(s):   I have ordered and independently interpreted X-rays - see prior notes.  I have ordered and independently interpreted EKG Reading(s) - see prior notes  Clinical Tests:   Lab Tests: Ordered and Reviewed  Radiological Study: Ordered and Reviewed  Medical Tests: Ordered and Reviewed  ED Management:  Patient without any significant findings either on exam or chest x-ray, room air saturation is 100% and her anxiety was resolved and I told her chest x-ray was clear.  Patient states that her main concern was her cough.  I did inform her that although her POCT COVID test was negative, if she has early admit not detected and therefore I am instructing her to maintain social distancing and home quarantine.  Patient acknowledged understanding            Scribe Attestation:   Scribe #1: I performed the above scribed service and the documentation accurately describes the services I performed. I attest to the accuracy of the note.    Attending Attestation:           Physician Attestation for Scribe:  Physician Attestation Statement for Scribe #1: I  Morales, reviewed documentation, as scribed by Paige Almeida in my presence, and it is both accurate and complete.                               Clinical Impression:     1. Cough    2. SOB (shortness of breath)              ED Disposition Condition    Discharge Stable        ED Prescriptions     Medication Sig Dispense Start Date End Date Auth. Provider    benzonatate (TESSALON) 100 MG capsule Take 1 capsule (100 mg total) by mouth 3 (three) times daily as needed for Cough. 30 capsule 4/6/2020 4/16/2020 Demian Nielsen MD        Follow-up Information     Follow up With Specialties Details Why Contact Info    Bolivar Arambula MD Internal Medicine Schedule an appointment as soon as possible for a visit in 3 days For follow-up and re-evaluation [address] [facility] [address]  [phone]      [facility] Emergency Medicine  As needed, for any new or worsening symptoms [address] [address]  [phone]                                     Demian Nielsen MD  04/06/20 0851

## 2020-04-06 NOTE — ED NOTES
Pt presented to the ED via pov. Pt c/o cough, sob, and chest tightness x 3 days. Pt alert. Pt has a hx of lupus and COPD. Pt states she has been in contact with her daughter who is COVID +. Pt afebrile. Pt c/o productive cough.

## 2020-04-07 ENCOUNTER — TELEPHONE (OUTPATIENT)
Dept: EMERGENCY MEDICINE | Facility: OTHER | Age: 60
End: 2020-04-07

## 2020-04-07 NOTE — TELEPHONE ENCOUNTER
"Pt called regarding regarding ED visit and discharge yesterday. Pt states "I feel a whole lot better." Discussed for any change is status she may return and Pt voiced understanding.   "

## 2020-04-15 ENCOUNTER — NURSE TRIAGE (OUTPATIENT)
Dept: ADMINISTRATIVE | Facility: CLINIC | Age: 60
End: 2020-04-15

## 2020-04-15 ENCOUNTER — HOSPITAL ENCOUNTER (EMERGENCY)
Facility: OTHER | Age: 60
Discharge: HOME | End: 2020-04-15
Attending: EMERGENCY MEDICINE
Payer: MEDICARE

## 2020-04-15 VITALS
HEART RATE: 63 BPM | BODY MASS INDEX: 27.46 KG/M2 | OXYGEN SATURATION: 100 % | WEIGHT: 155 LBS | HEIGHT: 63 IN | TEMPERATURE: 98 F | SYSTOLIC BLOOD PRESSURE: 133 MMHG | DIASTOLIC BLOOD PRESSURE: 76 MMHG | RESPIRATION RATE: 16 BRPM

## 2020-04-15 DIAGNOSIS — M25.561 RIGHT KNEE PAIN: ICD-10-CM

## 2020-04-15 DIAGNOSIS — M25.561 ACUTE PAIN OF RIGHT KNEE: Primary | ICD-10-CM

## 2020-04-15 PROCEDURE — 96372 THER/PROPH/DIAG INJ SC/IM: CPT | Mod: 59

## 2020-04-15 PROCEDURE — 99284 EMERGENCY DEPT VISIT MOD MDM: CPT | Mod: 25

## 2020-04-15 PROCEDURE — 63600175 PHARM REV CODE 636 W HCPCS: Performed by: EMERGENCY MEDICINE

## 2020-04-15 PROCEDURE — 29505 APPLICATION LONG LEG SPLINT: CPT | Mod: RT

## 2020-04-15 RX ORDER — HYDROCODONE BITARTRATE AND ACETAMINOPHEN 5; 325 MG/1; MG/1
1 TABLET ORAL EVERY 8 HOURS PRN
Qty: 10 TABLET | Refills: 0 | Status: SHIPPED | OUTPATIENT
Start: 2020-04-15 | End: 2021-11-22

## 2020-04-15 RX ORDER — KETOROLAC TROMETHAMINE 30 MG/ML
15 INJECTION, SOLUTION INTRAMUSCULAR; INTRAVENOUS
Status: COMPLETED | OUTPATIENT
Start: 2020-04-15 | End: 2020-04-15

## 2020-04-15 RX ADMIN — KETOROLAC TROMETHAMINE 15 MG: 30 INJECTION, SOLUTION INTRAMUSCULAR; INTRAVENOUS at 02:04

## 2020-04-15 NOTE — TELEPHONE ENCOUNTER
Right knee is swollen cannot bend or walk/stand on it and     Fell 2 times. First time 2 weeks ago- fell off a chair and hit knee coming down- felt something pull. Iced elevated and had swelling . started to feel better, running from a spider and fell down stairs off of porch and hit same knee- felt something pull- swollen and painful. Using ace wrap and swelling improving. Pain with ambulation. This am stepped off curb- felt ruber band pop-- cannot place pressure and is swollen and iced.  Protocol instructions given and  pt verbalizes understanding. Pt to go to ED.     Reason for Disposition   Followed a knee injury   Can't stand (bear weight) or walk    Additional Information   Negative: Sounds like a life-threatening emergency to the triager    Protocols used: KNEE PAIN-A-OH, KNEE INJURY-A-OH

## 2020-04-15 NOTE — ED PROVIDER NOTES
Encounter Date: 4/15/2020    SCRIBE #1 NOTE: I, Keke Boyer, am scribing for, and in the presence of, Dr. Andrew.       History     Chief Complaint   Patient presents with    Knee Pain     +Pt reporting R knee pain and swelling after stepping off of a curb earlier this AM, unable to bear weight. Reports injury to R knee approx 1.5 week ago.      This is a 59 y.o. female with h/o rheumatoid arthritis, lupus, fibromyalgia and HTN who presents with complaint of right knee pain s/p three falls in the past week. During the first fall, she fell out of her chair and landed directly on her right knee one week ago. Two days later, she was running from a spider and fell down the stairs, landing on her right knee. Both times, her knee had increased swelling but was able to ambulate and had applied ice. She felt like her right knee gave out on her while walking today, In the process, she felt pain over the ligament to back of her right knee. She has chronic arthritis in both knees, but has more issues in her left knee then right knee. She now has difficulty weight bearing and feels like the right knee is unstable. She denies other injuries. No cough, fever, or SOB.    The history is provided by the patient.     Review of patient's allergies indicates:   Allergen Reactions    Bactrim [sulfamethoxazole-trimethoprim] Nausea And Vomiting    Sulfa (sulfonamide antibiotics)      Agitation^     Past Medical History:   Diagnosis Date    Anxiety     Arthritis     Bipolar 1 disorder     Cataract     COPD (chronic obstructive pulmonary disease)     Depression     bipolar 2    Eyelid lesion     Fibromyalgia     H/O corneal abrasion 2001    left eye with lead-based paint chips    Hyperlipidemia     Hypertension     Lupus     Migraine headache     Scleritis     Thyroid disease      Past Surgical History:   Procedure Laterality Date    HYSTERECTOMY      for sterilization, ovaries intact     Family History   Problem  Relation Age of Onset    Depression Father         suicide    Cataracts Mother     Rheum arthritis Mother     Lupus Mother     Cataracts Sister     Rheum arthritis Sister     Breast cancer Maternal Aunt     Thyroid disease Daughter     No Known Problems Daughter     No Known Problems Daughter     Colon cancer Brother     No Known Problems Maternal Uncle     No Known Problems Paternal Aunt     No Known Problems Paternal Uncle     No Known Problems Maternal Grandmother     No Known Problems Maternal Grandfather     No Known Problems Paternal Grandmother     No Known Problems Paternal Grandfather     Ovarian cancer Neg Hx     Melanoma Neg Hx     Amblyopia Neg Hx     Blindness Neg Hx     Cancer Neg Hx     Diabetes Neg Hx     Glaucoma Neg Hx     Hypertension Neg Hx     Macular degeneration Neg Hx     Retinal detachment Neg Hx     Strabismus Neg Hx     Stroke Neg Hx      Social History     Tobacco Use    Smoking status: Current Every Day Smoker     Packs/day: 0.25     Years: 40.00     Pack years: 10.00     Types: Cigarettes     Last attempt to quit: 2017     Years since quittin.8    Smokeless tobacco: Never Used    Tobacco comment: reports more tob after  1-2ppd x 14years - cut down 2018   Substance Use Topics    Alcohol use: No     Comment: rare    Drug use: No     Review of Systems   Constitutional: Negative for fever.   HENT: Negative for sore throat.    Eyes: Negative for visual disturbance.   Respiratory: Negative for cough and shortness of breath.    Cardiovascular: Negative for chest pain.   Gastrointestinal: Negative for nausea.   Musculoskeletal: Positive for arthralgias (right knee), gait problem and joint swelling (right knee).   Skin: Negative for wound.   Neurological: Negative for weakness and numbness.   Psychiatric/Behavioral: Negative for confusion.       Physical Exam     Initial Vitals [04/15/20 1258]   BP Pulse Resp Temp SpO2   137/76 87 18 98 °F (36.7 °C)  100 %      MAP       --         Physical Exam    Nursing note and vitals reviewed.  Constitutional: She appears well-developed and well-nourished. She is not diaphoretic. No distress.   HENT:   Head: Normocephalic and atraumatic.   Eyes: EOM are normal.   Neck: Normal range of motion. Neck supple.   Cardiovascular: Normal rate, regular rhythm and normal heart sounds.   Pulmonary/Chest: Breath sounds normal. No respiratory distress. She has no wheezes. She has no rales.   Musculoskeletal:   RLE: No focal bony tenderness. No joint effusion. No major ligmaent instability. Tenderness to lateral hamstring ligament. ROM limited secondary to pain, but intact.   Neurological: She is alert and oriented to person, place, and time.   Skin: Skin is warm and dry.   RLE: No ecchymosis         ED Course   Procedures  Labs Reviewed - No data to display       Imaging Results          X-Ray Knee 3 View Right (Final result)  Result time 04/15/20 13:21:31    Final result by Landon Merritt MD (04/15/20 13:21:31)                 Impression:      1. No acute displaced fracture or dislocation of the knee noting progressive degenerative changes since prior exam.      Electronically signed by: Landon Merritt MD  Date:    04/15/2020  Time:    13:21             Narrative:    EXAMINATION:  XR KNEE 3 VIEW RIGHT    CLINICAL HISTORY:  Pain in right knee    TECHNIQUE:  AP, lateral, and Merchant views of the right knee were performed.    COMPARISON:  01/26/2015    FINDINGS:  Three views right knee.    Degenerative changes are noted of the knee, noting medial compartmental narrowing.  No acute displaced fracture or dislocation of the knee.  No radiopaque foreign body.  No large knee joint effusion.                              X-Rays:   Independently Interpreted Readings:   Other Readings:  Right Knee: Arthritis, but no fracture.    Medical Decision Making:   History:   Old Medical Records: I decided to obtain old medical records.  Initial  Assessment:       59-year-old female with history of RA, lupus, fibromyalgia, COPD, HTN presents for evaluation of worsening right knee pain s/p multiple falls this week.  Her initial 2 falls occurred a week and 5 days ago, both mechanical falls with impact on right knee, resulting in diffuse swelling somewhat improved with supportive care and rest.  Today she felt like her right knee gave out and felt pain to the back of her knee afterwards, worse with any ROM and with new difficulty weight-bearing.  She has history of bilateral knee arthritis, but previously left knee was more symptomatic.  On exam patient with no right knee joint effusion, no sign of septic arthritis.  She has ROM intact but pain limited, and no focal bone tenderness.  She does have tenderness over distal lateral hamstring ligament, suggesting a strain.  No sign of major ligament instability, and I would suspect a large tear to produce hemarthrosis not evident on this exam.    X-ray with degenerative changes but no acute fracture.  Low suspicion for subtle fracture given mechanism of injury and no ecchymosis.  Patient states she is unable to take NSAIDs due to GI side effects.  Will give short course of Norco and patient will start nonweightbearing with partial knee immobilization, but she is instructed to start early ROM when pain improves.  She will follow up with her orthopedist for re-evaluation and return to the ED for any worsening pain or other concerns.      Independently Interpreted Test(s):   I have ordered and independently interpreted X-rays - see prior notes.  Clinical Tests:   Radiological Study: Reviewed            Scribe Attestation:   Scribe #1: I performed the above scribed service and the documentation accurately describes the services I performed. I attest to the accuracy of the note.    Attending Attestation:           Physician Attestation for Scribe:  Physician Attestation Statement for Scribe #1: I, Dr. Andrew,  reviewed documentation, as scribed by Keke Boyer in my presence, and it is both accurate and complete.                               Clinical Impression:     1. Right knee pain              ED Disposition Condition    Discharge Stable        ED Prescriptions     Medication Sig Dispense Start Date End Date Auth. Provider    HYDROcodone-acetaminophen (NORCO) 5-325 mg per tablet Take 1 tablet by mouth every 8 (eight) hours as needed for Pain. 10 tablet 4/15/2020  Dariusz Andrew MD        Follow-up Information     Follow up With Specialties Details Why Contact Info    Ger Bai MD Orthopedic Surgery Schedule an appointment as soon as possible for a visit in 1 week If symptoms worsen [address]  [place] [address]  [phone]                                       Dariusz Andrew MD  04/17/20 2628

## 2020-04-15 NOTE — ED NOTES
Pt reporting rt knee pain and swellin after stepping off of a curb earlier this AM, unable to bear weight. Reports injury to Rt knee approx 1.5 week agoPt reporting Rt knee pain and swellin after stepping off of a curb earlier this AM, unable to bear weight. Reports injury to Rt knee approx 1.5 week ago. Pt states she had a popping noise.

## 2020-04-24 ENCOUNTER — TELEPHONE (OUTPATIENT)
Dept: PULMONOLOGY | Facility: CLINIC | Age: 60
End: 2020-04-24

## 2020-05-05 ENCOUNTER — NURSE TRIAGE (OUTPATIENT)
Dept: ADMINISTRATIVE | Facility: CLINIC | Age: 60
End: 2020-05-05

## 2020-05-05 ENCOUNTER — PATIENT MESSAGE (OUTPATIENT)
Dept: INTERNAL MEDICINE | Facility: CLINIC | Age: 60
End: 2020-05-05

## 2020-05-06 ENCOUNTER — HOSPITAL ENCOUNTER (EMERGENCY)
Facility: HOSPITAL | Age: 60
Discharge: HOME | End: 2020-05-06
Attending: EMERGENCY MEDICINE
Payer: MEDICARE

## 2020-05-06 ENCOUNTER — TELEPHONE (OUTPATIENT)
Dept: INTERNAL MEDICINE | Facility: CLINIC | Age: 60
End: 2020-05-06

## 2020-05-06 VITALS
TEMPERATURE: 98 F | HEART RATE: 81 BPM | SYSTOLIC BLOOD PRESSURE: 139 MMHG | OXYGEN SATURATION: 98 % | RESPIRATION RATE: 16 BRPM | DIASTOLIC BLOOD PRESSURE: 77 MMHG

## 2020-05-06 DIAGNOSIS — M79.604 RIGHT LEG PAIN: Primary | ICD-10-CM

## 2020-05-06 PROCEDURE — 25000003 PHARM REV CODE 250: Performed by: PHYSICIAN ASSISTANT

## 2020-05-06 PROCEDURE — 99283 EMERGENCY DEPT VISIT LOW MDM: CPT

## 2020-05-06 PROCEDURE — 99284 PR EMERGENCY DEPT VISIT,LEVEL IV: ICD-10-PCS | Mod: ,,, | Performed by: PHYSICIAN ASSISTANT

## 2020-05-06 PROCEDURE — 99284 EMERGENCY DEPT VISIT MOD MDM: CPT | Mod: ,,, | Performed by: PHYSICIAN ASSISTANT

## 2020-05-06 RX ORDER — ACETAMINOPHEN 500 MG
1000 TABLET ORAL
Status: COMPLETED | OUTPATIENT
Start: 2020-05-06 | End: 2020-05-06

## 2020-05-06 RX ADMIN — ACETAMINOPHEN 1000 MG: 500 TABLET ORAL at 01:05

## 2020-05-06 NOTE — TELEPHONE ENCOUNTER
----- Message from HANNA Armenta sent at 5/5/2020  2:04 PM CDT -----  It appears patient has appointment with Ortho in July. Is this for knee and if dorcas can we look to get sooner appointment and or schedule new appointment to evaluate for her recent knee injury. Thanks,    Brenda Ann

## 2020-05-06 NOTE — TELEPHONE ENCOUNTER
Was seen on the 15th of April after falling down stairs and was given stabalizer for the knee. Last several days the pain has gotten worse to knee. Now having pain in the thigh.    Reason for Disposition   [1] Thigh or calf pain AND [2] only 1 side AND [3] present > 1 hour    Additional Information   Negative: Looks like a broken bone or dislocated joint (e.g., crooked or deformed)   Negative: Sounds like a life-threatening emergency to the triager   Negative: Followed a leg injury   Negative: Leg swelling is main symptom   Negative: Back pain radiating (shooting) into leg(s)   Negative: Knee pain is main symptom   Negative: Ankle pain is main symptom   Negative: Pregnant   Negative: Chest pain   Negative: Difficulty breathing   Negative: Entire foot is cool or blue in comparison to other side   Negative: Unable to walk   Negative: [1] Red area or streak AND [2] fever   Negative: [1] Swollen joint AND [2] fever   Negative: [1] Cast on leg or ankle AND [2] now increased pain   Negative: Patient sounds very sick or weak to the triager    Protocols used: LEG PAIN-A-AH

## 2020-05-06 NOTE — ED PROVIDER NOTES
Encounter Date: 5/6/2020       History     Chief Complaint   Patient presents with    Fall     Pt had fall on 3/15 was seen in ED and given knee stabilizer and crutches. Pt reports starting Saturday, she began to have pain in affected leg, but in a different area than before.      Patient is a 60 year old female with PMHx of fibromyalgia, lupus, HTN, HLD, COPD, thyroid disease, bipolar disorder, depression, and anxiety. She presents to the ED for leg pain. Patient recently seen on 04/15/2020 at [facility] for similar presentation. She reports having right leg pain for approximately four days. Describes pain as constant and aching. Rates pain 4/10. Denies OTC medication use. Denies serial trauma or falls. Denies hx of anticoagulation. She denies fever,chills, nausea, vomiting, sob, chest pain, abd pain, dysuria, diarrhea, or constipation. She is a current everyday smoker and reports alcohol use.    The history is provided by the patient and medical records. No  was used.     Review of patient's allergies indicates:   Allergen Reactions    Bactrim [sulfamethoxazole-trimethoprim] Nausea And Vomiting    Sulfa (sulfonamide antibiotics)      Agitation^     Past Medical History:   Diagnosis Date    Anxiety     Arthritis     Bipolar 1 disorder     Cataract     COPD (chronic obstructive pulmonary disease)     Depression     bipolar 2    Eyelid lesion     Fibromyalgia     H/O corneal abrasion 2001    left eye with lead-based paint chips    Hyperlipidemia     Hypertension     Lupus     Migraine headache     Scleritis     Thyroid disease      Past Surgical History:   Procedure Laterality Date    HYSTERECTOMY      for sterilization, ovaries intact     Family History   Problem Relation Age of Onset    Depression Father         suicide    Cataracts Mother     Rheum arthritis Mother     Lupus Mother     Cataracts Sister     Rheum arthritis Sister     Breast cancer Maternal Aunt      Thyroid disease Daughter     No Known Problems Daughter     No Known Problems Daughter     Colon cancer Brother     No Known Problems Maternal Uncle     No Known Problems Paternal Aunt     No Known Problems Paternal Uncle     No Known Problems Maternal Grandmother     No Known Problems Maternal Grandfather     No Known Problems Paternal Grandmother     No Known Problems Paternal Grandfather     Ovarian cancer Neg Hx     Melanoma Neg Hx     Amblyopia Neg Hx     Blindness Neg Hx     Cancer Neg Hx     Diabetes Neg Hx     Glaucoma Neg Hx     Hypertension Neg Hx     Macular degeneration Neg Hx     Retinal detachment Neg Hx     Strabismus Neg Hx     Stroke Neg Hx      Social History     Tobacco Use    Smoking status: Current Every Day Smoker     Packs/day: 1.00     Types: Cigarettes    Smokeless tobacco: Never Used   Substance Use Topics    Alcohol use: Yes     Comment: occasionally    Drug use: Yes     Types: Marijuana     Comment: occasionally     Review of Systems   Constitutional: Negative for fever.   HENT: Negative for sore throat.    Respiratory: Negative for shortness of breath.    Cardiovascular: Negative for chest pain.   Gastrointestinal: Negative for abdominal pain, nausea and vomiting.   Genitourinary: Negative for dysuria.   Musculoskeletal: Positive for myalgias (right leg pain). Negative for back pain.   Skin: Negative for rash.   Neurological: Negative for weakness.   Hematological: Does not bruise/bleed easily.       Physical Exam     Initial Vitals [05/06/20 0057]   BP Pulse Resp Temp SpO2   139/77 81 16 98.2 °F (36.8 °C) 98 %      MAP       --         Physical Exam    Vitals reviewed.  Constitutional: She appears well-developed and well-nourished. No distress.   HENT:   Head: Normocephalic.   Eyes: Conjunctivae are normal.   Neck: Normal range of motion.   Cardiovascular: Normal rate and regular rhythm.   No murmur heard.  Pulses:       Radial pulses are 2+ on the right  side, and 2+ on the left side.        Femoral pulses are 2+ on the right side, and 2+ on the left side.       Dorsalis pedis pulses are 2+ on the right side, and 2+ on the left side.        Posterior tibial pulses are 2+ on the right side, and 2+ on the left side.   Pulmonary/Chest: Breath sounds normal. No respiratory distress. She has no wheezes. She has no rales.   Musculoskeletal: Normal range of motion. She exhibits no edema.   FROM b/l hips. No palpable tenderness over b/l LEs. Patient able to bear weight and ambulate without difficulty.   Neurological: She is alert and oriented to person, place, and time. She has normal strength. No sensory deficit. Gait normal.   Skin: Skin is warm and dry. No erythema.         ED Course   Procedures  Labs Reviewed - No data to display          Medical Decision Making:   History:   Old Medical Records: I decided to obtain old medical records.       APC / Resident Notes:   Patient is a 60 year old female presents to the ED for emergent evaluation of right leg pain.     Will order pain medication for symptomatic relief. Will continue to monitor.     Differential diagnoses include, but are not limited to: myositis, contusion, knee effusion, gout, and osteoarthritis.     I have discussed emergency department findings, and plan with the patient. Pain likely muscular in nature, no signs of infection or other emergent etiology. Will discharge home with F/U with sports medicine in approximately one week. Ambulatory referral placed to optimize F/U. Additional verbal discharge instructions were given and discussed with the patient. Patient verbalizes understanding of plan and agrees. Return precautions given.    I have discussed and reviewed with my supervising physician.         Attending Attestation:     Physician Attestation Statement for NP/PA:   I discussed this assessment and plan of this patient with the NP/PA, but I did not personally examine the patient. The face to face  encounter was performed by the NP/PA.            Clinical Impression:       ICD-10-CM ICD-9-CM   1. Right leg pain M79.604 729.5         Disposition:   Disposition: Discharged  Condition: Stable     ED Disposition Condition    Discharge Stable        ED Prescriptions     None        Follow-up Information     Follow up With Specialties Details Why Contact Info Additional Information    [facility] Sports Medicine Schedule an appointment as soon as possible for a visit in 1 week  [address]  [facility] [address]  [phone] [address]                                     Maria Fernanda Mcintyre PA-C  05/06/20 0310       Barbara Gomez MD  05/06/20 0355

## 2020-05-06 NOTE — ED NOTES
"Adult Physical Assessment  LOC: Breanna Guido, 60 y.o. female verified via two identifiers.  The patient is awake, alert, oriented and speaking appropriately at this time.  APPEARANCE: Patient resting comfortably and appears to be in no acute distress at this time. Patient is clean and well groomed, patient's clothing is properly fastened.  SKIN:The skin is warm and dry, color consistent with ethnicity, patient has normal skin turgor and moist mucus membranes, skin intact, no breakdown or brusing noted.  MUSCULOSKELETAL: Patient moving all extremities well, no obvious swelling or deformities noted. Pt states feeling pain when bending right knee and states feeling a "pulling" in right thigh when lifting leg.  RESPIRATORY: Airway is open and patent, respirations are spontaneous, patient has a normal effort and rate, no accessory muscle use noted.  CARDIAC: Patient has a normal rate and rhythm, no periphreal edema noted in any extremity, capillary refill < 3 seconds in all extremities  ABDOMEN: Soft and non tender to palpation, no abdominal distention noted. Bowel sounds present in all four quadrants.  NEUROLOGIC: Eyes open spontaneously, behavior appropriate to situation, follows commands, facial expression symmetrical, bilateral hand grasp equal and even, purposeful motor response noted, normal sensation in all extremities when touched with a finger.  "

## 2020-05-06 NOTE — ED TRIAGE NOTES
Pt presents stating she was seen in ED April 15th for a fall. Pt states hurting right knee and being given a knee stabilizer and crutches. Pt states pain was on sides of knee but now is above knee cap and throughout her upper thigh. Pt states pain started yesterday and felt sharp pain. Pain woke her up out of sleep tonight. Pt states know pain feels somewhat numb with tingling.

## 2020-05-22 ENCOUNTER — PATIENT MESSAGE (OUTPATIENT)
Dept: RHEUMATOLOGY | Facility: CLINIC | Age: 60
End: 2020-05-22

## 2020-07-02 ENCOUNTER — PATIENT OUTREACH (OUTPATIENT)
Dept: ADMINISTRATIVE | Facility: OTHER | Age: 60
End: 2020-07-02

## 2020-07-02 NOTE — PROGRESS NOTES
Requested updates within Care Everywhere.  Patient's chart was reviewed for overdue LAYLA topics.  Immunizations reconciled.    Orders placed:n/a  Tasked appts:Mammogram  Labs Linked:n/a

## 2020-07-06 ENCOUNTER — HOSPITAL ENCOUNTER (OUTPATIENT)
Dept: RADIOLOGY | Facility: HOSPITAL | Age: 60
Discharge: HOME | End: 2020-07-06
Attending: PHYSICIAN ASSISTANT
Payer: MEDICARE

## 2020-07-06 ENCOUNTER — TELEPHONE (OUTPATIENT)
Dept: RHEUMATOLOGY | Facility: CLINIC | Age: 60
End: 2020-07-06

## 2020-07-06 ENCOUNTER — OFFICE VISIT (OUTPATIENT)
Dept: ORTHOPEDICS | Facility: CLINIC | Age: 60
End: 2020-07-06
Payer: MEDICARE

## 2020-07-06 VITALS — TEMPERATURE: 98 F | BODY MASS INDEX: 27.46 KG/M2 | HEIGHT: 63 IN | WEIGHT: 155 LBS

## 2020-07-06 DIAGNOSIS — M17.11 PRIMARY OSTEOARTHRITIS OF RIGHT KNEE: ICD-10-CM

## 2020-07-06 DIAGNOSIS — M25.511 RIGHT SHOULDER PAIN, UNSPECIFIED CHRONICITY: Primary | ICD-10-CM

## 2020-07-06 DIAGNOSIS — M25.561 RIGHT KNEE PAIN, UNSPECIFIED CHRONICITY: ICD-10-CM

## 2020-07-06 DIAGNOSIS — M25.511 RIGHT SHOULDER PAIN, UNSPECIFIED CHRONICITY: ICD-10-CM

## 2020-07-06 PROCEDURE — 3008F PR BODY MASS INDEX (BMI) DOCUMENTED: ICD-10-PCS | Mod: CPTII,S$GLB,, | Performed by: PHYSICIAN ASSISTANT

## 2020-07-06 PROCEDURE — 99204 OFFICE O/P NEW MOD 45 MIN: CPT | Mod: 25,S$GLB,, | Performed by: PHYSICIAN ASSISTANT

## 2020-07-06 PROCEDURE — 73560 X-RAY EXAM OF KNEE 1 OR 2: CPT | Mod: TC,50

## 2020-07-06 PROCEDURE — 3008F BODY MASS INDEX DOCD: CPT | Mod: CPTII,S$GLB,, | Performed by: PHYSICIAN ASSISTANT

## 2020-07-06 PROCEDURE — 20610 PR DRAIN/INJECT LARGE JOINT/BURSA: ICD-10-PCS | Mod: RT,S$GLB,, | Performed by: PHYSICIAN ASSISTANT

## 2020-07-06 PROCEDURE — 73560 X-RAY EXAM OF KNEE 1 OR 2: CPT | Mod: 26,50,, | Performed by: RADIOLOGY

## 2020-07-06 PROCEDURE — 73030 X-RAY EXAM OF SHOULDER: CPT | Mod: 26,RT,, | Performed by: RADIOLOGY

## 2020-07-06 PROCEDURE — 73560 XR KNEE 1 OR 2 VIEW BILATERAL: ICD-10-PCS | Mod: 26,50,, | Performed by: RADIOLOGY

## 2020-07-06 PROCEDURE — 73030 X-RAY EXAM OF SHOULDER: CPT | Mod: TC,RT

## 2020-07-06 PROCEDURE — 99999 PR PBB SHADOW E&M-EST. PATIENT-LVL V: CPT | Mod: PBBFAC,,, | Performed by: PHYSICIAN ASSISTANT

## 2020-07-06 PROCEDURE — 20610 DRAIN/INJ JOINT/BURSA W/O US: CPT | Mod: RT,S$GLB,, | Performed by: PHYSICIAN ASSISTANT

## 2020-07-06 PROCEDURE — 99204 PR OFFICE/OUTPT VISIT, NEW, LEVL IV, 45-59 MIN: ICD-10-PCS | Mod: 25,S$GLB,, | Performed by: PHYSICIAN ASSISTANT

## 2020-07-06 PROCEDURE — 73030 XR SHOULDER COMPLETE 2 OR MORE VIEWS RIGHT: ICD-10-PCS | Mod: 26,RT,, | Performed by: RADIOLOGY

## 2020-07-06 PROCEDURE — 99999 PR PBB SHADOW E&M-EST. PATIENT-LVL V: ICD-10-PCS | Mod: PBBFAC,,, | Performed by: PHYSICIAN ASSISTANT

## 2020-07-06 RX ORDER — METHYLPREDNISOLONE ACETATE 80 MG/ML
80 INJECTION, SUSPENSION INTRA-ARTICULAR; INTRALESIONAL; INTRAMUSCULAR; SOFT TISSUE
Status: COMPLETED | OUTPATIENT
Start: 2020-07-06 | End: 2020-07-06

## 2020-07-06 RX ORDER — NAPROXEN 500 MG/1
500 TABLET ORAL 2 TIMES DAILY WITH MEALS
Qty: 60 TABLET | Refills: 0 | Status: SHIPPED | OUTPATIENT
Start: 2020-07-06 | End: 2020-08-05

## 2020-07-06 RX ADMIN — METHYLPREDNISOLONE ACETATE 80 MG: 80 INJECTION, SUSPENSION INTRA-ARTICULAR; INTRALESIONAL; INTRAMUSCULAR; SOFT TISSUE at 07:07

## 2020-07-06 NOTE — PROGRESS NOTES
Subjective:      Patient ID: Breanna Guido is a 60 y.o. female.    Chief Complaint: Pain of the Right Knee and Pain of the Right Shoulder    HPI  60 year old female presents with chief complaint of right knee pain since she fell twice in April. She reports pain and a knot at the posterior knee. It is worse at night and when she is on it for too long. Naproxen gives some relief. She wears a brace prn. She reports swelling. She reports popping and giving way. She denies locking and catching. She had euflexxa injections in 2015 with good relief. She uses a cane prn.   Patient reports right shoulder pain that began 1 week after she fell in April. She is RHD. Pain radiates down her arm to her fingers. It is constant. Naproxen does not give her much relief. Her arm feels heavy. She denies neck pain.   Review of Systems   Constitution: Negative for chills, fever and night sweats.   Cardiovascular: Negative for chest pain.   Respiratory: Negative for cough and shortness of breath.    Hematologic/Lymphatic: Does not bruise/bleed easily.   Skin: Negative for color change.   Gastrointestinal: Negative for heartburn.   Genitourinary: Negative for dysuria.   Neurological: Negative for numbness and paresthesias.   Psychiatric/Behavioral: Negative for altered mental status.   Allergic/Immunologic: Negative for persistent infections.         Objective:            General    Vitals reviewed.  Constitutional: She is oriented to person, place, and time. She appears well-developed and well-nourished.   HENT:   Head: Atraumatic.   Cardiovascular: Normal rate.    Pulmonary/Chest: Effort normal.   Neurological: She is alert and oriented to person, place, and time.   Psychiatric: She has a normal mood and affect.         Right Shoulder Exam     Range of Motion   Active abduction: normal   Forward Flexion: normal   External Rotation 0 degrees: normal   Internal rotation 0 degrees: normal     Tests & Signs   Pisano test:  negative  Impingement: positive  Speed's Test: positive    Other   Sensation: normal    Comments:  Positive empty can test.     Muscle Strength   Right Upper Extremity   Shoulder Abduction: 5/5   Supraspinatus: 5/5/5   Biceps: 5/5/5     General :   alert, appears stated age and cooperative   Gait: Normal. The patient can bear weight on the injured extremity.   Right Lower Extremity  Hip Palpation:  no tenderness over the greater  trochanter   Hip ROM: 100% of normal    Knee Effusion:  None. Palpable baker's cyst posterior aspect.    Ecchymosis:  none   Knee ROM:  0 to 120 degrees with subpatellar   crepitance.   Patella:  Patella does track normally.  Patellar apprehension test: negative  Patellar compression test: negative   Tenderness: medial joint line   Stability:  Lachman's test: negative  Posterior drawer: negative  Medial collateral ligament: negative  Lateral collateral ligament: negative         Brian's Test:  negative    Sensation:   intact to light touch   Pulses: normal DP and PT pulses         X-ray knee: ordered and reviewed by myself. There is mild-moderate DJD and a varus deformity.  X-ray shoulder: ordered and reviewed by myself. No fracture dislocation bone destruction seen.  There is mild DJD.        Assessment:       Encounter Diagnoses   Name Primary?    Primary osteoarthritis of right knee     Right knee pain, unspecified chronicity     Right shoulder pain, unspecified chronicity Yes          Plan:       Discussed treatment options with patient. She would like injections for the right knee and right shoulder. She will take naproxen prn. RTC prn.     PROCEDURE:  I have explained the risks, benefits, and alternatives of the procedure in detail.  The patient voices understanding and all questions have been answered.  The patient agrees to proceed as planned. So after I performed a sterile prep of the skin in the normal fashion the right knee is injected using a 22 gauge needle from the  anterolateral approach with a combination of 4cc 1% plain lidocaine and 80 mg of depo medrol.  The patient is cautioned and immediate relief of pain is secondary to the local anesthetic and will be temporary.  After the anesthetic wears off there may be a increase in pain that may last for a few hours or a few days and they should use ice to help alleviate this flair up of pain.     PROCEDURE:  I have explained the risks, benefits, and alternatives of the procedure in detail.  The patient voices understanding and all questions have been answered.  The patient agrees to proceed as planned. So after I performed a sterile prep of the skin in the normal fashion the right shoulder is injected from the posterior approach using a 22 gauge needle with a combination of 4cc 1% plain lidocaine and 80 mg of depo medrol. The patient is cautioned and immediate relief of pain is secondary to the local anesthetic and will be temporary.  After the anesthetic wears off there may be a increase in pain that may last for a few hours or a few days and they should use ice to help alleviate this flair up of pain.

## 2020-07-06 NOTE — LETTER
July 9, 2020      Bolivar Arambula MD  [address]  [facility] [address]           [facility]  [address]  [facility] [address]  Phone: [phone]          Patient: Breanna Guido   MR Number: [id number]   YOB: 1960   Date of Visit: 7/6/2020       Dear Dr. Bolivar Arambula:    Thank you for referring Breanna Guido to me for evaluation. Attached you will find relevant portions of my assessment and plan of care.    If you have questions, please do not hesitate to call me. I look forward to following Breanna Guido along with you.    Sincerely,    CAPO Yooosure  CC:  No Recipients    If you would like to receive this communication electronically, please contact [email or web address] or [phone] to request more information on [facility] access.    For providers and/or their staff who would like to refer a patient to [facility], please contact us through our one-stop-shop provider referral line, [facility], at [phone].    If you feel you have received this communication in error or would no longer like to receive these types of communications, please e-mail [email or web address]

## 2020-07-07 ENCOUNTER — OFFICE VISIT (OUTPATIENT)
Dept: RHEUMATOLOGY | Facility: CLINIC | Age: 60
End: 2020-07-07
Payer: MEDICARE

## 2020-07-07 VITALS
BODY MASS INDEX: 25.95 KG/M2 | SYSTOLIC BLOOD PRESSURE: 130 MMHG | DIASTOLIC BLOOD PRESSURE: 81 MMHG | HEIGHT: 64 IN | WEIGHT: 152 LBS | HEART RATE: 92 BPM

## 2020-07-07 DIAGNOSIS — Z79.899 LONG-TERM USE OF PLAQUENIL: ICD-10-CM

## 2020-07-07 DIAGNOSIS — M79.7 FIBROMYALGIA: Chronic | ICD-10-CM

## 2020-07-07 DIAGNOSIS — M32.19 OTHER SYSTEMIC LUPUS ERYTHEMATOSUS WITH OTHER ORGAN INVOLVEMENT: Chronic | ICD-10-CM

## 2020-07-07 DIAGNOSIS — M17.0 BILATERAL PRIMARY OSTEOARTHRITIS OF KNEE: ICD-10-CM

## 2020-07-07 DIAGNOSIS — M17.0 PRIMARY OSTEOARTHRITIS OF BOTH KNEES: ICD-10-CM

## 2020-07-07 DIAGNOSIS — E55.9 VITAMIN D DEFICIENCY: Primary | ICD-10-CM

## 2020-07-07 DIAGNOSIS — M32.9 LUPUS: Chronic | ICD-10-CM

## 2020-07-07 DIAGNOSIS — Z72.0 TOBACCO ABUSE: ICD-10-CM

## 2020-07-07 PROCEDURE — 3075F PR MOST RECENT SYSTOLIC BLOOD PRESS GE 130-139MM HG: ICD-10-PCS | Mod: CPTII,S$GLB,, | Performed by: INTERNAL MEDICINE

## 2020-07-07 PROCEDURE — 99214 PR OFFICE/OUTPT VISIT, EST, LEVL IV, 30-39 MIN: ICD-10-PCS | Mod: S$GLB,,, | Performed by: INTERNAL MEDICINE

## 2020-07-07 PROCEDURE — 3079F DIAST BP 80-89 MM HG: CPT | Mod: CPTII,S$GLB,, | Performed by: INTERNAL MEDICINE

## 2020-07-07 PROCEDURE — 3075F SYST BP GE 130 - 139MM HG: CPT | Mod: CPTII,S$GLB,, | Performed by: INTERNAL MEDICINE

## 2020-07-07 PROCEDURE — 99999 PR PBB SHADOW E&M-EST. PATIENT-LVL V: ICD-10-PCS | Mod: PBBFAC,,, | Performed by: INTERNAL MEDICINE

## 2020-07-07 PROCEDURE — 3008F BODY MASS INDEX DOCD: CPT | Mod: CPTII,S$GLB,, | Performed by: INTERNAL MEDICINE

## 2020-07-07 PROCEDURE — 3079F PR MOST RECENT DIASTOLIC BLOOD PRESSURE 80-89 MM HG: ICD-10-PCS | Mod: CPTII,S$GLB,, | Performed by: INTERNAL MEDICINE

## 2020-07-07 PROCEDURE — 99214 OFFICE O/P EST MOD 30 MIN: CPT | Mod: S$GLB,,, | Performed by: INTERNAL MEDICINE

## 2020-07-07 PROCEDURE — 3008F PR BODY MASS INDEX (BMI) DOCUMENTED: ICD-10-PCS | Mod: CPTII,S$GLB,, | Performed by: INTERNAL MEDICINE

## 2020-07-07 PROCEDURE — 99999 PR PBB SHADOW E&M-EST. PATIENT-LVL V: CPT | Mod: PBBFAC,,, | Performed by: INTERNAL MEDICINE

## 2020-07-07 RX ORDER — HYDROXYCHLOROQUINE SULFATE 200 MG/1
200 TABLET, FILM COATED ORAL 2 TIMES DAILY
Qty: 180 TABLET | Refills: 1 | Status: SHIPPED | OUTPATIENT
Start: 2020-07-07 | End: 2021-01-07 | Stop reason: SDUPTHER

## 2020-07-07 RX ORDER — PREGABALIN 150 MG/1
150 CAPSULE ORAL 2 TIMES DAILY
Qty: 180 CAPSULE | Refills: 1 | Status: SHIPPED | OUTPATIENT
Start: 2020-07-07 | End: 2021-01-07 | Stop reason: SDUPTHER

## 2020-07-07 ASSESSMENT — SYSTEMIC LUPUS ERYTHEMATOSUS DISEASE ACTIVITY INDEX (SLEDAI)
TOTAL_SCORE: 0
TOTAL_SCORE: 0

## 2020-07-07 NOTE — PROGRESS NOTES
Pre chart    Answers for HPI/ROS submitted by the patient on 7/7/2020   fever: No  eye redness: No  headaches: No  shortness of breath: No  chest pain: No  trouble swallowing: No  diarrhea: No  constipation: No  unexpected weight change: No  genital sore: No  dysuria: No  During the last 3 days, have you had a skin rash?: No  Bruises or bleeds easily: No  cough: No

## 2020-07-07 NOTE — PROGRESS NOTES
I have personally taken the history and examined the patient and agree with the resident,s note as stated above               SLE, SLEDAI 0 pending all labs  Fibromyalgia stable  Tobacco abuse, worse with stress of Covid. Headache with Chantix, has tried Nicorette gum and patch which helped but  Not currently taking. Failed bupropion in past. Motivated to quit  Hypertension 130/81   Scleritis in remission       *Shingrix series  Standing lupus labs, vitamin D today  Continue duloxetine 90mg daily  Continue pregabalin 150mg twice daily  Continue hydroxychloroquine 200mg twice daily  Continue quetiapine 300mg daily  Continue rosuvastatin 40mg nightly  Ref to PT for OA knees, right shoulder and balance  DXA due  RTC 6 months with standing lupus labs.  Answers for HPI/ROS submitted by the patient on 7/7/2020   fever: No  eye redness: No  headaches: No  shortness of breath: No  chest pain: No  trouble swallowing: No  diarrhea: No  constipation: No  unexpected weight change: No  genital sore: No  dysuria: No  During the last 3 days, have you had a skin rash?: No  Bruises or bleeds easily: No  cough: No

## 2020-07-07 NOTE — PROGRESS NOTES
Subjective:       Patient ID: Breanna Guido is a 60 y.o. female.    Chief Complaint: No chief complaint on file.      Primary complaint is acute knee pain that began in April.  Was in extreme pain yesterday. Fell off a chair (reaching for something high) in April and landed directly on knee, fell off a stair (running from a spider) two days later and went to ED, two days later fell off curb while walking dog and went to ED, received knee stabilizer and crutches. Pain has been constant since then, especially at night. Reports a mass in the posterior fossa of knee that she first noticed two weeks after falling down the stairs. After the fall from the stairs also developed shoulder pain. Had to significantly reduce activity. Walks the dog twice a day about 12 blocks each time, otherwise bought weights but has only been able to use them on the left side of her body due to her falls. Uses about 2-3 times per week.     During falls and at all other times, denies light headedness, dizziness, vertigo. Previous to recent set of falls, slipped getting out of bathtub.     Went to orthopedics yesterday to have knee looked at, received cortisone shot in right knee and shoulder, reduced acute knee and shoulder pain to 0/10. Continues to experiences baseline fibromyalgia pains in the proximal arms and thighs in the limb rather than the joint.     Meds: Naproxen when in high pain, limits usage due to stomach irritation. Used lyrica to help treat MSK pains related to falls. Requests lyrica rather than generic.     Fibromyalgia: Every day experiences proximal arm and leg pain, but is bearable. Lyrica and simbalta makes the pain bearable, but does not make the pain go away, allows her to complete daily tasks.     Lupus: denies current rash, did have a rash after staying out in the sun for an extended period of time. Attributes joint pain to acute fall rather than injury. Reports flare ups about twice a year, but is unsure if  "it's lupus or fibromyalgia. Last flare up was around December. Treated with lyrica and symbalta and was using multiple pain patches (salonpas).       Review of Systems   Constitutional: Negative for fever and unexpected weight change.   HENT: Negative for trouble swallowing.    Eyes: Negative for redness.   Respiratory: Negative for cough and shortness of breath.    Cardiovascular: Negative for chest pain.   Gastrointestinal: Negative for constipation and diarrhea.   Genitourinary: Negative for dysuria and genital sores.   Skin: Negative for rash.   Neurological: Negative for headaches.   Hematological: Does not bruise/bleed easily.         Objective:   /81   Pulse 92   Ht 5' 3.6" (1.615 m)   Wt 68.9 kg (152 lb)   BMI 26.42 kg/m²      Physical Exam       Right Side Rheumatological Exam     Examination finds the shoulder, elbow, wrist and knee normal.    Muscle Strength (0-5 scale):  Neck Flexion:  5  Neck Extension: 5  Deltoid:  5  Biceps: 5/5   Triceps:  5  : 5/5   Iliopsoas: 5  Quadriceps:  5   Distal Lower Extremity: 5    Left Side Rheumatological Exam     Muscle Strength (0-5 scale):  Neck Flexion:  5  Neck Extension: 5  Deltoid:  5  Biceps: 5/5   Triceps:  5  :  5/5   Iliopsoas: 5  Quadriceps:  5   Distal Lower Extremity: 5           No data to display     Assessment:       1. Vitamin D deficiency    2. Other systemic lupus erythematosus with other organ involvement    3. Fibromyalgia    4. Long-term use of Plaquenil    5. Bilateral primary osteoarthritis of knee    6. Primary osteoarthritis of both knees    7. Lupus    8. Tobacco abuse          SLEDAI Score: 0  Seizure: Not present  Psychosis: Not present  Organic Brain Syndrome: Not present  Visual Disturbance: Not present  Cranial Nerve Disorder: Not present  Lupus Headache: Not present  CVA: Not present  Vasculitis: Not present  Arthritis: Not present  Myositis: Not present  Urinary Casts: Not present  Hematuria: Not present  Proteinuria: " Not present  Pyuria: Not present  New Rash: Not present  Alopecia: Not present  Mucosal Ulcers: Not present  Pleurisy: Not present  Pericarditis: Not present  Low Complement: Not present  Increased DNA Binding: Not present  Fever: Not present  Thrombocytopenia: Not present  Leukopenia: Not present    Plan:       Problem List Items Addressed This Visit        Immunology/Multi System    Lupus (Chronic)     Appears to be well controlled without any joint tenderness or swelling. SLEDAI score of 0,  - Continue hydroxychloroquine 200mg BID.          Relevant Medications    hydroxychloroquine (PLAQUENIL) 200 mg tablet       Orthopedic    Fibromyalgia (Chronic)     Patient reports baseline and tolerable symptoms.   - Continue pregabalin 150 mg BID         Relevant Medications    pregabalin (LYRICA) 150 MG capsule    Osteoarthritis of both knees     Patient received knee X-rays for work up of knee pain after her fall. X-rays show joint space narrowing, osteophytes, and sclerosis, evidence of moderate OA. Patient received a knee injection in the right knee that reduced pain to 0/10. Long term pain control will be best achieved with strengthening of quadriceps and hip abductors. Prevention of future falls is important given the high frequency in the recent past.   - Physical therapy for strengthening and gait stability exercises.            Other    Tobacco abuse     Discussed with patient the importance of quitting especially in the setting Covid.  - Advised to try patches and gum again           Other Visit Diagnoses     Vitamin D deficiency    -  Primary    Relevant Orders    Vitamin D (Completed)    Other systemic lupus erythematosus with other organ involvement  (Chronic)       Relevant Medications    hydroxychloroquine (PLAQUENIL) 200 mg tablet    Long-term use of Plaquenil        Relevant Orders    Ambulatory referral/consult to Optometry    Bilateral primary osteoarthritis of knee        Relevant Orders    Ambulatory  referral/consult to Physical/Occupational Therapy        I performed a history, review of systems, and physical exam with the patient. The assessment and plan were discussed and agreed upon with Dr. Valencia, the attending of record, before sharing with the patient. The face to face encounter time, including answering all patient questions, was 1 hour.     Gibson Francois, PGY 1

## 2020-07-07 NOTE — PATIENT INSTRUCTIONS
Quitting smoking will be the best defense against Corona Virus and serious illness. Give gum and patches another try.   - Physical therapy for strengthening to improve pain symptoms and to prevent future falls.   - Continue plaquenil therapy  - Obtain blood work to monitor disease activity and side effects of lupus  - Referral to optometrist to monitor side effects of plaquenil on the retina and for overall eye health.   - Refilled Lyrica  - Vit D for bone health

## 2020-08-28 ENCOUNTER — OFFICE VISIT (OUTPATIENT)
Dept: INTERNAL MEDICINE | Facility: CLINIC | Age: 60
End: 2020-08-28
Payer: MEDICARE

## 2020-08-28 ENCOUNTER — TELEPHONE (OUTPATIENT)
Dept: INTERNAL MEDICINE | Facility: CLINIC | Age: 60
End: 2020-08-28

## 2020-08-28 VITALS — SYSTOLIC BLOOD PRESSURE: 118 MMHG | DIASTOLIC BLOOD PRESSURE: 78 MMHG

## 2020-08-28 DIAGNOSIS — I10 ESSENTIAL HYPERTENSION, BENIGN: Chronic | ICD-10-CM

## 2020-08-28 DIAGNOSIS — J41.0 SIMPLE CHRONIC BRONCHITIS: Primary | Chronic | ICD-10-CM

## 2020-08-28 DIAGNOSIS — M32.9 LUPUS: Chronic | ICD-10-CM

## 2020-08-28 DIAGNOSIS — M79.7 FIBROMYALGIA: Chronic | ICD-10-CM

## 2020-08-28 DIAGNOSIS — R91.1 LUNG NODULE: ICD-10-CM

## 2020-08-28 DIAGNOSIS — F31.81 BIPOLAR 2 DISORDER: Chronic | ICD-10-CM

## 2020-08-28 DIAGNOSIS — E78.2 MIXED HYPERLIPIDEMIA: Chronic | ICD-10-CM

## 2020-08-28 DIAGNOSIS — Z72.0 TOBACCO ABUSE: ICD-10-CM

## 2020-08-28 DIAGNOSIS — M85.89 OSTEOPENIA OF MULTIPLE SITES: ICD-10-CM

## 2020-08-28 DIAGNOSIS — M17.0 PRIMARY OSTEOARTHRITIS OF BOTH KNEES: ICD-10-CM

## 2020-08-28 DIAGNOSIS — E03.9 ACQUIRED HYPOTHYROIDISM: Chronic | ICD-10-CM

## 2020-08-28 PROCEDURE — 3074F SYST BP LT 130 MM HG: CPT | Mod: CPTII,95,, | Performed by: INTERNAL MEDICINE

## 2020-08-28 PROCEDURE — 3078F PR MOST RECENT DIASTOLIC BLOOD PRESSURE < 80 MM HG: ICD-10-PCS | Mod: CPTII,95,, | Performed by: INTERNAL MEDICINE

## 2020-08-28 PROCEDURE — 99214 PR OFFICE/OUTPT VISIT, EST, LEVL IV, 30-39 MIN: ICD-10-PCS | Mod: 95,,, | Performed by: INTERNAL MEDICINE

## 2020-08-28 PROCEDURE — 3074F PR MOST RECENT SYSTOLIC BLOOD PRESSURE < 130 MM HG: ICD-10-PCS | Mod: CPTII,95,, | Performed by: INTERNAL MEDICINE

## 2020-08-28 PROCEDURE — 99214 OFFICE O/P EST MOD 30 MIN: CPT | Mod: 95,,, | Performed by: INTERNAL MEDICINE

## 2020-08-28 PROCEDURE — 3078F DIAST BP <80 MM HG: CPT | Mod: CPTII,95,, | Performed by: INTERNAL MEDICINE

## 2020-08-28 RX ORDER — ROSUVASTATIN CALCIUM 40 MG/1
40 TABLET, COATED ORAL NIGHTLY
Qty: 90 TABLET | Refills: 3 | Status: SHIPPED | OUTPATIENT
Start: 2020-08-28 | End: 2021-08-08

## 2020-08-28 RX ORDER — LEVOTHYROXINE SODIUM 88 UG/1
88 TABLET ORAL DAILY
Qty: 90 TABLET | Refills: 3 | Status: SHIPPED | OUTPATIENT
Start: 2020-08-28 | End: 2021-08-08

## 2020-08-28 NOTE — TELEPHONE ENCOUNTER
----- Message from Bolivar Arambula MD sent at 8/28/2020 12:07 PM CDT -----  Please assist pt in scheduling CT chest ordered by pulm

## 2020-08-28 NOTE — PROGRESS NOTES
Subjective:       Patient ID: Breanna Guido is a 60 y.o. female.    Chief Complaint: Hypertension    The patient location is: home  The chief complaint leading to consultation is: HTN    Visit type: audiovisual    Face to Face time with patient: 8mins  10 minutes of total time spent on the encounter, which includes face to face time and non-face to face time preparing to see the patient (eg, review of tests), Obtaining and/or reviewing separately obtained history, Documenting clinical information in the electronic or other health record, Independently interpreting results (not separately reported) and communicating results to the patient/family/caregiver, or Care coordination (not separately reported).     Each patient to whom he or she provides medical services by telemedicine is:  (1) informed of the relationship between the physician and patient and the respective role of any other health care provider with respect to management of the patient; and (2) notified that he or she may decline to receive medical services by telemedicine and may withdraw from such care at any time.    Notes: Pt opted for telemed visit due to covid-19    Pt here for f/u. She is being followed by pulm for lung nodule but has been stable. She also had SPEP with rheum due to mildly elevated total protein but this was normal.     Migraines are well controlled on current meds. No new features or red flag symptoms.       BP is well controlled. Denies cp/sob/ha/vision or neuro changes. Not checking at home regularly.     HLD has been tx with crestor which she has tolerated well.     She is clinically euthyroid. Due for updated labs.     Bipolar is followed by psychiatry regularly. No SI/HI. This is stable.     Pt sees Dr. Valencia in [facility] for fibromyalgia dx and lupus dx; however, her serology has been negative. It was recommended she be maintained on plaquenil which she takes. She is being followed regularly by ophtho as well. 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 Her pain is manageable with lyrica.    COPD is stable on current meds. She has RUL lung nodule for which she saw pulmonary. This has been stable and she has f/u with pulmonary in April with plans for repeat CT.     She has osteopenia on most recent DEXA 4/2018. She is on ca/d.       Hypertension  Pertinent negatives include no chest pain, headaches, neck pain, palpitations or shortness of breath.     Review of Systems   Constitutional: Positive for activity change. Negative for unexpected weight change.   HENT: Negative for hearing loss, rhinorrhea and trouble swallowing.    Eyes: Negative for discharge and visual disturbance.   Respiratory: Negative for chest tightness, shortness of breath and wheezing.    Cardiovascular: Negative for chest pain and palpitations.   Gastrointestinal: Negative for abdominal pain, blood in stool, constipation, diarrhea, nausea and vomiting.   Endocrine: Negative for polydipsia and polyuria.   Genitourinary: Negative for difficulty urinating, dysuria, frequency, hematuria and menstrual problem.   Musculoskeletal: Positive for arthralgias. Negative for joint swelling and neck pain.   Neurological: Negative for speech difficulty, weakness and headaches.   Hematological: Negative for adenopathy.   Psychiatric/Behavioral: Negative for confusion and dysphoric mood.       Objective:          Assessment:       1. Simple chronic bronchitis    2. Lung nodule    3. Bipolar 2 disorder    4. Essential hypertension, benign    5. Mixed hyperlipidemia    6. Acquired hypothyroidism    7. Lupus    8. Fibromyalgia    9. Osteopenia of multiple sites    10. Primary osteoarthritis of both knees    11. Tobacco abuse        Plan:       1. Prior labs reviewed with pt   2. Keep f/u with specialists  3. Tobacco cessation  4. Pt to schedule mammo and CT chest             Physical Exam  Constitutional:       Appearance: She is well-developed.   Pulmonary:       Effort: Pulmonary effort is normal. No respiratory distress.   Neurological:      Mental Status: She is alert and oriented to person, place, and time.   Psychiatric:         Behavior: Behavior normal.         Thought Content: Thought content normal.         Judgment: Judgment normal.

## 2020-09-08 ENCOUNTER — HOSPITAL ENCOUNTER (OUTPATIENT)
Dept: RADIOLOGY | Facility: OTHER | Age: 60
Discharge: HOME | End: 2020-09-08
Attending: NURSE PRACTITIONER
Payer: MEDICARE

## 2020-09-08 DIAGNOSIS — R91.1 LUNG NODULE: ICD-10-CM

## 2020-09-08 DIAGNOSIS — R91.1 LUNG NODULE: Primary | ICD-10-CM

## 2020-09-08 PROCEDURE — 71250 CT THORAX DX C-: CPT | Mod: 26,,, | Performed by: RADIOLOGY

## 2020-09-08 PROCEDURE — 71250 CT THORAX DX C-: CPT | Mod: TC

## 2020-09-08 PROCEDURE — 71250 CT CHEST WITHOUT CONTRAST: ICD-10-PCS | Mod: 26,,, | Performed by: RADIOLOGY

## 2021-01-06 ENCOUNTER — PATIENT OUTREACH (OUTPATIENT)
Dept: ADMINISTRATIVE | Facility: OTHER | Age: 61
End: 2021-01-06

## 2021-01-07 ENCOUNTER — PATIENT MESSAGE (OUTPATIENT)
Dept: RHEUMATOLOGY | Facility: CLINIC | Age: 61
End: 2021-01-07

## 2021-01-07 ENCOUNTER — LAB VISIT (OUTPATIENT)
Dept: LAB | Facility: HOSPITAL | Age: 61
End: 2021-01-07
Attending: INTERNAL MEDICINE
Payer: MEDICARE

## 2021-01-07 ENCOUNTER — OFFICE VISIT (OUTPATIENT)
Dept: RHEUMATOLOGY | Facility: CLINIC | Age: 61
End: 2021-01-07
Payer: MEDICARE

## 2021-01-07 ENCOUNTER — TELEPHONE (OUTPATIENT)
Dept: RHEUMATOLOGY | Facility: CLINIC | Age: 61
End: 2021-01-07

## 2021-01-07 VITALS
WEIGHT: 156 LBS | HEART RATE: 72 BPM | BODY MASS INDEX: 26.63 KG/M2 | DIASTOLIC BLOOD PRESSURE: 87 MMHG | SYSTOLIC BLOOD PRESSURE: 138 MMHG | HEIGHT: 64 IN

## 2021-01-07 DIAGNOSIS — M79.7 FIBROMYALGIA: Chronic | ICD-10-CM

## 2021-01-07 DIAGNOSIS — D70.9 NEUTROPENIA, UNSPECIFIED TYPE: Primary | ICD-10-CM

## 2021-01-07 DIAGNOSIS — G47.9 SLEEP DISORDER: Primary | ICD-10-CM

## 2021-01-07 DIAGNOSIS — M32.19 OTHER SYSTEMIC LUPUS ERYTHEMATOSUS WITH OTHER ORGAN INVOLVEMENT: ICD-10-CM

## 2021-01-07 DIAGNOSIS — M32.19 OTHER SYSTEMIC LUPUS ERYTHEMATOSUS WITH OTHER ORGAN INVOLVEMENT: Chronic | ICD-10-CM

## 2021-01-07 LAB
BILIRUB UR QL STRIP: NEGATIVE
CLARITY UR REFRACT.AUTO: ABNORMAL
COLOR UR AUTO: YELLOW
CREAT UR-MCNC: 190 MG/DL (ref 15–325)
GLUCOSE UR QL STRIP: NEGATIVE
HGB UR QL STRIP: NEGATIVE
KETONES UR QL STRIP: NEGATIVE
LEUKOCYTE ESTERASE UR QL STRIP: NEGATIVE
MICROSCOPIC COMMENT: NORMAL
NITRITE UR QL STRIP: NEGATIVE
PH UR STRIP: 5 [PH] (ref 5–8)
PROT UR QL STRIP: NEGATIVE
PROT UR-MCNC: 12 MG/DL (ref 0–15)
PROT/CREAT UR: 0.06 MG/G{CREAT} (ref 0–0.2)
SP GR UR STRIP: 1.02 (ref 1–1.03)
SQUAMOUS #/AREA URNS AUTO: 5 /HPF
URN SPEC COLLECT METH UR: ABNORMAL
WBC #/AREA URNS AUTO: 4 /HPF (ref 0–5)

## 2021-01-07 PROCEDURE — 99499 RISK ADDL DX/OHS AUDIT: ICD-10-PCS | Mod: S$GLB,,, | Performed by: INTERNAL MEDICINE

## 2021-01-07 PROCEDURE — 82570 ASSAY OF URINE CREATININE: CPT

## 2021-01-07 PROCEDURE — 3008F PR BODY MASS INDEX (BMI) DOCUMENTED: ICD-10-PCS | Mod: CPTII,S$GLB,, | Performed by: INTERNAL MEDICINE

## 2021-01-07 PROCEDURE — 3075F SYST BP GE 130 - 139MM HG: CPT | Mod: CPTII,S$GLB,, | Performed by: INTERNAL MEDICINE

## 2021-01-07 PROCEDURE — 3079F DIAST BP 80-89 MM HG: CPT | Mod: CPTII,S$GLB,, | Performed by: INTERNAL MEDICINE

## 2021-01-07 PROCEDURE — 1125F AMNT PAIN NOTED PAIN PRSNT: CPT | Mod: S$GLB,,, | Performed by: INTERNAL MEDICINE

## 2021-01-07 PROCEDURE — 99999 PR PBB SHADOW E&M-EST. PATIENT-LVL IV: CPT | Mod: PBBFAC,,, | Performed by: INTERNAL MEDICINE

## 2021-01-07 PROCEDURE — 99214 PR OFFICE/OUTPT VISIT, EST, LEVL IV, 30-39 MIN: ICD-10-PCS | Mod: S$GLB,,, | Performed by: INTERNAL MEDICINE

## 2021-01-07 PROCEDURE — 1125F PR PAIN SEVERITY QUANTIFIED, PAIN PRESENT: ICD-10-PCS | Mod: S$GLB,,, | Performed by: INTERNAL MEDICINE

## 2021-01-07 PROCEDURE — 3079F PR MOST RECENT DIASTOLIC BLOOD PRESSURE 80-89 MM HG: ICD-10-PCS | Mod: CPTII,S$GLB,, | Performed by: INTERNAL MEDICINE

## 2021-01-07 PROCEDURE — 99499 UNLISTED E&M SERVICE: CPT | Mod: S$GLB,,, | Performed by: INTERNAL MEDICINE

## 2021-01-07 PROCEDURE — 99999 PR PBB SHADOW E&M-EST. PATIENT-LVL IV: ICD-10-PCS | Mod: PBBFAC,,, | Performed by: INTERNAL MEDICINE

## 2021-01-07 PROCEDURE — 3008F BODY MASS INDEX DOCD: CPT | Mod: CPTII,S$GLB,, | Performed by: INTERNAL MEDICINE

## 2021-01-07 PROCEDURE — 81001 URINALYSIS AUTO W/SCOPE: CPT

## 2021-01-07 PROCEDURE — 99214 OFFICE O/P EST MOD 30 MIN: CPT | Mod: S$GLB,,, | Performed by: INTERNAL MEDICINE

## 2021-01-07 PROCEDURE — 3075F PR MOST RECENT SYSTOLIC BLOOD PRESS GE 130-139MM HG: ICD-10-PCS | Mod: CPTII,S$GLB,, | Performed by: INTERNAL MEDICINE

## 2021-01-07 RX ORDER — HYDROXYCHLOROQUINE SULFATE 200 MG/1
200 TABLET, FILM COATED ORAL 2 TIMES DAILY
Qty: 180 TABLET | Refills: 1 | Status: SHIPPED | OUTPATIENT
Start: 2021-01-07 | End: 2021-04-20 | Stop reason: SDUPTHER

## 2021-01-07 RX ORDER — PREGABALIN 150 MG/1
150 CAPSULE ORAL 2 TIMES DAILY
Qty: 180 CAPSULE | Refills: 1 | Status: SHIPPED | OUTPATIENT
Start: 2021-01-07 | End: 2021-07-19 | Stop reason: SDUPTHER

## 2021-01-07 ASSESSMENT — SYSTEMIC LUPUS ERYTHEMATOSUS DISEASE ACTIVITY INDEX (SLEDAI): TOTAL_SCORE: 0

## 2021-01-13 ENCOUNTER — PATIENT MESSAGE (OUTPATIENT)
Dept: RHEUMATOLOGY | Facility: CLINIC | Age: 61
End: 2021-01-13

## 2021-01-20 ENCOUNTER — PATIENT MESSAGE (OUTPATIENT)
Dept: RHEUMATOLOGY | Facility: CLINIC | Age: 61
End: 2021-01-20

## 2021-01-21 ENCOUNTER — LAB VISIT (OUTPATIENT)
Dept: LAB | Facility: OTHER | Age: 61
End: 2021-01-21
Attending: INTERNAL MEDICINE
Payer: MEDICARE

## 2021-01-21 DIAGNOSIS — D70.9 NEUTROPENIA, UNSPECIFIED TYPE: ICD-10-CM

## 2021-01-21 LAB
BASOPHILS # BLD AUTO: 0.04 K/UL (ref 0–0.2)
BASOPHILS NFR BLD: 0.9 % (ref 0–1.9)
DIFFERENTIAL METHOD: NORMAL
EOSINOPHIL # BLD AUTO: 0 K/UL (ref 0–0.5)
EOSINOPHIL NFR BLD: 0.9 % (ref 0–8)
ERYTHROCYTE [DISTWIDTH] IN BLOOD BY AUTOMATED COUNT: 13 % (ref 11.5–14.5)
HCT VFR BLD AUTO: 41.1 % (ref 37–48.5)
HGB BLD-MCNC: 13.7 G/DL (ref 12–16)
IMM GRANULOCYTES # BLD AUTO: 0.01 K/UL (ref 0–0.04)
IMM GRANULOCYTES NFR BLD AUTO: 0.2 % (ref 0–0.5)
LYMPHOCYTES # BLD AUTO: 1.7 K/UL (ref 1–4.8)
LYMPHOCYTES NFR BLD: 40.5 % (ref 18–48)
MCH RBC QN AUTO: 30.5 PG (ref 27–31)
MCHC RBC AUTO-ENTMCNC: 33.3 G/DL (ref 32–36)
MCV RBC AUTO: 92 FL (ref 82–98)
MONOCYTES # BLD AUTO: 0.4 K/UL (ref 0.3–1)
MONOCYTES NFR BLD: 9.4 % (ref 4–15)
NEUTROPHILS # BLD AUTO: 2.1 K/UL (ref 1.8–7.7)
NEUTROPHILS NFR BLD: 48.1 % (ref 38–73)
NRBC BLD-RTO: 0 /100 WBC
PLATELET # BLD AUTO: 168 K/UL (ref 150–350)
PMV BLD AUTO: 11.1 FL (ref 9.2–12.9)
RBC # BLD AUTO: 4.49 M/UL (ref 4–5.4)
WBC # BLD AUTO: 4.27 K/UL (ref 3.9–12.7)

## 2021-01-21 PROCEDURE — 85025 COMPLETE CBC W/AUTO DIFF WBC: CPT

## 2021-01-21 PROCEDURE — 36415 COLL VENOUS BLD VENIPUNCTURE: CPT

## 2021-01-26 ENCOUNTER — PATIENT MESSAGE (OUTPATIENT)
Dept: INTERNAL MEDICINE | Facility: CLINIC | Age: 61
End: 2021-01-26

## 2021-01-26 RX ORDER — LOSARTAN POTASSIUM 100 MG/1
100 TABLET ORAL DAILY
Qty: 90 TABLET | Refills: 1 | Status: SHIPPED | OUTPATIENT
Start: 2021-01-26 | End: 2021-07-12

## 2021-02-04 ENCOUNTER — PATIENT MESSAGE (OUTPATIENT)
Dept: INTERNAL MEDICINE | Facility: CLINIC | Age: 61
End: 2021-02-04

## 2021-02-18 ENCOUNTER — PATIENT MESSAGE (OUTPATIENT)
Dept: INTERNAL MEDICINE | Facility: CLINIC | Age: 61
End: 2021-02-18

## 2021-02-18 ENCOUNTER — PATIENT MESSAGE (OUTPATIENT)
Dept: RHEUMATOLOGY | Facility: CLINIC | Age: 61
End: 2021-02-18

## 2021-02-22 ENCOUNTER — LAB VISIT (OUTPATIENT)
Dept: LAB | Facility: OTHER | Age: 61
End: 2021-02-22
Attending: INTERNAL MEDICINE
Payer: MEDICARE

## 2021-02-22 DIAGNOSIS — E78.2 MIXED HYPERLIPIDEMIA: Chronic | ICD-10-CM

## 2021-02-22 DIAGNOSIS — I10 ESSENTIAL HYPERTENSION, BENIGN: Chronic | ICD-10-CM

## 2021-02-22 LAB
ALBUMIN SERPL BCP-MCNC: 4.3 G/DL (ref 3.5–5.2)
ALP SERPL-CCNC: 59 U/L (ref 55–135)
ALT SERPL W/O P-5'-P-CCNC: 18 U/L (ref 10–44)
ANION GAP SERPL CALC-SCNC: 10 MMOL/L (ref 8–16)
AST SERPL-CCNC: 23 U/L (ref 10–40)
BASOPHILS # BLD AUTO: 0.05 K/UL (ref 0–0.2)
BASOPHILS NFR BLD: 1.3 % (ref 0–1.9)
BILIRUB SERPL-MCNC: 0.8 MG/DL (ref 0.1–1)
BUN SERPL-MCNC: 12 MG/DL (ref 8–23)
CALCIUM SERPL-MCNC: 9.8 MG/DL (ref 8.7–10.5)
CHLORIDE SERPL-SCNC: 107 MMOL/L (ref 95–110)
CHOLEST SERPL-MCNC: 129 MG/DL (ref 120–199)
CHOLEST/HDLC SERPL: 2.8 {RATIO} (ref 2–5)
CO2 SERPL-SCNC: 24 MMOL/L (ref 23–29)
CREAT SERPL-MCNC: 0.7 MG/DL (ref 0.5–1.4)
DIFFERENTIAL METHOD: ABNORMAL
EOSINOPHIL # BLD AUTO: 0.1 K/UL (ref 0–0.5)
EOSINOPHIL NFR BLD: 1.6 % (ref 0–8)
ERYTHROCYTE [DISTWIDTH] IN BLOOD BY AUTOMATED COUNT: 13.1 % (ref 11.5–14.5)
EST. GFR  (AFRICAN AMERICAN): >60 ML/MIN/1.73 M^2
EST. GFR  (NON AFRICAN AMERICAN): >60 ML/MIN/1.73 M^2
GLUCOSE SERPL-MCNC: 96 MG/DL (ref 70–110)
HCT VFR BLD AUTO: 38.9 % (ref 37–48.5)
HDLC SERPL-MCNC: 46 MG/DL (ref 40–75)
HDLC SERPL: 35.7 % (ref 20–50)
HGB BLD-MCNC: 13.2 G/DL (ref 12–16)
IMM GRANULOCYTES # BLD AUTO: 0.01 K/UL (ref 0–0.04)
IMM GRANULOCYTES NFR BLD AUTO: 0.3 % (ref 0–0.5)
LDLC SERPL CALC-MCNC: 71.6 MG/DL (ref 63–159)
LYMPHOCYTES # BLD AUTO: 1.6 K/UL (ref 1–4.8)
LYMPHOCYTES NFR BLD: 42.5 % (ref 18–48)
MCH RBC QN AUTO: 30.8 PG (ref 27–31)
MCHC RBC AUTO-ENTMCNC: 33.9 G/DL (ref 32–36)
MCV RBC AUTO: 91 FL (ref 82–98)
MONOCYTES # BLD AUTO: 0.4 K/UL (ref 0.3–1)
MONOCYTES NFR BLD: 10 % (ref 4–15)
NEUTROPHILS # BLD AUTO: 1.7 K/UL (ref 1.8–7.7)
NEUTROPHILS NFR BLD: 44.3 % (ref 38–73)
NONHDLC SERPL-MCNC: 83 MG/DL
NRBC BLD-RTO: 0 /100 WBC
PLATELET # BLD AUTO: 186 K/UL (ref 150–350)
PMV BLD AUTO: 11 FL (ref 9.2–12.9)
POTASSIUM SERPL-SCNC: 3.4 MMOL/L (ref 3.5–5.1)
PROT SERPL-MCNC: 8 G/DL (ref 6–8.4)
RBC # BLD AUTO: 4.28 M/UL (ref 4–5.4)
SODIUM SERPL-SCNC: 141 MMOL/L (ref 136–145)
TRIGL SERPL-MCNC: 57 MG/DL (ref 30–150)
WBC # BLD AUTO: 3.79 K/UL (ref 3.9–12.7)

## 2021-02-22 PROCEDURE — 85025 COMPLETE CBC W/AUTO DIFF WBC: CPT

## 2021-02-22 PROCEDURE — 36415 COLL VENOUS BLD VENIPUNCTURE: CPT

## 2021-02-22 PROCEDURE — 80053 COMPREHEN METABOLIC PANEL: CPT

## 2021-02-22 PROCEDURE — 80061 LIPID PANEL: CPT

## 2021-02-23 ENCOUNTER — PATIENT MESSAGE (OUTPATIENT)
Dept: RHEUMATOLOGY | Facility: CLINIC | Age: 61
End: 2021-02-23

## 2021-03-17 ENCOUNTER — PATIENT OUTREACH (OUTPATIENT)
Dept: ADMINISTRATIVE | Facility: OTHER | Age: 61
End: 2021-03-17

## 2021-03-18 ENCOUNTER — OFFICE VISIT (OUTPATIENT)
Dept: OPTOMETRY | Facility: CLINIC | Age: 61
End: 2021-03-18
Payer: MEDICARE

## 2021-03-18 DIAGNOSIS — M32.9 SYSTEMIC LUPUS ERYTHEMATOSUS, UNSPECIFIED SLE TYPE, UNSPECIFIED ORGAN INVOLVEMENT STATUS: ICD-10-CM

## 2021-03-18 DIAGNOSIS — H52.4 HYPEROPIA WITH ASTIGMATISM AND PRESBYOPIA, BILATERAL: ICD-10-CM

## 2021-03-18 DIAGNOSIS — Z79.899 HIGH RISK MEDICATION USE: Primary | ICD-10-CM

## 2021-03-18 DIAGNOSIS — H04.123 DRY EYE SYNDROME OF BOTH EYES: ICD-10-CM

## 2021-03-18 DIAGNOSIS — H52.203 HYPEROPIA WITH ASTIGMATISM AND PRESBYOPIA, BILATERAL: ICD-10-CM

## 2021-03-18 DIAGNOSIS — H52.03 HYPEROPIA WITH ASTIGMATISM AND PRESBYOPIA, BILATERAL: ICD-10-CM

## 2021-03-18 DIAGNOSIS — H25.13 NUCLEAR SCLEROSIS, BILATERAL: ICD-10-CM

## 2021-03-18 PROCEDURE — 92083 EXTENDED VISUAL FIELD XM: CPT | Mod: S$GLB,,, | Performed by: OPTOMETRIST

## 2021-03-18 PROCEDURE — 92083 HUMPHREY VISUAL FIELD - OU - BOTH EYES: ICD-10-PCS | Mod: S$GLB,,, | Performed by: OPTOMETRIST

## 2021-03-18 PROCEDURE — 99499 RISK ADDL DX/OHS AUDIT: ICD-10-PCS | Mod: S$GLB,,, | Performed by: OPTOMETRIST

## 2021-03-18 PROCEDURE — 99999 PR PBB SHADOW E&M-EST. PATIENT-LVL III: ICD-10-PCS | Mod: PBBFAC,,, | Performed by: OPTOMETRIST

## 2021-03-18 PROCEDURE — 92134 POSTERIOR SEGMENT OCT RETINA (OCULAR COHERENCE TOMOGRAPHY)-BOTH EYES: ICD-10-PCS | Mod: S$GLB,,, | Performed by: OPTOMETRIST

## 2021-03-18 PROCEDURE — 92014 PR EYE EXAM, EST PATIENT,COMPREHESV: ICD-10-PCS | Mod: S$GLB,,, | Performed by: OPTOMETRIST

## 2021-03-18 PROCEDURE — 99999 PR PBB SHADOW E&M-EST. PATIENT-LVL III: CPT | Mod: PBBFAC,,, | Performed by: OPTOMETRIST

## 2021-03-18 PROCEDURE — 92014 COMPRE OPH EXAM EST PT 1/>: CPT | Mod: S$GLB,,, | Performed by: OPTOMETRIST

## 2021-03-18 PROCEDURE — 99499 UNLISTED E&M SERVICE: CPT | Mod: S$GLB,,, | Performed by: OPTOMETRIST

## 2021-03-18 PROCEDURE — 1126F PR PAIN SEVERITY QUANTIFIED, NO PAIN PRESENT: ICD-10-PCS | Mod: S$GLB,,, | Performed by: OPTOMETRIST

## 2021-03-18 PROCEDURE — 1126F AMNT PAIN NOTED NONE PRSNT: CPT | Mod: S$GLB,,, | Performed by: OPTOMETRIST

## 2021-03-18 PROCEDURE — 92134 CPTRZ OPH DX IMG PST SGM RTA: CPT | Mod: S$GLB,,, | Performed by: OPTOMETRIST

## 2021-04-19 ENCOUNTER — PATIENT OUTREACH (OUTPATIENT)
Dept: ADMINISTRATIVE | Facility: OTHER | Age: 61
End: 2021-04-19

## 2021-04-20 ENCOUNTER — OFFICE VISIT (OUTPATIENT)
Dept: RHEUMATOLOGY | Facility: CLINIC | Age: 61
End: 2021-04-20
Payer: MEDICARE

## 2021-04-20 ENCOUNTER — LAB VISIT (OUTPATIENT)
Dept: LAB | Facility: HOSPITAL | Age: 61
End: 2021-04-20
Attending: INTERNAL MEDICINE
Payer: MEDICARE

## 2021-04-20 VITALS
HEIGHT: 63 IN | HEART RATE: 62 BPM | SYSTOLIC BLOOD PRESSURE: 115 MMHG | BODY MASS INDEX: 27.5 KG/M2 | WEIGHT: 155.19 LBS | DIASTOLIC BLOOD PRESSURE: 78 MMHG

## 2021-04-20 DIAGNOSIS — M32.19 OTHER SYSTEMIC LUPUS ERYTHEMATOSUS WITH OTHER ORGAN INVOLVEMENT: Chronic | ICD-10-CM

## 2021-04-20 DIAGNOSIS — Z72.0 TOBACCO ABUSE: ICD-10-CM

## 2021-04-20 DIAGNOSIS — M32.19 OTHER SYSTEMIC LUPUS ERYTHEMATOSUS WITH OTHER ORGAN INVOLVEMENT: ICD-10-CM

## 2021-04-20 DIAGNOSIS — M79.7 FIBROMYALGIA: Chronic | ICD-10-CM

## 2021-04-20 DIAGNOSIS — T50.905A ADVERSE EFFECT OF DRUG, INITIAL ENCOUNTER: ICD-10-CM

## 2021-04-20 DIAGNOSIS — M65.841 STENOSING TENOSYNOVITIS OF FINGER OF RIGHT HAND: Primary | ICD-10-CM

## 2021-04-20 LAB
BILIRUB UR QL STRIP: NEGATIVE
CLARITY UR REFRACT.AUTO: CLEAR
COLOR UR AUTO: YELLOW
CREAT UR-MCNC: 168 MG/DL (ref 15–325)
GLUCOSE UR QL STRIP: NEGATIVE
HGB UR QL STRIP: NEGATIVE
KETONES UR QL STRIP: NEGATIVE
LEUKOCYTE ESTERASE UR QL STRIP: NEGATIVE
MICROSCOPIC COMMENT: NORMAL
NITRITE UR QL STRIP: NEGATIVE
PH UR STRIP: 5 [PH] (ref 5–8)
PROT UR QL STRIP: NEGATIVE
PROT UR-MCNC: 10 MG/DL (ref 0–15)
PROT/CREAT UR: 0.06 MG/G{CREAT} (ref 0–0.2)
RBC #/AREA URNS AUTO: 0 /HPF (ref 0–4)
SP GR UR STRIP: 1.02 (ref 1–1.03)
SQUAMOUS #/AREA URNS AUTO: 3 /HPF
URN SPEC COLLECT METH UR: NORMAL
WBC #/AREA URNS AUTO: 2 /HPF (ref 0–5)

## 2021-04-20 PROCEDURE — 99499 UNLISTED E&M SERVICE: CPT | Mod: S$GLB,,, | Performed by: INTERNAL MEDICINE

## 2021-04-20 PROCEDURE — 99499 RISK ADDL DX/OHS AUDIT: ICD-10-PCS | Mod: S$GLB,,, | Performed by: INTERNAL MEDICINE

## 2021-04-20 PROCEDURE — 99999 PR PBB SHADOW E&M-EST. PATIENT-LVL V: ICD-10-PCS | Mod: PBBFAC,,, | Performed by: INTERNAL MEDICINE

## 2021-04-20 PROCEDURE — 99214 PR OFFICE/OUTPT VISIT, EST, LEVL IV, 30-39 MIN: ICD-10-PCS | Mod: S$GLB,,, | Performed by: INTERNAL MEDICINE

## 2021-04-20 PROCEDURE — 1125F AMNT PAIN NOTED PAIN PRSNT: CPT | Mod: S$GLB,,, | Performed by: INTERNAL MEDICINE

## 2021-04-20 PROCEDURE — 1125F PR PAIN SEVERITY QUANTIFIED, PAIN PRESENT: ICD-10-PCS | Mod: S$GLB,,, | Performed by: INTERNAL MEDICINE

## 2021-04-20 PROCEDURE — 99214 OFFICE O/P EST MOD 30 MIN: CPT | Mod: S$GLB,,, | Performed by: INTERNAL MEDICINE

## 2021-04-20 PROCEDURE — 81001 URINALYSIS AUTO W/SCOPE: CPT | Performed by: INTERNAL MEDICINE

## 2021-04-20 PROCEDURE — 99999 PR PBB SHADOW E&M-EST. PATIENT-LVL V: CPT | Mod: PBBFAC,,, | Performed by: INTERNAL MEDICINE

## 2021-04-20 PROCEDURE — 82570 ASSAY OF URINE CREATININE: CPT | Performed by: INTERNAL MEDICINE

## 2021-04-20 PROCEDURE — 3008F PR BODY MASS INDEX (BMI) DOCUMENTED: ICD-10-PCS | Mod: CPTII,S$GLB,, | Performed by: INTERNAL MEDICINE

## 2021-04-20 PROCEDURE — 3008F BODY MASS INDEX DOCD: CPT | Mod: CPTII,S$GLB,, | Performed by: INTERNAL MEDICINE

## 2021-04-20 RX ORDER — DICLOFENAC SODIUM 10 MG/G
2 GEL TOPICAL 4 TIMES DAILY
Qty: 1 TUBE | Refills: 1 | Status: SHIPPED | OUTPATIENT
Start: 2021-04-20 | End: 2021-07-19 | Stop reason: SDUPTHER

## 2021-04-20 RX ORDER — FERROUS SULFATE, DRIED 160(50) MG
1 TABLET, EXTENDED RELEASE ORAL 2 TIMES DAILY WITH MEALS
COMMUNITY
End: 2021-11-22

## 2021-04-20 RX ORDER — HYDROXYCHLOROQUINE SULFATE 200 MG/1
200 TABLET, FILM COATED ORAL 2 TIMES DAILY
Qty: 180 TABLET | Refills: 1 | Status: SHIPPED | OUTPATIENT
Start: 2021-04-20 | End: 2021-07-19 | Stop reason: SDUPTHER

## 2021-04-20 ASSESSMENT — SYSTEMIC LUPUS ERYTHEMATOSUS DISEASE ACTIVITY INDEX (SLEDAI): TOTAL_SCORE: 0

## 2021-04-21 ENCOUNTER — TELEPHONE (OUTPATIENT)
Dept: RHEUMATOLOGY | Facility: CLINIC | Age: 61
End: 2021-04-21

## 2021-05-01 DIAGNOSIS — M79.641 RIGHT HAND PAIN: Primary | ICD-10-CM

## 2021-05-06 ENCOUNTER — OFFICE VISIT (OUTPATIENT)
Dept: ORTHOPEDICS | Facility: CLINIC | Age: 61
End: 2021-05-06
Payer: MEDICARE

## 2021-05-06 ENCOUNTER — HOSPITAL ENCOUNTER (OUTPATIENT)
Dept: RADIOLOGY | Facility: OTHER | Age: 61
Discharge: HOME | End: 2021-05-06
Attending: ORTHOPAEDIC SURGERY
Payer: MEDICARE

## 2021-05-06 VITALS
BODY MASS INDEX: 27.46 KG/M2 | DIASTOLIC BLOOD PRESSURE: 83 MMHG | HEIGHT: 63 IN | WEIGHT: 155 LBS | SYSTOLIC BLOOD PRESSURE: 143 MMHG | HEART RATE: 67 BPM

## 2021-05-06 DIAGNOSIS — M79.641 RIGHT HAND PAIN: ICD-10-CM

## 2021-05-06 DIAGNOSIS — M65.841 STENOSING TENOSYNOVITIS OF FINGER OF RIGHT HAND: ICD-10-CM

## 2021-05-06 PROCEDURE — 20550 TENDON SHEATH: ICD-10-PCS | Mod: RT,S$GLB,, | Performed by: ORTHOPAEDIC SURGERY

## 2021-05-06 PROCEDURE — 1126F PR PAIN SEVERITY QUANTIFIED, NO PAIN PRESENT: ICD-10-PCS | Mod: S$GLB,,, | Performed by: ORTHOPAEDIC SURGERY

## 2021-05-06 PROCEDURE — 1126F AMNT PAIN NOTED NONE PRSNT: CPT | Mod: S$GLB,,, | Performed by: ORTHOPAEDIC SURGERY

## 2021-05-06 PROCEDURE — 3008F BODY MASS INDEX DOCD: CPT | Mod: CPTII,S$GLB,, | Performed by: ORTHOPAEDIC SURGERY

## 2021-05-06 PROCEDURE — 73130 XR HAND COMPLETE 3 VIEW RIGHT: ICD-10-PCS | Mod: 26,RT,, | Performed by: RADIOLOGY

## 2021-05-06 PROCEDURE — 20550 NJX 1 TENDON SHEATH/LIGAMENT: CPT | Mod: RT,S$GLB,, | Performed by: ORTHOPAEDIC SURGERY

## 2021-05-06 PROCEDURE — 3008F PR BODY MASS INDEX (BMI) DOCUMENTED: ICD-10-PCS | Mod: CPTII,S$GLB,, | Performed by: ORTHOPAEDIC SURGERY

## 2021-05-06 PROCEDURE — 99204 PR OFFICE/OUTPT VISIT, NEW, LEVL IV, 45-59 MIN: ICD-10-PCS | Mod: 25,S$GLB,, | Performed by: ORTHOPAEDIC SURGERY

## 2021-05-06 PROCEDURE — 73130 X-RAY EXAM OF HAND: CPT | Mod: 26,RT,, | Performed by: RADIOLOGY

## 2021-05-06 PROCEDURE — 99204 OFFICE O/P NEW MOD 45 MIN: CPT | Mod: 25,S$GLB,, | Performed by: ORTHOPAEDIC SURGERY

## 2021-05-06 PROCEDURE — 99999 PR PBB SHADOW E&M-EST. PATIENT-LVL IV: CPT | Mod: PBBFAC,,, | Performed by: ORTHOPAEDIC SURGERY

## 2021-05-06 PROCEDURE — 99999 PR PBB SHADOW E&M-EST. PATIENT-LVL IV: ICD-10-PCS | Mod: PBBFAC,,, | Performed by: ORTHOPAEDIC SURGERY

## 2021-05-06 PROCEDURE — 73130 X-RAY EXAM OF HAND: CPT | Mod: TC,FY,RT

## 2021-05-06 RX ADMIN — DEXAMETHASONE SODIUM PHOSPHATE 4 MG: 4 INJECTION, SOLUTION INTRA-ARTICULAR; INTRALESIONAL; INTRAMUSCULAR; INTRAVENOUS; SOFT TISSUE at 10:05

## 2021-05-07 RX ORDER — DEXAMETHASONE SODIUM PHOSPHATE 4 MG/ML
4 INJECTION, SOLUTION INTRA-ARTICULAR; INTRALESIONAL; INTRAMUSCULAR; INTRAVENOUS; SOFT TISSUE
Status: DISCONTINUED | OUTPATIENT
Start: 2021-05-06 | End: 2021-05-07 | Stop reason: HOSPADM

## 2021-06-07 ENCOUNTER — PATIENT MESSAGE (OUTPATIENT)
Dept: OPTOMETRY | Facility: CLINIC | Age: 61
End: 2021-06-07

## 2021-06-22 ENCOUNTER — PATIENT MESSAGE (OUTPATIENT)
Dept: INTERNAL MEDICINE | Facility: CLINIC | Age: 61
End: 2021-06-22

## 2021-06-22 ENCOUNTER — TELEPHONE (OUTPATIENT)
Dept: ORTHOPEDICS | Facility: CLINIC | Age: 61
End: 2021-06-22

## 2021-06-22 ENCOUNTER — PATIENT MESSAGE (OUTPATIENT)
Dept: RHEUMATOLOGY | Facility: CLINIC | Age: 61
End: 2021-06-22

## 2021-06-24 ENCOUNTER — OFFICE VISIT (OUTPATIENT)
Dept: ORTHOPEDICS | Facility: CLINIC | Age: 61
End: 2021-06-24
Payer: MEDICARE

## 2021-06-24 VITALS
DIASTOLIC BLOOD PRESSURE: 72 MMHG | HEART RATE: 65 BPM | WEIGHT: 155 LBS | HEIGHT: 63 IN | BODY MASS INDEX: 27.46 KG/M2 | SYSTOLIC BLOOD PRESSURE: 116 MMHG

## 2021-06-24 DIAGNOSIS — M65.841 STENOSING TENOSYNOVITIS OF FINGER OF RIGHT HAND: Primary | ICD-10-CM

## 2021-06-24 PROCEDURE — 99999 PR PBB SHADOW E&M-EST. PATIENT-LVL III: CPT | Mod: PBBFAC,,, | Performed by: ORTHOPAEDIC SURGERY

## 2021-06-24 PROCEDURE — 1125F PR PAIN SEVERITY QUANTIFIED, PAIN PRESENT: ICD-10-PCS | Mod: S$GLB,,, | Performed by: ORTHOPAEDIC SURGERY

## 2021-06-24 PROCEDURE — 99214 PR OFFICE/OUTPT VISIT, EST, LEVL IV, 30-39 MIN: ICD-10-PCS | Mod: S$GLB,,, | Performed by: ORTHOPAEDIC SURGERY

## 2021-06-24 PROCEDURE — 3008F PR BODY MASS INDEX (BMI) DOCUMENTED: ICD-10-PCS | Mod: CPTII,S$GLB,, | Performed by: ORTHOPAEDIC SURGERY

## 2021-06-24 PROCEDURE — 3008F BODY MASS INDEX DOCD: CPT | Mod: CPTII,S$GLB,, | Performed by: ORTHOPAEDIC SURGERY

## 2021-06-24 PROCEDURE — 1125F AMNT PAIN NOTED PAIN PRSNT: CPT | Mod: S$GLB,,, | Performed by: ORTHOPAEDIC SURGERY

## 2021-06-24 PROCEDURE — 99214 OFFICE O/P EST MOD 30 MIN: CPT | Mod: S$GLB,,, | Performed by: ORTHOPAEDIC SURGERY

## 2021-06-24 PROCEDURE — 99999 PR PBB SHADOW E&M-EST. PATIENT-LVL III: ICD-10-PCS | Mod: PBBFAC,,, | Performed by: ORTHOPAEDIC SURGERY

## 2021-06-25 DIAGNOSIS — M65.351 TRIGGER LITTLE FINGER OF RIGHT HAND: Primary | ICD-10-CM

## 2021-07-15 ENCOUNTER — PATIENT OUTREACH (OUTPATIENT)
Dept: ADMINISTRATIVE | Facility: OTHER | Age: 61
End: 2021-07-15

## 2021-07-16 ENCOUNTER — TELEPHONE (OUTPATIENT)
Dept: INTERNAL MEDICINE | Facility: CLINIC | Age: 61
End: 2021-07-16

## 2021-07-19 ENCOUNTER — OFFICE VISIT (OUTPATIENT)
Dept: RHEUMATOLOGY | Facility: CLINIC | Age: 61
End: 2021-07-19
Payer: MEDICARE

## 2021-07-19 ENCOUNTER — LAB VISIT (OUTPATIENT)
Dept: LAB | Facility: HOSPITAL | Age: 61
End: 2021-07-19
Attending: INTERNAL MEDICINE
Payer: MEDICARE

## 2021-07-19 VITALS
HEIGHT: 64 IN | HEART RATE: 77 BPM | SYSTOLIC BLOOD PRESSURE: 112 MMHG | BODY MASS INDEX: 26.29 KG/M2 | WEIGHT: 154 LBS | DIASTOLIC BLOOD PRESSURE: 77 MMHG

## 2021-07-19 DIAGNOSIS — M32.9 SYSTEMIC LUPUS ERYTHEMATOSUS, UNSPECIFIED SLE TYPE, UNSPECIFIED ORGAN INVOLVEMENT STATUS: Primary | ICD-10-CM

## 2021-07-19 DIAGNOSIS — E78.2 MIXED HYPERLIPIDEMIA: Chronic | ICD-10-CM

## 2021-07-19 DIAGNOSIS — M85.80 OSTEOPENIA, UNSPECIFIED LOCATION: ICD-10-CM

## 2021-07-19 DIAGNOSIS — M32.9 LUPUS: Chronic | ICD-10-CM

## 2021-07-19 DIAGNOSIS — M79.674 TOE PAIN, BILATERAL: ICD-10-CM

## 2021-07-19 DIAGNOSIS — M54.9 DORSALGIA, UNSPECIFIED: ICD-10-CM

## 2021-07-19 DIAGNOSIS — M79.675 TOE PAIN, BILATERAL: ICD-10-CM

## 2021-07-19 DIAGNOSIS — Z78.0 MENOPAUSE: ICD-10-CM

## 2021-07-19 DIAGNOSIS — M79.7 FIBROMYALGIA: Chronic | ICD-10-CM

## 2021-07-19 DIAGNOSIS — L65.9 ALOPECIA: ICD-10-CM

## 2021-07-19 DIAGNOSIS — M54.16 LEFT LUMBAR RADICULOPATHY: ICD-10-CM

## 2021-07-19 DIAGNOSIS — M32.19 OTHER SYSTEMIC LUPUS ERYTHEMATOSUS WITH OTHER ORGAN INVOLVEMENT: Chronic | ICD-10-CM

## 2021-07-19 DIAGNOSIS — I10 ESSENTIAL HYPERTENSION, BENIGN: Chronic | ICD-10-CM

## 2021-07-19 DIAGNOSIS — M17.0 PRIMARY OSTEOARTHRITIS OF BOTH KNEES: ICD-10-CM

## 2021-07-19 DIAGNOSIS — M54.16 LUMBAR BACK PAIN WITH RADICULOPATHY AFFECTING LEFT LOWER EXTREMITY: ICD-10-CM

## 2021-07-19 DIAGNOSIS — M32.19 OTHER SYSTEMIC LUPUS ERYTHEMATOSUS WITH OTHER ORGAN INVOLVEMENT: ICD-10-CM

## 2021-07-19 LAB
BILIRUB UR QL STRIP: NEGATIVE
CLARITY UR REFRACT.AUTO: ABNORMAL
COLOR UR AUTO: YELLOW
CREAT UR-MCNC: 224 MG/DL (ref 15–325)
GLUCOSE UR QL STRIP: NEGATIVE
HGB UR QL STRIP: NEGATIVE
KETONES UR QL STRIP: NEGATIVE
LEUKOCYTE ESTERASE UR QL STRIP: NEGATIVE
NITRITE UR QL STRIP: NEGATIVE
PH UR STRIP: 5 [PH] (ref 5–8)
PROT UR QL STRIP: NEGATIVE
PROT UR-MCNC: 13 MG/DL (ref 0–15)
PROT/CREAT UR: 0.06 MG/G{CREAT} (ref 0–0.2)
SP GR UR STRIP: 1.02 (ref 1–1.03)
URN SPEC COLLECT METH UR: ABNORMAL

## 2021-07-19 PROCEDURE — 81003 URINALYSIS AUTO W/O SCOPE: CPT | Performed by: INTERNAL MEDICINE

## 2021-07-19 PROCEDURE — 99999 PR PBB SHADOW E&M-EST. PATIENT-LVL III: ICD-10-PCS | Mod: PBBFAC,,, | Performed by: INTERNAL MEDICINE

## 2021-07-19 PROCEDURE — 3078F DIAST BP <80 MM HG: CPT | Mod: CPTII,S$GLB,, | Performed by: INTERNAL MEDICINE

## 2021-07-19 PROCEDURE — 1125F AMNT PAIN NOTED PAIN PRSNT: CPT | Mod: CPTII,S$GLB,, | Performed by: INTERNAL MEDICINE

## 2021-07-19 PROCEDURE — 3074F SYST BP LT 130 MM HG: CPT | Mod: CPTII,S$GLB,, | Performed by: INTERNAL MEDICINE

## 2021-07-19 PROCEDURE — 84156 ASSAY OF PROTEIN URINE: CPT | Performed by: INTERNAL MEDICINE

## 2021-07-19 PROCEDURE — 3008F BODY MASS INDEX DOCD: CPT | Mod: CPTII,S$GLB,, | Performed by: INTERNAL MEDICINE

## 2021-07-19 PROCEDURE — 3078F PR MOST RECENT DIASTOLIC BLOOD PRESSURE < 80 MM HG: ICD-10-PCS | Mod: CPTII,S$GLB,, | Performed by: INTERNAL MEDICINE

## 2021-07-19 PROCEDURE — 99214 PR OFFICE/OUTPT VISIT, EST, LEVL IV, 30-39 MIN: ICD-10-PCS | Mod: S$GLB,,, | Performed by: INTERNAL MEDICINE

## 2021-07-19 PROCEDURE — 1125F PR PAIN SEVERITY QUANTIFIED, PAIN PRESENT: ICD-10-PCS | Mod: CPTII,S$GLB,, | Performed by: INTERNAL MEDICINE

## 2021-07-19 PROCEDURE — 99999 PR PBB SHADOW E&M-EST. PATIENT-LVL III: CPT | Mod: PBBFAC,,, | Performed by: INTERNAL MEDICINE

## 2021-07-19 PROCEDURE — 3008F PR BODY MASS INDEX (BMI) DOCUMENTED: ICD-10-PCS | Mod: CPTII,S$GLB,, | Performed by: INTERNAL MEDICINE

## 2021-07-19 PROCEDURE — 3074F PR MOST RECENT SYSTOLIC BLOOD PRESSURE < 130 MM HG: ICD-10-PCS | Mod: CPTII,S$GLB,, | Performed by: INTERNAL MEDICINE

## 2021-07-19 PROCEDURE — 99214 OFFICE O/P EST MOD 30 MIN: CPT | Mod: S$GLB,,, | Performed by: INTERNAL MEDICINE

## 2021-07-19 RX ORDER — PREGABALIN 150 MG/1
150 CAPSULE ORAL 2 TIMES DAILY
Qty: 180 CAPSULE | Refills: 1 | Status: SHIPPED | OUTPATIENT
Start: 2021-07-19 | End: 2021-08-08 | Stop reason: SDUPTHER

## 2021-07-19 RX ORDER — DICLOFENAC SODIUM 10 MG/G
2 GEL TOPICAL 4 TIMES DAILY
Qty: 3 TUBE | Refills: 2 | Status: SHIPPED | OUTPATIENT
Start: 2021-07-19 | End: 2022-09-06 | Stop reason: SDUPTHER

## 2021-07-19 RX ORDER — HYDROXYCHLOROQUINE SULFATE 200 MG/1
300 TABLET, FILM COATED ORAL 2 TIMES DAILY
Qty: 135 TABLET | Refills: 2 | Status: SHIPPED | OUTPATIENT
Start: 2021-07-19 | End: 2022-02-19 | Stop reason: SDUPTHER

## 2021-07-19 ASSESSMENT — SYSTEMIC LUPUS ERYTHEMATOSUS DISEASE ACTIVITY INDEX (SLEDAI): TOTAL_SCORE: 0

## 2021-07-20 ENCOUNTER — TELEPHONE (OUTPATIENT)
Dept: PAIN MEDICINE | Facility: CLINIC | Age: 61
End: 2021-07-20

## 2021-07-21 ENCOUNTER — PATIENT MESSAGE (OUTPATIENT)
Dept: ADMINISTRATIVE | Facility: OTHER | Age: 61
End: 2021-07-21

## 2021-07-22 ENCOUNTER — HOSPITAL ENCOUNTER (OUTPATIENT)
Dept: RADIOLOGY | Facility: OTHER | Age: 61
Discharge: HOME | End: 2021-07-22
Attending: INTERNAL MEDICINE
Payer: MEDICARE

## 2021-07-22 ENCOUNTER — ANESTHESIA EVENT (OUTPATIENT)
Dept: SURGERY | Facility: OTHER | Age: 61
End: 2021-07-22
Payer: MEDICARE

## 2021-07-22 ENCOUNTER — HOSPITAL ENCOUNTER (OUTPATIENT)
Dept: PREADMISSION TESTING | Facility: OTHER | Age: 61
Discharge: HOME | End: 2021-07-22
Attending: INTERNAL MEDICINE
Payer: MEDICARE

## 2021-07-22 VITALS
WEIGHT: 154 LBS | HEART RATE: 76 BPM | BODY MASS INDEX: 27.29 KG/M2 | TEMPERATURE: 98 F | OXYGEN SATURATION: 98 % | DIASTOLIC BLOOD PRESSURE: 74 MMHG | SYSTOLIC BLOOD PRESSURE: 114 MMHG | HEIGHT: 63 IN | RESPIRATION RATE: 16 BRPM

## 2021-07-22 DIAGNOSIS — M54.16 LEFT LUMBAR RADICULOPATHY: ICD-10-CM

## 2021-07-22 DIAGNOSIS — M79.675 TOE PAIN, BILATERAL: Primary | ICD-10-CM

## 2021-07-22 DIAGNOSIS — Z78.0 MENOPAUSE: ICD-10-CM

## 2021-07-22 DIAGNOSIS — M85.80 OSTEOPENIA, UNSPECIFIED LOCATION: ICD-10-CM

## 2021-07-22 DIAGNOSIS — M54.9 DORSALGIA, UNSPECIFIED: ICD-10-CM

## 2021-07-22 DIAGNOSIS — M79.674 TOE PAIN, BILATERAL: Primary | ICD-10-CM

## 2021-07-22 PROCEDURE — 77080 DXA BONE DENSITY AXIAL: CPT | Mod: TC

## 2021-07-22 PROCEDURE — 72100 XR LUMBAR SPINE AP AND LATERAL: ICD-10-PCS | Mod: 26,,, | Performed by: RADIOLOGY

## 2021-07-22 PROCEDURE — 72100 X-RAY EXAM L-S SPINE 2/3 VWS: CPT | Mod: 26,,, | Performed by: RADIOLOGY

## 2021-07-22 PROCEDURE — 77080 DEXA BONE DENSITY SPINE HIP: ICD-10-PCS | Mod: 26,,, | Performed by: RADIOLOGY

## 2021-07-22 PROCEDURE — 77080 DXA BONE DENSITY AXIAL: CPT | Mod: 26,,, | Performed by: RADIOLOGY

## 2021-07-22 PROCEDURE — 72100 X-RAY EXAM L-S SPINE 2/3 VWS: CPT | Mod: TC,FY

## 2021-07-22 RX ORDER — ACETAMINOPHEN 500 MG
1000 TABLET ORAL
Status: CANCELLED | OUTPATIENT
Start: 2021-07-22 | End: 2021-07-22

## 2021-07-22 RX ORDER — SODIUM CHLORIDE, SODIUM LACTATE, POTASSIUM CHLORIDE, CALCIUM CHLORIDE 600; 310; 30; 20 MG/100ML; MG/100ML; MG/100ML; MG/100ML
INJECTION, SOLUTION INTRAVENOUS CONTINUOUS
Status: CANCELLED | OUTPATIENT
Start: 2021-07-22

## 2021-07-22 RX ORDER — LIDOCAINE HYDROCHLORIDE 10 MG/ML
0.5 INJECTION, SOLUTION EPIDURAL; INFILTRATION; INTRACAUDAL; PERINEURAL ONCE
Status: CANCELLED | OUTPATIENT
Start: 2021-07-22 | End: 2021-07-22

## 2021-07-22 RX ORDER — ALBUTEROL SULFATE 2.5 MG/.5ML
2.5 SOLUTION RESPIRATORY (INHALATION)
Status: CANCELLED | OUTPATIENT
Start: 2021-07-22 | End: 2021-07-22

## 2021-07-23 ENCOUNTER — PATIENT MESSAGE (OUTPATIENT)
Dept: ADMINISTRATIVE | Facility: HOSPITAL | Age: 61
End: 2021-07-23

## 2021-07-23 ENCOUNTER — PATIENT OUTREACH (OUTPATIENT)
Dept: ADMINISTRATIVE | Facility: HOSPITAL | Age: 61
End: 2021-07-23

## 2021-07-23 DIAGNOSIS — Z12.31 ENCOUNTER FOR SCREENING MAMMOGRAM FOR BREAST CANCER: Primary | ICD-10-CM

## 2021-07-27 ENCOUNTER — TELEPHONE (OUTPATIENT)
Dept: ORTHOPEDICS | Facility: CLINIC | Age: 61
End: 2021-07-27

## 2021-07-27 DIAGNOSIS — Z98.890 POST-OPERATIVE STATE: Primary | ICD-10-CM

## 2021-07-27 RX ORDER — TRAMADOL HYDROCHLORIDE 50 MG/1
50 TABLET ORAL EVERY 6 HOURS PRN
Qty: 10 TABLET | Refills: 0 | Status: SHIPPED | OUTPATIENT
Start: 2021-07-27 | End: 2021-11-22

## 2021-07-28 ENCOUNTER — HOSPITAL ENCOUNTER (OUTPATIENT)
Facility: OTHER | Age: 61
Discharge: HOME | End: 2021-07-28
Attending: ORTHOPAEDIC SURGERY | Admitting: ORTHOPAEDIC SURGERY
Payer: MEDICARE

## 2021-07-28 ENCOUNTER — ANESTHESIA (OUTPATIENT)
Dept: SURGERY | Facility: OTHER | Age: 61
End: 2021-07-28
Payer: MEDICARE

## 2021-07-28 VITALS
DIASTOLIC BLOOD PRESSURE: 83 MMHG | BODY MASS INDEX: 27.29 KG/M2 | RESPIRATION RATE: 18 BRPM | HEIGHT: 63 IN | HEART RATE: 62 BPM | WEIGHT: 154 LBS | SYSTOLIC BLOOD PRESSURE: 127 MMHG | OXYGEN SATURATION: 98 % | TEMPERATURE: 98 F

## 2021-07-28 DIAGNOSIS — M65.30 TRIGGER FINGER, UNSPECIFIED FINGER, UNSPECIFIED LATERALITY: Primary | ICD-10-CM

## 2021-07-28 DIAGNOSIS — M65.30 TRIGGER FINGER: ICD-10-CM

## 2021-07-28 PROCEDURE — 94761 N-INVAS EAR/PLS OXIMETRY MLT: CPT

## 2021-07-28 PROCEDURE — 63600175 PHARM REV CODE 636 W HCPCS: Performed by: STUDENT IN AN ORGANIZED HEALTH CARE EDUCATION/TRAINING PROGRAM

## 2021-07-28 PROCEDURE — 25000003 PHARM REV CODE 250: Performed by: ORTHOPAEDIC SURGERY

## 2021-07-28 PROCEDURE — 25000242 PHARM REV CODE 250 ALT 637 W/ HCPCS: Performed by: ANESTHESIOLOGY

## 2021-07-28 PROCEDURE — 63600175 PHARM REV CODE 636 W HCPCS: Performed by: NURSE ANESTHETIST, CERTIFIED REGISTERED

## 2021-07-28 PROCEDURE — 94640 AIRWAY INHALATION TREATMENT: CPT

## 2021-07-28 PROCEDURE — 37000008 HC ANESTHESIA 1ST 15 MINUTES: Performed by: ORTHOPAEDIC SURGERY

## 2021-07-28 PROCEDURE — 25000003 PHARM REV CODE 250: Performed by: STUDENT IN AN ORGANIZED HEALTH CARE EDUCATION/TRAINING PROGRAM

## 2021-07-28 PROCEDURE — 71000015 HC POSTOP RECOV 1ST HR: Performed by: ORTHOPAEDIC SURGERY

## 2021-07-28 PROCEDURE — 26055 INCISE FINGER TENDON SHEATH: CPT | Mod: F8,,, | Performed by: ORTHOPAEDIC SURGERY

## 2021-07-28 PROCEDURE — 36000706: Performed by: ORTHOPAEDIC SURGERY

## 2021-07-28 PROCEDURE — 26055 PR INCISE FINGER TENDON SHEATH: ICD-10-PCS | Mod: F8,,, | Performed by: ORTHOPAEDIC SURGERY

## 2021-07-28 PROCEDURE — 25000003 PHARM REV CODE 250: Performed by: NURSE ANESTHETIST, CERTIFIED REGISTERED

## 2021-07-28 PROCEDURE — 25000003 PHARM REV CODE 250: Performed by: ANESTHESIOLOGY

## 2021-07-28 PROCEDURE — 37000009 HC ANESTHESIA EA ADD 15 MINS: Performed by: ORTHOPAEDIC SURGERY

## 2021-07-28 PROCEDURE — 36000707: Performed by: ORTHOPAEDIC SURGERY

## 2021-07-28 RX ORDER — FENTANYL CITRATE 50 UG/ML
INJECTION, SOLUTION INTRAMUSCULAR; INTRAVENOUS
Status: DISCONTINUED | OUTPATIENT
Start: 2021-07-28 | End: 2021-07-28

## 2021-07-28 RX ORDER — ACETAMINOPHEN 500 MG
1000 TABLET ORAL
Status: COMPLETED | OUTPATIENT
Start: 2021-07-28 | End: 2021-07-28

## 2021-07-28 RX ORDER — ALBUTEROL SULFATE 2.5 MG/.5ML
2.5 SOLUTION RESPIRATORY (INHALATION)
Status: COMPLETED | OUTPATIENT
Start: 2021-07-28 | End: 2021-07-28

## 2021-07-28 RX ORDER — SODIUM CHLORIDE, SODIUM LACTATE, POTASSIUM CHLORIDE, CALCIUM CHLORIDE 600; 310; 30; 20 MG/100ML; MG/100ML; MG/100ML; MG/100ML
INJECTION, SOLUTION INTRAVENOUS CONTINUOUS
Status: DISCONTINUED | OUTPATIENT
Start: 2021-07-28 | End: 2021-07-28 | Stop reason: HOSPADM

## 2021-07-28 RX ORDER — CEFAZOLIN SODIUM 1 G/3ML
2 INJECTION, POWDER, FOR SOLUTION INTRAMUSCULAR; INTRAVENOUS
Status: COMPLETED | OUTPATIENT
Start: 2021-07-28 | End: 2021-07-28

## 2021-07-28 RX ORDER — PROPOFOL 10 MG/ML
VIAL (ML) INTRAVENOUS
Status: DISCONTINUED | OUTPATIENT
Start: 2021-07-28 | End: 2021-07-28

## 2021-07-28 RX ORDER — LIDOCAINE HYDROCHLORIDE 10 MG/ML
INJECTION, SOLUTION EPIDURAL; INFILTRATION; INTRACAUDAL; PERINEURAL
Status: DISCONTINUED | OUTPATIENT
Start: 2021-07-28 | End: 2021-07-28 | Stop reason: HOSPADM

## 2021-07-28 RX ORDER — BACITRACIN ZINC 500 UNIT/G
OINTMENT (GRAM) TOPICAL
Status: DISCONTINUED | OUTPATIENT
Start: 2021-07-28 | End: 2021-07-28 | Stop reason: HOSPADM

## 2021-07-28 RX ORDER — ONDANSETRON 2 MG/ML
INJECTION INTRAMUSCULAR; INTRAVENOUS
Status: DISCONTINUED | OUTPATIENT
Start: 2021-07-28 | End: 2021-07-28

## 2021-07-28 RX ORDER — MUPIROCIN 20 MG/G
OINTMENT TOPICAL 2 TIMES DAILY
Status: CANCELLED | OUTPATIENT
Start: 2021-07-28 | End: 2021-08-02

## 2021-07-28 RX ORDER — LIDOCAINE HYDROCHLORIDE 20 MG/ML
INJECTION INTRAVENOUS
Status: DISCONTINUED | OUTPATIENT
Start: 2021-07-28 | End: 2021-07-28

## 2021-07-28 RX ORDER — BUPIVACAINE HYDROCHLORIDE 2.5 MG/ML
INJECTION, SOLUTION EPIDURAL; INFILTRATION; INTRACAUDAL
Status: DISCONTINUED | OUTPATIENT
Start: 2021-07-28 | End: 2021-07-28 | Stop reason: HOSPADM

## 2021-07-28 RX ORDER — PROPOFOL 10 MG/ML
VIAL (ML) INTRAVENOUS CONTINUOUS PRN
Status: DISCONTINUED | OUTPATIENT
Start: 2021-07-28 | End: 2021-07-28

## 2021-07-28 RX ORDER — MIDAZOLAM HYDROCHLORIDE 1 MG/ML
INJECTION INTRAMUSCULAR; INTRAVENOUS
Status: DISCONTINUED | OUTPATIENT
Start: 2021-07-28 | End: 2021-07-28

## 2021-07-28 RX ORDER — HYDROCODONE BITARTRATE AND ACETAMINOPHEN 5; 325 MG/1; MG/1
1 TABLET ORAL EVERY 4 HOURS PRN
Status: CANCELLED | OUTPATIENT
Start: 2021-07-28

## 2021-07-28 RX ORDER — LIDOCAINE HYDROCHLORIDE 10 MG/ML
0.5 INJECTION, SOLUTION EPIDURAL; INFILTRATION; INTRACAUDAL; PERINEURAL ONCE
Status: DISCONTINUED | OUTPATIENT
Start: 2021-07-28 | End: 2021-07-28 | Stop reason: HOSPADM

## 2021-07-28 RX ORDER — SODIUM CHLORIDE 0.9 % (FLUSH) 0.9 %
10 SYRINGE (ML) INJECTION
Status: DISCONTINUED | OUTPATIENT
Start: 2021-07-28 | End: 2021-07-28 | Stop reason: HOSPADM

## 2021-07-28 RX ORDER — MUPIROCIN 20 MG/G
OINTMENT TOPICAL
Status: DISCONTINUED | OUTPATIENT
Start: 2021-07-28 | End: 2021-07-28 | Stop reason: HOSPADM

## 2021-07-28 RX ADMIN — MIDAZOLAM HYDROCHLORIDE 2 MG: 1 INJECTION, SOLUTION INTRAMUSCULAR; INTRAVENOUS at 08:07

## 2021-07-28 RX ADMIN — ONDANSETRON HYDROCHLORIDE 4 MG: 2 INJECTION INTRAMUSCULAR; INTRAVENOUS at 08:07

## 2021-07-28 RX ADMIN — PROPOFOL 50 MCG/KG/MIN: 10 INJECTION, EMULSION INTRAVENOUS at 08:07

## 2021-07-28 RX ADMIN — CEFAZOLIN 2 G: 330 INJECTION, POWDER, FOR SOLUTION INTRAMUSCULAR; INTRAVENOUS at 08:07

## 2021-07-28 RX ADMIN — ALBUTEROL SULFATE 2.5 MG: 2.5 SOLUTION RESPIRATORY (INHALATION) at 07:07

## 2021-07-28 RX ADMIN — LIDOCAINE HYDROCHLORIDE 40 MG: 20 INJECTION, SOLUTION INTRAVENOUS at 08:07

## 2021-07-28 RX ADMIN — MUPIROCIN: 20 OINTMENT TOPICAL at 07:07

## 2021-07-28 RX ADMIN — PROPOFOL 40 MG: 10 INJECTION, EMULSION INTRAVENOUS at 08:07

## 2021-07-28 RX ADMIN — FENTANYL CITRATE 50 MCG: 50 INJECTION, SOLUTION INTRAMUSCULAR; INTRAVENOUS at 08:07

## 2021-07-28 RX ADMIN — ACETAMINOPHEN 1000 MG: 500 TABLET, FILM COATED ORAL at 07:07

## 2021-08-01 ENCOUNTER — NURSE TRIAGE (OUTPATIENT)
Dept: ADMINISTRATIVE | Facility: CLINIC | Age: 61
End: 2021-08-01

## 2021-08-02 ENCOUNTER — TELEPHONE (OUTPATIENT)
Dept: SPORTS MEDICINE | Facility: CLINIC | Age: 61
End: 2021-08-02

## 2021-08-04 ENCOUNTER — TELEPHONE (OUTPATIENT)
Dept: ORTHOPEDICS | Facility: CLINIC | Age: 61
End: 2021-08-04

## 2021-08-06 ENCOUNTER — TELEPHONE (OUTPATIENT)
Dept: ORTHOPEDICS | Facility: CLINIC | Age: 61
End: 2021-08-06

## 2021-08-09 ENCOUNTER — PATIENT MESSAGE (OUTPATIENT)
Dept: RHEUMATOLOGY | Facility: CLINIC | Age: 61
End: 2021-08-09

## 2021-08-09 DIAGNOSIS — M79.7 FIBROMYALGIA: Chronic | ICD-10-CM

## 2021-08-09 RX ORDER — PREGABALIN 150 MG/1
150 CAPSULE ORAL 2 TIMES DAILY
Qty: 180 CAPSULE | Refills: 1 | Status: SHIPPED | OUTPATIENT
Start: 2021-08-09 | End: 2022-02-19 | Stop reason: SDUPTHER

## 2021-08-11 ENCOUNTER — OFFICE VISIT (OUTPATIENT)
Dept: ORTHOPEDICS | Facility: CLINIC | Age: 61
End: 2021-08-11
Payer: MEDICARE

## 2021-08-11 ENCOUNTER — PATIENT MESSAGE (OUTPATIENT)
Dept: ADMINISTRATIVE | Facility: OTHER | Age: 61
End: 2021-08-11

## 2021-08-11 ENCOUNTER — HOSPITAL ENCOUNTER (OUTPATIENT)
Dept: RADIOLOGY | Facility: OTHER | Age: 61
Discharge: HOME | End: 2021-08-11
Attending: INTERNAL MEDICINE
Payer: MEDICARE

## 2021-08-11 VITALS
SYSTOLIC BLOOD PRESSURE: 112 MMHG | HEIGHT: 63 IN | BODY MASS INDEX: 27.29 KG/M2 | HEART RATE: 76 BPM | DIASTOLIC BLOOD PRESSURE: 73 MMHG | WEIGHT: 154 LBS

## 2021-08-11 DIAGNOSIS — Z12.31 ENCOUNTER FOR SCREENING MAMMOGRAM FOR BREAST CANCER: ICD-10-CM

## 2021-08-11 DIAGNOSIS — M79.674 TOE PAIN, BILATERAL: ICD-10-CM

## 2021-08-11 DIAGNOSIS — Z98.890 POST-OPERATIVE STATE: Primary | ICD-10-CM

## 2021-08-11 DIAGNOSIS — M79.675 TOE PAIN, BILATERAL: ICD-10-CM

## 2021-08-11 PROCEDURE — 3078F PR MOST RECENT DIASTOLIC BLOOD PRESSURE < 80 MM HG: ICD-10-PCS | Mod: CPTII,S$GLB,, | Performed by: ORTHOPAEDIC SURGERY

## 2021-08-11 PROCEDURE — 3008F BODY MASS INDEX DOCD: CPT | Mod: CPTII,S$GLB,, | Performed by: ORTHOPAEDIC SURGERY

## 2021-08-11 PROCEDURE — 3074F SYST BP LT 130 MM HG: CPT | Mod: CPTII,S$GLB,, | Performed by: ORTHOPAEDIC SURGERY

## 2021-08-11 PROCEDURE — 77067 MAMMO DIGITAL SCREENING BILAT WITH TOMO: ICD-10-PCS | Mod: 26,,, | Performed by: RADIOLOGY

## 2021-08-11 PROCEDURE — 73630 X-RAY EXAM OF FOOT: CPT | Mod: 26,50,, | Performed by: RADIOLOGY

## 2021-08-11 PROCEDURE — 3078F DIAST BP <80 MM HG: CPT | Mod: CPTII,S$GLB,, | Performed by: ORTHOPAEDIC SURGERY

## 2021-08-11 PROCEDURE — 3074F PR MOST RECENT SYSTOLIC BLOOD PRESSURE < 130 MM HG: ICD-10-PCS | Mod: CPTII,S$GLB,, | Performed by: ORTHOPAEDIC SURGERY

## 2021-08-11 PROCEDURE — 73630 X-RAY EXAM OF FOOT: CPT | Mod: TC,50,FY

## 2021-08-11 PROCEDURE — 77067 SCR MAMMO BI INCL CAD: CPT | Mod: TC

## 2021-08-11 PROCEDURE — 1159F PR MEDICATION LIST DOCUMENTED IN MEDICAL RECORD: ICD-10-PCS | Mod: CPTII,S$GLB,, | Performed by: ORTHOPAEDIC SURGERY

## 2021-08-11 PROCEDURE — 99999 PR PBB SHADOW E&M-EST. PATIENT-LVL III: CPT | Mod: PBBFAC,,, | Performed by: ORTHOPAEDIC SURGERY

## 2021-08-11 PROCEDURE — 99024 PR POST-OP FOLLOW-UP VISIT: ICD-10-PCS | Mod: S$GLB,,, | Performed by: ORTHOPAEDIC SURGERY

## 2021-08-11 PROCEDURE — 3008F PR BODY MASS INDEX (BMI) DOCUMENTED: ICD-10-PCS | Mod: CPTII,S$GLB,, | Performed by: ORTHOPAEDIC SURGERY

## 2021-08-11 PROCEDURE — 77063 MAMMO DIGITAL SCREENING BILAT WITH TOMO: ICD-10-PCS | Mod: 26,,, | Performed by: RADIOLOGY

## 2021-08-11 PROCEDURE — 1159F MED LIST DOCD IN RCRD: CPT | Mod: CPTII,S$GLB,, | Performed by: ORTHOPAEDIC SURGERY

## 2021-08-11 PROCEDURE — 99999 PR PBB SHADOW E&M-EST. PATIENT-LVL III: ICD-10-PCS | Mod: PBBFAC,,, | Performed by: ORTHOPAEDIC SURGERY

## 2021-08-11 PROCEDURE — 77067 SCR MAMMO BI INCL CAD: CPT | Mod: 26,,, | Performed by: RADIOLOGY

## 2021-08-11 PROCEDURE — 73630 XR FOOT COMPLETE 3 VIEW BILATERAL: ICD-10-PCS | Mod: 26,50,, | Performed by: RADIOLOGY

## 2021-08-11 PROCEDURE — 99024 POSTOP FOLLOW-UP VISIT: CPT | Mod: S$GLB,,, | Performed by: ORTHOPAEDIC SURGERY

## 2021-08-11 PROCEDURE — 77063 BREAST TOMOSYNTHESIS BI: CPT | Mod: 26,,, | Performed by: RADIOLOGY

## 2021-08-12 ENCOUNTER — PATIENT MESSAGE (OUTPATIENT)
Dept: ADMINISTRATIVE | Facility: OTHER | Age: 61
End: 2021-08-12

## 2021-08-16 ENCOUNTER — TELEPHONE (OUTPATIENT)
Dept: INTERNAL MEDICINE | Facility: CLINIC | Age: 61
End: 2021-08-16

## 2021-08-19 ENCOUNTER — TELEPHONE (OUTPATIENT)
Dept: RADIOLOGY | Facility: OTHER | Age: 61
End: 2021-08-19

## 2021-08-23 ENCOUNTER — PATIENT MESSAGE (OUTPATIENT)
Dept: ORTHOPEDICS | Facility: CLINIC | Age: 61
End: 2021-08-23

## 2021-08-23 ENCOUNTER — TELEPHONE (OUTPATIENT)
Dept: ORTHOPEDICS | Facility: CLINIC | Age: 61
End: 2021-08-23

## 2021-09-13 ENCOUNTER — PATIENT OUTREACH (OUTPATIENT)
Dept: ADMINISTRATIVE | Facility: HOSPITAL | Age: 61
End: 2021-09-13

## 2021-09-22 DIAGNOSIS — E03.9 ACQUIRED HYPOTHYROIDISM: Chronic | ICD-10-CM

## 2021-09-22 RX ORDER — LOSARTAN POTASSIUM 100 MG/1
100 TABLET ORAL DAILY
Qty: 90 TABLET | Refills: 0 | Status: CANCELLED | OUTPATIENT
Start: 2021-09-22

## 2021-09-22 RX ORDER — AMLODIPINE BESYLATE 5 MG/1
5 TABLET ORAL DAILY
Qty: 90 TABLET | Refills: 0 | Status: CANCELLED | OUTPATIENT
Start: 2021-09-22

## 2021-09-22 RX ORDER — ROSUVASTATIN CALCIUM 40 MG/1
40 TABLET, COATED ORAL NIGHTLY
Qty: 90 TABLET | Refills: 0 | Status: CANCELLED | OUTPATIENT
Start: 2021-09-22

## 2021-09-22 RX ORDER — LEVOTHYROXINE SODIUM 88 UG/1
TABLET ORAL
Qty: 90 TABLET | Refills: 0 | Status: CANCELLED | OUTPATIENT
Start: 2021-09-22

## 2021-10-05 ENCOUNTER — PATIENT MESSAGE (OUTPATIENT)
Dept: ADMINISTRATIVE | Facility: HOSPITAL | Age: 61
End: 2021-10-05

## 2021-10-28 ENCOUNTER — PATIENT MESSAGE (OUTPATIENT)
Dept: RHEUMATOLOGY | Facility: CLINIC | Age: 61
End: 2021-10-28
Payer: MEDICARE

## 2021-10-28 ENCOUNTER — HOSPITAL ENCOUNTER (OUTPATIENT)
Dept: RADIOLOGY | Facility: OTHER | Age: 61
Discharge: HOME | End: 2021-10-28
Attending: INTERNAL MEDICINE
Payer: MEDICARE

## 2021-10-28 DIAGNOSIS — R92.8 ABNORMAL MAMMOGRAM: ICD-10-CM

## 2021-10-28 PROCEDURE — 77061 BREAST TOMOSYNTHESIS UNI: CPT | Mod: TC,LT

## 2021-10-28 PROCEDURE — 77065 DX MAMMO INCL CAD UNI: CPT | Mod: 26,LT,, | Performed by: RADIOLOGY

## 2021-10-28 PROCEDURE — 77065 MAMMO DIGITAL DIAGNOSTIC LEFT WITH TOMO: ICD-10-PCS | Mod: 26,LT,, | Performed by: RADIOLOGY

## 2021-10-28 PROCEDURE — 77061 BREAST TOMOSYNTHESIS UNI: CPT | Mod: 26,LT,, | Performed by: RADIOLOGY

## 2021-10-28 PROCEDURE — 77061 MAMMO DIGITAL DIAGNOSTIC LEFT WITH TOMO: ICD-10-PCS | Mod: 26,LT,, | Performed by: RADIOLOGY

## 2021-11-01 ENCOUNTER — PATIENT OUTREACH (OUTPATIENT)
Dept: ADMINISTRATIVE | Facility: OTHER | Age: 61
End: 2021-11-01
Payer: MEDICARE

## 2021-11-08 NOTE — TELEPHONE ENCOUNTER
Reason for Disposition   Cut over knuckle (MCP joint)    Additional Information   Negative: Finger injury is main concern   Negative: Caused by an animal bite   Negative: Caused by a human bite   Negative: Wound looks infected   Negative: Cast problems or questions   Negative: Bullet wound, stabbed by knife, or other serious penetrating wound   Negative: Looks like a broken bone (e.g., knuckle is gone or depressed)   Negative: Looks like a dislocated joint (e.g., crooked or deformed)    Protocols used: HAND AND WRIST INJURY-A-AH    Pt cut her hand earlier and now cannot bend her finger. Unknown last tetanus shot. Cut is over knuckle. Per protocol advised ER  
Patient

## 2021-11-10 NOTE — ASSESSMENT & PLAN NOTE
Appears to be well controlled without any joint tenderness or swelling. SLEDAI score of 0. No new labs  -   - Continue hydroxychloroquine 200mg BID.   
Discussed with patient the importance of quitting especially in the setting Covid.  - Advised to try patches and gum again  
Patient received knee X-rays for work up of knee pain after her fall. X-rays show joint space narrowing, osteophytes, and sclerosis, evidence of moderate OA. Patient received a knee injection in the right knee that reduced pain to 0/10. Long term pain control will be best achieved with strengthening of quadriceps and hip abductors. Prevention of future falls is important given the high frequency in the recent past.   - Physical therapy for strengthening and gait stability exercises.  
Patient reports baseline and tolerable symptoms.   - Continue pregabalin 150 mg BID  
able to climb stairs, ADLs, house chores

## 2021-11-11 ENCOUNTER — PES CALL (OUTPATIENT)
Dept: ADMINISTRATIVE | Facility: CLINIC | Age: 61
End: 2021-11-11
Payer: MEDICARE

## 2021-11-19 ENCOUNTER — TELEPHONE (OUTPATIENT)
Dept: ADMINISTRATIVE | Facility: CLINIC | Age: 61
End: 2021-11-19
Payer: MEDICARE

## 2021-11-22 ENCOUNTER — TELEPHONE (OUTPATIENT)
Dept: ADMINISTRATIVE | Facility: CLINIC | Age: 61
End: 2021-11-22
Payer: MEDICARE

## 2021-11-22 ENCOUNTER — OFFICE VISIT (OUTPATIENT)
Dept: INTERNAL MEDICINE | Facility: CLINIC | Age: 61
End: 2021-11-22
Payer: MEDICARE

## 2021-11-22 DIAGNOSIS — M85.89 OSTEOPENIA OF MULTIPLE SITES: ICD-10-CM

## 2021-11-22 DIAGNOSIS — Z74.8 ASSISTANCE NEEDED WITH TRANSPORTATION: ICD-10-CM

## 2021-11-22 DIAGNOSIS — F31.81 BIPOLAR 2 DISORDER: Chronic | ICD-10-CM

## 2021-11-22 DIAGNOSIS — J84.10 PULMONARY GRANULOMA: ICD-10-CM

## 2021-11-22 DIAGNOSIS — M79.7 FIBROMYALGIA: Chronic | ICD-10-CM

## 2021-11-22 DIAGNOSIS — I10 ESSENTIAL HYPERTENSION, BENIGN: Chronic | ICD-10-CM

## 2021-11-22 DIAGNOSIS — Z00.00 ENCOUNTER FOR PREVENTIVE HEALTH EXAMINATION: Primary | ICD-10-CM

## 2021-11-22 DIAGNOSIS — Z59.9 FINANCIAL DIFFICULTIES: ICD-10-CM

## 2021-11-22 DIAGNOSIS — R91.1 LUNG NODULE: ICD-10-CM

## 2021-11-22 DIAGNOSIS — Z72.0 TOBACCO ABUSE: ICD-10-CM

## 2021-11-22 DIAGNOSIS — E03.9 ACQUIRED HYPOTHYROIDISM: Chronic | ICD-10-CM

## 2021-11-22 DIAGNOSIS — E78.2 MIXED HYPERLIPIDEMIA: Chronic | ICD-10-CM

## 2021-11-22 DIAGNOSIS — J44.9 CHRONIC OBSTRUCTIVE PULMONARY DISEASE, UNSPECIFIED COPD TYPE: ICD-10-CM

## 2021-11-22 DIAGNOSIS — M17.0 PRIMARY OSTEOARTHRITIS OF BOTH KNEES: ICD-10-CM

## 2021-11-22 DIAGNOSIS — M54.16 LUMBAR BACK PAIN WITH RADICULOPATHY AFFECTING LEFT LOWER EXTREMITY: ICD-10-CM

## 2021-11-22 DIAGNOSIS — M32.9 LUPUS: Chronic | ICD-10-CM

## 2021-11-22 DIAGNOSIS — I70.0 AORTIC ATHEROSCLEROSIS: ICD-10-CM

## 2021-11-22 PROCEDURE — 4010F PR ACE/ARB THEARPY RXD/TAKEN: ICD-10-PCS | Mod: CPTII,S$GLB,, | Performed by: NURSE PRACTITIONER

## 2021-11-22 PROCEDURE — 99499 UNLISTED E&M SERVICE: CPT | Mod: 95,,, | Performed by: NURSE PRACTITIONER

## 2021-11-22 PROCEDURE — 4010F ACE/ARB THERAPY RXD/TAKEN: CPT | Mod: CPTII,S$GLB,, | Performed by: NURSE PRACTITIONER

## 2021-11-22 PROCEDURE — 99499 RISK ADDL DX/OHS AUDIT: ICD-10-PCS | Mod: 95,,, | Performed by: NURSE PRACTITIONER

## 2021-11-22 PROCEDURE — G0439 PPPS, SUBSEQ VISIT: HCPCS | Mod: 95,,, | Performed by: NURSE PRACTITIONER

## 2021-11-22 PROCEDURE — G0439 PR MEDICARE ANNUAL WELLNESS SUBSEQUENT VISIT: ICD-10-PCS | Mod: 95,,, | Performed by: NURSE PRACTITIONER

## 2021-11-22 RX ORDER — BUDESONIDE AND FORMOTEROL FUMARATE DIHYDRATE 160; 4.5 UG/1; UG/1
AEROSOL RESPIRATORY (INHALATION)
Qty: 10.2 G | Refills: 1 | Status: SHIPPED | OUTPATIENT
Start: 2021-11-22 | End: 2022-06-21

## 2021-11-22 SDOH — SOCIAL DETERMINANTS OF HEALTH (SDOH): PROBLEM RELATED TO HOUSING AND ECONOMIC CIRCUMSTANCES, UNSPECIFIED: Z59.9

## 2021-11-25 ENCOUNTER — TELEPHONE (OUTPATIENT)
Dept: INTERNAL MEDICINE | Facility: CLINIC | Age: 61
End: 2021-11-25
Payer: MEDICARE

## 2021-12-08 ENCOUNTER — TELEPHONE (OUTPATIENT)
Dept: INTERNAL MEDICINE | Facility: CLINIC | Age: 61
End: 2021-12-08

## 2021-12-08 ENCOUNTER — OFFICE VISIT (OUTPATIENT)
Dept: INTERNAL MEDICINE | Facility: CLINIC | Age: 61
End: 2021-12-08
Payer: MEDICARE

## 2021-12-08 VITALS — DIASTOLIC BLOOD PRESSURE: 76 MMHG | SYSTOLIC BLOOD PRESSURE: 117 MMHG

## 2021-12-08 DIAGNOSIS — R91.1 LUNG NODULE: ICD-10-CM

## 2021-12-08 DIAGNOSIS — M85.89 OSTEOPENIA OF MULTIPLE SITES: ICD-10-CM

## 2021-12-08 DIAGNOSIS — Z72.0 TOBACCO ABUSE: ICD-10-CM

## 2021-12-08 DIAGNOSIS — M79.7 FIBROMYALGIA: ICD-10-CM

## 2021-12-08 DIAGNOSIS — I70.0 AORTIC ATHEROSCLEROSIS: ICD-10-CM

## 2021-12-08 DIAGNOSIS — E78.2 MIXED HYPERLIPIDEMIA: ICD-10-CM

## 2021-12-08 DIAGNOSIS — F31.81 BIPOLAR 2 DISORDER: ICD-10-CM

## 2021-12-08 DIAGNOSIS — I10 ESSENTIAL HYPERTENSION, BENIGN: ICD-10-CM

## 2021-12-08 DIAGNOSIS — M32.9 LUPUS: ICD-10-CM

## 2021-12-08 DIAGNOSIS — J84.10 PULMONARY GRANULOMA: ICD-10-CM

## 2021-12-08 DIAGNOSIS — J41.0 SIMPLE CHRONIC BRONCHITIS: Primary | ICD-10-CM

## 2021-12-08 DIAGNOSIS — E03.9 ACQUIRED HYPOTHYROIDISM: ICD-10-CM

## 2021-12-08 PROCEDURE — 4010F PR ACE/ARB THEARPY RXD/TAKEN: ICD-10-PCS | Mod: CPTII,95,, | Performed by: INTERNAL MEDICINE

## 2021-12-08 PROCEDURE — 99214 PR OFFICE/OUTPT VISIT, EST, LEVL IV, 30-39 MIN: ICD-10-PCS | Mod: 95,,, | Performed by: INTERNAL MEDICINE

## 2021-12-08 PROCEDURE — 4010F ACE/ARB THERAPY RXD/TAKEN: CPT | Mod: CPTII,95,, | Performed by: INTERNAL MEDICINE

## 2021-12-08 PROCEDURE — 99214 OFFICE O/P EST MOD 30 MIN: CPT | Mod: 95,,, | Performed by: INTERNAL MEDICINE

## 2021-12-08 RX ORDER — AMLODIPINE BESYLATE 5 MG/1
5 TABLET ORAL DAILY
Qty: 90 TABLET | Refills: 1 | Status: SHIPPED | OUTPATIENT
Start: 2021-12-08 | End: 2022-05-31

## 2021-12-24 RX ORDER — LOSARTAN POTASSIUM 100 MG/1
100 TABLET ORAL DAILY
Qty: 90 TABLET | Refills: 3 | Status: SHIPPED | OUTPATIENT
Start: 2021-12-24 | End: 2022-10-08

## 2022-01-21 ENCOUNTER — PES CALL (OUTPATIENT)
Dept: ADMINISTRATIVE | Facility: CLINIC | Age: 62
End: 2022-01-21
Payer: MEDICARE

## 2022-01-21 DIAGNOSIS — E03.9 ACQUIRED HYPOTHYROIDISM: Chronic | ICD-10-CM

## 2022-01-21 RX ORDER — ROSUVASTATIN CALCIUM 40 MG/1
40 TABLET, COATED ORAL NIGHTLY
Qty: 90 TABLET | Refills: 3 | Status: SHIPPED | OUTPATIENT
Start: 2022-01-21 | End: 2022-10-15

## 2022-01-21 RX ORDER — LEVOTHYROXINE SODIUM 88 UG/1
88 TABLET ORAL
Qty: 90 TABLET | Refills: 3 | Status: SHIPPED | OUTPATIENT
Start: 2022-01-21 | End: 2022-10-15

## 2022-01-21 NOTE — TELEPHONE ENCOUNTER
No new care gaps identified.  Powered by [employer]. Reference number: [id number].   1/21/2022 1:34:33 PM CST

## 2022-01-26 RX ORDER — LEVOTHYROXINE SODIUM 88 UG/1
TABLET ORAL
Qty: 90 TABLET | Refills: 0 | OUTPATIENT
Start: 2022-01-26

## 2022-01-26 RX ORDER — ROSUVASTATIN CALCIUM 40 MG/1
TABLET, COATED ORAL
Qty: 90 TABLET | Refills: 0 | OUTPATIENT
Start: 2022-01-26

## 2022-01-26 NOTE — TELEPHONE ENCOUNTER
Quick DC. Request already responded to by other means (e.g. phone or fax)   Refill Authorization Note   Breanna Guido  is requesting a refill authorization.  Brief Assessment and Rationale for Refill:  Quick Discontinue  Medication Therapy Plan:  quick dc: duplicate     Medication Reconciliation Completed:  No      Comments:   Pended Medication(s)       Requested Prescriptions     Pending Prescriptions Disp Refills    levothyroxine (SYNTHROID) 88 MCG tablet [Pharmacy Med Name: LEVOTHYROXINE 0.088MG (88MCG) TAB] 90 tablet 0     Sig: TAKE 1 TABLET(88 MCG) BY MOUTH EVERY DAY    rosuvastatin (CRESTOR) 40 MG Tab [Pharmacy Med Name: ROSUVASTATIN 40MG TABLETS] 90 tablet 0     Sig: TAKE 1 TABLET(40 MG) BY MOUTH EVERY EVENING        Duplicate Pended Encounter(s)/ Last Prescribed Details: (includes pharmacy & prescriber details)   Ordering Encounter Report    Associated Reports   View Encounter                Note composed:5:05 PM 01/26/2022

## 2022-01-31 ENCOUNTER — TELEPHONE (OUTPATIENT)
Dept: ADMINISTRATIVE | Facility: CLINIC | Age: 62
End: 2022-01-31
Payer: MEDICARE

## 2022-02-01 ENCOUNTER — TELEPHONE (OUTPATIENT)
Dept: ADMINISTRATIVE | Facility: CLINIC | Age: 62
End: 2022-02-01
Payer: MEDICARE

## 2022-02-01 ENCOUNTER — OFFICE VISIT (OUTPATIENT)
Dept: INTERNAL MEDICINE | Facility: CLINIC | Age: 62
End: 2022-02-01
Payer: MEDICARE

## 2022-02-01 DIAGNOSIS — Z00.00 ENCOUNTER FOR PREVENTIVE HEALTH EXAMINATION: Primary | ICD-10-CM

## 2022-02-01 DIAGNOSIS — F32.0 CURRENT MILD EPISODE OF MAJOR DEPRESSIVE DISORDER WITHOUT PRIOR EPISODE: ICD-10-CM

## 2022-02-01 DIAGNOSIS — R91.1 LUNG NODULE: ICD-10-CM

## 2022-02-01 DIAGNOSIS — D70.8 OTHER NEUTROPENIA: ICD-10-CM

## 2022-02-01 DIAGNOSIS — F31.81 BIPOLAR 2 DISORDER: Chronic | ICD-10-CM

## 2022-02-01 DIAGNOSIS — E03.9 ACQUIRED HYPOTHYROIDISM: Chronic | ICD-10-CM

## 2022-02-01 DIAGNOSIS — J84.10 PULMONARY GRANULOMA: ICD-10-CM

## 2022-02-01 DIAGNOSIS — J44.9 CHRONIC OBSTRUCTIVE PULMONARY DISEASE, UNSPECIFIED COPD TYPE: Chronic | ICD-10-CM

## 2022-02-01 DIAGNOSIS — E78.2 MIXED HYPERLIPIDEMIA: Chronic | ICD-10-CM

## 2022-02-01 DIAGNOSIS — F17.200 TOBACCO DEPENDENCE SYNDROME: ICD-10-CM

## 2022-02-01 DIAGNOSIS — M32.9 LUPUS: Chronic | ICD-10-CM

## 2022-02-01 DIAGNOSIS — I70.0 AORTIC ATHEROSCLEROSIS: ICD-10-CM

## 2022-02-01 DIAGNOSIS — I10 ESSENTIAL HYPERTENSION, BENIGN: Chronic | ICD-10-CM

## 2022-02-01 PROCEDURE — 1159F MED LIST DOCD IN RCRD: CPT | Mod: CPTII,S$GLB,, | Performed by: NURSE PRACTITIONER

## 2022-02-01 PROCEDURE — G0439 PR MEDICARE ANNUAL WELLNESS SUBSEQUENT VISIT: ICD-10-PCS | Mod: 95,,, | Performed by: NURSE PRACTITIONER

## 2022-02-01 PROCEDURE — 1160F PR REVIEW ALL MEDS BY PRESCRIBER/CLIN PHARMACIST DOCUMENTED: ICD-10-PCS | Mod: CPTII,S$GLB,, | Performed by: NURSE PRACTITIONER

## 2022-02-01 PROCEDURE — 1159F PR MEDICATION LIST DOCUMENTED IN MEDICAL RECORD: ICD-10-PCS | Mod: CPTII,S$GLB,, | Performed by: NURSE PRACTITIONER

## 2022-02-01 PROCEDURE — 1160F RVW MEDS BY RX/DR IN RCRD: CPT | Mod: CPTII,S$GLB,, | Performed by: NURSE PRACTITIONER

## 2022-02-01 PROCEDURE — G0439 PPPS, SUBSEQ VISIT: HCPCS | Mod: 95,,, | Performed by: NURSE PRACTITIONER

## 2022-02-01 NOTE — PATIENT INSTRUCTIONS
Counseling and Referral of Other Preventative  (Italic type indicates deductible and co-insurance are waived)    Patient Name: Breanna Guido  Today's Date: 2/1/2022    Health Maintenance       Date Due Completion Date    Mammogram 10/28/2022 10/28/2021    Override on 9/27/2012: Done    TETANUS VACCINE 07/17/2023 7/17/2013    Pneumococcal Vaccines (Age 0-64) (2 of 2 - PPSV23) 09/06/2025 9/6/2020    Lipid Panel 02/22/2026 2/22/2021    Colorectal Cancer Screening 05/22/2028 5/22/2018    Override on 6/27/2012: Done        No orders of the defined types were placed in this encounter.    The following information is provided to all patients.  This information is to help you find resources for any of the problems found today that may be affecting your health:                Living healthy guide: www.[facility].[place].gov      Understanding Diabetes: www.diabetes.org      Eating healthy: www.cdc.gov/healthyweight      [facility] home safety checklist: www.cdc.gov/steadi/patient.html      Agency on Aging: www.goea.[place].[facility]      Alcoholics anonymous (AA): www.aa.org      Physical Activity: www.jostin.nih.gov/zt5fost      Tobacco use: www.quitwithusla.org

## 2022-02-01 NOTE — PROGRESS NOTES
The patient location is: [place]  The chief complaint leading to consultation is: Medicare HRA    Visit type: audiovisual    Face to Face time with patient: 25 minutes  40 minutes of total time spent on the encounter, which includes face to face time and non-face to face time preparing to see the patient (eg, review of tests), Obtaining and/or reviewing separately obtained history, Documenting clinical information in the electronic or other health record, Independently interpreting results (not separately reported) and communicating results to the patient/family/caregiver, or Care coordination (not separately reported).         Each patient to whom he or she provides medical services by telemedicine is:  (1) informed of the relationship between the physician and patient and the respective role of any other health care provider with respect to management of the patient; and (2) notified that he or she may decline to receive medical services by telemedicine and may withdraw from such care at any time.    Notes: Pt of Dr. Arambula, here virtually for Medicare HRA      Breanna Norrisckett presented for a  Medicare AWV and comprehensive Health Risk Assessment today. The following components were reviewed and updated:    · Medical history  · Family History  · Social history  · Allergies and Current Medications  · Health Risk Assessment  · Health Maintenance  · Care Team         ** See Completed Assessments for Annual Wellness Visit within the encounter summary.**         The following assessments were completed:  · Living Situation  · CAGE  · Depression Screening  · Fall Risk Assessment (MACH 10)  · Hearing Assessment(HHI)  · Cognitive Function Screening  · Nutrition Screening  · ADL Screening  · PAQ Screening      Physical Exam  Limited PE, seen virtually, no distress during video visit       Diagnoses and health risks identified today and associated recommendations/orders:    1. Encounter for preventive health  examination  Exam done    Health Maintenance updated    Records reviewed    2. Aortic atherosclerosis  Chronic, followed by PCP    3. Essential hypertension, benign  Chronic, followed by PCP    Take medications as prescribed.    Monitor BP at home, goal BP < or = 140/80, call office if consistently above this range.    Follow low salt DASH diet and exercise.    BMI reviewed.    Go to ED if Headaches, blurred vision, chest pain, or SOB occurs along with elevated readings > or = 160/90.    4. Mixed hyperlipidemia  Chronic, followed by PCP    Continue cholesterol med, low fat diet, and exercise.     5. Chronic obstructive pulmonary disease, unspecified COPD type  Chronic, followed by PCP    6. Pulmonary granuloma  Chronic, followed by PCP    7. Lupus  Chronic, followed by PCP    8. Lung nodule  Chronic, followed by PCP    9. Other neutropenia  Chronic, followed by PCP    10. Current mild episode of major depressive disorder without prior episode  Chronic, followed by PCP    11. Bipolar 2 disorder  Chronic, followed by PCP    12. Tobacco dependence syndrome  Smoking Cessation counseling done. Pt not ready to quit. Advised of the Smoking Cessation program    13. Acquired hypothyroidism  Chronic, followed by PCP    On thyroid meds, advised to take separate from other meds and vitamins    Provided Doreatha with a 5-10 year written screening schedule and personal prevention plan. Recommendations were developed using the USPSTF age appropriate recommendations. Education, counseling, and referrals were provided as needed. After Visit Summary printed and given to patient which includes a list of additional screenings\tests needed.    Follow up in about 4 months (around 6/8/2022) for with PCP Dr. Arambula as scheduled.    Jacklyn Mireles DNP  I offered to discuss advanced care planning, including how to pick a person who would make decisions for you if you were unable to make them for yourself, called a health care power of  , and what kind of decisions you might make such as use of life sustaining treatments such as ventilators and tube feeding when faced with a life limiting illness recorded on a living will that they will need to know. (How you want to be cared for as you near the end of your natural life)     X Patient is interested in learning more about how to make advanced directives.  I provided them paperwork and offered to discuss this with them.

## 2022-02-21 ENCOUNTER — TELEPHONE (OUTPATIENT)
Dept: RHEUMATOLOGY | Facility: CLINIC | Age: 62
End: 2022-02-21
Payer: MEDICARE

## 2022-02-21 ENCOUNTER — PATIENT MESSAGE (OUTPATIENT)
Dept: RHEUMATOLOGY | Facility: CLINIC | Age: 62
End: 2022-02-21
Payer: MEDICARE

## 2022-02-21 NOTE — TELEPHONE ENCOUNTER
Please schedule overdue standing lupus labs, appt and need copy of her annual Ophthalmology Plaquenil check so it can be refilled. Thank you. KALEE

## 2022-02-23 DIAGNOSIS — D84.9 IMMUNOSUPPRESSED STATUS: ICD-10-CM

## 2022-02-24 ENCOUNTER — LAB VISIT (OUTPATIENT)
Dept: LAB | Facility: OTHER | Age: 62
End: 2022-02-24
Attending: INTERNAL MEDICINE
Payer: MEDICARE

## 2022-02-24 DIAGNOSIS — M32.9 SYSTEMIC LUPUS ERYTHEMATOSUS, UNSPECIFIED SLE TYPE, UNSPECIFIED ORGAN INVOLVEMENT STATUS: ICD-10-CM

## 2022-02-24 LAB
ALBUMIN SERPL BCP-MCNC: 4 G/DL (ref 3.5–5.2)
ALP SERPL-CCNC: 66 U/L (ref 55–135)
ALT SERPL W/O P-5'-P-CCNC: 14 U/L (ref 10–44)
ANION GAP SERPL CALC-SCNC: 11 MMOL/L (ref 8–16)
AST SERPL-CCNC: 20 U/L (ref 10–40)
BASOPHILS # BLD AUTO: 0.03 K/UL (ref 0–0.2)
BASOPHILS NFR BLD: 0.8 % (ref 0–1.9)
BILIRUB SERPL-MCNC: 0.3 MG/DL (ref 0.1–1)
BUN SERPL-MCNC: 9 MG/DL (ref 8–23)
C3 SERPL-MCNC: 132 MG/DL (ref 50–180)
C4 SERPL-MCNC: 33 MG/DL (ref 11–44)
CALCIUM SERPL-MCNC: 9.4 MG/DL (ref 8.7–10.5)
CHLORIDE SERPL-SCNC: 108 MMOL/L (ref 95–110)
CO2 SERPL-SCNC: 23 MMOL/L (ref 23–29)
CREAT SERPL-MCNC: 0.8 MG/DL (ref 0.5–1.4)
CRP SERPL-MCNC: 0.7 MG/L (ref 0–8.2)
DIFFERENTIAL METHOD: ABNORMAL
EOSINOPHIL # BLD AUTO: 0.1 K/UL (ref 0–0.5)
EOSINOPHIL NFR BLD: 1.6 % (ref 0–8)
ERYTHROCYTE [DISTWIDTH] IN BLOOD BY AUTOMATED COUNT: 13.3 % (ref 11.5–14.5)
ERYTHROCYTE [SEDIMENTATION RATE] IN BLOOD: 9 MM/HR (ref 0–20)
EST. GFR  (AFRICAN AMERICAN): >60 ML/MIN/1.73 M^2
EST. GFR  (NON AFRICAN AMERICAN): >60 ML/MIN/1.73 M^2
GLUCOSE SERPL-MCNC: 102 MG/DL (ref 70–110)
HCT VFR BLD AUTO: 39.8 % (ref 37–48.5)
HGB BLD-MCNC: 13.4 G/DL (ref 12–16)
IMM GRANULOCYTES # BLD AUTO: 0 K/UL (ref 0–0.04)
IMM GRANULOCYTES NFR BLD AUTO: 0 % (ref 0–0.5)
LYMPHOCYTES # BLD AUTO: 2.1 K/UL (ref 1–4.8)
LYMPHOCYTES NFR BLD: 54.7 % (ref 18–48)
MCH RBC QN AUTO: 30.6 PG (ref 27–31)
MCHC RBC AUTO-ENTMCNC: 33.7 G/DL (ref 32–36)
MCV RBC AUTO: 91 FL (ref 82–98)
MONOCYTES # BLD AUTO: 0.3 K/UL (ref 0.3–1)
MONOCYTES NFR BLD: 8.7 % (ref 4–15)
NEUTROPHILS # BLD AUTO: 1.3 K/UL (ref 1.8–7.7)
NEUTROPHILS NFR BLD: 34.2 % (ref 38–73)
NRBC BLD-RTO: 0 /100 WBC
PLATELET # BLD AUTO: 162 K/UL (ref 150–450)
PMV BLD AUTO: 11.4 FL (ref 9.2–12.9)
POTASSIUM SERPL-SCNC: 3.5 MMOL/L (ref 3.5–5.1)
PROT SERPL-MCNC: 7.7 G/DL (ref 6–8.4)
RBC # BLD AUTO: 4.38 M/UL (ref 4–5.4)
SODIUM SERPL-SCNC: 142 MMOL/L (ref 136–145)
WBC # BLD AUTO: 3.8 K/UL (ref 3.9–12.7)

## 2022-02-24 PROCEDURE — 85651 RBC SED RATE NONAUTOMATED: CPT | Performed by: INTERNAL MEDICINE

## 2022-02-24 PROCEDURE — 86160 COMPLEMENT ANTIGEN: CPT | Performed by: INTERNAL MEDICINE

## 2022-02-24 PROCEDURE — 80053 COMPREHEN METABOLIC PANEL: CPT | Performed by: INTERNAL MEDICINE

## 2022-02-24 PROCEDURE — 86140 C-REACTIVE PROTEIN: CPT | Performed by: INTERNAL MEDICINE

## 2022-02-24 PROCEDURE — 85025 COMPLETE CBC W/AUTO DIFF WBC: CPT | Performed by: INTERNAL MEDICINE

## 2022-02-24 PROCEDURE — 86160 COMPLEMENT ANTIGEN: CPT | Mod: 59 | Performed by: INTERNAL MEDICINE

## 2022-02-24 PROCEDURE — 36415 COLL VENOUS BLD VENIPUNCTURE: CPT | Performed by: INTERNAL MEDICINE

## 2022-02-24 PROCEDURE — 86225 DNA ANTIBODY NATIVE: CPT | Performed by: INTERNAL MEDICINE

## 2022-02-25 LAB — DSDNA AB SER-ACNC: NORMAL [IU]/ML

## 2022-03-17 ENCOUNTER — TELEPHONE (OUTPATIENT)
Dept: INTERNAL MEDICINE | Facility: CLINIC | Age: 62
End: 2022-03-17
Payer: MEDICARE

## 2022-03-17 NOTE — TELEPHONE ENCOUNTER
I received a fax from N w/ Progress Notes    Dr. Arambula reviewed it  I sent it to be scanned to the medical record

## 2022-03-29 ENCOUNTER — PATIENT MESSAGE (OUTPATIENT)
Dept: INTERNAL MEDICINE | Facility: CLINIC | Age: 62
End: 2022-03-29
Payer: MEDICARE

## 2022-04-14 ENCOUNTER — OFFICE VISIT (OUTPATIENT)
Dept: INTERNAL MEDICINE | Facility: CLINIC | Age: 62
End: 2022-04-14
Payer: MEDICARE

## 2022-04-14 ENCOUNTER — OFFICE VISIT (OUTPATIENT)
Dept: OPTOMETRY | Facility: CLINIC | Age: 62
End: 2022-04-14
Payer: MEDICARE

## 2022-04-14 VITALS
DIASTOLIC BLOOD PRESSURE: 68 MMHG | WEIGHT: 145.06 LBS | BODY MASS INDEX: 25.7 KG/M2 | HEART RATE: 66 BPM | SYSTOLIC BLOOD PRESSURE: 129 MMHG

## 2022-04-14 DIAGNOSIS — Z79.899 HIGH RISK MEDICATION USE: Primary | ICD-10-CM

## 2022-04-14 DIAGNOSIS — H52.03 HYPEROPIA WITH ASTIGMATISM AND PRESBYOPIA, BILATERAL: ICD-10-CM

## 2022-04-14 DIAGNOSIS — H02.826 EYELID CYST, LEFT: ICD-10-CM

## 2022-04-14 DIAGNOSIS — H52.203 HYPEROPIA WITH ASTIGMATISM AND PRESBYOPIA, BILATERAL: ICD-10-CM

## 2022-04-14 DIAGNOSIS — M54.42 ACUTE BILATERAL LOW BACK PAIN WITH LEFT-SIDED SCIATICA: Primary | ICD-10-CM

## 2022-04-14 DIAGNOSIS — M32.9 SYSTEMIC LUPUS ERYTHEMATOSUS, UNSPECIFIED SLE TYPE, UNSPECIFIED ORGAN INVOLVEMENT STATUS: ICD-10-CM

## 2022-04-14 DIAGNOSIS — H52.4 HYPEROPIA WITH ASTIGMATISM AND PRESBYOPIA, BILATERAL: ICD-10-CM

## 2022-04-14 DIAGNOSIS — H25.13 NUCLEAR SCLEROSIS, BILATERAL: ICD-10-CM

## 2022-04-14 PROCEDURE — 99999 PR PBB SHADOW E&M-EST. PATIENT-LVL III: CPT | Mod: PBBFAC,,, | Performed by: OPTOMETRIST

## 2022-04-14 PROCEDURE — 92015 PR REFRACTION: ICD-10-PCS | Mod: S$GLB,,, | Performed by: OPTOMETRIST

## 2022-04-14 PROCEDURE — 99213 PR OFFICE/OUTPT VISIT, EST, LEVL III, 20-29 MIN: ICD-10-PCS | Mod: S$GLB,,, | Performed by: STUDENT IN AN ORGANIZED HEALTH CARE EDUCATION/TRAINING PROGRAM

## 2022-04-14 PROCEDURE — 92134 POSTERIOR SEGMENT OCT RETINA (OCULAR COHERENCE TOMOGRAPHY)-BOTH EYES: ICD-10-PCS | Mod: S$GLB,,, | Performed by: OPTOMETRIST

## 2022-04-14 PROCEDURE — 99999 PR PBB SHADOW E&M-EST. PATIENT-LVL III: CPT | Mod: PBBFAC,,, | Performed by: STUDENT IN AN ORGANIZED HEALTH CARE EDUCATION/TRAINING PROGRAM

## 2022-04-14 PROCEDURE — 4010F ACE/ARB THERAPY RXD/TAKEN: CPT | Mod: CPTII,S$GLB,, | Performed by: STUDENT IN AN ORGANIZED HEALTH CARE EDUCATION/TRAINING PROGRAM

## 2022-04-14 PROCEDURE — 4010F PR ACE/ARB THEARPY RXD/TAKEN: ICD-10-PCS | Mod: CPTII,S$GLB,, | Performed by: OPTOMETRIST

## 2022-04-14 PROCEDURE — 92083 EXTENDED VISUAL FIELD XM: CPT | Mod: S$GLB,,, | Performed by: OPTOMETRIST

## 2022-04-14 PROCEDURE — 92014 COMPRE OPH EXAM EST PT 1/>: CPT | Mod: S$GLB,,, | Performed by: OPTOMETRIST

## 2022-04-14 PROCEDURE — 92015 DETERMINE REFRACTIVE STATE: CPT | Mod: S$GLB,,, | Performed by: OPTOMETRIST

## 2022-04-14 PROCEDURE — 3078F PR MOST RECENT DIASTOLIC BLOOD PRESSURE < 80 MM HG: ICD-10-PCS | Mod: CPTII,S$GLB,, | Performed by: STUDENT IN AN ORGANIZED HEALTH CARE EDUCATION/TRAINING PROGRAM

## 2022-04-14 PROCEDURE — 3074F SYST BP LT 130 MM HG: CPT | Mod: CPTII,S$GLB,, | Performed by: STUDENT IN AN ORGANIZED HEALTH CARE EDUCATION/TRAINING PROGRAM

## 2022-04-14 PROCEDURE — 4010F PR ACE/ARB THEARPY RXD/TAKEN: ICD-10-PCS | Mod: CPTII,S$GLB,, | Performed by: STUDENT IN AN ORGANIZED HEALTH CARE EDUCATION/TRAINING PROGRAM

## 2022-04-14 PROCEDURE — 3008F PR BODY MASS INDEX (BMI) DOCUMENTED: ICD-10-PCS | Mod: CPTII,S$GLB,, | Performed by: STUDENT IN AN ORGANIZED HEALTH CARE EDUCATION/TRAINING PROGRAM

## 2022-04-14 PROCEDURE — 2023F PR DILATED RETINAL EXAM W/O EVID OF RETINOPATHY: ICD-10-PCS | Mod: CPTII,S$GLB,, | Performed by: OPTOMETRIST

## 2022-04-14 PROCEDURE — 99999 PR PBB SHADOW E&M-EST. PATIENT-LVL III: ICD-10-PCS | Mod: PBBFAC,,, | Performed by: OPTOMETRIST

## 2022-04-14 PROCEDURE — 1159F PR MEDICATION LIST DOCUMENTED IN MEDICAL RECORD: ICD-10-PCS | Mod: CPTII,S$GLB,, | Performed by: OPTOMETRIST

## 2022-04-14 PROCEDURE — 92014 PR EYE EXAM, EST PATIENT,COMPREHESV: ICD-10-PCS | Mod: S$GLB,,, | Performed by: OPTOMETRIST

## 2022-04-14 PROCEDURE — 99213 OFFICE O/P EST LOW 20 MIN: CPT | Mod: S$GLB,,, | Performed by: STUDENT IN AN ORGANIZED HEALTH CARE EDUCATION/TRAINING PROGRAM

## 2022-04-14 PROCEDURE — 3074F PR MOST RECENT SYSTOLIC BLOOD PRESSURE < 130 MM HG: ICD-10-PCS | Mod: CPTII,S$GLB,, | Performed by: STUDENT IN AN ORGANIZED HEALTH CARE EDUCATION/TRAINING PROGRAM

## 2022-04-14 PROCEDURE — 99999 PR PBB SHADOW E&M-EST. PATIENT-LVL III: ICD-10-PCS | Mod: PBBFAC,,, | Performed by: STUDENT IN AN ORGANIZED HEALTH CARE EDUCATION/TRAINING PROGRAM

## 2022-04-14 PROCEDURE — 92083 HUMPHREY VISUAL FIELD - OU - BOTH EYES: ICD-10-PCS | Mod: S$GLB,,, | Performed by: OPTOMETRIST

## 2022-04-14 PROCEDURE — 2023F DILAT RTA XM W/O RTNOPTHY: CPT | Mod: CPTII,S$GLB,, | Performed by: OPTOMETRIST

## 2022-04-14 PROCEDURE — 3078F DIAST BP <80 MM HG: CPT | Mod: CPTII,S$GLB,, | Performed by: STUDENT IN AN ORGANIZED HEALTH CARE EDUCATION/TRAINING PROGRAM

## 2022-04-14 PROCEDURE — 3008F BODY MASS INDEX DOCD: CPT | Mod: CPTII,S$GLB,, | Performed by: STUDENT IN AN ORGANIZED HEALTH CARE EDUCATION/TRAINING PROGRAM

## 2022-04-14 PROCEDURE — 92134 CPTRZ OPH DX IMG PST SGM RTA: CPT | Mod: S$GLB,,, | Performed by: OPTOMETRIST

## 2022-04-14 PROCEDURE — 1159F MED LIST DOCD IN RCRD: CPT | Mod: CPTII,S$GLB,, | Performed by: OPTOMETRIST

## 2022-04-14 PROCEDURE — 4010F ACE/ARB THERAPY RXD/TAKEN: CPT | Mod: CPTII,S$GLB,, | Performed by: OPTOMETRIST

## 2022-04-14 RX ORDER — GABAPENTIN 300 MG/1
300 CAPSULE ORAL NIGHTLY PRN
Qty: 30 CAPSULE | Refills: 0 | Status: SHIPPED | OUTPATIENT
Start: 2022-04-14 | End: 2022-06-08

## 2022-04-14 RX ORDER — CYCLOBENZAPRINE HCL 10 MG
10 TABLET ORAL 3 TIMES DAILY PRN
Qty: 30 TABLET | Refills: 0 | Status: SHIPPED | OUTPATIENT
Start: 2022-04-14 | End: 2022-04-24

## 2022-04-14 NOTE — PROGRESS NOTES
HPI     Last eye exam was 3/18/21 with Dr. Cervantes.  Patient states no vision changes since last exam.   Patient denies diplopia, headaches, flashes/floaters, and pain.    Plaquenil 300 mg Daily PO      Last edited by Barbara Rodriguez MA on 4/14/2022  2:09 PM. (History)            Assessment /Plan     For exam results, see Encounter Report.    High risk medication use  -     [place] Visual Field - OU - Extended - Both Eyes  -     OCT- Retina    Systemic lupus erythematosus, unspecified SLE type, unspecified organ involvement status  -     Wood Visual Field - OU - Extended - Both Eyes  -     OCT- Retina    Nuclear sclerosis, bilateral    Eyelid cyst, left    Hyperopia with astigmatism and presbyopia, bilateral                 1-2.  All testing normal OU-no retinopathy.  Monitor yearly.  3.  Educated on cataracts and affects on vision.  Patient happy with vision.  Monitor.  4.  Patient states cyst has increased in size and would like it removed.  Refer to Dr. Fox.    5.  Bifocal rx given.   Retina flat and intact OU--no holes, tears, breaks, or RDs.

## 2022-04-14 NOTE — PROGRESS NOTES
Subjective:       Patient ID: Breanna Guido is a 61 y.o. female.    Chief Complaint: Acute bilateral low back pain with left-sided sciatica [M54.42]    Patient is new to me, PCP is Dr. Arambula.    Left leg pain -  Started with toe numbness, tingling, stinging pain  Then started to go down the left leg, tightness, stinging pain  No preceding trauma  Has been worsening over the last 2-3 months  History of low back pain with intermittent sciatica pain  Worse with lying flat, prolonged walking  Denies weakness in extremities, saddle anesthesia, or bowel/bladder in continence     Review of Systems   Musculoskeletal: Positive for arthralgias and myalgias.   Neurological: Positive for numbness.         Current Outpatient Medications   Medication Instructions    albuterol (PROVENTIL/VENTOLIN HFA) 90 mcg/actuation inhaler INHALE 2 PUFFS INTO THE LUNGS EVERY 6 HOURS AS NEEDED FOR WHEEZING OR SHORTNESS OF BREATH. RESCUE    ALPRAZolam (XANAX) 2 mg, Oral, 2 times daily PRN    amLODIPine (NORVASC) 5 mg, Oral, Daily    budesonide-formoterol 160-4.5 mcg (SYMBICORT) 160-4.5 mcg/actuation HFAA INHALE 2 PUFFS BY MOUTH EVERY 12 HOURS    diclofenac sodium (VOLTAREN) 2 g, Topical (Top), 4 times daily    duloxetine (CYMBALTA) 60 MG capsule Take one capsule along with cymbalta 30mg daily to equal 90mg daily    hydrOXYchloroQUINE (PLAQUENIL) 300 mg, Oral, Daily    levothyroxine (SYNTHROID) 88 mcg, Oral, Before breakfast    losartan (COZAAR) 100 mg, Oral, Daily    pregabalin (LYRICA) 150 mg, Oral, 2 times daily, Generic is fine    QUEtiapine (SEROQUEL) 300 mg, Oral, Daily    rosuvastatin (CRESTOR) 40 mg, Oral, Nightly    vitamin D (VITAMIN D3) 185 mg, Daily     Objective:      Vitals:    04/14/22 0938   BP: 129/68   Pulse: 66   Weight: 65.8 kg (145 lb 1 oz)   PainSc:   2     Body mass index is 25.7 kg/m².    Physical Exam  Vitals and nursing note reviewed.   Constitutional:       General: She is not in acute distress.      Appearance: She is not ill-appearing or diaphoretic.   Cardiovascular:      Rate and Rhythm: Normal rate and regular rhythm.      Heart sounds: Normal heart sounds. No murmur heard.    No friction rub. No gallop.   Pulmonary:      Effort: Pulmonary effort is normal. No respiratory distress.      Breath sounds: Normal breath sounds. No stridor. No wheezing, rhonchi or rales.   Musculoskeletal:      Lumbar back: Spasms and tenderness present. No swelling, deformity or bony tenderness. Positive left straight leg raise test. Negative right straight leg raise test.      Comments:   Negative DEYA bilaterally  Positive straight leg raise left, negative right  TTP upper gluteal region bilaterally  Non-TTP SI joint bilaterally  TTP trochanteric bursa left, negative right    Pain with forward flexion  Pain with facet loading     Hip flexion left 5/5 right 5/5  Knee extension left 5/5 right 5/5  Knee flexion left 5/5 right 5/5       Skin:     General: Skin is warm and dry.      Comments: Color WNL   Neurological:      Mental Status: She is alert. Mental status is at baseline.   Psychiatric:         Mood and Affect: Mood normal.         Behavior: Behavior normal.         Assessment:       1. Acute bilateral low back pain with left-sided sciatica        Plan:       Acute bilateral low back pain with left-sided sciatica  Discussed conservative treatment at home: heat/ice, gentle stretching, NSAIDs PRN, and muscle relaxer PRN.  Gabapentin for sciatica pain  Provided handout with home stretches and exercises. Do not perform stretches/exercises if they cause overt pain.  RTC in pain unimproved or worsening in the next 1-2 weeks.   -     cyclobenzaprine (FLEXERIL) 10 MG tablet; Take 1 tablet (10 mg total) by mouth 3 (three) times daily as needed for Muscle spasms.  -     gabapentin (NEURONTIN) 300 MG capsule; Take 1 capsule (300 mg total) by mouth nightly as needed (leg pain).      Virginia Gruber MD  4/14/2022

## 2022-05-12 ENCOUNTER — PATIENT MESSAGE (OUTPATIENT)
Dept: SMOKING CESSATION | Facility: CLINIC | Age: 62
End: 2022-05-12
Payer: MEDICARE

## 2022-06-07 NOTE — PROGRESS NOTES
Subjective:       Patient ID: Breanna Guido is a 62 y.o. female.    Chief Complaint: Hypertension    Pt here for f/u.     Migraines are well controlled on current meds. No new features or red flag symptoms.       BP is well controlled. Denies cp/sob/ha/vision or neuro changes. Home readings are well controlled.     HLD has been tx with crestor which she has tolerated well.     She is clinically euthyroid on synthroid.     Bipolar is followed by psychiatry regularly. No SI/HI. This is stable.     Pt sees Dr. Valencia in [facility] for fibromyalgia dx and lupus dx; however, her serology has been negative. It was recommended she be maintained on plaquenil which she takes. She is being followed regularly by ophtho as well. 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 Her pain is manageable with lyrica.    COPD is stable on current meds. She has RUL lung nodule for which she saw pulmonary. This has been stable on last CT 9/2020 but is due for f/u CT with pulm--she will contact them and understands importance of doing so.     She has osteopenia on most recent DEXA 7/2021. She is on ca/d.       Hypertension  Pertinent negatives include no chest pain, headaches, neck pain, palpitations or shortness of breath.     Review of Systems   Constitutional: Positive for activity change. Negative for unexpected weight change.   HENT: Negative for hearing loss, rhinorrhea and trouble swallowing.    Eyes: Negative for discharge and visual disturbance.   Respiratory: Negative for chest tightness, shortness of breath and wheezing.    Cardiovascular: Negative for chest pain and palpitations.   Gastrointestinal: Negative for abdominal pain, blood in stool, constipation, diarrhea, nausea and vomiting.   Endocrine: Negative for polydipsia and polyuria.   Genitourinary: Negative for difficulty urinating, dysuria, frequency, hematuria and menstrual problem.   Musculoskeletal: Positive for arthralgias. Negative for joint  swelling and neck pain.   Neurological: Negative for speech difficulty, weakness and headaches.   Hematological: Negative for adenopathy.   Psychiatric/Behavioral: Negative for confusion and dysphoric mood.       Objective:          Assessment:       1. Essential hypertension, benign    2. Mixed hyperlipidemia    3. Acquired hypothyroidism    4. Chronic obstructive pulmonary disease, unspecified COPD type    5. Bipolar 2 disorder    6. Osteopenia of multiple sites    7. Tobacco abuse    8. Aortic atherosclerosis    9. Lung nodule    10. Current mild episode of major depressive disorder without prior episode    11. Fibromyalgia    12. Lupus    13. Pulmonary granuloma        Plan:       1. Prior labs reviewed; update lipids       2. Keep f/u with specialists  3. Tobacco cessation  4. Chest CT w/o           Physical Exam  Constitutional:       Appearance: She is well-developed.   HENT:      Head: Normocephalic and atraumatic.   Eyes:      Pupils: Pupils are equal, round, and reactive to light.   Neck:      Thyroid: No thyroid mass or thyromegaly.      Vascular: No carotid bruit.   Cardiovascular:      Rate and Rhythm: Normal rate and regular rhythm.      Heart sounds: S1 normal and S2 normal. Murmur heard.   Pulmonary:      Effort: Pulmonary effort is normal.      Breath sounds: Normal breath sounds. No wheezing.   Abdominal:      General: Bowel sounds are normal. There is no distension.      Palpations: Abdomen is soft. There is no mass.      Tenderness: There is no abdominal tenderness.   Musculoskeletal:      Cervical back: Neck supple.      Lumbar back: Normal.   Neurological:      Mental Status: She is alert and oriented to person, place, and time.      Cranial Nerves: No cranial nerve deficit.      Sensory: No sensory deficit.      Coordination: Coordination normal.      Gait: Gait normal.      Deep Tendon Reflexes:      Reflex Scores:       Bicep reflexes are 2+ on the right side and 2+ on the left side.        Patellar reflexes are 2+ on the right side and 2+ on the left side.  Psychiatric:         Behavior: Behavior normal.

## 2022-06-08 ENCOUNTER — PATIENT MESSAGE (OUTPATIENT)
Dept: INTERNAL MEDICINE | Facility: CLINIC | Age: 62
End: 2022-06-08

## 2022-06-08 ENCOUNTER — IMMUNIZATION (OUTPATIENT)
Dept: INTERNAL MEDICINE | Facility: CLINIC | Age: 62
End: 2022-06-08
Payer: MEDICARE

## 2022-06-08 ENCOUNTER — OFFICE VISIT (OUTPATIENT)
Dept: INTERNAL MEDICINE | Facility: CLINIC | Age: 62
End: 2022-06-08
Payer: MEDICARE

## 2022-06-08 ENCOUNTER — LAB VISIT (OUTPATIENT)
Dept: LAB | Facility: OTHER | Age: 62
End: 2022-06-08
Attending: INTERNAL MEDICINE
Payer: MEDICARE

## 2022-06-08 VITALS
HEART RATE: 67 BPM | DIASTOLIC BLOOD PRESSURE: 80 MMHG | WEIGHT: 148.06 LBS | OXYGEN SATURATION: 96 % | BODY MASS INDEX: 26.23 KG/M2 | SYSTOLIC BLOOD PRESSURE: 126 MMHG | HEIGHT: 63 IN

## 2022-06-08 DIAGNOSIS — Z72.0 TOBACCO ABUSE: ICD-10-CM

## 2022-06-08 DIAGNOSIS — Z23 NEED FOR VACCINATION: Primary | ICD-10-CM

## 2022-06-08 DIAGNOSIS — M85.89 OSTEOPENIA OF MULTIPLE SITES: ICD-10-CM

## 2022-06-08 DIAGNOSIS — I10 ESSENTIAL HYPERTENSION, BENIGN: Primary | ICD-10-CM

## 2022-06-08 DIAGNOSIS — F32.0 CURRENT MILD EPISODE OF MAJOR DEPRESSIVE DISORDER WITHOUT PRIOR EPISODE: ICD-10-CM

## 2022-06-08 DIAGNOSIS — I70.0 AORTIC ATHEROSCLEROSIS: ICD-10-CM

## 2022-06-08 DIAGNOSIS — M32.9 LUPUS: ICD-10-CM

## 2022-06-08 DIAGNOSIS — F31.81 BIPOLAR 2 DISORDER: ICD-10-CM

## 2022-06-08 DIAGNOSIS — J84.10 PULMONARY GRANULOMA: ICD-10-CM

## 2022-06-08 DIAGNOSIS — R91.1 LUNG NODULE: ICD-10-CM

## 2022-06-08 DIAGNOSIS — E03.9 ACQUIRED HYPOTHYROIDISM: ICD-10-CM

## 2022-06-08 DIAGNOSIS — E78.2 MIXED HYPERLIPIDEMIA: ICD-10-CM

## 2022-06-08 DIAGNOSIS — J44.9 CHRONIC OBSTRUCTIVE PULMONARY DISEASE, UNSPECIFIED COPD TYPE: ICD-10-CM

## 2022-06-08 DIAGNOSIS — M79.7 FIBROMYALGIA: ICD-10-CM

## 2022-06-08 LAB
CHOLEST SERPL-MCNC: 127 MG/DL (ref 120–199)
CHOLEST/HDLC SERPL: 2.4 {RATIO} (ref 2–5)
HDLC SERPL-MCNC: 52 MG/DL (ref 40–75)
HDLC SERPL: 40.9 % (ref 20–50)
LDLC SERPL CALC-MCNC: 66.2 MG/DL (ref 63–159)
NONHDLC SERPL-MCNC: 75 MG/DL
TRIGL SERPL-MCNC: 44 MG/DL (ref 30–150)

## 2022-06-08 PROCEDURE — 99214 PR OFFICE/OUTPT VISIT, EST, LEVL IV, 30-39 MIN: ICD-10-PCS | Mod: S$GLB,,, | Performed by: INTERNAL MEDICINE

## 2022-06-08 PROCEDURE — 3008F PR BODY MASS INDEX (BMI) DOCUMENTED: ICD-10-PCS | Mod: CPTII,S$GLB,, | Performed by: INTERNAL MEDICINE

## 2022-06-08 PROCEDURE — 4010F PR ACE/ARB THEARPY RXD/TAKEN: ICD-10-PCS | Mod: CPTII,S$GLB,, | Performed by: INTERNAL MEDICINE

## 2022-06-08 PROCEDURE — 3074F SYST BP LT 130 MM HG: CPT | Mod: CPTII,S$GLB,, | Performed by: INTERNAL MEDICINE

## 2022-06-08 PROCEDURE — 3079F PR MOST RECENT DIASTOLIC BLOOD PRESSURE 80-89 MM HG: ICD-10-PCS | Mod: CPTII,S$GLB,, | Performed by: INTERNAL MEDICINE

## 2022-06-08 PROCEDURE — 4010F ACE/ARB THERAPY RXD/TAKEN: CPT | Mod: CPTII,S$GLB,, | Performed by: INTERNAL MEDICINE

## 2022-06-08 PROCEDURE — 3074F PR MOST RECENT SYSTOLIC BLOOD PRESSURE < 130 MM HG: ICD-10-PCS | Mod: CPTII,S$GLB,, | Performed by: INTERNAL MEDICINE

## 2022-06-08 PROCEDURE — 1159F PR MEDICATION LIST DOCUMENTED IN MEDICAL RECORD: ICD-10-PCS | Mod: CPTII,S$GLB,, | Performed by: INTERNAL MEDICINE

## 2022-06-08 PROCEDURE — 36415 COLL VENOUS BLD VENIPUNCTURE: CPT | Performed by: INTERNAL MEDICINE

## 2022-06-08 PROCEDURE — 1159F MED LIST DOCD IN RCRD: CPT | Mod: CPTII,S$GLB,, | Performed by: INTERNAL MEDICINE

## 2022-06-08 PROCEDURE — 1160F RVW MEDS BY RX/DR IN RCRD: CPT | Mod: CPTII,S$GLB,, | Performed by: INTERNAL MEDICINE

## 2022-06-08 PROCEDURE — 99214 OFFICE O/P EST MOD 30 MIN: CPT | Mod: S$GLB,,, | Performed by: INTERNAL MEDICINE

## 2022-06-08 PROCEDURE — 1160F PR REVIEW ALL MEDS BY PRESCRIBER/CLIN PHARMACIST DOCUMENTED: ICD-10-PCS | Mod: CPTII,S$GLB,, | Performed by: INTERNAL MEDICINE

## 2022-06-08 PROCEDURE — 3008F BODY MASS INDEX DOCD: CPT | Mod: CPTII,S$GLB,, | Performed by: INTERNAL MEDICINE

## 2022-06-08 PROCEDURE — 99499 UNLISTED E&M SERVICE: CPT | Mod: S$GLB,,, | Performed by: INTERNAL MEDICINE

## 2022-06-08 PROCEDURE — 99999 PR PBB SHADOW E&M-EST. PATIENT-LVL IV: ICD-10-PCS | Mod: PBBFAC,,, | Performed by: INTERNAL MEDICINE

## 2022-06-08 PROCEDURE — 80061 LIPID PANEL: CPT | Performed by: INTERNAL MEDICINE

## 2022-06-08 PROCEDURE — 99499 RISK ADDL DX/OHS AUDIT: ICD-10-PCS | Mod: S$GLB,,, | Performed by: INTERNAL MEDICINE

## 2022-06-08 PROCEDURE — 3079F DIAST BP 80-89 MM HG: CPT | Mod: CPTII,S$GLB,, | Performed by: INTERNAL MEDICINE

## 2022-06-08 PROCEDURE — 91305 COVID-19, MRNA, LNP-S, PF, 30 MCG/0.3 ML DOSE VACCINE (PFIZER): CPT | Mod: PBBFAC | Performed by: INTERNAL MEDICINE

## 2022-06-08 PROCEDURE — 99999 PR PBB SHADOW E&M-EST. PATIENT-LVL IV: CPT | Mod: PBBFAC,,, | Performed by: INTERNAL MEDICINE

## 2022-06-19 ENCOUNTER — PATIENT MESSAGE (OUTPATIENT)
Dept: INTERNAL MEDICINE | Facility: CLINIC | Age: 62
End: 2022-06-19
Payer: MEDICARE

## 2022-06-20 ENCOUNTER — TELEPHONE (OUTPATIENT)
Dept: SPINE | Facility: CLINIC | Age: 62
End: 2022-06-20
Payer: MEDICARE

## 2022-06-20 NOTE — TELEPHONE ENCOUNTER
"Spoke with patient. Scheduled pt for 6/21, to discuss stomach pain. Pt states "she has been putting the pain off and feel she needs to be seen."  "

## 2022-06-20 NOTE — TELEPHONE ENCOUNTER
This message is for patient in regards to his or hers appointment 06/21/22 at 9:20 am with Idania Solo Np.    On the 4th floor [address] in [facility]. We do ask that patients arrive 15 minutes before appointment time.  Patient may contact [phone] if there is any questions or concerns. Thank you for entrusting in [facility] with your care.

## 2022-06-20 NOTE — TELEPHONE ENCOUNTER
This message is for patient in regards to his or hers appointment 06/21/22 at 9:20 am with Idania Solo Np.    On the 4th floor [address] in the [facility]. We do ask that patients arrive 15 minutes before appointment time.  Patient may contact [phone] if there is any questions or concerns. Thank you for entrusting in [facility] with your care.

## 2022-06-21 ENCOUNTER — OFFICE VISIT (OUTPATIENT)
Dept: INTERNAL MEDICINE | Facility: CLINIC | Age: 62
End: 2022-06-21
Payer: MEDICARE

## 2022-06-21 ENCOUNTER — LAB VISIT (OUTPATIENT)
Dept: LAB | Facility: OTHER | Age: 62
End: 2022-06-21
Attending: INTERNAL MEDICINE
Payer: MEDICARE

## 2022-06-21 VITALS
BODY MASS INDEX: 26.32 KG/M2 | WEIGHT: 148.56 LBS | HEART RATE: 74 BPM | DIASTOLIC BLOOD PRESSURE: 78 MMHG | HEIGHT: 63 IN | SYSTOLIC BLOOD PRESSURE: 120 MMHG | OXYGEN SATURATION: 97 %

## 2022-06-21 DIAGNOSIS — J44.9 CHRONIC OBSTRUCTIVE PULMONARY DISEASE, UNSPECIFIED COPD TYPE: ICD-10-CM

## 2022-06-21 DIAGNOSIS — R10.13 DYSPEPSIA: ICD-10-CM

## 2022-06-21 DIAGNOSIS — R10.32 LLQ ABDOMINAL PAIN: ICD-10-CM

## 2022-06-21 DIAGNOSIS — R10.32 LLQ ABDOMINAL PAIN: Primary | ICD-10-CM

## 2022-06-21 LAB — LIPASE SERPL-CCNC: 12 U/L (ref 4–60)

## 2022-06-21 PROCEDURE — 3078F PR MOST RECENT DIASTOLIC BLOOD PRESSURE < 80 MM HG: ICD-10-PCS | Mod: CPTII,S$GLB,, | Performed by: INTERNAL MEDICINE

## 2022-06-21 PROCEDURE — 1160F RVW MEDS BY RX/DR IN RCRD: CPT | Mod: CPTII,S$GLB,, | Performed by: INTERNAL MEDICINE

## 2022-06-21 PROCEDURE — 99214 OFFICE O/P EST MOD 30 MIN: CPT | Mod: S$GLB,,, | Performed by: INTERNAL MEDICINE

## 2022-06-21 PROCEDURE — 83690 ASSAY OF LIPASE: CPT | Performed by: INTERNAL MEDICINE

## 2022-06-21 PROCEDURE — 4010F PR ACE/ARB THEARPY RXD/TAKEN: ICD-10-PCS | Mod: CPTII,S$GLB,, | Performed by: INTERNAL MEDICINE

## 2022-06-21 PROCEDURE — 36415 COLL VENOUS BLD VENIPUNCTURE: CPT | Performed by: INTERNAL MEDICINE

## 2022-06-21 PROCEDURE — 1159F MED LIST DOCD IN RCRD: CPT | Mod: CPTII,S$GLB,, | Performed by: INTERNAL MEDICINE

## 2022-06-21 PROCEDURE — 3008F BODY MASS INDEX DOCD: CPT | Mod: CPTII,S$GLB,, | Performed by: INTERNAL MEDICINE

## 2022-06-21 PROCEDURE — 3008F PR BODY MASS INDEX (BMI) DOCUMENTED: ICD-10-PCS | Mod: CPTII,S$GLB,, | Performed by: INTERNAL MEDICINE

## 2022-06-21 PROCEDURE — 1160F PR REVIEW ALL MEDS BY PRESCRIBER/CLIN PHARMACIST DOCUMENTED: ICD-10-PCS | Mod: CPTII,S$GLB,, | Performed by: INTERNAL MEDICINE

## 2022-06-21 PROCEDURE — 99214 PR OFFICE/OUTPT VISIT, EST, LEVL IV, 30-39 MIN: ICD-10-PCS | Mod: S$GLB,,, | Performed by: INTERNAL MEDICINE

## 2022-06-21 PROCEDURE — 4010F ACE/ARB THERAPY RXD/TAKEN: CPT | Mod: CPTII,S$GLB,, | Performed by: INTERNAL MEDICINE

## 2022-06-21 PROCEDURE — 99999 PR PBB SHADOW E&M-EST. PATIENT-LVL III: ICD-10-PCS | Mod: PBBFAC,,, | Performed by: INTERNAL MEDICINE

## 2022-06-21 PROCEDURE — 3074F PR MOST RECENT SYSTOLIC BLOOD PRESSURE < 130 MM HG: ICD-10-PCS | Mod: CPTII,S$GLB,, | Performed by: INTERNAL MEDICINE

## 2022-06-21 PROCEDURE — 3074F SYST BP LT 130 MM HG: CPT | Mod: CPTII,S$GLB,, | Performed by: INTERNAL MEDICINE

## 2022-06-21 PROCEDURE — 1159F PR MEDICATION LIST DOCUMENTED IN MEDICAL RECORD: ICD-10-PCS | Mod: CPTII,S$GLB,, | Performed by: INTERNAL MEDICINE

## 2022-06-21 PROCEDURE — 99999 PR PBB SHADOW E&M-EST. PATIENT-LVL III: CPT | Mod: PBBFAC,,, | Performed by: INTERNAL MEDICINE

## 2022-06-21 PROCEDURE — 3078F DIAST BP <80 MM HG: CPT | Mod: CPTII,S$GLB,, | Performed by: INTERNAL MEDICINE

## 2022-06-21 RX ORDER — PANTOPRAZOLE SODIUM 40 MG/1
40 TABLET, DELAYED RELEASE ORAL DAILY
Qty: 30 TABLET | Refills: 2 | Status: SHIPPED | OUTPATIENT
Start: 2022-06-21 | End: 2022-08-22 | Stop reason: SDUPTHER

## 2022-06-21 NOTE — PROGRESS NOTES
Subjective:       Patient ID: Breanna Guido is a 62 y.o. female.    Chief Complaint: Nausea and Abdominal Pain    Pt c/o nausea and reflux-like symptoms for the past month--was a few time per week but now daily. She also had intermittent LLQ sharp abd pain a couple of times per week but not occurring daily and not increasing/worsening. She took covid tests during this time and all negative. No wt loss. Eating makes it worse. No blood in stool. No diarrhea/constipation. No dysuria/hematuria/urinary frequency. She has taken pepto bismol which has helped but needing it daily.    She also has had increase need for alb inh in the last month due to mild increase in SOB; no associated cp/wheezing. Not related to exertion. No SOB with above GI symptoms. No increased cough. No fever. No orthopnea/pnd/wt gain or edema. Using symbicort regularly as Rx.     Review of Systems   Constitutional: Negative for fever.   Respiratory: Positive for shortness of breath. Negative for cough and wheezing.    Cardiovascular: Negative for chest pain.   Gastrointestinal: Positive for abdominal pain and nausea. Negative for blood in stool, constipation, diarrhea and vomiting.   Genitourinary: Negative for dysuria and frequency.   Psychiatric/Behavioral: Negative for dysphoric mood.         Objective:      Physical Exam  Constitutional:       Appearance: Normal appearance. She is well-developed.   Eyes:      Extraocular Movements: Extraocular movements intact.      Pupils: Pupils are equal, round, and reactive to light.   Neck:      Thyroid: No thyromegaly.   Cardiovascular:      Rate and Rhythm: Normal rate and regular rhythm.      Heart sounds: Normal heart sounds.   Pulmonary:      Effort: Pulmonary effort is normal.      Breath sounds: Normal breath sounds.   Abdominal:      General: Bowel sounds are normal. There is no distension.      Palpations: Abdomen is soft. There is no mass.      Tenderness: There is no abdominal tenderness.  There is no right CVA tenderness or left CVA tenderness.   Musculoskeletal:      Cervical back: Neck supple.      Right lower leg: No edema.      Left lower leg: No edema.   Lymphadenopathy:      Cervical: No cervical adenopathy.   Neurological:      Mental Status: She is alert and oriented to person, place, and time.      Cranial Nerves: No cranial nerve deficit.   Psychiatric:         Mood and Affect: Mood normal.         Behavior: Behavior normal.         Assessment:       Problem List Items Addressed This Visit        Pulmonary    COPD (chronic obstructive pulmonary disease) (Chronic)      Other Visit Diagnoses     LLQ abdominal pain    -  Primary    Relevant Orders    Urinalysis, Reflex to Urine Culture Urine, Clean Catch    Lipase    Dyspepsia        Relevant Orders    H. pylori antigen, stool          Plan:       1. Prior labs reviewed; additional labs today  2. Collect stool for h pylori then start protonix  3. Change symbicort to trelegy as I suspect she needs escalation in COPD tx; no findings to suggest cardiac etiology  4. No convincing findings to suggest diverticulitis but educated pt on RTC/ED prompts which she understood

## 2022-06-22 ENCOUNTER — LAB VISIT (OUTPATIENT)
Dept: LAB | Facility: OTHER | Age: 62
End: 2022-06-22
Attending: INTERNAL MEDICINE
Payer: MEDICARE

## 2022-06-22 DIAGNOSIS — R10.13 DYSPEPSIA: ICD-10-CM

## 2022-06-22 PROCEDURE — 87338 HPYLORI STOOL AG IA: CPT | Performed by: INTERNAL MEDICINE

## 2022-06-23 ENCOUNTER — PATIENT MESSAGE (OUTPATIENT)
Dept: INTERNAL MEDICINE | Facility: CLINIC | Age: 62
End: 2022-06-23
Payer: MEDICARE

## 2022-06-23 DIAGNOSIS — J44.9 CHRONIC OBSTRUCTIVE PULMONARY DISEASE, UNSPECIFIED COPD TYPE: Primary | ICD-10-CM

## 2022-06-23 NOTE — TELEPHONE ENCOUNTER
Pulmonary referral.    Spoke with Insurance rep to start PA for Tramadol. Tramadol will require a PA, which can not be done over the phone. Form faxed to the office and was completed. Once form has vladimir signed by Dr. Ara Foote, when he returns to the office on Monday, it will be faxed.

## 2022-06-24 NOTE — TELEPHONE ENCOUNTER
"ms staff of provider she Is currently scheduled with: "Hello! Im reaching out to see if yall can pls help me get this pt scheduled for a pulm appt at [facility] as she cannot get to the  location. I couldn't find a staff pool for the [facility] pulm, so just seeing if you guys can help instead!  Karin"    I set a reminder for Monday to f/u  "

## 2022-06-27 ENCOUNTER — TELEPHONE (OUTPATIENT)
Dept: SPINE | Facility: CLINIC | Age: 62
End: 2022-06-27
Payer: MEDICARE

## 2022-06-27 NOTE — TELEPHONE ENCOUNTER
This message is for patient in regards to his or hers appointment 06/28/22 at 1:00 pm with Idania Solo Np.    On the 4th floor [address] in the [facility] and [facility]. We do ask that patients arrive 15 minutes before appointment time.  Patient may contact [phone] if there is any questions or concerns. Thank you for entrusting in [facility] with your care.

## 2022-06-28 ENCOUNTER — HOSPITAL ENCOUNTER (OUTPATIENT)
Dept: RADIOLOGY | Facility: OTHER | Age: 62
Discharge: HOME | End: 2022-06-28
Attending: INTERNAL MEDICINE
Payer: MEDICARE

## 2022-06-28 ENCOUNTER — OFFICE VISIT (OUTPATIENT)
Dept: SPINE | Facility: CLINIC | Age: 62
End: 2022-06-28
Payer: MEDICARE

## 2022-06-28 VITALS
DIASTOLIC BLOOD PRESSURE: 71 MMHG | BODY MASS INDEX: 26.22 KG/M2 | OXYGEN SATURATION: 98 % | WEIGHT: 148 LBS | HEART RATE: 62 BPM | TEMPERATURE: 97 F | SYSTOLIC BLOOD PRESSURE: 150 MMHG

## 2022-06-28 DIAGNOSIS — M54.42 CHRONIC LEFT-SIDED LOW BACK PAIN WITH LEFT-SIDED SCIATICA: ICD-10-CM

## 2022-06-28 DIAGNOSIS — M51.36 DDD (DEGENERATIVE DISC DISEASE), LUMBAR: ICD-10-CM

## 2022-06-28 DIAGNOSIS — M47.26 OSTEOARTHRITIS OF SPINE WITH RADICULOPATHY, LUMBAR REGION: Primary | ICD-10-CM

## 2022-06-28 DIAGNOSIS — G89.29 CHRONIC LEFT-SIDED LOW BACK PAIN WITH LEFT-SIDED SCIATICA: ICD-10-CM

## 2022-06-28 DIAGNOSIS — M54.16 LEFT LUMBAR RADICULOPATHY: ICD-10-CM

## 2022-06-28 DIAGNOSIS — R91.1 LUNG NODULE: ICD-10-CM

## 2022-06-28 PROCEDURE — 4010F ACE/ARB THERAPY RXD/TAKEN: CPT | Mod: CPTII,S$GLB,, | Performed by: NURSE PRACTITIONER

## 2022-06-28 PROCEDURE — 71250 CT THORAX DX C-: CPT | Mod: TC

## 2022-06-28 PROCEDURE — 99204 PR OFFICE/OUTPT VISIT, NEW, LEVL IV, 45-59 MIN: ICD-10-PCS | Mod: S$GLB,,, | Performed by: NURSE PRACTITIONER

## 2022-06-28 PROCEDURE — 3077F PR MOST RECENT SYSTOLIC BLOOD PRESSURE >= 140 MM HG: ICD-10-PCS | Mod: CPTII,S$GLB,, | Performed by: NURSE PRACTITIONER

## 2022-06-28 PROCEDURE — 3077F SYST BP >= 140 MM HG: CPT | Mod: CPTII,S$GLB,, | Performed by: NURSE PRACTITIONER

## 2022-06-28 PROCEDURE — 3008F PR BODY MASS INDEX (BMI) DOCUMENTED: ICD-10-PCS | Mod: CPTII,S$GLB,, | Performed by: NURSE PRACTITIONER

## 2022-06-28 PROCEDURE — 4010F PR ACE/ARB THEARPY RXD/TAKEN: ICD-10-PCS | Mod: CPTII,S$GLB,, | Performed by: NURSE PRACTITIONER

## 2022-06-28 PROCEDURE — 71250 CT THORAX DX C-: CPT | Mod: 26,,, | Performed by: RADIOLOGY

## 2022-06-28 PROCEDURE — 3078F PR MOST RECENT DIASTOLIC BLOOD PRESSURE < 80 MM HG: ICD-10-PCS | Mod: CPTII,S$GLB,, | Performed by: NURSE PRACTITIONER

## 2022-06-28 PROCEDURE — 1159F PR MEDICATION LIST DOCUMENTED IN MEDICAL RECORD: ICD-10-PCS | Mod: CPTII,S$GLB,, | Performed by: NURSE PRACTITIONER

## 2022-06-28 PROCEDURE — 99204 OFFICE O/P NEW MOD 45 MIN: CPT | Mod: S$GLB,,, | Performed by: NURSE PRACTITIONER

## 2022-06-28 PROCEDURE — 1159F MED LIST DOCD IN RCRD: CPT | Mod: CPTII,S$GLB,, | Performed by: NURSE PRACTITIONER

## 2022-06-28 PROCEDURE — 3078F DIAST BP <80 MM HG: CPT | Mod: CPTII,S$GLB,, | Performed by: NURSE PRACTITIONER

## 2022-06-28 PROCEDURE — 99999 PR PBB SHADOW E&M-EST. PATIENT-LVL V: ICD-10-PCS | Mod: PBBFAC,,, | Performed by: NURSE PRACTITIONER

## 2022-06-28 PROCEDURE — 71250 CT CHEST WITHOUT CONTRAST: ICD-10-PCS | Mod: 26,,, | Performed by: RADIOLOGY

## 2022-06-28 PROCEDURE — 3008F BODY MASS INDEX DOCD: CPT | Mod: CPTII,S$GLB,, | Performed by: NURSE PRACTITIONER

## 2022-06-28 PROCEDURE — 1160F RVW MEDS BY RX/DR IN RCRD: CPT | Mod: CPTII,S$GLB,, | Performed by: NURSE PRACTITIONER

## 2022-06-28 PROCEDURE — 1160F PR REVIEW ALL MEDS BY PRESCRIBER/CLIN PHARMACIST DOCUMENTED: ICD-10-PCS | Mod: CPTII,S$GLB,, | Performed by: NURSE PRACTITIONER

## 2022-06-28 PROCEDURE — 99999 PR PBB SHADOW E&M-EST. PATIENT-LVL V: CPT | Mod: PBBFAC,,, | Performed by: NURSE PRACTITIONER

## 2022-06-28 NOTE — PROGRESS NOTES
Subjective:      Patient ID: Breanna Guido is a 62 y.o. female.    Chief Complaint: Mid-back Pain (Pt has pain in middle and lower back that radiates down to her thighs. )    HPI Ms. Guido is a 62 year old female here for evaluation of mid-low back pain, referred by Dr. Valencia for consultation.     C/o chronic left-sided low back pain with diffuse left lower extremity pain, worsened within the last month  History of chronic back pain, had injections in the past which provided good relief.    Worse with sitting up straight and standing  Had epidural injection in 2015 with good relief, PT years ago with some benefit  Taking Lyrica prescribed by Dr. Valencia    Lumbar xray 2021    FINDINGS:  Slight grade 1 retrolisthesis of L3 over L4.  Slight grade 1 retrolisthesis of L1 over L2.  Lumbar spine is otherwise normal in alignment.  Vertebral body heights are maintained.  No displaced fracture.     Mild and moderate multilevel lumbar spine disc space loss, most prominent at L4-5 and L5-S1.  Mild multilevel spondylosis.     Unremarkable SI joints.     Unremarkable soft tissues.     Impression:     No acute abnormality.  Degenerative disease.    Past Medical History:   Diagnosis Date    Anxiety     Arthritis     Bipolar 1 disorder     Cataract     COPD (chronic obstructive pulmonary disease)     Depression     bipolar 2    Eyelid lesion     Fibromyalgia     H/O corneal abrasion 2001    left eye with lead-based paint chips    Hyperlipidemia     Hypertension     Lupus     Migraine headache     Scleritis     Thyroid disease        Past Surgical History:   Procedure Laterality Date    HYSTERECTOMY      for sterilization, ovaries intact    TRIGGER FINGER RELEASE Right 7/28/2021    Procedure: RELEASE, TRIGGER FINGER, right ring & small;  Surgeon: Mellissa Guadarrama MD;  Location: [facility];  Service: Orthopedics;  Laterality: Right;  REGIONAL MAC       Family History   Problem Relation Age of Onset     Depression Father         suicide    Cataracts Mother     Rheum arthritis Mother     Lupus Mother     Arthritis Mother     Cataracts Sister     Rheum arthritis Sister     Breast cancer Maternal Aunt     Thyroid disease Daughter     No Known Problems Daughter     No Known Problems Daughter     Colon cancer Brother     Cancer Brother     No Known Problems Maternal Uncle     No Known Problems Paternal Aunt     No Known Problems Paternal Uncle     No Known Problems Maternal Grandmother     No Known Problems Maternal Grandfather     No Known Problems Paternal Grandmother     No Known Problems Paternal Grandfather     Cancer Sister     Cancer Brother     Early death Sister         Covid-19    Ovarian cancer Neg Hx     Melanoma Neg Hx     Amblyopia Neg Hx     Blindness Neg Hx     Diabetes Neg Hx     Glaucoma Neg Hx     Hypertension Neg Hx     Macular degeneration Neg Hx     Retinal detachment Neg Hx     Strabismus Neg Hx     Stroke Neg Hx        Social History     Socioeconomic History    Marital status:    Tobacco Use    Smoking status: Current Every Day Smoker     Packs/day: 1.00     Years: 45.00     Pack years: 45.00     Types: Cigarettes     Start date: 1974     Last attempt to quit: 2021     Years since quittin.4    Smokeless tobacco: Never Used   Substance and Sexual Activity    Alcohol use: Yes     Alcohol/week: 5.0 standard drinks     Types: 5 Glasses of wine per week     Comment: 0.5 to 1 bottle of wine per day     Drug use: Yes     Types: Marijuana     Comment: occasionally    Sexual activity: Not Currently     Birth control/protection: Abstinence, Surgical     Comment: Divored     Social Determinants of Health     Financial Resource Strain: Low Risk     Difficulty of Paying Living Expenses: Not hard at all   Food Insecurity: No Food Insecurity    Worried About Running Out of Food in the Last Year: Never true    Ran Out of Food in the Last Year: Never  true   Transportation Needs: No Transportation Needs    Lack of Transportation (Medical): No    Lack of Transportation (Non-Medical): No   Physical Activity: Sufficiently Active    Days of Exercise per Week: 7 days    Minutes of Exercise per Session: 60 min   Stress: Stress Concern Present    Feeling of Stress : To some extent   Social Connections: Socially Isolated    Frequency of Communication with Friends and Family: More than three times a week    Frequency of Social Gatherings with Friends and Family: Three times a week    Attends Shinto Services: Never    Active Member of Clubs or Organizations: No    Attends Club or Organization Meetings: Never    Marital Status:    Housing Stability: Low Risk     Unable to Pay for Housing in the Last Year: No    Number of Places Lived in the Last Year: 1    Unstable Housing in the Last Year: No       Current Outpatient Medications   Medication Sig Dispense Refill    albuterol (PROVENTIL/VENTOLIN HFA) 90 mcg/actuation inhaler INHALE 2 PUFFS INTO THE LUNGS EVERY 6 HOURS AS NEEDED FOR WHEEZING OR SHORTNESS OF BREATH. RESCUE 8.5 g 5    alprazolam (XANAX) 2 MG Tab Take 2 mg by mouth 2 (two) times daily as needed (pt takes 1/2 tab BID and 1 tab  at night).      amLODIPine (NORVASC) 5 MG tablet Take 1 tablet (5 mg total) by mouth once daily. 90 tablet 1    diclofenac sodium (VOLTAREN) 1 % Gel Apply 2 g topically 4 (four) times daily. 3 Tube 2    duloxetine (CYMBALTA) 60 MG capsule Take one capsule along with cymbalta 30mg daily to equal 90mg daily 90 capsule 3    fluticasone-umeclidin-vilanter (TRELEGY ELLIPTA) 100-62.5-25 mcg DsDv Inhale 1 puff into the lungs once daily. 60 each 3    hydrOXYchloroQUINE (PLAQUENIL) 200 mg tablet Take 1.5 tablets (300 mg total) by mouth once daily. 135 tablet 0    levothyroxine (SYNTHROID) 88 MCG tablet Take 1 tablet (88 mcg total) by mouth before breakfast. 90 tablet 3    losartan (COZAAR) 100 MG tablet Take 1  tablet (100 mg total) by mouth once daily. 90 tablet 3    pantoprazole (PROTONIX) 40 MG tablet Take 1 tablet (40 mg total) by mouth once daily. 30 tablet 2    pregabalin (LYRICA) 150 MG capsule Take 1 capsule (150 mg total) by mouth 2 (two) times daily. Generic is fine (Patient taking differently: Take 150 mg by mouth 2 (two) times daily. Generic is fine) 180 capsule 1    quetiapine (SEROQUEL) 300 MG Tab Take 300 mg by mouth once daily.       rosuvastatin (CRESTOR) 40 MG Tab Take 1 tablet (40 mg total) by mouth every evening. 90 tablet 3    vitamin D 1000 units Tab Take 185 mg by mouth once daily.       No current facility-administered medications for this visit.       Review of patient's allergies indicates:   Allergen Reactions    Bactrim [sulfamethoxazole-trimethoprim] Nausea And Vomiting    Sulfa (sulfonamide antibiotics)      Agitation^         Review of Systems   Constitutional: Negative for fever, weight gain and weight loss.   Cardiovascular: Positive for chest pain.   Respiratory: Positive for shortness of breath.    Musculoskeletal: Positive for back pain (  left leg). Negative for falls.   Gastrointestinal: Positive for abdominal pain, nausea and vomiting. Negative for bowel incontinence.   Genitourinary: Negative for bladder incontinence.   Neurological: Positive for focal weakness (  left leg) and paresthesias. Negative for sensory change and weakness.         Objective:        General: Breanna is well-developed, well-nourished, appears stated age, in no acute distress, alert and oriented to time, place and person.     General    Vitals reviewed.  Constitutional: She is oriented to person, place, and time. She appears well-developed and well-nourished.   HENT:   Head: Atraumatic.   Nose: Nose normal.   Eyes: Conjunctivae are normal.   Cardiovascular: Normal rate.    Pulmonary/Chest: Effort normal.   Abdominal: She exhibits no distension.   Neurological: She is alert and oriented to person,  place, and time.   Psychiatric: She has a normal mood and affect. Her behavior is normal. Judgment and thought content normal.     General Musculoskeletal Exam   Gait: normal     Back (L-Spine & T-Spine) / Neck (C-Spine) Exam     Tenderness Posterior midline palpation reveals tenderness of the Upper T-Spine and Lower T-Spine. Left paramedian tenderness of the Sacrum.     Back (L-Spine & T-Spine) Range of Motion   Extension: 30   Flexion: 90   Lateral bend right: 20   Lateral bend left: 20     Spinal Sensation   Right Side Sensation  L-Spine Level: normal  S-Spine Level: normal  Left Side Sensation  L-Spine Level: normal  S-Spine Level: normal    Other She has no scoliosis .  Spinal Kyphosis:  Absent    Comments:  Mild pain with facet loading on the left      Muscle Strength   Right Upper Extremity   Biceps: 5/5   Deltoid:  5/5  Triceps:  5/5  Left Upper Extremity  Biceps: 5/5   Deltoid:  5/5  Triceps:  5/5  Right Lower Extremity   Hip Flexion: 5/5   Quadriceps:  5/5   Ankle Dorsiflexion:  5/5   Anterior tibial:  5/5   EHL:  5/5  Left Lower Extremity   Hip Flexion: 5/5   Quadriceps:  5/5   Ankle Dorsiflexion:  5/5   Anterior tibial:  5/5   EHL:  5/5    Reflexes     Left Side  Biceps:  2+  Brachioradialis:  2+  Achilles:  2+  Ankle Clonus:  absent    Right Side   Biceps:  2+  Brachioradialis:  2+  Achilles:  2+  Ankle Clonus:  absent    Vascular Exam     Right Pulses        Carotid:                  2+    Left Pulses        Carotid:                  2+              Assessment:       1. Osteoarthritis of spine with radiculopathy, lumbar region    2. Left lumbar radiculopathy    3. DDD (degenerative disc disease), lumbar    4. Chronic left-sided low back pain with left-sided sciatica           Plan:          1. Prior imaging and records were reviewed today.  2. We discussed back pain and the nature of back pain.  We discussed that it is not one thing that causes the pain but an accumulation of multiple things that we  do.  Much of her pain appears to be related to degenerative changes and arthritis  3. Referral to healthy [facility] for evaluation and treatment of her low back pain, core and back strengthening, and good home exercise program.  4. We discussed posture sitting and the importance of trying to sit better.    5. We discussed the benefits of therapy and exercise and continuing to move.  6. Consider lumbar MRI if no improvement with [facility].  7. May consider injections with pain management pending relief with [facility].  8. RTC for follow-up in 3 months    More than 50% of the total time  of 45 minutes was spent face to face in counseling on diagnosis and treatment options. I also counseled patient  on common and most usual side effect of prescribed medications.  I reviewed Primary care , and other specialty's notes to better coordinate patient's care. All questions were answered, and patient voiced understanding.     Follow-up: Follow up in about 3 months (around 9/28/2022). If there are any questions prior to this, the patient was instructed to contact the office.

## 2022-06-29 ENCOUNTER — TELEPHONE (OUTPATIENT)
Dept: INTERNAL MEDICINE | Facility: CLINIC | Age: 62
End: 2022-06-29
Payer: MEDICARE

## 2022-06-29 ENCOUNTER — PATIENT MESSAGE (OUTPATIENT)
Dept: INTERNAL MEDICINE | Facility: CLINIC | Age: 62
End: 2022-06-29
Payer: MEDICARE

## 2022-06-29 DIAGNOSIS — A04.8 H. PYLORI INFECTION: Primary | ICD-10-CM

## 2022-06-29 LAB
H PYLORI AG STL QL IA: DETECTED
SPECIMEN SOURCE: ABNORMAL

## 2022-06-29 RX ORDER — BISMUTH SUBCITRATE POTASSIUM, METRONIDAZOLE, TETRACYCLINE HYDROCHLORIDE 140; 125; 125 MG/1; MG/1; MG/1
3 CAPSULE ORAL
Qty: 120 CAPSULE | Refills: 0 | Status: SHIPPED | OUTPATIENT
Start: 2022-06-29 | End: 2022-07-01

## 2022-06-29 NOTE — TELEPHONE ENCOUNTER
----- Message from Celestina Cardoso sent at 6/29/2022  2:28 PM CDT -----  Regarding: self [phone]  Type:  Patient Returning Call    Who Called:  self    Who Left Message for Patient:  Idania    Does the patient know what this is regarding?:Na    Would the patient rather a call back or a response via [facility]? Na    Best Call Back Number:[phone]

## 2022-06-30 NOTE — PROGRESS NOTES
Subjective:       Patient ID: Breanna Guido is a 62 y.o. female.    Chief Complaint: h pylori    The patient location is: home  The chief complaint leading to consultation is: H pylori    Visit type: audiovisual; pt was unable to connect video so did audio only    Face to Face time with patient: 6mins  8 minutes of total time spent on the encounter, which includes face to face time and non-face to face time preparing to see the patient (eg, review of tests), Obtaining and/or reviewing separately obtained history, Documenting clinical information in the electronic or other health record, Independently interpreting results (not separately reported) and communicating results to the patient/family/caregiver, or Care coordination (not separately reported).     Each patient to whom he or she provides medical services by telemedicine is:  (1) informed of the relationship between the physician and patient and the respective role of any other health care provider with respect to management of the patient; and (2) notified that he or she may decline to receive medical services by telemedicine and may withdraw from such care at any time.      Pt here to discuss recent positive stool test for H Pylori. We discussed implications of this and why it is important to tx. We discussed tx options and need for f/u testing. I expect this will help her GI symptoms discussed at most recent visit.     Review of Systems   Constitutional: Negative for fever and unexpected weight change.   Respiratory: Negative for shortness of breath.    Cardiovascular: Negative for chest pain.   Psychiatric/Behavioral: Negative for dysphoric mood.         Objective:      Physical Exam  Constitutional:       Appearance: She is well-developed.   Neurological:      Mental Status: She is alert and oriented to person, place, and time.   Psychiatric:         Mood and Affect: Mood normal.         Behavior: Behavior normal.         Assessment:       Problem  List Items Addressed This Visit    None     Visit Diagnoses     H. pylori infection    -  Primary          Plan:       1. Complete pylera as Rx  2. Repeat stool h pylori 6-8 wks  3. RTC prompts d/w pt and she understood

## 2022-07-01 ENCOUNTER — TELEPHONE (OUTPATIENT)
Dept: INTERNAL MEDICINE | Facility: CLINIC | Age: 62
End: 2022-07-01

## 2022-07-01 ENCOUNTER — OFFICE VISIT (OUTPATIENT)
Dept: INTERNAL MEDICINE | Facility: CLINIC | Age: 62
End: 2022-07-01
Payer: MEDICARE

## 2022-07-01 ENCOUNTER — PATIENT MESSAGE (OUTPATIENT)
Dept: INTERNAL MEDICINE | Facility: CLINIC | Age: 62
End: 2022-07-01

## 2022-07-01 VITALS — SYSTOLIC BLOOD PRESSURE: 122 MMHG | DIASTOLIC BLOOD PRESSURE: 78 MMHG

## 2022-07-01 DIAGNOSIS — A04.8 H. PYLORI INFECTION: Primary | ICD-10-CM

## 2022-07-01 PROCEDURE — 3074F PR MOST RECENT SYSTOLIC BLOOD PRESSURE < 130 MM HG: ICD-10-PCS | Mod: CPTII,95,, | Performed by: INTERNAL MEDICINE

## 2022-07-01 PROCEDURE — 1159F MED LIST DOCD IN RCRD: CPT | Mod: CPTII,95,, | Performed by: INTERNAL MEDICINE

## 2022-07-01 PROCEDURE — 3078F PR MOST RECENT DIASTOLIC BLOOD PRESSURE < 80 MM HG: ICD-10-PCS | Mod: CPTII,95,, | Performed by: INTERNAL MEDICINE

## 2022-07-01 PROCEDURE — 4010F ACE/ARB THERAPY RXD/TAKEN: CPT | Mod: CPTII,95,, | Performed by: INTERNAL MEDICINE

## 2022-07-01 PROCEDURE — 1160F RVW MEDS BY RX/DR IN RCRD: CPT | Mod: CPTII,95,, | Performed by: INTERNAL MEDICINE

## 2022-07-01 PROCEDURE — 99441 PR PHYSICIAN TELEPHONE EVALUATION 5-10 MIN: ICD-10-PCS | Mod: 95,,, | Performed by: INTERNAL MEDICINE

## 2022-07-01 PROCEDURE — 99441 PR PHYSICIAN TELEPHONE EVALUATION 5-10 MIN: CPT | Mod: 95,,, | Performed by: INTERNAL MEDICINE

## 2022-07-01 PROCEDURE — 1160F PR REVIEW ALL MEDS BY PRESCRIBER/CLIN PHARMACIST DOCUMENTED: ICD-10-PCS | Mod: CPTII,95,, | Performed by: INTERNAL MEDICINE

## 2022-07-01 PROCEDURE — 3074F SYST BP LT 130 MM HG: CPT | Mod: CPTII,95,, | Performed by: INTERNAL MEDICINE

## 2022-07-01 PROCEDURE — 3078F DIAST BP <80 MM HG: CPT | Mod: CPTII,95,, | Performed by: INTERNAL MEDICINE

## 2022-07-01 PROCEDURE — 4010F PR ACE/ARB THEARPY RXD/TAKEN: ICD-10-PCS | Mod: CPTII,95,, | Performed by: INTERNAL MEDICINE

## 2022-07-01 PROCEDURE — 1159F PR MEDICATION LIST DOCUMENTED IN MEDICAL RECORD: ICD-10-PCS | Mod: CPTII,95,, | Performed by: INTERNAL MEDICINE

## 2022-07-01 RX ORDER — METRONIDAZOLE 250 MG/1
250 TABLET ORAL EVERY 6 HOURS
Qty: 56 TABLET | Refills: 0 | Status: SHIPPED | OUTPATIENT
Start: 2022-07-01 | End: 2022-07-15

## 2022-07-01 RX ORDER — TETRACYCLINE HYDROCHLORIDE 500 MG/1
500 CAPSULE ORAL EVERY 6 HOURS
Qty: 56 CAPSULE | Refills: 0 | Status: SHIPPED | OUTPATIENT
Start: 2022-07-01 | End: 2022-07-15

## 2022-07-01 NOTE — TELEPHONE ENCOUNTER
Inform pt that insurance will not cover combo pill Pylera therefore, we'll need to give her the individual components:    Tetracycline 500mg every 6 hours x14 days  Metronidazole 250mg every 6 hours x14 days    She should get pepto bismol (bismuth subsalicylate) 262mg over the counter and take 2 tablets (524mg) every 6 hours for 14 days.    Also needs to increase protonix to 1 tablet bid x14 days    It is important she take all of these as instructed and if unable to do so, let us know.

## 2022-07-01 NOTE — TELEPHONE ENCOUNTER
"07/01 1314  Informed that the insurance would not approve the pylera even though the MD really tried. The listed message from Dr. Arambula was given to the patient. She wrote down the directions and this bruce will be sent to her via [email or web address]. Advised to consult with pharmacist to double check "mg"  dosage for pepto bismol. States she is on pantoprazole and informed that this is protonix. Advised to use a calender if necessary to note number of days and to call for any problems.Patient voiced an understanding.    Inform pt that insurance will not cover combo pill Pylera therefore, we'll need to give her the individual components:     Tetracycline 500mg every 6 hours x14 days  Metronidazole 250mg every 6 hours x14 days     She should get pepto bismol (bismuth subsalicylate) 262mg over the counter and take 2 tablets (524mg) every 6 hours for 14 days.     Also needs to increase protonix to 1 tablet bid x14 days     It is important she take all of these as instructed and if unable to do so, let us know.      "

## 2022-07-01 NOTE — TELEPHONE ENCOUNTER
"    07/01 1249  Response from [employer] fpr Pylera Cap  "Pylera is not FDA approved or supported by an accepted reverence for your medical condition H pylori infection with dyspepsia. In order to approve your medication, you must also have active duodenal ulcer or a history of a duodenal ulcer within 5 years. Therefore your drug is denied"        07/01Dr. Arambula completed [employer] [facility] for Pylera cap and form faxed to 1 [phone] as directed. Confirmation received.. Packet filed in media  "

## 2022-07-12 ENCOUNTER — TELEPHONE (OUTPATIENT)
Dept: INTERNAL MEDICINE | Facility: CLINIC | Age: 62
End: 2022-07-12
Payer: MEDICARE

## 2022-07-12 NOTE — TELEPHONE ENCOUNTER
----- Message from Idania Jain MA sent at 6/24/2022  7:46 AM CDT -----  Regarding: appt  Did pt get moved to main [facility]?

## 2022-08-11 ENCOUNTER — OFFICE VISIT (OUTPATIENT)
Dept: PULMONOLOGY | Facility: CLINIC | Age: 62
End: 2022-08-11
Payer: MEDICARE

## 2022-08-11 VITALS
DIASTOLIC BLOOD PRESSURE: 78 MMHG | WEIGHT: 151.88 LBS | HEIGHT: 63 IN | OXYGEN SATURATION: 98 % | BODY MASS INDEX: 26.91 KG/M2 | HEART RATE: 75 BPM | SYSTOLIC BLOOD PRESSURE: 129 MMHG

## 2022-08-11 DIAGNOSIS — F17.219 CIGARETTE NICOTINE DEPENDENCE WITH NICOTINE-INDUCED DISORDER: ICD-10-CM

## 2022-08-11 DIAGNOSIS — I70.0 AORTIC ATHEROSCLEROSIS: ICD-10-CM

## 2022-08-11 DIAGNOSIS — J44.9 CHRONIC OBSTRUCTIVE PULMONARY DISEASE, UNSPECIFIED COPD TYPE: ICD-10-CM

## 2022-08-11 PROCEDURE — 3074F SYST BP LT 130 MM HG: CPT | Mod: CPTII,S$GLB,, | Performed by: INTERNAL MEDICINE

## 2022-08-11 PROCEDURE — 99215 OFFICE O/P EST HI 40 MIN: CPT | Mod: S$GLB,,, | Performed by: INTERNAL MEDICINE

## 2022-08-11 PROCEDURE — 3078F PR MOST RECENT DIASTOLIC BLOOD PRESSURE < 80 MM HG: ICD-10-PCS | Mod: CPTII,S$GLB,, | Performed by: INTERNAL MEDICINE

## 2022-08-11 PROCEDURE — 3074F PR MOST RECENT SYSTOLIC BLOOD PRESSURE < 130 MM HG: ICD-10-PCS | Mod: CPTII,S$GLB,, | Performed by: INTERNAL MEDICINE

## 2022-08-11 PROCEDURE — 99499 RISK ADDL DX/OHS AUDIT: ICD-10-PCS | Mod: S$GLB,,, | Performed by: INTERNAL MEDICINE

## 2022-08-11 PROCEDURE — 99499 UNLISTED E&M SERVICE: CPT | Mod: S$GLB,,, | Performed by: INTERNAL MEDICINE

## 2022-08-11 PROCEDURE — 4010F ACE/ARB THERAPY RXD/TAKEN: CPT | Mod: CPTII,S$GLB,, | Performed by: INTERNAL MEDICINE

## 2022-08-11 PROCEDURE — 99215 PR OFFICE/OUTPT VISIT, EST, LEVL V, 40-54 MIN: ICD-10-PCS | Mod: S$GLB,,, | Performed by: INTERNAL MEDICINE

## 2022-08-11 PROCEDURE — 99999 PR PBB SHADOW E&M-EST. PATIENT-LVL IV: ICD-10-PCS | Mod: PBBFAC,,, | Performed by: INTERNAL MEDICINE

## 2022-08-11 PROCEDURE — 3008F BODY MASS INDEX DOCD: CPT | Mod: CPTII,S$GLB,, | Performed by: INTERNAL MEDICINE

## 2022-08-11 PROCEDURE — 3078F DIAST BP <80 MM HG: CPT | Mod: CPTII,S$GLB,, | Performed by: INTERNAL MEDICINE

## 2022-08-11 PROCEDURE — 99999 PR PBB SHADOW E&M-EST. PATIENT-LVL IV: CPT | Mod: PBBFAC,,, | Performed by: INTERNAL MEDICINE

## 2022-08-11 PROCEDURE — 4010F PR ACE/ARB THEARPY RXD/TAKEN: ICD-10-PCS | Mod: CPTII,S$GLB,, | Performed by: INTERNAL MEDICINE

## 2022-08-11 PROCEDURE — 3008F PR BODY MASS INDEX (BMI) DOCUMENTED: ICD-10-PCS | Mod: CPTII,S$GLB,, | Performed by: INTERNAL MEDICINE

## 2022-08-11 NOTE — PROGRESS NOTES
Subjective:       Patient ID: Breanna Guido is a 62 y.o. female.    Chief Complaint: COPD    HPI:   Breanna Guido is a 62 y.o. female new to me last seen by Mary Montiel 1/9/20 for a pulmonary nodule who presents for follow up.    ICS/LABA broadened to triple therapy by her PCP. Respiratory symptoms improved on Trelegy. Denies any URI/LRTIs.     Walk twice a day with her dog,50 min total. Denies any limitations to her walking.     Does report GERD and substernal, nonexertional chest discomfort. Recently treated for h pylori.    Chronic, mild cough, denies sputum production. Occ nighttime cough    Denies f/c/ns, weight loss, malaise, fatigue    Denies rashes, myalgias, arthralgias, hair/nail changes, dysphagia, eye changes, raynauds, mucosal ulcerations    Exposures:  Tobacco- ongoing, up to 1 ppd for 45 years  Inhalational Agents- denies  Mold- denies  Birds- denies    Childhood history of Lung Disease: denies    Review of Systems   Constitutional: Negative for fever, chills, weight loss, activity change, appetite change, night sweats and weakness.   HENT: Negative for postnasal drip, sinus pressure, voice change and congestion.    Eyes: Negative for redness.   Respiratory: Positive for cough and use of rescue inhaler. Negative for sputum production, shortness of breath, wheezing, previous hospitialization due to pulmonary problems, dyspnea on extertion, somnolence and Paroxysmal Nocturnal Dyspnea.    Cardiovascular: Positive for chest pain. Negative for palpitations and leg swelling.   Musculoskeletal: Negative for joint swelling and myalgias.   Skin: Negative for rash.   Gastrointestinal: Negative for acid reflux.   Neurological: Negative for syncope and weakness.   Psychiatric/Behavioral: Negative for sleep disturbance.         Social History     Tobacco Use    Smoking status: Current Every Day Smoker     Packs/day: 1.00     Years: 45.00     Pack years: 45.00     Types: Cigarettes     Start date:  1974     Last attempt to quit: 2021     Years since quittin.6    Smokeless tobacco: Never Used   Substance Use Topics    Alcohol use: Yes     Alcohol/week: 5.0 standard drinks     Types: 5 Glasses of wine per week     Comment: 0.5 to 1 bottle of wine per day        Review of patient's allergies indicates:   Allergen Reactions    Bactrim [sulfamethoxazole-trimethoprim] Nausea And Vomiting    Sulfa (sulfonamide antibiotics)      Agitation^     Past Medical History:   Diagnosis Date    Anxiety     Arthritis     Bipolar 1 disorder     Cataract     COPD (chronic obstructive pulmonary disease)     Depression     bipolar 2    Eyelid lesion     Fibromyalgia     H/O corneal abrasion     left eye with lead-based paint chips    Hyperlipidemia     Hypertension     Lupus     Migraine headache     Scleritis     Thyroid disease      Past Surgical History:   Procedure Laterality Date    HYSTERECTOMY      for sterilization, ovaries intact    TRIGGER FINGER RELEASE Right 2021    Procedure: RELEASE, TRIGGER FINGER, right ring & small;  Surgeon: Mellissa Guadarrama MD;  Location: [facility];  Service: Orthopedics;  Laterality: Right;  REGIONAL MAC     Current Outpatient Medications on File Prior to Visit   Medication Sig    albuterol (PROVENTIL/VENTOLIN HFA) 90 mcg/actuation inhaler INHALE 2 PUFFS INTO THE LUNGS EVERY 6 HOURS AS NEEDED FOR WHEEZING OR SHORTNESS OF BREATH. RESCUE    alprazolam (XANAX) 2 MG Tab Take 2 mg by mouth 2 (two) times daily as needed (pt takes 1/2 tab BID and 1 tab  at night).    amLODIPine (NORVASC) 5 MG tablet Take 1 tablet (5 mg total) by mouth once daily.    diclofenac sodium (VOLTAREN) 1 % Gel Apply 2 g topically 4 (four) times daily.    duloxetine (CYMBALTA) 60 MG capsule Take one capsule along with cymbalta 30mg daily to equal 90mg daily    fluticasone-umeclidin-vilanter (TRELEGY ELLIPTA) 100-62.5-25 mcg DsDv Inhale 1 puff into the lungs once daily.     "levothyroxine (SYNTHROID) 88 MCG tablet Take 1 tablet (88 mcg total) by mouth before breakfast.    losartan (COZAAR) 100 MG tablet Take 1 tablet (100 mg total) by mouth once daily.    pregabalin (LYRICA) 150 MG capsule Take 1 capsule (150 mg total) by mouth 2 (two) times daily. Generic is fine (Patient taking differently: Take 150 mg by mouth 2 (two) times daily. Generic is fine)    quetiapine (SEROQUEL) 300 MG Tab Take 300 mg by mouth once daily.     rosuvastatin (CRESTOR) 40 MG Tab Take 1 tablet (40 mg total) by mouth every evening.    vitamin D 1000 units Tab Take 185 mg by mouth once daily.     No current facility-administered medications on file prior to visit.       Objective:      Vitals:    08/11/22 1450   BP: 129/78   BP Location: Right arm   Patient Position: Sitting   Pulse: 75   SpO2: 98%   Weight: 68.9 kg (151 lb 14.4 oz)   Height: 5' 3" (1.6 m)     Physical Exam   Constitutional: She is oriented to person, place, and time. She appears well-developed and well-nourished.   HENT:   Nose: No mucosal edema.   Mouth/Throat: Oropharynx is clear and moist. Mallampati Score: I.   Neck: No tracheal deviation present.   Cardiovascular: Normal rate, regular rhythm and intact distal pulses.   Pulmonary/Chest: Normal expansion, symmetric chest wall expansion, effort normal and breath sounds normal. No respiratory distress. She has no wheezes. She has no rhonchi. She has no rales.   Abdominal: She exhibits no distension.   Musculoskeletal:         General: No edema.      Cervical back: Neck supple.   Lymphadenopathy: No supraclavicular adenopathy is present.     She has no cervical adenopathy.   Neurological: She is alert and oriented to person, place, and time.   Skin: Skin is warm and dry. No cyanosis or erythema. Nails show no clubbing.   Psychiatric: She has a normal mood and affect.   Nursing note and vitals reviewed.      Personal Diagnostic Review    Laboratory:  2/24/22  Bicarb- 24  Eosinophils- " 0.1      CT Chest 6/28/22- Images personally reviewed and compared to priors. I agree w/ the radiologist who notes;  Subcentimeter right upper lobe nodule remains stable going back to April 2019, almost certainly benign.  No new or suspicious nodule.     PFTs 8/16/22- Personally reviewed, PFTs are consistent with mild restriction, no w/o obstruction and uninterpretable DLCO    No flowsheet data found.        Assessment:     Problem List Items Addressed This Visit        Pulmonary    COPD (chronic obstructive pulmonary disease) (Chronic)     Stepped up to triple therapy with some improvement in dyspnea. Ok to continue. PFTs w/o significant obstruction and shows mild restriction. Cont FRANCIS    Encouraged regular, progressive exercise    Strongly encouraged smoking cessation. Not interested at this time           Relevant Orders    Spirometry without Bronchodilator (Completed)    Lung Volumes    DLCO-Carbon Monoxide Diffusing Capacity       Cardiac/Vascular    Aortic atherosclerosis     Noted on CT Chest. Mnmt per PCP              Other    Cigarette nicotine dependence with nicotine-induced disorder     Not interested in quitting at this time. Qualifies for yearly screening CT Chest. Next would be 6/2023                 48 minutes of total time spent on the encounter, which includes face to face time and non-face to face time preparing to see the patient (eg, review of tests), Obtaining and/or reviewing separately obtained history, Documenting clinical information in the electronic or other health record, Independently interpreting results (not separately reported) and communicating results to the patient/family/caregiver, or Care coordination (not separately reported).

## 2022-08-16 ENCOUNTER — HOSPITAL ENCOUNTER (OUTPATIENT)
Dept: PULMONOLOGY | Facility: CLINIC | Age: 62
Discharge: HOME | End: 2022-08-16
Payer: MEDICARE

## 2022-08-16 ENCOUNTER — HOSPITAL ENCOUNTER (EMERGENCY)
Facility: HOSPITAL | Age: 62
Discharge: HOME | End: 2022-08-16
Attending: EMERGENCY MEDICINE
Payer: MEDICARE

## 2022-08-16 ENCOUNTER — OFFICE VISIT (OUTPATIENT)
Dept: OPHTHALMOLOGY | Facility: CLINIC | Age: 62
End: 2022-08-16
Payer: MEDICARE

## 2022-08-16 VITALS
WEIGHT: 147 LBS | RESPIRATION RATE: 16 BRPM | HEIGHT: 63 IN | OXYGEN SATURATION: 99 % | TEMPERATURE: 98 F | HEART RATE: 64 BPM | DIASTOLIC BLOOD PRESSURE: 68 MMHG | BODY MASS INDEX: 26.05 KG/M2 | SYSTOLIC BLOOD PRESSURE: 132 MMHG

## 2022-08-16 DIAGNOSIS — J44.9 CHRONIC OBSTRUCTIVE PULMONARY DISEASE, UNSPECIFIED COPD TYPE: ICD-10-CM

## 2022-08-16 DIAGNOSIS — M54.9 ACUTE BACK PAIN, UNSPECIFIED BACK LOCATION, UNSPECIFIED BACK PAIN LATERALITY: ICD-10-CM

## 2022-08-16 DIAGNOSIS — H02.825 CYST OF LEFT LOWER EYELID: Primary | ICD-10-CM

## 2022-08-16 DIAGNOSIS — R07.9 CHEST PAIN: Primary | ICD-10-CM

## 2022-08-16 LAB
ALBUMIN SERPL BCP-MCNC: 4.4 G/DL (ref 3.5–5.2)
ALP SERPL-CCNC: 62 U/L (ref 55–135)
ALT SERPL W/O P-5'-P-CCNC: 16 U/L (ref 10–44)
ANION GAP SERPL CALC-SCNC: 9 MMOL/L (ref 8–16)
AST SERPL-CCNC: 23 U/L (ref 10–40)
BASOPHILS # BLD AUTO: 0.03 K/UL (ref 0–0.2)
BASOPHILS NFR BLD: 0.7 % (ref 0–1.9)
BILIRUB SERPL-MCNC: 0.6 MG/DL (ref 0.1–1)
BNP SERPL-MCNC: 13 PG/ML (ref 0–99)
BUN SERPL-MCNC: 8 MG/DL (ref 6–30)
BUN SERPL-MCNC: 8 MG/DL (ref 6–30)
BUN SERPL-MCNC: 9 MG/DL (ref 8–23)
CALCIUM SERPL-MCNC: 9.7 MG/DL (ref 8.7–10.5)
CHLORIDE SERPL-SCNC: 102 MMOL/L (ref 95–110)
CHLORIDE SERPL-SCNC: 104 MMOL/L (ref 95–110)
CHLORIDE SERPL-SCNC: 105 MMOL/L (ref 95–110)
CO2 SERPL-SCNC: 24 MMOL/L (ref 23–29)
CREAT SERPL-MCNC: 0.5 MG/DL (ref 0.5–1.4)
CREAT SERPL-MCNC: 0.5 MG/DL (ref 0.5–1.4)
CREAT SERPL-MCNC: 0.6 MG/DL (ref 0.5–1.4)
DIFFERENTIAL METHOD: ABNORMAL
EOSINOPHIL # BLD AUTO: 0 K/UL (ref 0–0.5)
EOSINOPHIL NFR BLD: 0.9 % (ref 0–8)
ERYTHROCYTE [DISTWIDTH] IN BLOOD BY AUTOMATED COUNT: 13.4 % (ref 11.5–14.5)
EST. GFR  (NO RACE VARIABLE): >60 ML/MIN/1.73 M^2
GLUCOSE SERPL-MCNC: 89 MG/DL (ref 70–110)
GLUCOSE SERPL-MCNC: 93 MG/DL (ref 70–110)
GLUCOSE SERPL-MCNC: 94 MG/DL (ref 70–110)
HCT VFR BLD AUTO: 41.8 % (ref 37–48.5)
HCT VFR BLD CALC: 44 %PCV (ref 36–54)
HCT VFR BLD CALC: 45 %PCV (ref 36–54)
HGB BLD-MCNC: 14.7 G/DL (ref 12–16)
IMM GRANULOCYTES # BLD AUTO: 0.01 K/UL (ref 0–0.04)
IMM GRANULOCYTES NFR BLD AUTO: 0.2 % (ref 0–0.5)
LYMPHOCYTES # BLD AUTO: 1.8 K/UL (ref 1–4.8)
LYMPHOCYTES NFR BLD: 41.9 % (ref 18–48)
MCH RBC QN AUTO: 32 PG (ref 27–31)
MCHC RBC AUTO-ENTMCNC: 35.2 G/DL (ref 32–36)
MCV RBC AUTO: 91 FL (ref 82–98)
MONOCYTES # BLD AUTO: 0.3 K/UL (ref 0.3–1)
MONOCYTES NFR BLD: 7.6 % (ref 4–15)
NEUTROPHILS # BLD AUTO: 2.1 K/UL (ref 1.8–7.7)
NEUTROPHILS NFR BLD: 48.7 % (ref 38–73)
NRBC BLD-RTO: 0 /100 WBC
PLATELET # BLD AUTO: 166 K/UL (ref 150–450)
PMV BLD AUTO: 10.4 FL (ref 9.2–12.9)
POC IONIZED CALCIUM: 1.11 MMOL/L (ref 1.06–1.42)
POC IONIZED CALCIUM: 1.24 MMOL/L (ref 1.06–1.42)
POC TCO2 (MEASURED): 25 MMOL/L (ref 23–29)
POC TCO2 (MEASURED): 27 MMOL/L (ref 23–29)
POTASSIUM BLD-SCNC: 3.4 MMOL/L (ref 3.5–5.1)
POTASSIUM BLD-SCNC: 3.4 MMOL/L (ref 3.5–5.1)
POTASSIUM SERPL-SCNC: 3.5 MMOL/L (ref 3.5–5.1)
PROT SERPL-MCNC: 8.5 G/DL (ref 6–8.4)
RBC # BLD AUTO: 4.59 M/UL (ref 4–5.4)
SAMPLE: ABNORMAL
SAMPLE: ABNORMAL
SODIUM BLD-SCNC: 141 MMOL/L (ref 136–145)
SODIUM BLD-SCNC: 143 MMOL/L (ref 136–145)
SODIUM SERPL-SCNC: 138 MMOL/L (ref 136–145)
TROPONIN I SERPL DL<=0.01 NG/ML-MCNC: <0.006 NG/ML (ref 0–0.03)
TROPONIN I SERPL DL<=0.01 NG/ML-MCNC: <0.006 NG/ML (ref 0–0.03)
WBC # BLD AUTO: 4.22 K/UL (ref 3.9–12.7)

## 2022-08-16 PROCEDURE — 87389 HIV-1 AG W/HIV-1&-2 AB AG IA: CPT | Performed by: PHYSICIAN ASSISTANT

## 2022-08-16 PROCEDURE — 99203 PR OFFICE/OUTPT VISIT, NEW, LEVL III, 30-44 MIN: ICD-10-PCS | Mod: S$GLB,,, | Performed by: OPHTHALMOLOGY

## 2022-08-16 PROCEDURE — 1159F PR MEDICATION LIST DOCUMENTED IN MEDICAL RECORD: ICD-10-PCS | Mod: CPTII,S$GLB,, | Performed by: OPHTHALMOLOGY

## 2022-08-16 PROCEDURE — 86803 HEPATITIS C AB TEST: CPT | Performed by: PHYSICIAN ASSISTANT

## 2022-08-16 PROCEDURE — 92285 EXTERNAL PHOTOGRAPHY - OU - BOTH EYES: ICD-10-PCS | Mod: S$GLB,,, | Performed by: OPHTHALMOLOGY

## 2022-08-16 PROCEDURE — 80053 COMPREHEN METABOLIC PANEL: CPT | Performed by: PHYSICIAN ASSISTANT

## 2022-08-16 PROCEDURE — 93010 EKG 12-LEAD: ICD-10-PCS | Mod: ,,, | Performed by: INTERNAL MEDICINE

## 2022-08-16 PROCEDURE — 94727 GAS DIL/WSHOT DETER LNG VOL: CPT | Mod: S$GLB,,, | Performed by: INTERNAL MEDICINE

## 2022-08-16 PROCEDURE — 4010F ACE/ARB THERAPY RXD/TAKEN: CPT | Mod: CPTII,S$GLB,, | Performed by: OPHTHALMOLOGY

## 2022-08-16 PROCEDURE — 94010 BREATHING CAPACITY TEST: CPT | Mod: S$GLB,,, | Performed by: INTERNAL MEDICINE

## 2022-08-16 PROCEDURE — 99203 OFFICE O/P NEW LOW 30 MIN: CPT | Mod: S$GLB,,, | Performed by: OPHTHALMOLOGY

## 2022-08-16 PROCEDURE — 94727 PR PULM FUNCTION TEST BY GAS: ICD-10-PCS | Mod: S$GLB,,, | Performed by: INTERNAL MEDICINE

## 2022-08-16 PROCEDURE — 93010 ELECTROCARDIOGRAM REPORT: CPT | Mod: ,,, | Performed by: INTERNAL MEDICINE

## 2022-08-16 PROCEDURE — 83880 ASSAY OF NATRIURETIC PEPTIDE: CPT | Performed by: PHYSICIAN ASSISTANT

## 2022-08-16 PROCEDURE — 25500020 PHARM REV CODE 255: Performed by: EMERGENCY MEDICINE

## 2022-08-16 PROCEDURE — 4010F PR ACE/ARB THEARPY RXD/TAKEN: ICD-10-PCS | Mod: CPTII,S$GLB,, | Performed by: OPHTHALMOLOGY

## 2022-08-16 PROCEDURE — 1160F PR REVIEW ALL MEDS BY PRESCRIBER/CLIN PHARMACIST DOCUMENTED: ICD-10-PCS | Mod: CPTII,S$GLB,, | Performed by: OPHTHALMOLOGY

## 2022-08-16 PROCEDURE — 99999 PR PBB SHADOW E&M-EST. PATIENT-LVL III: ICD-10-PCS | Mod: PBBFAC,,, | Performed by: OPHTHALMOLOGY

## 2022-08-16 PROCEDURE — 93005 ELECTROCARDIOGRAM TRACING: CPT

## 2022-08-16 PROCEDURE — 94729 DIFFUSING CAPACITY: CPT | Mod: S$GLB,,, | Performed by: INTERNAL MEDICINE

## 2022-08-16 PROCEDURE — 84484 ASSAY OF TROPONIN QUANT: CPT | Performed by: PHYSICIAN ASSISTANT

## 2022-08-16 PROCEDURE — 1160F RVW MEDS BY RX/DR IN RCRD: CPT | Mod: CPTII,S$GLB,, | Performed by: OPHTHALMOLOGY

## 2022-08-16 PROCEDURE — 85025 COMPLETE CBC W/AUTO DIFF WBC: CPT | Performed by: PHYSICIAN ASSISTANT

## 2022-08-16 PROCEDURE — 99285 EMERGENCY DEPT VISIT HI MDM: CPT | Mod: ,,, | Performed by: EMERGENCY MEDICINE

## 2022-08-16 PROCEDURE — 99285 PR EMERGENCY DEPT VISIT,LEVEL V: ICD-10-PCS | Mod: ,,, | Performed by: EMERGENCY MEDICINE

## 2022-08-16 PROCEDURE — 1159F MED LIST DOCD IN RCRD: CPT | Mod: CPTII,S$GLB,, | Performed by: OPHTHALMOLOGY

## 2022-08-16 PROCEDURE — 92285 EXTERNAL OCULAR PHOTOGRAPHY: CPT | Mod: S$GLB,,, | Performed by: OPHTHALMOLOGY

## 2022-08-16 PROCEDURE — 94010 BREATHING CAPACITY TEST: ICD-10-PCS | Mod: S$GLB,,, | Performed by: INTERNAL MEDICINE

## 2022-08-16 PROCEDURE — 94729 PR C02/MEMBANE DIFFUSE CAPACITY: ICD-10-PCS | Mod: S$GLB,,, | Performed by: INTERNAL MEDICINE

## 2022-08-16 PROCEDURE — 99999 PR PBB SHADOW E&M-EST. PATIENT-LVL III: CPT | Mod: PBBFAC,,, | Performed by: OPHTHALMOLOGY

## 2022-08-16 PROCEDURE — 25000003 PHARM REV CODE 250: Performed by: PHYSICIAN ASSISTANT

## 2022-08-16 PROCEDURE — 99285 EMERGENCY DEPT VISIT HI MDM: CPT | Mod: 25

## 2022-08-16 RX ORDER — MAG HYDROX/ALUMINUM HYD/SIMETH 200-200-20
5 SUSPENSION, ORAL (FINAL DOSE FORM) ORAL
Status: COMPLETED | OUTPATIENT
Start: 2022-08-16 | End: 2022-08-16

## 2022-08-16 RX ORDER — NITROGLYCERIN 0.4 MG/1
0.4 TABLET SUBLINGUAL EVERY 5 MIN PRN
Status: DISCONTINUED | OUTPATIENT
Start: 2022-08-16 | End: 2022-08-16

## 2022-08-16 RX ORDER — ACETAMINOPHEN 500 MG
1000 TABLET ORAL ONCE AS NEEDED
Status: DISCONTINUED | OUTPATIENT
Start: 2022-08-16 | End: 2022-08-16 | Stop reason: HOSPADM

## 2022-08-16 RX ORDER — LIDOCAINE HYDROCHLORIDE 20 MG/ML
5 SOLUTION OROPHARYNGEAL
Status: COMPLETED | OUTPATIENT
Start: 2022-08-16 | End: 2022-08-16

## 2022-08-16 RX ORDER — ASPIRIN 325 MG
325 TABLET ORAL
Status: DISCONTINUED | OUTPATIENT
Start: 2022-08-16 | End: 2022-08-16 | Stop reason: HOSPADM

## 2022-08-16 RX ADMIN — LIDOCAINE HYDROCHLORIDE 5 ML: 20 SOLUTION ORAL; TOPICAL at 04:08

## 2022-08-16 RX ADMIN — IOHEXOL 100 ML: 350 INJECTION, SOLUTION INTRAVENOUS at 05:08

## 2022-08-16 RX ADMIN — ALUMINUM HYDROXIDE, MAGNESIUM HYDROXIDE, AND SIMETHICONE 5 ML: 200; 200; 20 SUSPENSION ORAL at 04:08

## 2022-08-16 NOTE — PROVIDER PROGRESS NOTES - EMERGENCY DEPT.
Encounter Date: 8/16/2022    ED Physician Progress Notes        Physician Note:   I took report on this patient at sign out due to shift change and pending CTA chest/abdomen/pelvis to r/o aortic dissection. Pt here c/o chest pain that radiates through to back for several weeks, worse recently prompting visit. Reportedly heart score of 3 and if CTA is negative - recommends discharge with outpatient follow up.     Update: CTA is negative for any acute findings - specifically negative for aortic dissection. I discussed the case in detail with the ER attending physician, who also evaluated the patient - requests that the patient be discharged home pending negative second serum Troponin level     2nd troponin is negative. Pt reports feeling better. Ambulatory referral to cardiology ordered. Pt advised to follow up closely with her PCP and cardiology. Pt advised to return to the ER if unimproved or if worse.

## 2022-08-16 NOTE — ED NOTES
"Pt c/o chest pain x 2 weeks, feels like something "burrowing into her chest and through her back". Worsens when she bends over or lifts up her right arm. VSS, NAD, A&O x4  "

## 2022-08-16 NOTE — ED NOTES
iSTAT Chem 8    Na+ 141   K+ 3.4   Cl 104   iCa 1.11   TCO2 25   Glu 93   BUN 8   Creat 0.5   Hct% 44

## 2022-08-16 NOTE — ED PROVIDER NOTES
"Encounter Date: 8/16/2022       History     Chief Complaint   Patient presents with    Chest Pain     62-year-old female with hypertension, hyperlipidemia, fibromyalgia, thyroid disease, bipolar 1 disorder, COPD, anxiety presents to the ED with chest pain.  Patient describes substernal chest pain that radiates through to her back.  She describes the pain as stabbing and like something "boring a hole through to my back".  The pain was initially intermittent when it began a couple of weeks ago, but has become constant over the past few days.  She denies any associated shortness of breath, diaphoresis, nausea or vomiting.  She states that the pain is worse when she leans forward or when she raises her right arm.  Pain is not worse with eating.  Denies weakness or numbness in the extremities.  She was treated for H pylori infection last month.        Review of patient's allergies indicates:   Allergen Reactions    Bactrim [sulfamethoxazole-trimethoprim] Nausea And Vomiting    Sulfa (sulfonamide antibiotics)      Agitation^     Past Medical History:   Diagnosis Date    Anxiety     Arthritis     Bipolar 1 disorder     Cataract     COPD (chronic obstructive pulmonary disease)     Depression     bipolar 2    Eyelid lesion     Fibromyalgia     H/O corneal abrasion 2001    left eye with lead-based paint chips    Hyperlipidemia     Hypertension     Lupus     Migraine headache     Scleritis     Thyroid disease      Past Surgical History:   Procedure Laterality Date    HYSTERECTOMY      for sterilization, ovaries intact    TRIGGER FINGER RELEASE Right 7/28/2021    Procedure: RELEASE, TRIGGER FINGER, right ring & small;  Surgeon: Mellissa Guadarrama MD;  Location: [facility];  Service: Orthopedics;  Laterality: Right;  REGIONAL MAC     Family History   Problem Relation Age of Onset    Depression Father         suicide    Cataracts Mother     Rheum arthritis Mother     Lupus Mother     Arthritis Mother     " Cataracts Sister     Rheum arthritis Sister     Breast cancer Maternal Aunt     Thyroid disease Daughter     No Known Problems Daughter     No Known Problems Daughter     Colon cancer Brother     Cancer Brother     No Known Problems Maternal Uncle     No Known Problems Paternal Aunt     No Known Problems Paternal Uncle     No Known Problems Maternal Grandmother     No Known Problems Maternal Grandfather     No Known Problems Paternal Grandmother     No Known Problems Paternal Grandfather     Cancer Sister     Cancer Brother     Early death Sister         Covid-19    Ovarian cancer Neg Hx     Melanoma Neg Hx     Amblyopia Neg Hx     Blindness Neg Hx     Diabetes Neg Hx     Glaucoma Neg Hx     Hypertension Neg Hx     Macular degeneration Neg Hx     Retinal detachment Neg Hx     Strabismus Neg Hx     Stroke Neg Hx      Social History     Tobacco Use    Smoking status: Current Every Day Smoker     Packs/day: 1.00     Years: 45.00     Pack years: 45.00     Types: Cigarettes     Start date: 1974     Last attempt to quit: 2021     Years since quittin.6    Smokeless tobacco: Never Used   Substance Use Topics    Alcohol use: Yes     Alcohol/week: 5.0 standard drinks     Types: 5 Glasses of wine per week     Comment: 0.5 to 1 bottle of wine per day     Drug use: Yes     Types: Marijuana     Comment: occasionally     Review of Systems   Constitutional: Negative for diaphoresis and fever.   HENT: Negative for sore throat.    Respiratory: Negative for shortness of breath.    Cardiovascular: Positive for chest pain.   Gastrointestinal: Negative for nausea.   Genitourinary: Negative for dysuria.   Musculoskeletal: Negative for back pain.   Skin: Negative for rash.   Neurological: Negative for weakness and numbness.   Hematological: Does not bruise/bleed easily.       Physical Exam     Initial Vitals [22 1406]   BP Pulse Resp Temp SpO2   (!) 161/84 81 18 97.8 °F (36.6 °C) 99 %       MAP       --         Physical Exam    Nursing note and vitals reviewed.  Constitutional: She appears well-developed and well-nourished. She is not diaphoretic.  Non-toxic appearance. She does not appear ill. No distress.   HENT:   Head: Normocephalic and atraumatic.   Neck: Neck supple.   Cardiovascular: Normal rate and regular rhythm. Exam reveals no gallop and no friction rub.    No murmur heard.  Pulses:       Radial pulses are 2+ on the right side and 2+ on the left side.   Pulmonary/Chest: Effort normal and breath sounds normal. No accessory muscle usage. No tachypnea. No respiratory distress. She has no decreased breath sounds. She has no wheezes. She has no rhonchi. She has no rales.   Abdominal: She exhibits no distension.   Musculoskeletal:      Cervical back: Neck supple.     Neurological: She is alert.   5/5 strength to BUE, BLE   Skin: No rash noted.   Psychiatric: She has a normal mood and affect. Her behavior is normal.         ED Course   Procedures  Labs Reviewed   CBC W/ AUTO DIFFERENTIAL - Abnormal; Notable for the following components:       Result Value    MCH 32.0 (*)     All other components within normal limits   COMPREHENSIVE METABOLIC PANEL - Abnormal; Notable for the following components:    Total Protein 8.5 (*)     All other components within normal limits   ISTAT PROCEDURE - Abnormal; Notable for the following components:    POC Potassium 3.4 (*)     All other components within normal limits   ISTAT PROCEDURE - Abnormal; Notable for the following components:    POC Potassium 3.4 (*)     All other components within normal limits   TROPONIN I   B-TYPE NATRIURETIC PEPTIDE   TROPONIN I   HIV 1 / 2 ANTIBODY   HEPATITIS C ANTIBODY        ECG Results          EKG 12-lead (Final result)  Result time 08/16/22 15:12:56    Final result by Interface, Lab In [facility] (08/16/22 15:12:56)                 Narrative:    Test Reason : R07.9,    Vent. Rate : 065 BPM     Atrial Rate : 065 BPM     P-R Int :  168 ms          QRS Dur : 074 ms      QT Int : 416 ms       P-R-T Axes : 052 008 025 degrees     QTc Int : 432 ms    Normal sinus rhythm  Normal ECG  When compared with ECG of 06-APR-2020 07:28,  No significant change was found  Confirmed by BRETT MCCRAY MD ([id number]) on 8/16/2022 3:12:44 PM    Referred By: GALO DUTTA           Confirmed By:BRETT MCCRAY MD                            Imaging Results          CTA Chest Abdomen Pelvis (Final result)  Result time 08/16/22 17:40:57    Final result by Landon Merritt MD (08/16/22 17:40:57)                 Impression:      1. No findings to suggest aortic aneurysm or dissection as clinically questioned.  Please see above for full vascular details.  2. No pulmonary thromboembolism.  3. Stable pulmonary nodule within the right lower lobe adjacent to a region of atelectasis or scarring.  4. Bilateral basilar subsegmental atelectasis versus dependent edema, correlation is advised.  5. Possible hepatic steatosis, correlation with LFTs recommended.  6. Please see above for several additional findings.      Electronically signed by: Landon Merritt MD  Date:    08/16/2022  Time:    17:40             Narrative:    EXAMINATION:  CTA CHEST ABDOMEN PELVIS    CLINICAL HISTORY:  Aortic aneurysm, known or suspected;dissection rule out;    TECHNIQUE:  Axial images of the chest abdomen and pelvis were obtained pre and post the administration of 100 cc omni 350 IV contrast following the CTA chest abdomen and pelvis protocol.  Coronal and sagittal reformatted images were reviewed as well as coronal, sagittal, and axial MIPS reformatted images.    COMPARISON:  CT chest 09/08/2020    FINDINGS:  the structures at the base of the neck are unremarkable. There are a few scattered upper limit of normal caliber mediastinal lymph nodes. The heart is not enlarged. No pericardial effusion.    Respiratory motion limits evaluation of the airways and pulmonary parenchyma.    Allowing for the  above, the airways are patent.  There is a pulmonary nodule within the right upper lobe measuring 5 mm in the region of atelectasis or scarring, stable. There is bilateral basilar dependent atelectasis.  There is a calcified granuloma within the right lower lobe. No large focal consolidation. No pneumothorax. No pleural effusion.    Bolus timing is not optimized for evaluation of the pulmonary arteries.  Additionally, evaluation of the pulmonary arteries is limited secondary to artifact from respiratory motion.  Allowing for these factors, no convincing pulmonary arterial filling defect to the level of the segmental branches bilaterally to suggest pulmonary thromboembolism. Please note, there is particularly limited evaluation of the pulmonary arteries to the bilateral lower lobes.    The liver is hypoattenuating, likely on the basis of contrast phase however correlation with LFTs is recommended as findings can be seen with nonspecific steatosis. There is a subcentimeter focus of arterial enhancement within the right hepatic lobe, too small for characterization, possibly flash filling hemangioma.  There are several calcific foci within the spleen suggesting granuloma. The pancreas, gallbladder and adrenal glands are grossly unremarkable given contrast phase. The stomach is decompressed without wall thickening. No significant abdominal lymphadenopathy.    The kidneys enhance symmetrically without hydronephrosis or nephrolithiasis. A low attenuating lesion arises from the interpolar region of the right kidney measuring 2.6 cm with attenuation suggesting cyst. There is a low attenuating lesion arising from the interpolar region of the left kidney measuring 1 cm, too small for characterization. The bilateral ureters are unremarkable without calculi seen noting contrast within the ureters limits evaluation for the same. The urinary bladder is grossly unremarkable. The uterus is absent the adnexa is remarkable for a cyst  arising from the left ovary measuring 2.6 cm. No significant free fluid in the pelvis.    There are several scattered colonic diverticula without inflammation. The terminal ileum and appendix are unremarkable. The small bowel is grossly unremarkable. Several scattered shotty periaortic, pericaval, and mesenteric lymph nodes are noted.  There are small bilateral fat containing inguinal hernias.    There is osteopenia. Degenerative changes are noted of the bilateral femoroacetabular joints, pubic symphysis, sacroiliac joints, and spine. No significant axillary lymphadenopathy. No significant inguinal lymphadenopathy.    Vascular details:    There is a 3 vessel arch. The proximal arch vessels are patent. The aorta tapers normally. The celiac axis, SMA, bilateral renal arteries, SELENA, and distal aorto iliac branches all are patent. The configuration of the celiac trunk on sagittal view may reflect sequela of median arcuate compression noting slight luminal narrowing at the origin. No findings to suggest aneurysm or dissection.                               X-Ray Chest PA And Lateral (Final result)  Result time 08/16/22 15:50:32    Final result by Cecilio Salcedo MD (08/16/22 15:50:32)                 Impression:      No acute process.      Electronically signed by: Cecilio Salcedo MD  Date:    08/16/2022  Time:    15:50             Narrative:    EXAMINATION:  XR CHEST PA AND LATERAL    CLINICAL HISTORY:  Chest Pain;    TECHNIQUE:  PA and lateral views of the chest were performed.    COMPARISON:  04/06/2020.    FINDINGS:  The trachea is unremarkable.  The cardiomediastinal silhouette is within normal limits.  The hemidiaphragms are unremarkable.  There are no pleural effusions.  There is no evidence of a pneumothorax.  There is no evidence of pneumomediastinum.  No airspace opacity is present.  There are degenerative changes in the osseous structures.                                 Medications   LIDOcaine HCl 2% oral solution  5 mL (5 mLs Oral Given 8/16/22 1608)   aluminum-magnesium hydroxide-simethicone 200-200-20 mg/5 mL suspension 5 mL (5 mLs Oral Given 8/16/22 1607)   iohexoL (OMNIPAQUE 350) injection 100 mL (100 mLs Intravenous Given 8/16/22 1723)     Medical Decision Making:   History:   Old Medical Records: I decided to obtain old medical records.  Initial Assessment:   62-year-old female presents to the ED with chest pain.  Hemodynamically stable.  No acute distress.  Afebrile.  Differential Diagnosis:   My differential diagnosis includes but is not limited to:  ACS, aortic dissection, esophagitis, esophageal ulcer, PUD, costochondritis, chest wall strain  Independently Interpreted Test(s):   I have ordered and independently interpreted EKG Reading(s) - see summary below       <> Summary of EKG Reading(s): NSR rate 65. No ectopy.  No evidence of ischemia.  Normal axis.  Clinical Tests:   Lab Tests: Ordered  Radiological Study: Ordered  Medical Tests: Ordered             ED Course as of 08/17/22 0725   Tue Aug 16, 2022   1603 Initial troponin negative.  No other concerning metabolic or hematologic abnormalities.  Updated patient on test results thus far.  Her heart score is 3. If imaging and serial troponin is negative, feel that she can be discharged in stable condition an outpatient follow-up with Cardiology for outpatient stress test.   [EH]   1604 Patient signed out to fellow PA pending additional labs and imaging results.  [EH]      ED Course User Index  [EH] Alexia Bush PA-C             Clinical Impression:   Final diagnoses:  [R07.9] Chest pain (Primary)  [M54.9] Acute back pain, unspecified back location, unspecified back pain laterality          ED Disposition Condition    Discharge Stable        ED Prescriptions     None        Follow-up Information     Follow up With Specialties Details Why Contact Info Additional Information    Bolivar Arambula MD Internal Medicine Schedule an appointment as soon as possible  for a visit in 2 days Follow up with your primary care physician in the enxt 1-2 days for re-evaluation and further management. [address]  [facility] [address]  [phone]       Salvat[facility] Matias - Cardiology - [facility] Cardiology Schedule an appointment as soon as possible for a visit  Follow up with [facility] cardiology clinic at next available. [address]  [facility] [id number] [phone] Cardiology Services Clinics - 3rd floor    [facility] Matias - Emergency Dept Emergency Medicine  If symptoms worsen in any way or if unimproved. [address]  [facility] [id number] [phone]            Alexia Bush PA-C  08/17/22 0726

## 2022-08-16 NOTE — ED NOTES
I-STAT Chem-8+ Results:   Value Reference Range   Sodium 143 136-145 mmol/L   Potassium  3.4 3.5-5.1 mmol/L   Chloride 102  mmol/L   Ionized Calcium 1.24 1.06-1.42 mmol/L   CO2 (measured) 27 23-29 mmol/L   Glucose 94  mg/dL   BUN 8 6-30 mg/dL   Creatinine 0.5 0.5-1.4 mg/dL   Hematocrit 45 36-54%

## 2022-08-16 NOTE — PROGRESS NOTES
HPI      Referred by     Patient is her for cyst evaluation.patient complaint of cyst irritation   occasionally.     Oral meds:Plaquenil 300 mg Daily PO  Eye meds:none        Last edited by JAH Buenrostro on 8/16/2022  1:16 PM. (History)            Assessment /Plan     For exam results, see Encounter Report.    Cyst of left lower eyelid      The patient is a pleasant 62-year-old female here for evaluation of left lower eyelid cyst this has been present over several years but has recently started causing irritation and enlarging over the last 2 years.  No prior history of skin cancer.    On exam, the patient has a fluid-filled left lower eyelid cyst at the left lateral canthus.  There is no preauricular submandibular adenopathy.    Recommend excisional biopsy in the minor procedure room.    Pertinent risks benefits and alternatives of the procedure were discussed with the patient prior to obtaining informed consent.    Hold ASA, NSAIDS, and fish oil 5 to 7 days prior to procedure.    Return for surgery

## 2022-08-17 ENCOUNTER — PATIENT MESSAGE (OUTPATIENT)
Dept: INTERNAL MEDICINE | Facility: CLINIC | Age: 62
End: 2022-08-17
Payer: MEDICARE

## 2022-08-17 LAB
HCV AB SERPL QL IA: NEGATIVE
HIV 1+2 AB+HIV1 P24 AG SERPL QL IA: NEGATIVE

## 2022-08-22 DIAGNOSIS — E03.9 ACQUIRED HYPOTHYROIDISM: Primary | ICD-10-CM

## 2022-08-22 NOTE — TELEPHONE ENCOUNTER
Care Due:                  Date            Visit Type   Department     Provider  --------------------------------------------------------------------------------                                ESTABLISHED                              PATIENT -    [facility]  Last Visit: 07-      [facility]      MEDICINE       Bolivar Arambula                              EP -                              PRIMARY      [facility]  Next Visit: 08-      [facility] ([facility])   MEDICINE       Bolivar Arambula                                                            Last  Test          Frequency    Reason                     Performed    Due Date  --------------------------------------------------------------------------------    TSH.........  12 months..  levothyroxine............  Not Found    Overdue    [employer] Embedded Care Gaps. Reference number: [id number]. 8/22/2022   6:24:21 PM CDT

## 2022-08-23 ENCOUNTER — TELEPHONE (OUTPATIENT)
Dept: RHEUMATOLOGY | Facility: CLINIC | Age: 62
End: 2022-08-23
Payer: MEDICARE

## 2022-08-23 RX ORDER — PANTOPRAZOLE SODIUM 40 MG/1
40 TABLET, DELAYED RELEASE ORAL DAILY
Qty: 30 TABLET | Refills: 2 | Status: SHIPPED | OUTPATIENT
Start: 2022-08-23 | End: 2022-08-30 | Stop reason: SDUPTHER

## 2022-08-23 NOTE — ASSESSMENT & PLAN NOTE
Stepped up to triple therapy with some improvement in dyspnea. Ok to continue. PFTs w/o significant obstruction and shows mild restriction. Cont FRANCIS    Encouraged regular, progressive exercise    Strongly encouraged smoking cessation. Not interested at this time

## 2022-08-23 NOTE — ASSESSMENT & PLAN NOTE
Not interested in quitting at this time. Qualifies for yearly screening CT Chest. Next would be 6/2023

## 2022-08-29 NOTE — PROGRESS NOTES
Subjective:       Patient ID: Brenana Guido is a 62 y.o. female.    Chief Complaint: Hypertension    Pt here for f/u from ED where she was seen for intermittent stabbing substernal cp with radiation to her back for the last couple of weeks. She went to ED as it had become more persistent in the last couple of days. Record reviewed. No associated sob/diaphoresis/n/v and not associated with eating. W/u was unrevealing for cause including CTA chest that was neg for aortic pathology and pulm emb. She is still having that pain but not as bad. It is in center of chest and hurts when turning side to side makes it worse or raising arms. Not related to exertion. CT incidentally found renal cysts and L ovarian cyst. She is not having any related symptoms but we discussed getting u/s to further characterize with specialty f/u if indicated.     Migraines are well controlled on current meds. No new features or red flag symptoms.       BP is well controlled. Denies cp/sob/ha/vision or neuro changes. Home readings are well controlled.     HLD has been tx with crestor which she has tolerated well.     She is clinically euthyroid on synthroid.     Bipolar is followed by psychiatry regularly. No SI/HI. This is stable.     Pt sees Dr. Valencia in [facility] for fibromyalgia dx and lupus dx; however, her serology has been negative. It was recommended she be maintained on plaquenil which she takes. She is being followed regularly by ophtho as well. 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 Her pain is manageable with lyrica.    COPD is stable on current meds. She has RUL lung nodule for which she saw pulmonary. This has been stable on last CT 9/2020 but is due for f/u CT with pulm--she will contact them and understands importance of doing so.     She has osteopenia on most recent DEXA 7/2021. She is on ca/d.       Hypertension  Pertinent negatives include no chest pain, headaches, neck pain, palpitations or  shortness of breath.   Review of Systems   Constitutional:  Positive for activity change. Negative for unexpected weight change.   HENT:  Negative for hearing loss, rhinorrhea and trouble swallowing.    Eyes:  Negative for discharge and visual disturbance.   Respiratory:  Negative for chest tightness, shortness of breath and wheezing.    Cardiovascular:  Negative for chest pain and palpitations.   Gastrointestinal:  Negative for abdominal pain, blood in stool, constipation, diarrhea, nausea and vomiting.   Endocrine: Negative for polydipsia and polyuria.   Genitourinary:  Negative for difficulty urinating, dysuria, frequency, hematuria and menstrual problem.   Musculoskeletal:  Positive for arthralgias. Negative for joint swelling and neck pain.   Neurological:  Negative for speech difficulty, weakness and headaches.   Hematological:  Negative for adenopathy.   Psychiatric/Behavioral:  Negative for confusion and dysphoric mood.      Objective:          Assessment:       1. Chronic obstructive pulmonary disease, unspecified COPD type    2. Essential hypertension, benign    3. Mixed hyperlipidemia    4. Bipolar 2 disorder    5. Lupus    6. Acquired hypothyroidism    7. Osteopenia of multiple sites    8. Fibromyalgia    9. Lung nodule    10. Current mild episode of major depressive disorder without prior episode    11. Aortic atherosclerosis    12. 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 Lumbar back pain with radiculopathy affecting left lower extremity    15. Renal cyst    16. Cyst of left ovary    17. Encounter for screening mammogram for breast cancer    18. Other chest pain        Plan:       1. Prior labs reviewed   2. Keep f/u with specialists  3. Tobacco cessation  4. Pelvic and renal u/s  5. Pt's central chest pain is c/w costochondritis but with risk factors, would proceed with exercise stress; NSAIDs prn--proper use d/w pt  6. RTC/ED prompts d/w pt and  she understands        Physical Exam  Constitutional:       Appearance: She is well-developed.   HENT:      Head: Normocephalic and atraumatic.   Eyes:      Pupils: Pupils are equal, round, and reactive to light.   Neck:      Thyroid: No thyroid mass or thyromegaly.      Vascular: No carotid bruit.   Cardiovascular:      Rate and Rhythm: Normal rate and regular rhythm.      Heart sounds: S1 normal and S2 normal. Murmur heard.   Pulmonary:      Effort: Pulmonary effort is normal.      Breath sounds: Normal breath sounds. No wheezing.   Abdominal:      General: Bowel sounds are normal. There is no distension.      Palpations: Abdomen is soft. There is no mass.      Tenderness: There is no abdominal tenderness.   Musculoskeletal:      Cervical back: Neck supple.      Lumbar back: Normal.   Neurological:      Mental Status: She is alert and oriented to person, place, and time.      Cranial Nerves: No cranial nerve deficit.      Sensory: No sensory deficit.      Coordination: Coordination normal.      Gait: Gait normal.      Deep Tendon Reflexes:      Reflex Scores:       Bicep reflexes are 2+ on the right side and 2+ on the left side.       Patellar reflexes are 2+ on the right side and 2+ on the left side.  Psychiatric:         Behavior: Behavior normal.

## 2022-08-30 ENCOUNTER — LAB VISIT (OUTPATIENT)
Dept: LAB | Facility: OTHER | Age: 62
End: 2022-08-30
Attending: INTERNAL MEDICINE
Payer: MEDICARE

## 2022-08-30 ENCOUNTER — OFFICE VISIT (OUTPATIENT)
Dept: INTERNAL MEDICINE | Facility: CLINIC | Age: 62
End: 2022-08-30
Payer: MEDICARE

## 2022-08-30 ENCOUNTER — TELEPHONE (OUTPATIENT)
Dept: RHEUMATOLOGY | Facility: CLINIC | Age: 62
End: 2022-08-30
Payer: MEDICARE

## 2022-08-30 VITALS
DIASTOLIC BLOOD PRESSURE: 78 MMHG | OXYGEN SATURATION: 99 % | BODY MASS INDEX: 26.5 KG/M2 | SYSTOLIC BLOOD PRESSURE: 126 MMHG | HEIGHT: 63 IN | WEIGHT: 149.56 LBS | HEART RATE: 79 BPM

## 2022-08-30 DIAGNOSIS — J84.10 PULMONARY GRANULOMA: ICD-10-CM

## 2022-08-30 DIAGNOSIS — E03.9 ACQUIRED HYPOTHYROIDISM: ICD-10-CM

## 2022-08-30 DIAGNOSIS — Z12.31 ENCOUNTER FOR SCREENING MAMMOGRAM FOR BREAST CANCER: ICD-10-CM

## 2022-08-30 DIAGNOSIS — M32.9 SYSTEMIC LUPUS ERYTHEMATOSUS, UNSPECIFIED SLE TYPE, UNSPECIFIED ORGAN INVOLVEMENT STATUS: ICD-10-CM

## 2022-08-30 DIAGNOSIS — R07.89 OTHER CHEST PAIN: ICD-10-CM

## 2022-08-30 DIAGNOSIS — R91.1 LUNG NODULE: ICD-10-CM

## 2022-08-30 DIAGNOSIS — M32.9 LUPUS: ICD-10-CM

## 2022-08-30 DIAGNOSIS — N83.202 CYST OF LEFT OVARY: ICD-10-CM

## 2022-08-30 DIAGNOSIS — I10 ESSENTIAL HYPERTENSION, BENIGN: ICD-10-CM

## 2022-08-30 DIAGNOSIS — N28.1 RENAL CYST: ICD-10-CM

## 2022-08-30 DIAGNOSIS — F17.219 CIGARETTE NICOTINE DEPENDENCE WITH NICOTINE-INDUCED DISORDER: ICD-10-CM

## 2022-08-30 DIAGNOSIS — F31.81 BIPOLAR 2 DISORDER: ICD-10-CM

## 2022-08-30 DIAGNOSIS — E78.2 MIXED HYPERLIPIDEMIA: ICD-10-CM

## 2022-08-30 DIAGNOSIS — M79.7 FIBROMYALGIA: ICD-10-CM

## 2022-08-30 DIAGNOSIS — J44.9 CHRONIC OBSTRUCTIVE PULMONARY DISEASE, UNSPECIFIED COPD TYPE: Primary | ICD-10-CM

## 2022-08-30 DIAGNOSIS — F32.0 CURRENT MILD EPISODE OF MAJOR DEPRESSIVE DISORDER WITHOUT PRIOR EPISODE: ICD-10-CM

## 2022-08-30 DIAGNOSIS — E87.6 HYPOKALEMIA: Primary | ICD-10-CM

## 2022-08-30 DIAGNOSIS — M54.16 LUMBAR BACK PAIN WITH RADICULOPATHY AFFECTING LEFT LOWER EXTREMITY: ICD-10-CM

## 2022-08-30 DIAGNOSIS — M85.89 OSTEOPENIA OF MULTIPLE SITES: ICD-10-CM

## 2022-08-30 DIAGNOSIS — I70.0 AORTIC ATHEROSCLEROSIS: ICD-10-CM

## 2022-08-30 LAB
ALBUMIN SERPL BCP-MCNC: 4.2 G/DL (ref 3.5–5.2)
ALP SERPL-CCNC: 60 U/L (ref 55–135)
ALT SERPL W/O P-5'-P-CCNC: 18 U/L (ref 10–44)
ANION GAP SERPL CALC-SCNC: 11 MMOL/L (ref 8–16)
AST SERPL-CCNC: 22 U/L (ref 10–40)
BASOPHILS # BLD AUTO: 0.04 K/UL (ref 0–0.2)
BASOPHILS NFR BLD: 1 % (ref 0–1.9)
BILIRUB SERPL-MCNC: 0.5 MG/DL (ref 0.1–1)
BUN SERPL-MCNC: 9 MG/DL (ref 8–23)
C3 SERPL-MCNC: 148 MG/DL (ref 50–180)
C4 SERPL-MCNC: 32 MG/DL (ref 11–44)
CALCIUM SERPL-MCNC: 9.1 MG/DL (ref 8.7–10.5)
CHLORIDE SERPL-SCNC: 108 MMOL/L (ref 95–110)
CO2 SERPL-SCNC: 22 MMOL/L (ref 23–29)
CREAT SERPL-MCNC: 0.8 MG/DL (ref 0.5–1.4)
CRP SERPL-MCNC: 0.9 MG/L (ref 0–8.2)
DIFFERENTIAL METHOD: ABNORMAL
EOSINOPHIL # BLD AUTO: 0.1 K/UL (ref 0–0.5)
EOSINOPHIL NFR BLD: 1.2 % (ref 0–8)
ERYTHROCYTE [DISTWIDTH] IN BLOOD BY AUTOMATED COUNT: 13.7 % (ref 11.5–14.5)
ERYTHROCYTE [SEDIMENTATION RATE] IN BLOOD: 12 MM/HR (ref 0–20)
EST. GFR  (NO RACE VARIABLE): >60 ML/MIN/1.73 M^2
GLUCOSE SERPL-MCNC: 112 MG/DL (ref 70–110)
HCT VFR BLD AUTO: 39.5 % (ref 37–48.5)
HGB BLD-MCNC: 13.7 G/DL (ref 12–16)
IMM GRANULOCYTES # BLD AUTO: 0.01 K/UL (ref 0–0.04)
IMM GRANULOCYTES NFR BLD AUTO: 0.2 % (ref 0–0.5)
LYMPHOCYTES # BLD AUTO: 2.1 K/UL (ref 1–4.8)
LYMPHOCYTES NFR BLD: 50.7 % (ref 18–48)
MCH RBC QN AUTO: 31.4 PG (ref 27–31)
MCHC RBC AUTO-ENTMCNC: 34.7 G/DL (ref 32–36)
MCV RBC AUTO: 91 FL (ref 82–98)
MONOCYTES # BLD AUTO: 0.4 K/UL (ref 0.3–1)
MONOCYTES NFR BLD: 9.1 % (ref 4–15)
NEUTROPHILS # BLD AUTO: 1.6 K/UL (ref 1.8–7.7)
NEUTROPHILS NFR BLD: 37.8 % (ref 38–73)
NRBC BLD-RTO: 0 /100 WBC
PLATELET # BLD AUTO: 184 K/UL (ref 150–450)
PMV BLD AUTO: 10.5 FL (ref 9.2–12.9)
POTASSIUM SERPL-SCNC: 3.4 MMOL/L (ref 3.5–5.1)
PROT SERPL-MCNC: 8.2 G/DL (ref 6–8.4)
RBC # BLD AUTO: 4.36 M/UL (ref 4–5.4)
SODIUM SERPL-SCNC: 141 MMOL/L (ref 136–145)
TSH SERPL DL<=0.005 MIU/L-ACNC: 0.8 UIU/ML (ref 0.4–4)
WBC # BLD AUTO: 4.18 K/UL (ref 3.9–12.7)

## 2022-08-30 PROCEDURE — 36415 COLL VENOUS BLD VENIPUNCTURE: CPT | Performed by: INTERNAL MEDICINE

## 2022-08-30 PROCEDURE — 84443 ASSAY THYROID STIM HORMONE: CPT | Performed by: INTERNAL MEDICINE

## 2022-08-30 PROCEDURE — 99999 PR PBB SHADOW E&M-EST. PATIENT-LVL V: ICD-10-PCS | Mod: PBBFAC,,, | Performed by: INTERNAL MEDICINE

## 2022-08-30 PROCEDURE — 86160 COMPLEMENT ANTIGEN: CPT | Performed by: INTERNAL MEDICINE

## 2022-08-30 PROCEDURE — 99499 UNLISTED E&M SERVICE: CPT | Mod: S$GLB,,, | Performed by: INTERNAL MEDICINE

## 2022-08-30 PROCEDURE — 85025 COMPLETE CBC W/AUTO DIFF WBC: CPT | Performed by: INTERNAL MEDICINE

## 2022-08-30 PROCEDURE — 1159F PR MEDICATION LIST DOCUMENTED IN MEDICAL RECORD: ICD-10-PCS | Mod: CPTII,S$GLB,, | Performed by: INTERNAL MEDICINE

## 2022-08-30 PROCEDURE — 4010F PR ACE/ARB THEARPY RXD/TAKEN: ICD-10-PCS | Mod: CPTII,S$GLB,, | Performed by: INTERNAL MEDICINE

## 2022-08-30 PROCEDURE — 99499 RISK ADDL DX/OHS AUDIT: ICD-10-PCS | Mod: S$GLB,,, | Performed by: INTERNAL MEDICINE

## 2022-08-30 PROCEDURE — 99999 PR PBB SHADOW E&M-EST. PATIENT-LVL V: CPT | Mod: PBBFAC,,, | Performed by: INTERNAL MEDICINE

## 2022-08-30 PROCEDURE — 3008F PR BODY MASS INDEX (BMI) DOCUMENTED: ICD-10-PCS | Mod: CPTII,S$GLB,, | Performed by: INTERNAL MEDICINE

## 2022-08-30 PROCEDURE — 3078F DIAST BP <80 MM HG: CPT | Mod: CPTII,S$GLB,, | Performed by: INTERNAL MEDICINE

## 2022-08-30 PROCEDURE — 1160F PR REVIEW ALL MEDS BY PRESCRIBER/CLIN PHARMACIST DOCUMENTED: ICD-10-PCS | Mod: CPTII,S$GLB,, | Performed by: INTERNAL MEDICINE

## 2022-08-30 PROCEDURE — 3078F PR MOST RECENT DIASTOLIC BLOOD PRESSURE < 80 MM HG: ICD-10-PCS | Mod: CPTII,S$GLB,, | Performed by: INTERNAL MEDICINE

## 2022-08-30 PROCEDURE — 85651 RBC SED RATE NONAUTOMATED: CPT | Performed by: INTERNAL MEDICINE

## 2022-08-30 PROCEDURE — 86225 DNA ANTIBODY NATIVE: CPT | Performed by: INTERNAL MEDICINE

## 2022-08-30 PROCEDURE — 1160F RVW MEDS BY RX/DR IN RCRD: CPT | Mod: CPTII,S$GLB,, | Performed by: INTERNAL MEDICINE

## 2022-08-30 PROCEDURE — 3008F BODY MASS INDEX DOCD: CPT | Mod: CPTII,S$GLB,, | Performed by: INTERNAL MEDICINE

## 2022-08-30 PROCEDURE — 3074F SYST BP LT 130 MM HG: CPT | Mod: CPTII,S$GLB,, | Performed by: INTERNAL MEDICINE

## 2022-08-30 PROCEDURE — 4010F ACE/ARB THERAPY RXD/TAKEN: CPT | Mod: CPTII,S$GLB,, | Performed by: INTERNAL MEDICINE

## 2022-08-30 PROCEDURE — 86140 C-REACTIVE PROTEIN: CPT | Performed by: INTERNAL MEDICINE

## 2022-08-30 PROCEDURE — 99214 OFFICE O/P EST MOD 30 MIN: CPT | Mod: S$GLB,,, | Performed by: INTERNAL MEDICINE

## 2022-08-30 PROCEDURE — 99214 PR OFFICE/OUTPT VISIT, EST, LEVL IV, 30-39 MIN: ICD-10-PCS | Mod: S$GLB,,, | Performed by: INTERNAL MEDICINE

## 2022-08-30 PROCEDURE — 1159F MED LIST DOCD IN RCRD: CPT | Mod: CPTII,S$GLB,, | Performed by: INTERNAL MEDICINE

## 2022-08-30 PROCEDURE — 86160 COMPLEMENT ANTIGEN: CPT | Mod: 59 | Performed by: INTERNAL MEDICINE

## 2022-08-30 PROCEDURE — 80053 COMPREHEN METABOLIC PANEL: CPT | Performed by: INTERNAL MEDICINE

## 2022-08-30 PROCEDURE — 3074F PR MOST RECENT SYSTOLIC BLOOD PRESSURE < 130 MM HG: ICD-10-PCS | Mod: CPTII,S$GLB,, | Performed by: INTERNAL MEDICINE

## 2022-08-30 RX ORDER — DULOXETIN HYDROCHLORIDE 30 MG/1
30 CAPSULE, DELAYED RELEASE ORAL DAILY
COMMUNITY
End: 2023-06-08 | Stop reason: SDUPTHER

## 2022-08-30 RX ORDER — PANTOPRAZOLE SODIUM 40 MG/1
40 TABLET, DELAYED RELEASE ORAL DAILY
Qty: 90 TABLET | Refills: 2 | Status: SHIPPED | OUTPATIENT
Start: 2022-08-30 | End: 2023-10-25

## 2022-08-30 NOTE — TELEPHONE ENCOUNTER
Called pt and confirmed today's appt and let her know I'll be adding a thyroid lab to her labs  Pended/routing TSH to provider to sign

## 2022-08-31 LAB — DSDNA AB SER-ACNC: NORMAL [IU]/ML

## 2022-09-06 ENCOUNTER — OFFICE VISIT (OUTPATIENT)
Dept: RHEUMATOLOGY | Facility: CLINIC | Age: 62
End: 2022-09-06
Payer: MEDICARE

## 2022-09-06 VITALS
SYSTOLIC BLOOD PRESSURE: 122 MMHG | BODY MASS INDEX: 27.89 KG/M2 | HEART RATE: 69 BPM | HEIGHT: 63 IN | WEIGHT: 157.44 LBS | DIASTOLIC BLOOD PRESSURE: 70 MMHG

## 2022-09-06 DIAGNOSIS — Z72.0 TOBACCO ABUSE: Primary | ICD-10-CM

## 2022-09-06 DIAGNOSIS — M19.90 OSTEOARTHRITIS, UNSPECIFIED OSTEOARTHRITIS TYPE, UNSPECIFIED SITE: ICD-10-CM

## 2022-09-06 DIAGNOSIS — M79.7 FIBROMYALGIA: Chronic | ICD-10-CM

## 2022-09-06 PROCEDURE — 99499 UNLISTED E&M SERVICE: CPT | Mod: S$GLB,,, | Performed by: INTERNAL MEDICINE

## 2022-09-06 PROCEDURE — 99499 RISK ADDL DX/OHS AUDIT: ICD-10-PCS | Mod: S$GLB,,, | Performed by: INTERNAL MEDICINE

## 2022-09-06 PROCEDURE — 4010F ACE/ARB THERAPY RXD/TAKEN: CPT | Mod: CPTII,S$GLB,, | Performed by: INTERNAL MEDICINE

## 2022-09-06 PROCEDURE — 4010F PR ACE/ARB THEARPY RXD/TAKEN: ICD-10-PCS | Mod: CPTII,S$GLB,, | Performed by: INTERNAL MEDICINE

## 2022-09-06 PROCEDURE — 99214 OFFICE O/P EST MOD 30 MIN: CPT | Mod: S$GLB,,, | Performed by: INTERNAL MEDICINE

## 2022-09-06 PROCEDURE — 1159F MED LIST DOCD IN RCRD: CPT | Mod: CPTII,S$GLB,, | Performed by: INTERNAL MEDICINE

## 2022-09-06 PROCEDURE — 1159F PR MEDICATION LIST DOCUMENTED IN MEDICAL RECORD: ICD-10-PCS | Mod: CPTII,S$GLB,, | Performed by: INTERNAL MEDICINE

## 2022-09-06 PROCEDURE — 3008F BODY MASS INDEX DOCD: CPT | Mod: CPTII,S$GLB,, | Performed by: INTERNAL MEDICINE

## 2022-09-06 PROCEDURE — 3008F PR BODY MASS INDEX (BMI) DOCUMENTED: ICD-10-PCS | Mod: CPTII,S$GLB,, | Performed by: INTERNAL MEDICINE

## 2022-09-06 PROCEDURE — 99214 PR OFFICE/OUTPT VISIT, EST, LEVL IV, 30-39 MIN: ICD-10-PCS | Mod: S$GLB,,, | Performed by: INTERNAL MEDICINE

## 2022-09-06 PROCEDURE — 99999 PR PBB SHADOW E&M-EST. PATIENT-LVL IV: CPT | Mod: PBBFAC,,, | Performed by: INTERNAL MEDICINE

## 2022-09-06 PROCEDURE — 99999 PR PBB SHADOW E&M-EST. PATIENT-LVL IV: ICD-10-PCS | Mod: PBBFAC,,, | Performed by: INTERNAL MEDICINE

## 2022-09-06 RX ORDER — DICLOFENAC SODIUM 10 MG/G
2 GEL TOPICAL 4 TIMES DAILY
Qty: 3 EACH | Refills: 2 | Status: SHIPPED | OUTPATIENT
Start: 2022-09-06

## 2022-09-06 RX ORDER — PREGABALIN 150 MG/1
150 CAPSULE ORAL 2 TIMES DAILY
Qty: 180 CAPSULE | Refills: 1 | Status: SHIPPED | OUTPATIENT
Start: 2022-09-06 | End: 2023-10-12 | Stop reason: SDUPTHER

## 2022-09-06 ASSESSMENT — ROUTINE ASSESSMENT OF PATIENT INDEX DATA (RAPID3)
FATIGUE SCORE: 5
PSYCHOLOGICAL DISTRESS SCORE: 4.4
PAIN SCORE: 7
MDHAQ FUNCTION SCORE: 0.5
TOTAL RAPID3 SCORE: 4.56
AM STIFFNESS SCORE: 0, NO
PATIENT GLOBAL ASSESSMENT SCORE: 5

## 2022-09-06 ASSESSMENT — SYSTEMIC LUPUS ERYTHEMATOSUS DISEASE ACTIVITY INDEX (SLEDAI): TOTAL_SCORE: 0

## 2022-09-06 NOTE — PROGRESS NOTES
"Subjective:       Patient ID: Breanna Guido is a 62 y.o. female.    Chief Complaint: SLE; fibromyalgia    HPI Went to ED 8/16/22 for central chest pain,  constant, + local tenderness, + positional.  "Cary through" chest radiates to upper back, directly. No sharp chest pain, sometimes worse with breathing, worse with bending forward, stretch, arms overhead. In ED CTA negative for PE and for dissection. To have exercise stress test Friday. Hair loss is stable, but stable. Wonders about oral minoxidil. No rash.  + dry eyes, not dry mouth. No oral or nasal ulcers.  Uses AT 2-3 x/wk only. Some diffuse swelling in hands ? Joints. No Raynauds.   Has diffuse aching arms and legs.     Exercise: walking, just got treadmill. Not tried Nick Chi yoga b/o cost  Still smoking, has been is smoking cessation. Intolerant of varenicline, but bupropion helped greatly at first, but not second time.   Diet: avoiding fried foods, but craves bread.   Review of Systems      Objective:   /70   Pulse 69   Ht 5' 3" (1.6 m)   Wt 71.4 kg (157 lb 6.5 oz)   BMI 27.88 kg/m²      Physical Exam   Constitutional: She is oriented to person, place, and time.   HENT:   Head: Normocephalic and atraumatic.   Mouth/Throat: Oropharynx is clear and moist. No posterior oropharyngeal erythema.   Mouth/Throat: Hyperpigmentation hard palate. Nicotine staining?  Eyes: Conjunctivae are normal.   Dry     Neck: No thyromegaly present.   Cardiovascular: Normal rate, regular rhythm and normal heart sounds. Exam reveals no gallop and no friction rub.   No murmur heard.  Pulmonary/Chest: Breath sounds normal. She has no wheezes. She has no rales. She exhibits no tenderness.   Musculoskeletal:      Cervical back: Normal range of motion and neck supple.   Lymphadenopathy:     She has no cervical adenopathy.   Neurological: She is alert and oriented to person, place, and time. She displays normal reflexes. Gait normal.   Nl motor strength UE and LE " bilateral       Latest Reference Range & Units 08/30/22 14:41 08/30/22 14:50   WBC 3.90 - 12.70 K/uL  4.18   RBC 4.00 - 5.40 M/uL  4.36   Hemoglobin 12.0 - 16.0 g/dL  13.7   Hematocrit 37.0 - 48.5 %  39.5   MCV 82 - 98 fL  91   MCH 27.0 - 31.0 pg  31.4 (H)   MCHC 32.0 - 36.0 g/dL  34.7   RDW 11.5 - 14.5 %  13.7   Platelets 150 - 450 K/uL  184   MPV 9.2 - 12.9 fL  10.5   Gran % 38.0 - 73.0 %  37.8 (L)   Lymph % 18.0 - 48.0 %  50.7 (H)   Mono % 4.0 - 15.0 %  9.1   Eosinophil % 0.0 - 8.0 %  1.2   Basophil % 0.0 - 1.9 %  1.0   Immature Granulocytes 0.0 - 0.5 %  0.2   Gran # (ANC) 1.8 - 7.7 K/uL  1.6 (L)   Lymph # 1.0 - 4.8 K/uL  2.1   Mono # 0.3 - 1.0 K/uL  0.4   Eos # 0.0 - 0.5 K/uL  0.1   Baso # 0.00 - 0.20 K/uL  0.04   Immature Grans (Abs) 0.00 - 0.04 K/uL  0.01   nRBC 0 /100 WBC  0   Differential Method   Automated   Sed Rate 0 - 20 mm/Hr  12   Sodium 136 - 145 mmol/L  141   Potassium 3.5 - 5.1 mmol/L  3.4 (L)   Chloride 95 - 110 mmol/L  108   CO2 23 - 29 mmol/L  22 (L)   Anion Gap 8 - 16 mmol/L  11   BUN 8 - 23 mg/dL  9   Creatinine 0.5 - 1.4 mg/dL  0.8   eGFR >60 mL/min/1.73 m^2  >60   Glucose 70 - 110 mg/dL  112 (H)   Calcium 8.7 - 10.5 mg/dL  9.1   Alkaline Phosphatase 55 - 135 U/L  60   PROTEIN TOTAL 6.0 - 8.4 g/dL  8.2   Albumin 3.5 - 5.2 g/dL  4.2   BILIRUBIN TOTAL 0.1 - 1.0 mg/dL  0.5   AST 10 - 40 U/L  22   ALT 10 - 44 U/L  18   CRP 0.0 - 8.2 mg/L  0.9   TSH 0.400 - 4.000 uIU/mL  0.796   ds DNA Ab Negative 1:10   Negative 1:10   Complement (C-3) 50 - 180 mg/dL  148   Complement (C-4) 11 - 44 mg/dL  32   Specimen UA  Urine, Unspecified    Color, UA Yellow, Straw, Nini  Yellow    Appearance, UA Clear  Clear    Specific Gravity, UA 1.005 - 1.030  1.025    pH, UA 5.0 - 8.0  6.0    Protein, UA Negative  Negative    Glucose, UA Negative  Negative    Ketones, UA Negative  Trace !    Occult Blood UA Negative  Negative    NITRITE UA Negative  Negative    UROBILINOGEN UA <2.0 EU/dL Negative    Bilirubin (UA)  Negative  Negative    Leukocytes, UA Negative  Negative    Creatinine, Urine 15.0 - 325.0 mg/dL 312.2    Protein, Urine Random 0 - 15 mg/dL 19 (H)    Prot/Creat Ratio, Urine 0.00 - 0.20  0.06    (H): Data is abnormally high  (L): Data is abnormally low  !: Data is abnormal      Narrative & Impression  EXAMINATION:  CTA CHEST ABDOMEN PELVIS     CLINICAL HISTORY:  Aortic aneurysm, known or suspected;dissection rule out;     TECHNIQUE:  Axial images of the chest abdomen and pelvis were obtained pre and post the administration of 100 cc omni 350 IV contrast following the CTA chest abdomen and pelvis protocol.  Coronal and sagittal reformatted images were reviewed as well as coronal, sagittal, and axial MIPS reformatted images.     COMPARISON:  CT chest 09/08/2020     FINDINGS:  the structures at the base of the neck are unremarkable. There are a few scattered upper limit of normal caliber mediastinal lymph nodes. The heart is not enlarged. No pericardial effusion.     Respiratory motion limits evaluation of the airways and pulmonary parenchyma.     Allowing for the above, the airways are patent.  There is a pulmonary nodule within the right upper lobe measuring 5 mm in the region of atelectasis or scarring, stable. There is bilateral basilar dependent atelectasis.  There is a calcified granuloma within the right lower lobe. No large focal consolidation. No pneumothorax. No pleural effusion.     Bolus timing is not optimized for evaluation of the pulmonary arteries.  Additionally, evaluation of the pulmonary arteries is limited secondary to artifact from respiratory motion.  Allowing for these factors, no convincing pulmonary arterial filling defect to the level of the segmental branches bilaterally to suggest pulmonary thromboembolism. Please note, there is particularly limited evaluation of the pulmonary arteries to the bilateral lower lobes.     The liver is hypoattenuating, likely on the basis of contrast phase  however correlation with LFTs is recommended as findings can be seen with nonspecific steatosis. There is a subcentimeter focus of arterial enhancement within the right hepatic lobe, too small for characterization, possibly flash filling hemangioma.  There are several calcific foci within the spleen suggesting granuloma. The pancreas, gallbladder and adrenal glands are grossly unremarkable given contrast phase. The stomach is decompressed without wall thickening. No significant abdominal lymphadenopathy.     The kidneys enhance symmetrically without hydronephrosis or nephrolithiasis. A low attenuating lesion arises from the interpolar region of the right kidney measuring 2.6 cm with attenuation suggesting cyst. There is a low attenuating lesion arising from the interpolar region of the left kidney measuring 1 cm, too small for characterization. The bilateral ureters are unremarkable without calculi seen noting contrast within the ureters limits evaluation for the same. The urinary bladder is grossly unremarkable. The uterus is absent the adnexa is remarkable for a cyst arising from the left ovary measuring 2.6 cm. No significant free fluid in the pelvis.     There are several scattered colonic diverticula without inflammation. The terminal ileum and appendix are unremarkable. The small bowel is grossly unremarkable. Several scattered shotty periaortic, pericaval, and mesenteric lymph nodes are noted.  There are small bilateral fat containing inguinal hernias.     There is osteopenia. Degenerative changes are noted of the bilateral femoroacetabular joints, pubic symphysis, sacroiliac joints, and spine. No significant axillary lymphadenopathy. No significant inguinal lymphadenopathy.     Vascular details:     There is a 3 vessel arch. The proximal arch vessels are patent. The aorta tapers normally. The celiac axis, SMA, bilateral renal arteries, SELENA, and distal aorto iliac branches all are patent. The configuration of  the celiac trunk on sagittal view may reflect sequela of median arcuate compression noting slight luminal narrowing at the origin. No findings to suggest aneurysm or dissection.     Impression:     1. No findings to suggest aortic aneurysm or dissection as clinically questioned.  Please see above for full vascular details.  2. No pulmonary thromboembolism.  3. Stable pulmonary nodule within the right lower lobe adjacent to a region of atelectasis or scarring.  4. Bilateral basilar subsegmental atelectasis versus dependent edema, correlation is advised.  5. Possible hepatic steatosis, correlation with LFTs recommended.  6. Please see above for several additional findings.        Electronically signed by: Landon Merritt MD  Date:                                            08/16/2022  Time:                                           17:40   EXAMINATION:  CT CHEST WITHOUT CONTRAST     CLINICAL HISTORY:  Lung nodule, > 8mm; Solitary pulmonary nodule     TECHNIQUE:  Using low dose technique the chest was surveyed from above the pulmonary apices through the posterior costophrenic angles without the use of intravenous contrast.  Coronal and sagittal reformats were obtained     COMPARISON:  09/08/2020 and 04/09/2019     FINDINGS:  Base of Neck: No significant abnormality.     Aorta: Left-sided aortic arch. The aorta maintains normal caliber, contour and course.     Heart/pericardium: Normal size.  No pericardial fluid or calcification.     Maddie/Mediastinum: No pathologic mediastinal lary enlargement. Hilar contours appear unremarkable on these non contrast images.     Airways: Patent.     Lungs: Clear lungs.     Right upper lobe nodule with irregular margins measures 0.6 cm, stable going back to April 2019.  Bandlike opacities extend to the pleural surface with small underlying cysts.     No new or suspicious pulmonary nodule.     Pleura: No significant pleural effusion.     Upper Abdomen: No abnormality of the partially  imaged upper abdomen.     Bones: No acute fracture. No suspicious lytic or sclerotic lesion.     Impression:     Subcentimeter right upper lobe nodule remains stable going back to April 2019, almost certainly benign.  No new or suspicious nodule.        Electronically signed by: Shelly Monory  Date:                                            06/28/2022  Assessment:     SLE: SLEDAI 0 remission  Fibromyalgia  Low bone density DXA 7/22/21:  FRAX hip   Alopecia  EH  122/70          Plan:     sTOP SMOKING referred to Smoking Cessation again  Continue hydroxychloroquine 300mg daily with annual Ophthalmology check  Nick Chi, yoga on [employer]  Walking program, treadmill  Mediterranean diet  Cardiac Stress test Friday  Flu shot, new Covid vaccine booster  RTC 6 months with standing lupus labs

## 2022-09-06 NOTE — PATIENT INSTRUCTIONS
Try Nick Chi and yoga on [employer]  Mediterranean diet   New Covid vaccine booster when available  Flu shot

## 2022-09-06 NOTE — PROGRESS NOTES
Rapid3 Question Responses and Scores 7/19/2021   MDHAQ Score 0.6   Psychologic Score 2.2   Pain Score 7   When you awakened in the morning OVER THE LAST WEEK, did you feel stiff? No   If Yes, please indicate the number of hours until you are as limber as you will be for the day -   Fatigue Score 0   Global Health Score 5   RAPID3 Score 4.66

## 2022-09-16 ENCOUNTER — HOSPITAL ENCOUNTER (OUTPATIENT)
Dept: CARDIOLOGY | Facility: OTHER | Age: 62
Discharge: HOME | End: 2022-09-16
Attending: INTERNAL MEDICINE
Payer: MEDICARE

## 2022-09-16 ENCOUNTER — HOSPITAL ENCOUNTER (OUTPATIENT)
Dept: RADIOLOGY | Facility: OTHER | Age: 62
Discharge: HOME | End: 2022-09-16
Attending: INTERNAL MEDICINE
Payer: MEDICARE

## 2022-09-16 VITALS
WEIGHT: 157 LBS | DIASTOLIC BLOOD PRESSURE: 70 MMHG | SYSTOLIC BLOOD PRESSURE: 114 MMHG | HEIGHT: 63 IN | HEART RATE: 64 BPM | BODY MASS INDEX: 27.82 KG/M2

## 2022-09-16 DIAGNOSIS — N28.1 RENAL CYST: ICD-10-CM

## 2022-09-16 DIAGNOSIS — N83.202 CYST OF LEFT OVARY: ICD-10-CM

## 2022-09-16 DIAGNOSIS — R07.89 OTHER CHEST PAIN: ICD-10-CM

## 2022-09-16 LAB
CV STRESS BASE HR: 64 BPM
DIASTOLIC BLOOD PRESSURE: 70 MMHG
OHS CV CPX 1 MINUTE RECOVERY HEART RATE: 133 BPM
OHS CV CPX 85 PERCENT MAX PREDICTED HEART RATE MALE: 127
OHS CV CPX ESTIMATED METS: 7
OHS CV CPX MAX PREDICTED HEART RATE: 150
OHS CV CPX PATIENT IS FEMALE: 1
OHS CV CPX PATIENT IS MALE: 0
OHS CV CPX PEAK DIASTOLIC BLOOD PRESSURE: 85 MMHG
OHS CV CPX PEAK HEAR RATE: 144 BPM
OHS CV CPX PEAK RATE PRESSURE PRODUCT: NORMAL
OHS CV CPX PEAK SYSTOLIC BLOOD PRESSURE: 196 MMHG
OHS CV CPX PERCENT MAX PREDICTED HEART RATE ACHIEVED: 96
OHS CV CPX RATE PRESSURE PRODUCT PRESENTING: 7296
STRESS ECHO POST EXERCISE DUR MIN: 6 MINUTES
STRESS ECHO POST EXERCISE DUR SEC: 0 SECONDS
SYSTOLIC BLOOD PRESSURE: 114 MMHG

## 2022-09-16 PROCEDURE — 76856 US EXAM PELVIC COMPLETE: CPT | Mod: 26,,, | Performed by: RADIOLOGY

## 2022-09-16 PROCEDURE — 76770 US EXAM ABDO BACK WALL COMP: CPT | Mod: TC

## 2022-09-16 PROCEDURE — 76856 US PELVIS COMPLETE NON OB: ICD-10-PCS | Mod: 26,,, | Performed by: RADIOLOGY

## 2022-09-16 PROCEDURE — 93018 EXERCISE STRESS - EKG (CUPID ONLY): ICD-10-PCS | Mod: ,,, | Performed by: INTERNAL MEDICINE

## 2022-09-16 PROCEDURE — 93017 CV STRESS TEST TRACING ONLY: CPT

## 2022-09-16 PROCEDURE — 93016 EXERCISE STRESS - EKG (CUPID ONLY): ICD-10-PCS | Mod: ,,, | Performed by: INTERNAL MEDICINE

## 2022-09-16 PROCEDURE — 76770 US EXAM ABDO BACK WALL COMP: CPT | Mod: 26,,, | Performed by: RADIOLOGY

## 2022-09-16 PROCEDURE — 93018 CV STRESS TEST I&R ONLY: CPT | Mod: ,,, | Performed by: INTERNAL MEDICINE

## 2022-09-16 PROCEDURE — 76856 US EXAM PELVIC COMPLETE: CPT | Mod: TC

## 2022-09-16 PROCEDURE — 93016 CV STRESS TEST SUPVJ ONLY: CPT | Mod: ,,, | Performed by: INTERNAL MEDICINE

## 2022-09-16 PROCEDURE — 76770 US RETROPERITONEAL COMPLETE: ICD-10-PCS | Mod: 26,,, | Performed by: RADIOLOGY

## 2022-09-17 ENCOUNTER — PATIENT MESSAGE (OUTPATIENT)
Dept: INTERNAL MEDICINE | Facility: CLINIC | Age: 62
End: 2022-09-17
Payer: MEDICARE

## 2022-09-17 DIAGNOSIS — N83.201 BILATERAL OVARIAN CYSTS: Primary | ICD-10-CM

## 2022-09-17 DIAGNOSIS — N83.202 BILATERAL OVARIAN CYSTS: Primary | ICD-10-CM

## 2022-09-21 ENCOUNTER — IMMUNIZATION (OUTPATIENT)
Dept: INTERNAL MEDICINE | Facility: CLINIC | Age: 62
End: 2022-09-21
Payer: MEDICARE

## 2022-09-21 DIAGNOSIS — Z23 NEED FOR VACCINATION: Primary | ICD-10-CM

## 2022-09-21 PROCEDURE — 91312 COVID-19, MRNA, LNP-S, BIVALENT BOOSTER, PF, 30 MCG/0.3 ML DOSE: ICD-10-PCS | Mod: S$GLB,,, | Performed by: INTERNAL MEDICINE

## 2022-09-21 PROCEDURE — 0124A COVID-19, MRNA, LNP-S, BIVALENT BOOSTER, PF, 30 MCG/0.3 ML DOSE: CPT | Mod: PBBFAC | Performed by: INTERNAL MEDICINE

## 2022-09-21 PROCEDURE — 91312 COVID-19, MRNA, LNP-S, BIVALENT BOOSTER, PF, 30 MCG/0.3 ML DOSE: CPT | Mod: S$GLB,,, | Performed by: INTERNAL MEDICINE

## 2022-10-04 ENCOUNTER — PATIENT OUTREACH (OUTPATIENT)
Dept: ADMINISTRATIVE | Facility: OTHER | Age: 62
End: 2022-10-04
Payer: MEDICARE

## 2022-10-04 NOTE — PROGRESS NOTES
CHW - Initial Contact    Cathy Acuna, Community Health Worker completed the Social Determinant of Health questionnaire with patient via telephone today.    Pt identified barriers of most importance are: Utility Assistance  Referrals to community agencies completed with patient/caregiver consent outside of [facility] include: [employer]   Referrals were put through [facility] - no:  Ms. Guido reported all of her needs are meet at this time and she receives $55 in [employer]NAP monthly benefits .    CHW mailed [employer] application for assistance and provided the telephone to schedule a phone interview for assistance.    Other information discussed the patient needs / wants help with: None at this time.    Follow-up Outreach - Due: 10/25/2022

## 2022-10-25 ENCOUNTER — PATIENT OUTREACH (OUTPATIENT)
Dept: ADMINISTRATIVE | Facility: OTHER | Age: 62
End: 2022-10-25
Payer: MEDICARE

## 2022-10-25 NOTE — PROGRESS NOTES
CHW - Follow Up    Cathy Acuna, Community Health Worker completed a follow up visit with patient via telephone today.  Pt/Caregiver reported: Ms. Guido reported that she completed the utility assistance application and turned it into [employer]. She is waiting to see if it is approved.   Follow-up Outreach - Due: 11/11/2022

## 2022-11-09 ENCOUNTER — PATIENT OUTREACH (OUTPATIENT)
Dept: ADMINISTRATIVE | Facility: OTHER | Age: 62
End: 2022-11-09
Payer: MEDICARE

## 2022-11-09 NOTE — PROGRESS NOTES
CHW - Follow Up    Cathy Acuna, Community Health Worker completed a follow up visit with patient via telephone today.  Pt/Caregiver reported: Ms. Guido stated that she has not received a confirmation that [employer] has paid her utility bill.   Community Health Worker provided:  CHW informed Pt that it can take up to 30 days for [facility] to pay her utility bill.   Follow-up Outreach - Due: 11/21/2022

## 2022-11-22 ENCOUNTER — PATIENT OUTREACH (OUTPATIENT)
Dept: ADMINISTRATIVE | Facility: OTHER | Age: 62
End: 2022-11-22
Payer: MEDICARE

## 2022-11-22 NOTE — PROGRESS NOTES
CHW - Outreach Attempt    Cathy Acuna Community Health Worker left a voicemail message for 1st attempt to contact patient regarding: Community Health Program.   Community Health Worker to attempt to contact patient on:  December 1, 2022.

## 2022-11-29 ENCOUNTER — PATIENT MESSAGE (OUTPATIENT)
Dept: INTERNAL MEDICINE | Facility: CLINIC | Age: 62
End: 2022-11-29
Payer: MEDICARE

## 2022-12-01 ENCOUNTER — PATIENT OUTREACH (OUTPATIENT)
Dept: ADMINISTRATIVE | Facility: OTHER | Age: 62
End: 2022-12-01
Payer: MEDICARE

## 2022-12-01 NOTE — PROGRESS NOTES
CHW - Outreach Attempt    Cathy Acuna Community Health Worker left a voicemail message for 2nd attempt to contact patient regarding: [facility]   Community Health Worker to attempt to contact patient on:  December 13, 2022.

## 2022-12-02 ENCOUNTER — TELEPHONE (OUTPATIENT)
Dept: OPHTHALMOLOGY | Facility: CLINIC | Age: 62
End: 2022-12-02
Payer: MEDICARE

## 2022-12-04 ENCOUNTER — PATIENT MESSAGE (OUTPATIENT)
Dept: OPHTHALMOLOGY | Facility: CLINIC | Age: 62
End: 2022-12-04
Payer: MEDICARE

## 2022-12-05 ENCOUNTER — TELEPHONE (OUTPATIENT)
Dept: OPHTHALMOLOGY | Facility: CLINIC | Age: 62
End: 2022-12-05
Payer: MEDICARE

## 2022-12-05 ENCOUNTER — PATIENT MESSAGE (OUTPATIENT)
Dept: OPHTHALMOLOGY | Facility: CLINIC | Age: 62
End: 2022-12-05
Payer: MEDICARE

## 2022-12-05 ENCOUNTER — PROCEDURE VISIT (OUTPATIENT)
Dept: OPHTHALMOLOGY | Facility: CLINIC | Age: 62
End: 2022-12-05
Payer: MEDICARE

## 2022-12-05 VITALS — DIASTOLIC BLOOD PRESSURE: 77 MMHG | HEART RATE: 76 BPM | SYSTOLIC BLOOD PRESSURE: 109 MMHG

## 2022-12-05 DIAGNOSIS — H02.825 CYST OF LEFT LOWER EYELID: Primary | ICD-10-CM

## 2022-12-05 PROCEDURE — 11441 EXC FACE-MM B9+MARG 0.6-1 CM: CPT | Mod: S$GLB,,, | Performed by: OPHTHALMOLOGY

## 2022-12-05 PROCEDURE — 88305 TISSUE EXAM BY PATHOLOGIST: CPT | Performed by: STUDENT IN AN ORGANIZED HEALTH CARE EDUCATION/TRAINING PROGRAM

## 2022-12-05 PROCEDURE — 88305 TISSUE EXAM BY PATHOLOGIST: CPT | Mod: 26,,, | Performed by: STUDENT IN AN ORGANIZED HEALTH CARE EDUCATION/TRAINING PROGRAM

## 2022-12-05 PROCEDURE — 11441 PR EXC SKIN BENIG 0.6-1 CM FACE,FACIAL: ICD-10-PCS | Mod: S$GLB,,, | Performed by: OPHTHALMOLOGY

## 2022-12-05 PROCEDURE — 88307 TISSUE EXAM BY PATHOLOGIST: CPT | Performed by: STUDENT IN AN ORGANIZED HEALTH CARE EDUCATION/TRAINING PROGRAM

## 2022-12-05 PROCEDURE — 99499 NO LOS: ICD-10-PCS | Mod: S$GLB,,, | Performed by: OPHTHALMOLOGY

## 2022-12-05 PROCEDURE — 99499 UNLISTED E&M SERVICE: CPT | Mod: S$GLB,,, | Performed by: OPHTHALMOLOGY

## 2022-12-05 PROCEDURE — 88305 TISSUE EXAM BY PATHOLOGIST: ICD-10-PCS | Mod: 26,,, | Performed by: STUDENT IN AN ORGANIZED HEALTH CARE EDUCATION/TRAINING PROGRAM

## 2022-12-05 NOTE — PROGRESS NOTES
HPI    Patient here for excisional biopsy of lesion on left lower eyelid  No NSAIDS/ASA's taken in the past 7 days.   Valium taken at arrival.   Last edited by Barbara Vincent on 12/5/2022  9:03 AM.            Assessment /Plan     For exam results, see Encounter Report.    Cyst of left lower eyelid  -     Specimen to Pathology Ophthalmology             Ophthalmology Service  Operative Note    Date: 12/5/22    Attending: Elizabeth Fox MD     Resident: Robinson Sheffield MD     Pre-Operative Diagnosis: LEFT lateral canthal mass     Post-Operative Diagnosis: Same     Procedure:   1) LEFT lateral canthus mass excisional biopsy    Anesthesia: Local (mixture of 2% lidocaine with 1:200,000 epinephrine, 0.5% bupivacaine, Vitrase)     Estimated Blood Lost: < 2 cc     Complications: None     Specimens: left lateral canthal cyst     Implants: None     Indications for surgery:   The patient is a 62 y.o.-year-old female with a history of a left lateral canthal cystic lesion.   All risks, benefits, and alternatives of the procedure were discussed. The risks including but not limited to pain, bleeding, infection, ocular injury, loss of the eye, asymmetry, overcorrection, undercorrection, need for revision in future, scarring were discussed with the patient. After the opportunity to ask questions, the patient elected to proceed. A consent document was signed, witnessed, and placed in the patient's chart.      Procedure in detail:     The patient was brought to the procedure room.  2 cc of 2% lidocaine with epinephrine, 0.5% bupivacaine, and Vitrase was injected subcutaneously into both upper eyelids. The full face was then prepped using topical Betadine, and the patient was draped in sterile fashion.       The skin over the mass was excised horizontally in the midline with a 15-blade. Blunt dissection with Vivien scissors was performed to dissect out the cyst wall. The cyst wall was then excised from the underlying orbicularis  fibers. Hemostasis was obtained with conservative electrocautery. The wound was left open to heal by secondary intention. Tobradex ointment was applied.     The patient tolerated the procedure well without any complications. The patient will use Tobradex Tobradex ointment to the skin. They are to use cool compresses for 48 hours followed by warm compresses for 48 hours.      Post-operative instructions were given to the patient.

## 2022-12-13 ENCOUNTER — PATIENT OUTREACH (OUTPATIENT)
Dept: ADMINISTRATIVE | Facility: OTHER | Age: 62
End: 2022-12-13
Payer: MEDICARE

## 2022-12-13 NOTE — PROGRESS NOTES
CHW - Outreach Attempt    Cathy Acuna Community Health Worker left a voicemail message for 3rd attempt to contact patient regarding: Community Health Program.   Community Health Worker to attempt to contact patient on: December 22, 2022.

## 2022-12-16 LAB
FINAL PATHOLOGIC DIAGNOSIS: NORMAL
GROSS: NORMAL
Lab: NORMAL
MICROSCOPIC EXAM: NORMAL

## 2022-12-19 ENCOUNTER — TELEPHONE (OUTPATIENT)
Dept: OPHTHALMOLOGY | Facility: CLINIC | Age: 62
End: 2022-12-19
Payer: MEDICARE

## 2022-12-19 NOTE — TELEPHONE ENCOUNTER
Please call the patient and inform them of the benign results.  If the patient has any further questions, I would be happy to answer them.

## 2022-12-21 ENCOUNTER — PATIENT OUTREACH (OUTPATIENT)
Dept: ADMINISTRATIVE | Facility: OTHER | Age: 62
End: 2022-12-21
Payer: MEDICARE

## 2022-12-21 NOTE — PROGRESS NOTES
CHW - Outreach Attempt    Cathy Acuna Community Health Worker left a voicemail message for 3rd attempt to contact patient regarding: Community Health Program.   Community Health Worker to attempt to contact patient on:  1/05/2023

## 2023-01-03 ENCOUNTER — PATIENT OUTREACH (OUTPATIENT)
Dept: ADMINISTRATIVE | Facility: OTHER | Age: 63
End: 2023-01-03
Payer: MEDICARE

## 2023-01-04 ENCOUNTER — TELEPHONE (OUTPATIENT)
Dept: INTERNAL MEDICINE | Facility: CLINIC | Age: 63
End: 2023-01-04
Payer: MEDICARE

## 2023-01-05 NOTE — TELEPHONE ENCOUNTER
"Pt informed message below:  so sorry for the inconvenience, but Dr. Arambula's schedule is changing a bit and we needed to move your upcoming appointment with him to [facility] books, and adjust the time a bit! Please check the details under the "visits" section of your portal and let us know if you need anything adjusted.    Ok'd appt changes  "

## 2023-01-11 ENCOUNTER — PATIENT MESSAGE (OUTPATIENT)
Dept: INTERNAL MEDICINE | Facility: CLINIC | Age: 63
End: 2023-01-11
Payer: MEDICARE

## 2023-01-12 ENCOUNTER — TELEPHONE (OUTPATIENT)
Dept: INTERNAL MEDICINE | Facility: CLINIC | Age: 63
End: 2023-01-12
Payer: MEDICARE

## 2023-01-12 NOTE — TELEPHONE ENCOUNTER
Emelina FERREIRA Request     albuterol (PROVENTIL/VENTOLIN HFA) 90 mcg/actuation inhaler  Inhale 2 puffs into the lungs every 6 (six) hours as needed for Wheezing. Rescue, Starting Tue 12/20/2022, Normal   Dispense: 54 g   Refills: 2 ordered   Pharm[facility]: [id number] [place] [facility] [address] AT [facility] [address] (Ph: [phone])       Called pharmacist to ask them to run the medication through the primary insurance.     Pharmacist states that medication will be ready for  after 3:00 pm. Thanks.

## 2023-01-13 ENCOUNTER — PATIENT MESSAGE (OUTPATIENT)
Dept: INTERNAL MEDICINE | Facility: CLINIC | Age: 63
End: 2023-01-13
Payer: MEDICARE

## 2023-01-13 ENCOUNTER — OFFICE VISIT (OUTPATIENT)
Dept: FAMILY MEDICINE | Facility: CLINIC | Age: 63
End: 2023-01-13
Payer: MEDICARE

## 2023-01-13 DIAGNOSIS — R09.82 POSTNASAL DRIP: ICD-10-CM

## 2023-01-13 DIAGNOSIS — J06.9 UPPER RESPIRATORY TRACT INFECTION, UNSPECIFIED TYPE: Primary | ICD-10-CM

## 2023-01-13 DIAGNOSIS — J02.9 SORE THROAT: ICD-10-CM

## 2023-01-13 DIAGNOSIS — I10 ESSENTIAL HYPERTENSION, BENIGN: Chronic | ICD-10-CM

## 2023-01-13 DIAGNOSIS — E03.9 ACQUIRED HYPOTHYROIDISM: Chronic | ICD-10-CM

## 2023-01-13 DIAGNOSIS — R09.89 RUNNY NOSE: ICD-10-CM

## 2023-01-13 DIAGNOSIS — R05.1 ACUTE COUGH: ICD-10-CM

## 2023-01-13 PROCEDURE — 99499 RISK ADDL DX/OHS AUDIT: ICD-10-PCS | Mod: 95,,, | Performed by: INTERNAL MEDICINE

## 2023-01-13 PROCEDURE — 99214 OFFICE O/P EST MOD 30 MIN: CPT | Mod: 95,,, | Performed by: INTERNAL MEDICINE

## 2023-01-13 PROCEDURE — 1159F PR MEDICATION LIST DOCUMENTED IN MEDICAL RECORD: ICD-10-PCS | Mod: CPTII,95,, | Performed by: INTERNAL MEDICINE

## 2023-01-13 PROCEDURE — 1160F RVW MEDS BY RX/DR IN RCRD: CPT | Mod: CPTII,95,, | Performed by: INTERNAL MEDICINE

## 2023-01-13 PROCEDURE — 1160F PR REVIEW ALL MEDS BY PRESCRIBER/CLIN PHARMACIST DOCUMENTED: ICD-10-PCS | Mod: CPTII,95,, | Performed by: INTERNAL MEDICINE

## 2023-01-13 PROCEDURE — 1159F MED LIST DOCD IN RCRD: CPT | Mod: CPTII,95,, | Performed by: INTERNAL MEDICINE

## 2023-01-13 PROCEDURE — 99499 UNLISTED E&M SERVICE: CPT | Mod: 95,,, | Performed by: INTERNAL MEDICINE

## 2023-01-13 PROCEDURE — 99214 PR OFFICE/OUTPT VISIT, EST, LEVL IV, 30-39 MIN: ICD-10-PCS | Mod: 95,,, | Performed by: INTERNAL MEDICINE

## 2023-01-13 RX ORDER — PROMETHAZINE HYDROCHLORIDE AND DEXTROMETHORPHAN HYDROBROMIDE 6.25; 15 MG/5ML; MG/5ML
5 SYRUP ORAL EVERY 12 HOURS PRN
Qty: 100 ML | Refills: 0 | Status: SHIPPED | OUTPATIENT
Start: 2023-01-13 | End: 2023-01-23

## 2023-01-13 RX ORDER — FLUTICASONE PROPIONATE 50 MCG
1 SPRAY, SUSPENSION (ML) NASAL DAILY
Qty: 16 G | Refills: 0 | Status: SHIPPED | OUTPATIENT
Start: 2023-01-13 | End: 2023-03-13

## 2023-01-13 RX ORDER — LEVOCETIRIZINE DIHYDROCHLORIDE 5 MG/1
5 TABLET, FILM COATED ORAL NIGHTLY
Qty: 30 TABLET | Refills: 11 | Status: SHIPPED | OUTPATIENT
Start: 2023-01-13 | End: 2023-10-25

## 2023-01-13 NOTE — PROGRESS NOTES
Patient ID: Breanna Guido is a pleasant 62 y.o. Black or  female.    Chief Complaint: flu like symptoms and Generalized Body Aches      Patient is a new pt to me but established pt from Dr. Arambula. Last visit in 08/2022.    This visit is a Telemedicine Video Visit due to the COVID-19 epidemics.    HPI:    Pt with PMH as per list of problems below who reports subacute issue of runny nose, sore throat, postnasal drip, and cough.  It started 6 days ago. She took 3 home COVID tests on 2 days, negative. She is vaccinated for flu and COVID including bivalent booster.  She had fever at 101 2 days ago, now no more fever, but still symptoms as above. No SOB, CP, had chills when had fever but better. No headache, no loss of smell or taste, no GI issues. Cough is dry in bouts.  Her 12 y old grand son was sick the week-end before she started not to feel good, and she was in contact with him with no mask.  She tried Mucinex and Coricidine syrup with no improvement.    Patient Active Problem List   Diagnosis    COPD (chronic obstructive pulmonary disease)    Essential hypertension, benign    Mixed hyperlipidemia    Lupus    Bipolar 2 disorder    Fibromyalgia    Acquired hypothyroidism    Osteopenia    Alopecia    Lumbar back pain with radiculopathy affecting left lower extremity    Scleritis of right eye    Medication monitoring encounter    Lung nodule    Osteoarthritis of both knees    Toe pain, bilateral    Trigger finger    Aortic atherosclerosis    Pulmonary granuloma    Major depressive disorder    Osteoarthrosis    Cigarette nicotine dependence with nicotine-induced disorder        Review of Systems   Constitutional:  Positive for chills (solved). Negative for fever.   HENT:  Positive for postnasal drip, rhinorrhea and sore throat.    Respiratory:  Positive for cough. Negative for shortness of breath and wheezing.    Cardiovascular:  Negative for chest pain.   Musculoskeletal:  Positive for myalgias.    Skin:  Negative for rash.   Neurological:  Negative for headaches.     Objective:      Physical Exam    There were no vitals filed for this visit.  There is no height or weight on file to calculate BMI.    RESULTS: Reviewed labs from last 12 months    Last Lab Results:     Lab Results   Component Value Date    WBC 4.18 08/30/2022    HGB 13.7 08/30/2022    HCT 39.5 08/30/2022     08/30/2022     08/30/2022    K 3.8 09/16/2022     08/30/2022    CO2 22 (L) 08/30/2022    BUN 9 08/30/2022    CREATININE 0.8 08/30/2022    CALCIUM 9.1 08/30/2022    ALBUMIN 4.2 08/30/2022    AST 22 08/30/2022    ALT 18 08/30/2022    CHOL 127 06/08/2022    TRIG 44 06/08/2022    HDL 52 06/08/2022    LDLCALC 66.2 06/08/2022    HGBA1C 5.4 09/13/2017    TSH 0.796 08/30/2022       Assessment:       1. Upper respiratory tract infection, unspecified type    2. Sore throat    3. Acute cough    4. Runny nose    5. Postnasal drip    6. Essential hypertension, benign    7. Acquired hypothyroidism        Plan:   Breanna was seen today for flu like symptoms and generalized body aches.    Diagnoses and all orders for this visit:    Upper respiratory tract infection, unspecified type  -     levocetirizine (XYZAL) 5 MG tablet; Take 1 tablet (5 mg total) by mouth every evening.    Probably viral, even if COVID or flu, too late to start antiviral treatment, and pt feeling better. Will treat symptoms. Pt declines a short course of steroids. Advised re: symptoms and signs to monitor.    Sore throat    Advised to gargle with salted water, she can drink tea with honey, ginger and lemon.     Acute cough  -     promethazine-dextromethorphan (PROMETHAZINE-DM) 6.25-15 mg/5 mL Syrp; Take 5 mLs by mouth every 12 (twelve) hours as needed (cough).    See above.    Runny nose  -     fluticasone propionate (FLONASE) 50 mcg/actuation nasal spray; 1 spray (50 mcg total) by Each Nostril route once daily.    See above.    Postnasal drip  -     fluticasone  propionate (FLONASE) 50 mcg/actuation nasal spray; 1 spray (50 mcg total) by Each Nostril route once daily.    See above. Also advised re: saline.    Essential hypertension, benign    F-up usual PCP.    Acquired hypothyroidism    F-up usual PCP.    No follow-ups on file.    This note was created by combination of typed  and M-Modal dictation.  Transcription errors may be present.  If there are any questions, please contact me.

## 2023-01-13 NOTE — TELEPHONE ENCOUNTER
Called pt and confirmed today's virtual that I found for her  Pt verbalized understanding and had no further concerns or questions.

## 2023-02-13 ENCOUNTER — PATIENT MESSAGE (OUTPATIENT)
Dept: ADMINISTRATIVE | Facility: OTHER | Age: 63
End: 2023-02-13
Payer: MEDICARE

## 2023-02-27 ENCOUNTER — PATIENT MESSAGE (OUTPATIENT)
Dept: INTERNAL MEDICINE | Facility: CLINIC | Age: 63
End: 2023-02-27
Payer: MEDICARE

## 2023-03-01 ENCOUNTER — HOSPITAL ENCOUNTER (EMERGENCY)
Facility: HOSPITAL | Age: 63
Discharge: HOME | End: 2023-03-01
Attending: EMERGENCY MEDICINE
Payer: MEDICARE

## 2023-03-01 VITALS
DIASTOLIC BLOOD PRESSURE: 69 MMHG | SYSTOLIC BLOOD PRESSURE: 114 MMHG | OXYGEN SATURATION: 98 % | RESPIRATION RATE: 16 BRPM | HEART RATE: 70 BPM | WEIGHT: 150 LBS | TEMPERATURE: 98 F | BODY MASS INDEX: 26.58 KG/M2 | HEIGHT: 63 IN

## 2023-03-01 DIAGNOSIS — R51.9 NONINTRACTABLE HEADACHE, UNSPECIFIED CHRONICITY PATTERN, UNSPECIFIED HEADACHE TYPE: Primary | ICD-10-CM

## 2023-03-01 PROCEDURE — 99283 PR EMERGENCY DEPT VISIT,LEVEL III: ICD-10-PCS | Mod: ,,, | Performed by: EMERGENCY MEDICINE

## 2023-03-01 PROCEDURE — 99284 EMERGENCY DEPT VISIT MOD MDM: CPT | Mod: 25

## 2023-03-01 PROCEDURE — 96361 HYDRATE IV INFUSION ADD-ON: CPT

## 2023-03-01 PROCEDURE — 25000003 PHARM REV CODE 250: Performed by: EMERGENCY MEDICINE

## 2023-03-01 PROCEDURE — 96375 TX/PRO/DX INJ NEW DRUG ADDON: CPT

## 2023-03-01 PROCEDURE — 63600175 PHARM REV CODE 636 W HCPCS: Performed by: EMERGENCY MEDICINE

## 2023-03-01 PROCEDURE — 96374 THER/PROPH/DIAG INJ IV PUSH: CPT

## 2023-03-01 PROCEDURE — 99283 EMERGENCY DEPT VISIT LOW MDM: CPT | Mod: ,,, | Performed by: EMERGENCY MEDICINE

## 2023-03-01 RX ORDER — PROCHLORPERAZINE EDISYLATE 5 MG/ML
10 INJECTION INTRAMUSCULAR; INTRAVENOUS
Status: COMPLETED | OUTPATIENT
Start: 2023-03-01 | End: 2023-03-01

## 2023-03-01 RX ORDER — DIPHENHYDRAMINE HCL 25 MG
25 CAPSULE ORAL
Status: COMPLETED | OUTPATIENT
Start: 2023-03-01 | End: 2023-03-01

## 2023-03-01 RX ORDER — BUTALBITAL, ACETAMINOPHEN AND CAFFEINE 50; 325; 40 MG/1; MG/1; MG/1
1 TABLET ORAL EVERY 6 HOURS PRN
Qty: 15 TABLET | Refills: 0 | Status: SHIPPED | OUTPATIENT
Start: 2023-03-01 | End: 2023-06-08 | Stop reason: SDUPTHER

## 2023-03-01 RX ORDER — KETOROLAC TROMETHAMINE 30 MG/ML
15 INJECTION, SOLUTION INTRAMUSCULAR; INTRAVENOUS
Status: COMPLETED | OUTPATIENT
Start: 2023-03-01 | End: 2023-03-01

## 2023-03-01 RX ADMIN — PROCHLORPERAZINE EDISYLATE 10 MG: 5 INJECTION INTRAMUSCULAR; INTRAVENOUS at 04:03

## 2023-03-01 RX ADMIN — KETOROLAC TROMETHAMINE 15 MG: 30 INJECTION, SOLUTION INTRAMUSCULAR; INTRAVENOUS at 04:03

## 2023-03-01 RX ADMIN — DIPHENHYDRAMINE HYDROCHLORIDE 25 MG: 25 CAPSULE ORAL at 04:03

## 2023-03-01 NOTE — ED PROVIDER NOTES
Encounter Date: 3/1/2023    SCRIBE #1 NOTE: I, Georgiana Diallo, am scribing for, and in the presence of,  Enrique Ku MD. I have scribed the following portions of the note - Other sections scribed: HPI, ROS, PE.     History     Chief Complaint   Patient presents with    Headache     Started with ha for 2 weeks, hx migraines      Time patient was seen by the provider: 3:50 PM      The patient is a 62 y.o. female with past medical history of HTN, lupus, and thyroid disease who presents to the ED with a complaint of a persistent headache onset 2 weeks ago. The patient spontaneously developed a headache that was worse the first 3-4 days. Her symptoms improved for 2 days and then recurred, but more severe than before. Her headache is mainly located at the posterior and sides of her head. Associated symptoms include nausea and occasional facial tightness. Her headache exacerbates with loud sounds and light. She has been taking Excedrin, Tylenol, and Aleve with mild relief. The patient states that her current headache is similar to her migraines in the past. She denies vomiting, numbness, weakness, slurred speech, and facial droop.      The history is provided by medical records and the patient. No  was used.   Review of patient's allergies indicates:   Allergen Reactions    Bactrim [sulfamethoxazole-trimethoprim] Nausea And Vomiting    Sulfa (sulfonamide antibiotics)      Agitation^     Past Medical History:   Diagnosis Date    Anxiety     Arthritis     Bipolar 1 disorder     Cataract     COPD (chronic obstructive pulmonary disease)     Depression     bipolar 2    Eyelid lesion     Fibromyalgia     H/O corneal abrasion 2001    left eye with lead-based paint chips    Hyperlipidemia     Hypertension     Lupus     Migraine headache     Scleritis     Thyroid disease      Past Surgical History:   Procedure Laterality Date    HYSTERECTOMY      for sterilization, ovaries intact    TRIGGER FINGER RELEASE Right  2021    Procedure: RELEASE, TRIGGER FINGER, right ring & small;  Surgeon: Mellissa Guadarrama MD;  Location: [facility];  Service: Orthopedics;  Laterality: Right;  REGIONAL MAC     Family History   Problem Relation Age of Onset    Depression Father         suicide    Cataracts Mother     Rheum arthritis Mother     Lupus Mother     Arthritis Mother     Cataracts Sister     Rheum arthritis Sister     Breast cancer Maternal Aunt     Thyroid disease Daughter     No Known Problems Daughter     No Known Problems Daughter     Colon cancer Brother     Cancer Brother     No Known Problems Maternal Uncle     No Known Problems Paternal Aunt     No Known Problems Paternal Uncle     No Known Problems Maternal Grandmother     No Known Problems Maternal Grandfather     No Known Problems Paternal Grandmother     No Known Problems Paternal Grandfather     Cancer Sister     Cancer Brother     Early death Sister         Covid-19    Ovarian cancer Neg Hx     Melanoma Neg Hx     Amblyopia Neg Hx     Blindness Neg Hx     Diabetes Neg Hx     Glaucoma Neg Hx     Hypertension Neg Hx     Macular degeneration Neg Hx     Retinal detachment Neg Hx     Strabismus Neg Hx     Stroke Neg Hx      Social History     Tobacco Use    Smoking status: Every Day     Packs/day: 1.00     Years: 45.00     Pack years: 45.00     Types: Cigarettes     Start date: 1974     Last attempt to quit: 2021     Years since quittin.1    Smokeless tobacco: Never   Substance Use Topics    Alcohol use: Yes     Alcohol/week: 5.0 standard drinks     Types: 5 Glasses of wine per week     Comment: 0.5 to 1 bottle of wine per day     Drug use: Yes     Types: Marijuana     Comment: occasionally     Review of Systems   Constitutional:  Negative for activity change, diaphoresis and fever.   HENT:  Negative for ear pain, rhinorrhea, sore throat and trouble swallowing.    Eyes:  Negative for pain and visual disturbance.   Respiratory:  Negative for cough, shortness of  breath and stridor.    Cardiovascular:  Negative for chest pain.   Gastrointestinal:  Positive for nausea. Negative for abdominal pain, blood in stool, diarrhea and vomiting.   Genitourinary:  Negative for dysuria, hematuria, vaginal bleeding and vaginal discharge.   Musculoskeletal:  Negative for gait problem.   Skin:  Negative for rash and wound.   Neurological:  Positive for headaches. Negative for seizures, facial asymmetry, speech difficulty, weakness and numbness.   Psychiatric/Behavioral:  Negative for hallucinations and suicidal ideas.      Physical Exam     Initial Vitals [03/01/23 1354]   BP Pulse Resp Temp SpO2   (!) 145/80 92 18 98 °F (36.7 °C) 99 %      MAP       --         Physical Exam    Nursing note and vitals reviewed.  Constitutional: She appears well-developed. She is not diaphoretic. No distress.   HENT:   Head: Normocephalic and atraumatic.   Nose: Nose normal.   Eyes: EOM are normal. No scleral icterus.   Neck: Neck supple.   Normal range of motion.  Cardiovascular:  Normal rate, regular rhythm, normal heart sounds and intact distal pulses.     Exam reveals no gallop and no friction rub.       No murmur heard.  Pulmonary/Chest: Breath sounds normal. No stridor. No respiratory distress. She has no wheezes. She has no rhonchi. She has no rales.   Abdominal: Abdomen is soft. Bowel sounds are normal. She exhibits no distension. There is no abdominal tenderness. There is no rebound and no guarding.   Musculoskeletal:         General: Normal range of motion.      Cervical back: Normal range of motion and neck supple.     Skin: Skin is warm and dry. Capillary refill takes less than 2 seconds. No rash noted.   Psychiatric: She has a normal mood and affect.   Neurological Exam    Cranial Nerves  CN III, IV, VI: Extraocular movements intact bilaterally.     ED Course   Procedures  Labs Reviewed   HIV 1 / 2 ANTIBODY   HEPATITIS C ANTIBODY          Imaging Results    None          Medications   sodium  chloride 0.9% bolus 500 mL 500 mL (500 mLs Intravenous Bolus from Bag 3/1/23 1643)   prochlorperazine injection Soln 10 mg (10 mg Intravenous Given 3/1/23 1658)   diphenhydrAMINE capsule 25 mg (25 mg Oral Given 3/1/23 1642)   ketorolac injection 15 mg (15 mg Intravenous Given 3/1/23 1646)     Medical Decision Making:   History:   Old Medical Records: I decided to obtain old medical records.  ED Management:  Most likely 2/2 tension headache, migraine, or headache of non-emergent etiology. No focal neurological symptoms. Neuro exam is benign. Pt is nontoxic. VSS.    Unlikely SAH: headache is non thunderclap. Headache is gradual, non-maximal at onset and similar to headaches in the past.  Unlikely Subdural/epidural hematoma: no history of trauma, no anticoagulation.  Unlikely Meningitis: afebrile, no meningismus,  mild photophobia.  Unlikely Temporal arteritis: no tenderness in temporal area  Unlikely Acute angle glaucoma: PERRL, no eye pain.  Unlikely Carbon Monoxide Poisoning: no other house members with similar symptoms.    Headache improved after migraine cocktail in the ED.  Patient feeling much better and ready to go home.  Patient stable for discharge. Pt understands and agrees with discharge instructions. Pt also given strict return precautions for any new or worsening symptoms and plans to follow up closely with PCP.          Scribe Attestation:   Scribe #1: I performed the above scribed service and the documentation accurately describes the services I performed. I attest to the accuracy of the note.                   Clinical Impression:   Final diagnoses:  [R51.9] Nonintractable headache, unspecified chronicity pattern, unspecified headache type (Primary)        ED Disposition Condition    Discharge Stable          ED Prescriptions       Medication Sig Dispense Start Date End Date Auth. Provider    butalbital-acetaminophen-caffeine -40 mg (FIORICET, ESGIC) -40 mg per tablet Take 1 tablet by mouth  every 6 (six) hours as needed for Pain. 15 tablet 3/1/2023 -- Enrique Ku MD          Follow-up Information       Follow up With Specialties Details Why Contact Info    Jeremy Wood MD Neurology Schedule an appointment as soon as possible for a visit   [address] [facility] [address]  [phone]      Bolivar Arambula MD Internal Medicine Schedule an appointment as soon as possible for a visit   [address] [facility] [address]  [phone]      [facility] - Emergency Dept Emergency Medicine Go to  As needed, If symptoms worsen [address] [facility] [id number] [phone]             Enrique Ku MD  03/01/23 5382

## 2023-03-06 ENCOUNTER — TELEPHONE (OUTPATIENT)
Dept: RHEUMATOLOGY | Facility: CLINIC | Age: 63
End: 2023-03-06
Payer: MEDICARE

## 2023-03-06 ENCOUNTER — LAB VISIT (OUTPATIENT)
Dept: LAB | Facility: OTHER | Age: 63
End: 2023-03-06
Attending: INTERNAL MEDICINE
Payer: MEDICARE

## 2023-03-06 DIAGNOSIS — M32.19 OTHER SYSTEMIC LUPUS ERYTHEMATOSUS WITH OTHER ORGAN INVOLVEMENT: ICD-10-CM

## 2023-03-06 DIAGNOSIS — E87.6 HYPOKALEMIA: Primary | ICD-10-CM

## 2023-03-06 LAB
BILIRUB UR QL STRIP: NEGATIVE
CLARITY UR: CLEAR
COLOR UR: YELLOW
CREAT UR-MCNC: 217.5 MG/DL (ref 15–325)
GLUCOSE UR QL STRIP: NEGATIVE
HGB UR QL STRIP: NEGATIVE
KETONES UR QL STRIP: NEGATIVE
LEUKOCYTE ESTERASE UR QL STRIP: NEGATIVE
NITRITE UR QL STRIP: NEGATIVE
PH UR STRIP: 6 [PH] (ref 5–8)
PROT UR QL STRIP: ABNORMAL
PROT UR-MCNC: 18 MG/DL (ref 0–15)
PROT/CREAT UR: 0.08 MG/G{CREAT} (ref 0–0.2)
SP GR UR STRIP: 1.02 (ref 1–1.03)
URN SPEC COLLECT METH UR: ABNORMAL
UROBILINOGEN UR STRIP-ACNC: NEGATIVE EU/DL

## 2023-03-06 PROCEDURE — 81003 URINALYSIS AUTO W/O SCOPE: CPT | Performed by: INTERNAL MEDICINE

## 2023-03-06 PROCEDURE — 82570 ASSAY OF URINE CREATININE: CPT | Performed by: INTERNAL MEDICINE

## 2023-03-13 ENCOUNTER — OFFICE VISIT (OUTPATIENT)
Dept: RHEUMATOLOGY | Facility: CLINIC | Age: 63
End: 2023-03-13
Payer: MEDICARE

## 2023-03-13 VITALS
BODY MASS INDEX: 26.58 KG/M2 | DIASTOLIC BLOOD PRESSURE: 87 MMHG | HEIGHT: 63 IN | SYSTOLIC BLOOD PRESSURE: 131 MMHG | WEIGHT: 150 LBS | HEART RATE: 69 BPM

## 2023-03-13 DIAGNOSIS — M79.7 FIBROMYALGIA: Chronic | ICD-10-CM

## 2023-03-13 DIAGNOSIS — Z72.0 TOBACCO ABUSE: ICD-10-CM

## 2023-03-13 DIAGNOSIS — M17.0 PRIMARY OSTEOARTHRITIS OF BOTH KNEES: ICD-10-CM

## 2023-03-13 DIAGNOSIS — M32.9 LUPUS: Primary | Chronic | ICD-10-CM

## 2023-03-13 PROCEDURE — 3008F BODY MASS INDEX DOCD: CPT | Mod: CPTII,S$GLB,, | Performed by: INTERNAL MEDICINE

## 2023-03-13 PROCEDURE — 3079F DIAST BP 80-89 MM HG: CPT | Mod: CPTII,S$GLB,, | Performed by: INTERNAL MEDICINE

## 2023-03-13 PROCEDURE — 3008F PR BODY MASS INDEX (BMI) DOCUMENTED: ICD-10-PCS | Mod: CPTII,S$GLB,, | Performed by: INTERNAL MEDICINE

## 2023-03-13 PROCEDURE — 99214 PR OFFICE/OUTPT VISIT, EST, LEVL IV, 30-39 MIN: ICD-10-PCS | Mod: S$GLB,,, | Performed by: INTERNAL MEDICINE

## 2023-03-13 PROCEDURE — 3075F SYST BP GE 130 - 139MM HG: CPT | Mod: CPTII,S$GLB,, | Performed by: INTERNAL MEDICINE

## 2023-03-13 PROCEDURE — 99999 PR PBB SHADOW E&M-EST. PATIENT-LVL V: CPT | Mod: PBBFAC,,, | Performed by: INTERNAL MEDICINE

## 2023-03-13 PROCEDURE — 1159F MED LIST DOCD IN RCRD: CPT | Mod: CPTII,S$GLB,, | Performed by: INTERNAL MEDICINE

## 2023-03-13 PROCEDURE — 3075F PR MOST RECENT SYSTOLIC BLOOD PRESS GE 130-139MM HG: ICD-10-PCS | Mod: CPTII,S$GLB,, | Performed by: INTERNAL MEDICINE

## 2023-03-13 PROCEDURE — 1159F PR MEDICATION LIST DOCUMENTED IN MEDICAL RECORD: ICD-10-PCS | Mod: CPTII,S$GLB,, | Performed by: INTERNAL MEDICINE

## 2023-03-13 PROCEDURE — 99999 PR PBB SHADOW E&M-EST. PATIENT-LVL V: ICD-10-PCS | Mod: PBBFAC,,, | Performed by: INTERNAL MEDICINE

## 2023-03-13 PROCEDURE — 3079F PR MOST RECENT DIASTOLIC BLOOD PRESSURE 80-89 MM HG: ICD-10-PCS | Mod: CPTII,S$GLB,, | Performed by: INTERNAL MEDICINE

## 2023-03-13 PROCEDURE — 99214 OFFICE O/P EST MOD 30 MIN: CPT | Mod: S$GLB,,, | Performed by: INTERNAL MEDICINE

## 2023-03-13 NOTE — PROGRESS NOTES
Rapid3 Question Responses and Scores 3/11/2023   MDHAQ Score 0.9   Psychologic Score 4.4   Pain Score 5   When you awakened in the morning OVER THE LAST WEEK, did you feel stiff? Yes   If Yes, please indicate the number of hours until you are as limber as you will be for the day -   Fatigue Score 0.5   Global Health Score 4   RAPID3 Score 4     Answers submitted by the patient for this visit:  Rheumatology Questionnaire (Submitted on 3/11/2023)  fever: No  eye redness: No  mouth sores: No  headaches: Yes  shortness of breath: No  chest pain: No  trouble swallowing: No  diarrhea: No  constipation: No  unexpected weight change: No  genital sore: No  dysuria: No  During the last 3 days, have you had a skin rash?: No  Bruises or bleeds easily: No  cough: No

## 2023-03-13 NOTE — PROGRESS NOTES
"Subjective:       Patient ID: Breanna Guido is a 62 y.o. female.    Chief Complaint: SLE; fibromyalgia    HPI LCV was 9/6/22. She has been doing about the same as last clinic visit. She is having right shoulder pain. No radiation through the arm. She has tried tylenol/Aleve which have not helped. She states that this has increased since she has been gardening more. She is interested in repeating knee viscosupplementation with orthopedics. She is overdue for plaquenil eye check.     Exercise: walking, treadmill 45 minutes daily. Not tried [employer] yoga b/o cost  Still smoking, has been is smoking cessation. Intolerant of varenicline, but bupropion helped greatly at first, but not second time.   Diet: avoiding fried foods    Review of Systems   Constitutional:  Negative for fever and unexpected weight change.   HENT:  Negative for mouth sores and trouble swallowing.    Eyes:  Negative for redness.   Respiratory:  Negative for cough and shortness of breath.    Cardiovascular:  Negative for chest pain.   Gastrointestinal:  Negative for constipation and diarrhea.   Genitourinary:  Negative for dysuria and genital sores.   Skin:  Negative for rash.   Neurological:  Positive for headaches.   Hematological:  Does not bruise/bleed easily.       Objective:   /87   Pulse 69   Ht 5' 3" (1.6 m)   Wt 68 kg (150 lb)   BMI 26.57 kg/m²      Physical Exam   Constitutional: She is oriented to person, place, and time.   HENT:   Head: Normocephalic and atraumatic.   Mouth/Throat: Oropharynx is clear and moist. No posterior oropharyngeal erythema.   Mouth/Throat: Hyperpigmentation hard palate. Nicotine staining?  Eyes: Conjunctivae are normal.   Dry     Neck: No thyromegaly present.   Cardiovascular: Normal rate, regular rhythm and normal heart sounds. Exam reveals no gallop and no friction rub.   No murmur heard.  Pulmonary/Chest: Breath sounds normal. She has no wheezes. She has no rales. She exhibits no tenderness. "   Musculoskeletal:      Cervical back: Normal range of motion and neck supple.   Lymphadenopathy:     She has no cervical adenopathy.   Neurological: She is alert and oriented to person, place, and time. She displays normal reflexes. Gait normal.   Nl motor strength UE and LE bilateral      Right shoulder exam:  Full active range of motion   Full passive range of motion   + neer's   + speed's test   + pain with abduction with resistance     No visits with results within 1 Week(s) from this visit.   Latest known visit with results is:   Lab Visit on 03/06/2023   Component Date Value Ref Range Status    Complement (C-3) 03/06/2023 149  50 - 180 mg/dL Final    ds DNA Ab 03/06/2023 Negative 1:10  Negative 1:10 Final    Performed by fluorescent crithidia assay.    Sodium 03/06/2023 140  136 - 145 mmol/L Final    Potassium 03/06/2023 3.3 (L)  3.5 - 5.1 mmol/L Final    Chloride 03/06/2023 106  95 - 110 mmol/L Final    CO2 03/06/2023 26  23 - 29 mmol/L Final    Glucose 03/06/2023 100  70 - 110 mg/dL Final    BUN 03/06/2023 10  8 - 23 mg/dL Final    Creatinine 03/06/2023 0.8  0.5 - 1.4 mg/dL Final    Calcium 03/06/2023 9.0  8.7 - 10.5 mg/dL Final    Total Protein 03/06/2023 7.9  6.0 - 8.4 g/dL Final    Albumin 03/06/2023 4.2  3.5 - 5.2 g/dL Final    Total Bilirubin 03/06/2023 0.4  0.1 - 1.0 mg/dL Final    Comment: For infants and newborns, interpretation of results should be based  on gestational age, weight and in agreement with clinical  observations.    Premature Infant recommended reference ranges:  Up to 24 hours.............<8.0 mg/dL  Up to 48 hours............<12.0 mg/dL  3-5 days..................<15.0 mg/dL  6-29 days.................<15.0 mg/dL      Alkaline Phosphatase 03/06/2023 64  55 - 135 U/L Final    AST 03/06/2023 25  10 - 40 U/L Final    ALT 03/06/2023 19  10 - 44 U/L Final    Anion Gap 03/06/2023 8  8 - 16 mmol/L Final    eGFR 03/06/2023 >60  >60 mL/min/1.73 m^2 Final    WBC 03/06/2023 4.12  3.90 - 12.70  K/uL Final    RBC 03/06/2023 4.47  4.00 - 5.40 M/uL Final    Hemoglobin 03/06/2023 13.8  12.0 - 16.0 g/dL Final    Hematocrit 03/06/2023 41.1  37.0 - 48.5 % Final    MCV 03/06/2023 92  82 - 98 fL Final    MCH 03/06/2023 30.9  27.0 - 31.0 pg Final    MCHC 03/06/2023 33.6  32.0 - 36.0 g/dL Final    RDW 03/06/2023 13.4  11.5 - 14.5 % Final    Platelets 03/06/2023 179  150 - 450 K/uL Final    MPV 03/06/2023 10.6  9.2 - 12.9 fL Final    Immature Granulocytes 03/06/2023 0.2  0.0 - 0.5 % Final    Gran # (ANC) 03/06/2023 1.9  1.8 - 7.7 K/uL Final    Immature Grans (Abs) 03/06/2023 0.01  0.00 - 0.04 K/uL Final    Comment: Mild elevation in immature granulocytes is non specific and   can be seen in a variety of conditions including stress response,   acute inflammation, trauma and pregnancy. Correlation with other   laboratory and clinical findings is essential.      Lymph # 03/06/2023 1.7  1.0 - 4.8 K/uL Final    Mono # 03/06/2023 0.4  0.3 - 1.0 K/uL Final    Eos # 03/06/2023 0.1  0.0 - 0.5 K/uL Final    Baso # 03/06/2023 0.04  0.00 - 0.20 K/uL Final    nRBC 03/06/2023 0  0 /100 WBC Final    Gran % 03/06/2023 45.9  38.0 - 73.0 % Final    Lymph % 03/06/2023 41.7  18.0 - 48.0 % Final    Mono % 03/06/2023 9.5  4.0 - 15.0 % Final    Eosinophil % 03/06/2023 1.7  0.0 - 8.0 % Final    Basophil % 03/06/2023 1.0  0.0 - 1.9 % Final    Differential Method 03/06/2023 Automated   Final    Complement (C-4) 03/06/2023 34  11 - 44 mg/dL Final    Sed Rate 03/06/2023 13  0 - 20 mm/Hr Final    CRP 03/06/2023 4.4  0.0 - 8.2 mg/L Final      Assessment:      Right rotator cuff tendinitis     SLE: SLEDAI 0 remission  Fibromyalgia  Low bone density DXA 7/22/21:  FRAX hip   Alopecia  /87      Plan:      Not interested in physical therapy due to transportation issues, given home exercises for rotator cuff tendinitis   DXA due after July   Orthopedics referral for bilateral knee OA, interested in repeat viscosupplementation   STOP SMOKING    Continue hydroxychloroquine 300mg daily with annual Ophthalmology check  Nick Chi, yoga on You [employer]ube  Walking program, treadmill  Mediterranean diet  RTC 6 months with standing lupus labs

## 2023-03-13 NOTE — PATIENT INSTRUCTIONS
Orthopedic referral for bilateral knee osteoarthritis   Bone density scan due after July   Return to clinic in 6 months with standing labs

## 2023-03-13 NOTE — PROGRESS NOTES
I have personally taken the history and examined the patient and agree with the resident,s note as stated above          SLE: SLEDAI 2 minimal activity(alopecia)  H/o scleritis in remission (post rituximab 1000mg IV 8/24/15, 9/15/15 and  4/29/16)  Fibromyalgia  Low bone density DXA 7/22/21:  FRAX hip 0.6%  MOP 3.7%  OA knees  Bilateral rotator cuff tendinitis  EH  131/87  Tobacco abuse  Hypokalemia ? B2 agonist, no other apparent cause      Banana or OJ, repeat K in 2 wks  Ortho referral for shoulder pain  HEP as printed, declines PT  STOP SMOKING referred to Smoking Cessation again  Continue hydroxychloroquine 300mg daily with annual Ophthalmology check  Nick Chi, yoga on You Tube  Walking program, treadmill  Mediterranean diet  DXA > 7/21/23  Lipid panel in 3 mo with Dr. Arambula  RTC 6 months with standing lupus labs   Answers submitted by the patient for this visit:  Rheumatology Questionnaire (Submitted on 3/11/2023)  fever: No  eye redness: No  mouth sores: No  headaches: Yes  shortness of breath: No  chest pain: No  trouble swallowing: No  diarrhea: No  constipation: No  unexpected weight change: No  genital sore: No  dysuria: No  During the last 3 days, have you had a skin rash?: No  Bruises or bleeds easily: No  cough: No

## 2023-03-16 DIAGNOSIS — M25.561 PAIN IN BOTH KNEES, UNSPECIFIED CHRONICITY: Primary | ICD-10-CM

## 2023-03-16 DIAGNOSIS — M25.562 PAIN IN BOTH KNEES, UNSPECIFIED CHRONICITY: Primary | ICD-10-CM

## 2023-03-24 DIAGNOSIS — E78.2 MIXED HYPERLIPIDEMIA: Primary | ICD-10-CM

## 2023-03-24 RX ORDER — ROSUVASTATIN CALCIUM 40 MG/1
TABLET, COATED ORAL
Qty: 90 TABLET | Refills: 1 | Status: SHIPPED | OUTPATIENT
Start: 2023-03-24 | End: 2023-09-12 | Stop reason: SDUPTHER

## 2023-03-24 NOTE — TELEPHONE ENCOUNTER
Care Due:                  Date            Visit Type   Department     Provider  --------------------------------------------------------------------------------                                EP -                              PRIMARY      [facility] INTERNAL  Last Visit: 08-      CARE ([facility])   MEDICINE       Bolivar Arambula  Next Visit: None Scheduled  None         None Found                                                            Last  Test          Frequency    Reason                     Performed    Due Date  --------------------------------------------------------------------------------    Lipid Panel.  12 months..  rosuvastatin.............  06- 06-    [facility] Embedded Care Gaps. Reference number: [id number]. 3/24/2023   10:49:54 AM CDT

## 2023-03-24 NOTE — TELEPHONE ENCOUNTER
Requested medication has been pended and routed to Dr. Arambula for approval. Allergies have been verified.     Refill Encounter    PCP Visits: Recent Visits  Date Type Provider Dept   08/30/22 Office Visit Bolivar Arambula MD [facility] Internal Medicine   07/01/22 Office Visit Bolivar Arambula MD [facility] Internal Medicine   06/21/22 Office Visit Bolivar Arambula MD [facility] Internal Medicine   06/08/22 Office Visit Bolivar Arambula MD [facility] Internal Medicine   Showing recent visits within past 360 days and meeting all other requirements  Future Appointments  No visits were found meeting these conditions.  Showing future appointments within next 720 days and meeting all other requirements     Last 3 Blood Pressure:   BP Readings from Last 3 Encounters:   03/13/23 131/87   03/01/23 114/69   12/05/22 109/77     Preferred Pharmacy[facility]   [id number] - [place] - [address] AT [facility] [address]  [address] [address]  Phone: [phone] Fax: [phone]    Requested RX:  Requested Prescriptions     Pending Prescriptions Disp Refills    rosuvastatin (CRESTOR) 40 MG Tab [Pharmacy Med Name: ROSUVASTATIN 40MG TABLETS] 90 tablet 2     Sig: TAKE 1 TABLET(40 MG) BY MOUTH EVERY EVENING      RX Route: Normal

## 2023-06-02 ENCOUNTER — HOSPITAL ENCOUNTER (EMERGENCY)
Facility: OTHER | Age: 63
Discharge: HOME | End: 2023-06-02
Attending: EMERGENCY MEDICINE
Payer: MEDICARE

## 2023-06-02 VITALS
OXYGEN SATURATION: 98 % | SYSTOLIC BLOOD PRESSURE: 142 MMHG | BODY MASS INDEX: 26.58 KG/M2 | HEART RATE: 69 BPM | HEIGHT: 63 IN | WEIGHT: 150 LBS | TEMPERATURE: 98 F | DIASTOLIC BLOOD PRESSURE: 90 MMHG | RESPIRATION RATE: 18 BRPM

## 2023-06-02 DIAGNOSIS — S46.912A STRAIN OF LEFT SHOULDER, INITIAL ENCOUNTER: Primary | ICD-10-CM

## 2023-06-02 DIAGNOSIS — M25.519 SHOULDER PAIN: ICD-10-CM

## 2023-06-02 PROCEDURE — 63600175 PHARM REV CODE 636 W HCPCS

## 2023-06-02 PROCEDURE — 99284 EMERGENCY DEPT VISIT MOD MDM: CPT

## 2023-06-02 PROCEDURE — 96372 THER/PROPH/DIAG INJ SC/IM: CPT

## 2023-06-02 RX ORDER — ORPHENADRINE CITRATE 30 MG/ML
60 INJECTION INTRAMUSCULAR; INTRAVENOUS
Status: COMPLETED | OUTPATIENT
Start: 2023-06-02 | End: 2023-06-02

## 2023-06-02 RX ORDER — ORPHENADRINE CITRATE 100 MG/1
100 TABLET, EXTENDED RELEASE ORAL
Status: DISCONTINUED | OUTPATIENT
Start: 2023-06-02 | End: 2023-06-02

## 2023-06-02 RX ORDER — NAPROXEN 500 MG/1
500 TABLET ORAL
Status: DISCONTINUED | OUTPATIENT
Start: 2023-06-02 | End: 2023-06-02 | Stop reason: HOSPADM

## 2023-06-02 RX ORDER — METHOCARBAMOL 500 MG/1
500 TABLET, FILM COATED ORAL 3 TIMES DAILY
Qty: 30 TABLET | Refills: 0 | Status: SHIPPED | OUTPATIENT
Start: 2023-06-02 | End: 2023-06-12

## 2023-06-02 RX ORDER — NAPROXEN 500 MG/1
500 TABLET ORAL 2 TIMES DAILY WITH MEALS
Qty: 20 TABLET | Refills: 0 | Status: SHIPPED | OUTPATIENT
Start: 2023-06-02 | End: 2023-06-28

## 2023-06-02 RX ADMIN — ORPHENADRINE CITRATE 60 MG: 30 INJECTION INTRAMUSCULAR; INTRAVENOUS at 12:06

## 2023-06-02 NOTE — ED PROVIDER NOTES
"Encounter Date: 6/2/2023       History     Chief Complaint   Patient presents with    Shoulder Pain     Pt states that on Monday she was lifting a heavy suitcase into the closet and felt a "crack" in L shoulder and has been having pain since; pt reports no relief from otc meds     63-year-old female with history of HTN, HLD, lupus, arthritis, and fibromyalgia presenting to the emergency department for evaluation of left shoulder pain and stiffness status post lifting a heavy suitcase into her closet 4 days ago. Patient states that she felt a "pop" and experienced immediate pain. Reports that her pain has worsened since the initial incident. It is exacerbated with movement. She feels like her left shoulder looks different compared to right. She denies fever, neck pain, or paresthesias. No history of left shoulder injury or surgery.  She is right-hand dominant.     The history is provided by the patient.   Review of patient's allergies indicates:   Allergen Reactions    Bactrim [sulfamethoxazole-trimethoprim] Nausea And Vomiting    Sulfa (sulfonamide antibiotics)      Agitation^     Past Medical History:   Diagnosis Date    Anxiety     Arthritis     Bipolar 1 disorder     Cataract     COPD (chronic obstructive pulmonary disease)     Depression     bipolar 2    Eyelid lesion     Fibromyalgia     H/O corneal abrasion 2001    left eye with lead-based paint chips    Hyperlipidemia     Hypertension     Lupus     Migraine headache     Scleritis     Thyroid disease      Past Surgical History:   Procedure Laterality Date    HYSTERECTOMY      for sterilization, ovaries intact    TRIGGER FINGER RELEASE Right 7/28/2021    Procedure: RELEASE, TRIGGER FINGER, right ring & small;  Surgeon: Mellissa Guadarrama MD;  [facility];  Service: Orthopedics;  Laterality: Right;  REGIONAL MAC     Family History   Problem Relation Age of Onset    Depression Father         suicide    Cataracts Mother     Rheum arthritis Mother     " Lupus Mother     Arthritis Mother     Cataracts Sister     Rheum arthritis Sister     Breast cancer Maternal Aunt     Thyroid disease Daughter     No Known Problems Daughter     No Known Problems Daughter     Colon cancer Brother     Cancer Brother     No Known Problems Maternal Uncle     No Known Problems Paternal Aunt     No Known Problems Paternal Uncle     No Known Problems Maternal Grandmother     No Known Problems Maternal Grandfather     No Known Problems Paternal Grandmother     No Known Problems Paternal Grandfather     Cancer Sister     Cancer Brother     Early death Sister         Covid-19    Ovarian cancer Neg Hx     Melanoma Neg Hx     Amblyopia Neg Hx     Blindness Neg Hx     Diabetes Neg Hx     Glaucoma Neg Hx     Hypertension Neg Hx     Macular degeneration Neg Hx     Retinal detachment Neg Hx     Strabismus Neg Hx     Stroke Neg Hx      Social History     Tobacco Use    Smoking status: Every Day     Packs/day: 1.00     Years: 45.00     Pack years: 45.00     Types: Cigarettes     Start date: 1974     Last attempt to quit: 2021     Years since quittin.4    Smokeless tobacco: Never   Substance Use Topics    Alcohol use: Yes     Alcohol/week: 5.0 standard drinks     Types: 5 Glasses of wine per week     Comment: 0.5 to 1 bottle of wine per day     Drug use: Yes     Types: Marijuana     Comment: occasionally     Review of Systems   Constitutional:  Negative for chills and fever.   HENT:  Negative for congestion, rhinorrhea and sore throat.    Respiratory:  Negative for cough and shortness of breath.    Cardiovascular:  Negative for chest pain.   Gastrointestinal:  Negative for abdominal pain, diarrhea, nausea and vomiting.   Genitourinary:  Negative for dysuria, frequency and urgency.   Musculoskeletal:  Negative for neck pain.        Positive for left shoulder pain.    Skin:  Negative for rash.   Neurological:  Negative for dizziness and headaches.   Psychiatric/Behavioral:  Negative for  confusion.      Physical Exam     Initial Vitals [06/02/23 1043]   BP Pulse Resp Temp SpO2   (!) 146/86 71 20 98 °F (36.7 °C) 97 %      MAP       --         Physical Exam    Nursing note and vitals reviewed.  Constitutional: She appears well-developed and well-nourished. No distress.   HENT:   Head: Normocephalic and atraumatic.   Eyes: Conjunctivae and EOM are normal.   Neck: Neck supple.   Normal range of motion.  Cardiovascular:  Normal rate, regular rhythm, normal heart sounds and intact distal pulses.           Pulses:       Radial pulses are 2+ on the left side.   Pulmonary/Chest: Breath sounds normal. No respiratory distress. She has no wheezes. She has no rhonchi. She has no rales.   Musculoskeletal:         General: Normal range of motion.      Cervical back: Normal range of motion and neck supple.      Comments: Left upper extremity: Full range of motion with pain.  Tenderness to palpation of anterior and lateral left shoulder.  No edema, erythema or overlying skin changes. No crepitus or bony deformity.      Neurological: She is alert and oriented to person, place, and time. She has normal strength.   Sensation intact to light touch.  Neurovascularly intact.   Skin: Skin is warm and dry. Capillary refill takes less than 2 seconds. No erythema.   Psychiatric: She has a normal mood and affect. Her behavior is normal. Judgment and thought content normal.       ED Course   Procedures  Labs Reviewed - No data to display       Imaging Results              X-Ray Shoulder Trauma Left (Final result)  Result time 06/02/23 11:38:50      Final result by Nikko Shelley III, MD (06/02/23 11:38:50)                   Narrative:    EXAMINATION:  XR SHOULDER TRAUMA 3 VIEW LEFT    CLINICAL HISTORY:  Pain in unspecified shoulder    FINDINGS:  Trauma shoulder left.    No fracture, dislocation, or bone destruction seen.  There is DJD.  No acute trauma seen.      Electronically signed by: Nikko Shelley  MD  Date:    06/02/2023  Time:    11:38                                     Medications   orphenadrine injection 60 mg (60 mg Intramuscular Given 6/2/23 1233)     Medical Decision Making:   Initial Assessment:   Urgent evaluation of 63-year-old female with history of hypertension, hyperlipidemia, fibromyalgia, arthritis, lupus presenting with left shoulder pain and stiffness after lifting a heavy suitcase into a closet 4 days ago.  Reports pain is worsened with movement.  No fever, neck pain, or paresthesias.  On exam She does have full range of motion with pain.  Mild tenderness to palpation of the left anterior and lateral shoulder pain. Sensation intact to light touch. Neurovascularly intact.  Will obtain x-ray of the shoulder.  Will give naproxen and Norflex.  Clinical Tests:   Radiological Study: Ordered and Reviewed  ED Management:  On review of x-ray, patient without fracture or dislocation shoulder.  Likely shoulder strain.  I updated the patient on results.  Nurse notified me that she refused naproxen.  She only wanted Norflex injection. Will provide sling and swathe. Will discharge home with prescriptions for naproxen and Robaxin.  Provided ambulatory referral to PT and Orthopedics.  She verbalized understanding and agreement this plan care.  She was given specific return precautions.  All questions and concerns addressed.  She is stable for discharge.                        Clinical Impression:   Final diagnoses:  [M25.519] Shoulder pain  [S46.912A] Strain of left shoulder, initial encounter (Primary)        ED Disposition Condition    Discharge Stable          ED Prescriptions       Medication Sig Dispense Start Date End Date Auth. Provider    naproxen (NAPROSYN) 500 MG tablet Take 1 tablet (500 mg total) by mouth 2 (two) times daily with meals. 20 tablet 6/2/2023 -- Andi Morin PA-C    methocarbamoL (ROBAXIN) 500 MG Tab Take 1 tablet (500 mg total) by mouth 3 (three) times daily. for 10 days 30 tablet  6/2/2023 6/12/2023 Andi Morin PA-C          Follow-up Information       Follow up With Specialties Details Why Contact Info    Bolivar Arambula MD Internal Medicine  As needed, If symptoms worsen [address] [facility] [address]  [phone]      [facility] Emergency Dept Emergency Medicine  As needed, If symptoms worsen [address] [facility] [id number] [phone]             Andi Morin PA-C  06/03/23 0045

## 2023-06-02 NOTE — DISCHARGE INSTRUCTIONS
Please take naproxen and Robaxin as needed for shoulder pain.  Please keep arm in sling and swath.  You can also ice the area.

## 2023-06-02 NOTE — FIRST PROVIDER EVALUATION
" Emergency Department TeleTriage Encounter Note      CHIEF COMPLAINT    Chief Complaint   Patient presents with    Shoulder Pain     Pt states that on Monday she was lifting a heavy suitcase into the closet and felt a "crack" in L shoulder and has been having pain since; pt reports no relief from otc meds       VITAL SIGNS   Initial Vitals [06/02/23 1043]   BP Pulse Resp Temp SpO2   (!) 146/86 71 20 98 °F (36.7 °C) 97 %      MAP       --            ALLERGIES    Review of patient's allergies indicates:   Allergen Reactions    Bactrim [sulfamethoxazole-trimethoprim] Nausea And Vomiting    Sulfa (sulfonamide antibiotics)      Agitation^       PROVIDER TRIAGE NOTE  This is a teletriage evaluation of a 63 y.o. female presenting to the ED complaining of shoulder pain. Patient reports left shoulder pain since pushing suitcase into closet 4 days ago. She has decreased ROM. She denies numbness or tingling. She has tried OTC medications without relief.    Patient is alert and oriented. She speaks in complete sentences. She is sitting upright in the chair in no distress.     Initial orders will be placed and care will be transferred to an alternate provider when patient is roomed for a full evaluation. Any additional orders and the final disposition will be determined by that provider.         ORDERS  Labs Reviewed - No data to display    ED Orders (720h ago, onward)      Start Ordered     Status Ordering Provider    06/02/23 1102 06/02/23 1102  X-Ray Shoulder Trauma Left  1 time imaging         Ordered SHER JAMES              Virtual Visit Note: The provider triage portion of this emergency department evaluation and documentation was performed via [employer], a HIPAA-compliant telemedicine application, in concert with a tele-presenter in the room. A face to face patient evaluation with one of my colleagues will occur once the patient is placed in an emergency department room.      DISCLAIMER: This note was prepared " with [employer] voice recognition transcription software. Garbled syntax, mangled pronouns, and other bizarre constructions may be attributed to that software system.

## 2023-06-02 NOTE — ED TRIAGE NOTES
Felt a 'pop' in left shoulder a few days ago when lifting a suitcase. Has since had painful ROM of left shoulder. Taking OTC meds without relief. Presents in no distress.

## 2023-06-04 ENCOUNTER — HOSPITAL ENCOUNTER (EMERGENCY)
Facility: HOSPITAL | Age: 63
Discharge: HOME | End: 2023-06-04
Attending: EMERGENCY MEDICINE | Admitting: PHYSICIAN ASSISTANT
Payer: MEDICARE

## 2023-06-04 VITALS
SYSTOLIC BLOOD PRESSURE: 129 MMHG | HEART RATE: 67 BPM | RESPIRATION RATE: 18 BRPM | WEIGHT: 150 LBS | DIASTOLIC BLOOD PRESSURE: 68 MMHG | HEIGHT: 63 IN | TEMPERATURE: 98 F | BODY MASS INDEX: 26.58 KG/M2 | OXYGEN SATURATION: 96 %

## 2023-06-04 DIAGNOSIS — S49.92XA INJURY OF LEFT SHOULDER, INITIAL ENCOUNTER: ICD-10-CM

## 2023-06-04 DIAGNOSIS — M25.512 ACUTE PAIN OF LEFT SHOULDER: Primary | ICD-10-CM

## 2023-06-04 LAB
ALBUMIN SERPL BCP-MCNC: 4.4 G/DL (ref 3.5–5.2)
ALP SERPL-CCNC: 83 U/L (ref 55–135)
ALT SERPL W/O P-5'-P-CCNC: 16 U/L (ref 10–44)
ANION GAP SERPL CALC-SCNC: 11 MMOL/L (ref 8–16)
AST SERPL-CCNC: 23 U/L (ref 10–40)
BASOPHILS # BLD AUTO: 0.05 K/UL (ref 0–0.2)
BASOPHILS NFR BLD: 0.8 % (ref 0–1.9)
BILIRUB SERPL-MCNC: 0.3 MG/DL (ref 0.1–1)
BUN SERPL-MCNC: 12 MG/DL (ref 8–23)
CALCIUM SERPL-MCNC: 9.6 MG/DL (ref 8.7–10.5)
CHLORIDE SERPL-SCNC: 110 MMOL/L (ref 95–110)
CO2 SERPL-SCNC: 22 MMOL/L (ref 23–29)
CREAT SERPL-MCNC: 0.7 MG/DL (ref 0.5–1.4)
CRP SERPL-MCNC: 2.5 MG/L (ref 0–8.2)
DIFFERENTIAL METHOD: NORMAL
EOSINOPHIL # BLD AUTO: 0.1 K/UL (ref 0–0.5)
EOSINOPHIL NFR BLD: 1.1 % (ref 0–8)
ERYTHROCYTE [DISTWIDTH] IN BLOOD BY AUTOMATED COUNT: 13.3 % (ref 11.5–14.5)
ERYTHROCYTE [SEDIMENTATION RATE] IN BLOOD BY PHOTOMETRIC METHOD: 42 MM/HR (ref 0–36)
EST. GFR  (NO RACE VARIABLE): >60 ML/MIN/1.73 M^2
GLUCOSE SERPL-MCNC: 108 MG/DL (ref 70–110)
HCT VFR BLD AUTO: 43.1 % (ref 37–48.5)
HGB BLD-MCNC: 14 G/DL (ref 12–16)
IMM GRANULOCYTES # BLD AUTO: 0.01 K/UL (ref 0–0.04)
IMM GRANULOCYTES NFR BLD AUTO: 0.2 % (ref 0–0.5)
LYMPHOCYTES # BLD AUTO: 2.3 K/UL (ref 1–4.8)
LYMPHOCYTES NFR BLD: 34.7 % (ref 18–48)
MCH RBC QN AUTO: 30.5 PG (ref 27–31)
MCHC RBC AUTO-ENTMCNC: 32.5 G/DL (ref 32–36)
MCV RBC AUTO: 94 FL (ref 82–98)
MONOCYTES # BLD AUTO: 0.5 K/UL (ref 0.3–1)
MONOCYTES NFR BLD: 8.1 % (ref 4–15)
NEUTROPHILS # BLD AUTO: 3.6 K/UL (ref 1.8–7.7)
NEUTROPHILS NFR BLD: 55.1 % (ref 38–73)
NRBC BLD-RTO: 0 /100 WBC
PLATELET # BLD AUTO: 191 K/UL (ref 150–450)
PMV BLD AUTO: 11.3 FL (ref 9.2–12.9)
POTASSIUM SERPL-SCNC: 3.8 MMOL/L (ref 3.5–5.1)
PROT SERPL-MCNC: 8.5 G/DL (ref 6–8.4)
RBC # BLD AUTO: 4.59 M/UL (ref 4–5.4)
SODIUM SERPL-SCNC: 143 MMOL/L (ref 136–145)
WBC # BLD AUTO: 6.52 K/UL (ref 3.9–12.7)

## 2023-06-04 PROCEDURE — 63600175 PHARM REV CODE 636 W HCPCS: Performed by: PHYSICIAN ASSISTANT

## 2023-06-04 PROCEDURE — 96374 THER/PROPH/DIAG INJ IV PUSH: CPT

## 2023-06-04 PROCEDURE — 99284 EMERGENCY DEPT VISIT MOD MDM: CPT | Mod: 25

## 2023-06-04 PROCEDURE — 96375 TX/PRO/DX INJ NEW DRUG ADDON: CPT

## 2023-06-04 PROCEDURE — 99284 PR EMERGENCY DEPT VISIT,LEVEL IV: ICD-10-PCS | Mod: ,,, | Performed by: PHYSICIAN ASSISTANT

## 2023-06-04 PROCEDURE — 85025 COMPLETE CBC W/AUTO DIFF WBC: CPT | Performed by: PHYSICIAN ASSISTANT

## 2023-06-04 PROCEDURE — 85652 RBC SED RATE AUTOMATED: CPT | Performed by: PHYSICIAN ASSISTANT

## 2023-06-04 PROCEDURE — 80053 COMPREHEN METABOLIC PANEL: CPT | Performed by: PHYSICIAN ASSISTANT

## 2023-06-04 PROCEDURE — 86140 C-REACTIVE PROTEIN: CPT | Performed by: PHYSICIAN ASSISTANT

## 2023-06-04 PROCEDURE — 99284 EMERGENCY DEPT VISIT MOD MDM: CPT | Mod: ,,, | Performed by: PHYSICIAN ASSISTANT

## 2023-06-04 RX ORDER — HYDROMORPHONE HYDROCHLORIDE 1 MG/ML
0.5 INJECTION, SOLUTION INTRAMUSCULAR; INTRAVENOUS; SUBCUTANEOUS
Status: COMPLETED | OUTPATIENT
Start: 2023-06-04 | End: 2023-06-04

## 2023-06-04 RX ORDER — MORPHINE SULFATE 4 MG/ML
4 INJECTION, SOLUTION INTRAMUSCULAR; INTRAVENOUS
Status: COMPLETED | OUTPATIENT
Start: 2023-06-04 | End: 2023-06-04

## 2023-06-04 RX ORDER — OXYCODONE AND ACETAMINOPHEN 7.5; 325 MG/1; MG/1
1 TABLET ORAL EVERY 6 HOURS PRN
Qty: 12 TABLET | Refills: 0 | Status: SHIPPED | OUTPATIENT
Start: 2023-06-04 | End: 2023-10-25

## 2023-06-04 RX ADMIN — MORPHINE SULFATE 4 MG: 4 INJECTION INTRAVENOUS at 08:06

## 2023-06-04 RX ADMIN — HYDROMORPHONE HYDROCHLORIDE 0.5 MG: 1 INJECTION, SOLUTION INTRAMUSCULAR; INTRAVENOUS; SUBCUTANEOUS at 08:06

## 2023-06-04 NOTE — DISCHARGE INSTRUCTIONS
You may have a tear in the soft tissue in your shoulder.  Your blood work did not show any evidence of significant infection in your shoulder.   As discussed, do not take the pain medication with the alprazolam (Xanax). Both of these medications are sedating and maybe dangerous or life-threatening when combined.  Follow-up in orthopedic surgery clinic for further evaluation of your pain if symptoms are not improving over the next 1-2 weeks.    Return to the ER immediately if you develop numbness in the arm, difficulty breathing, chest pain, swelling in the arm, fever greater than 100.4° or any other worrisome symptoms.

## 2023-06-04 NOTE — ED NOTES
Patient identifiers verified and correct for Magalykishore NorrisPeoa  LOC: The patient is awake, alert and aware of environment with an appropriate affect, the patient is oriented x 3 and speaking appropriately.   APPEARANCE: Patient appears uncomfortable but in no acute distress, patient is clean and well groomed.  SKIN: The skin is warm and dry, color consistent with ethnicity, patient has normal skin turgor and moist mucus membranes, skin intact, no breakdown or bruising noted.   MUSCULOSKELETAL: Patient moving all extremities spontaneously, no swelling noted.pt reports left shoulder and arm pain since Monday   RESPIRATORY: Airway is open and patent, respirations are spontaneous, patient has a normal effort and rate, no accessory muscle use noted, O2 sats noted at 98% on room air.  CARDIAC: Patient has a normal rate and regular rhythm, no edema noted, capillary refill < 3 seconds.   GASTRO: Soft and non tender to palpation, no distention noted, normoactive bowel sounds present in all four quadrants. Pt states bowel movements have been regular.  : Pt denies any pain or frequency with urination.  NEURO: Pt opens eyes spontaneously, behavior appropriate to situation, follows commands, facial expression symmetrical, bilateral hand grasp equal and even, purposeful motor response noted, normal sensation in all extremities when touched with a finger.

## 2023-06-04 NOTE — ED TRIAGE NOTES
Pt states she felt a pop in her left shoulder while placing something in a closet on Monday. She states the pain is significantly worse today and cannot move her arm. Shoulder is tender to palpation. Pt went to ER on Friday but pain has continued to get worse. Pain was initially just shoulder area but now radiates down left arm

## 2023-06-04 NOTE — ED PROVIDER NOTES
Encounter Date: 6/4/2023       History     Chief Complaint   Patient presents with    Shoulder Pain     Pt states she felt a pop in her left shoulder while placing something in a closet on Monday. She states the pain is significantly worse today and cannot move her arm. Shoulder is tender to palpation.      63-year-old female with medical history significant for bipolar 1 disorder, COPD, lupus, fibromyalgia presents to the ED with shoulder pain.  Patient was pushing a box on a high shelf when she felt a pop in her left shoulder a few days ago.  She was seen in the ED 2 days ago for evaluation.  X-ray was negative.  She was discharged with naproxen and Robaxin.  She states that this medication is not working.  She states that her left shoulder pain is getting significantly worse and is now radiating down the entire left upper extremity.  Reporting that she is not able to lift her arm due to severe pain.  She denies chest pain, shortness of breath, numbness, fevers.     Review of patient's allergies indicates:   Allergen Reactions    Bactrim [sulfamethoxazole-trimethoprim] Nausea And Vomiting    Sulfa (sulfonamide antibiotics)      Agitation^     Past Medical History:   Diagnosis Date    Anxiety     Arthritis     Bipolar 1 disorder     Cataract     COPD (chronic obstructive pulmonary disease)     Depression     bipolar 2    Eyelid lesion     Fibromyalgia     H/O corneal abrasion 2001    left eye with lead-based paint chips    Hyperlipidemia     Hypertension     Lupus     Migraine headache     Scleritis     Thyroid disease      Past Surgical History:   Procedure Laterality Date    HYSTERECTOMY      for sterilization, ovaries intact    TRIGGER FINGER RELEASE Right 7/28/2021    Procedure: RELEASE, TRIGGER FINGER, right ring & small;  Surgeon: Mellissa Guadarrama MD;  Location: [facility];  Service: Orthopedics;  Laterality: Right;  REGIONAL MAC     Family History   Problem Relation Age of Onset    Depression Father          suicide    Cataracts Mother     Rheum arthritis Mother     Lupus Mother     Arthritis Mother     Cataracts Sister     Rheum arthritis Sister     Breast cancer Maternal Aunt     Thyroid disease Daughter     No Known Problems Daughter     No Known Problems Daughter     Colon cancer Brother     Cancer Brother     No Known Problems Maternal Uncle     No Known Problems Paternal Aunt     No Known Problems Paternal Uncle     No Known Problems Maternal Grandmother     No Known Problems Maternal Grandfather     No Known Problems Paternal Grandmother     No Known Problems Paternal Grandfather     Cancer Sister     Cancer Brother     Early death Sister         Covid-19    Ovarian cancer Neg Hx     Melanoma Neg Hx     Amblyopia Neg Hx     Blindness Neg Hx     Diabetes Neg Hx     Glaucoma Neg Hx     Hypertension Neg Hx     Macular degeneration Neg Hx     Retinal detachment Neg Hx     Strabismus Neg Hx     Stroke Neg Hx      Social History     Tobacco Use    Smoking status: Every Day     Packs/day: 1.00     Years: 45.00     Pack years: 45.00     Types: Cigarettes     Start date: 1974     Last attempt to quit: 2021     Years since quittin.4    Smokeless tobacco: Never   Substance Use Topics    Alcohol use: Yes     Alcohol/week: 5.0 standard drinks     Types: 5 Glasses of wine per week     Comment: 0.5 to 1 bottle of wine per day     Drug use: Yes     Types: Marijuana     Comment: occasionally     Review of Systems   Constitutional:  Negative for fever.   Musculoskeletal:  Positive for arthralgias and joint swelling.   Neurological:  Negative for numbness.     Physical Exam     Initial Vitals [23 0734]   BP Pulse Resp Temp SpO2   (!) 151/77 83 18 97.7 °F (36.5 °C) 98 %      MAP       --         Physical Exam    Nursing note and vitals reviewed.  Constitutional: She appears well-developed and well-nourished. She is not diaphoretic.  Non-toxic appearance. She does not appear ill. No distress.   Tearful due to pain.     HENT:   Head: Normocephalic and atraumatic.   Neck: Neck supple.   Cardiovascular:  Normal rate and regular rhythm.     Exam reveals no gallop and no friction rub.       No murmur heard.  Pulmonary/Chest: Effort normal and breath sounds normal. No accessory muscle usage. No tachypnea. No respiratory distress. She has no decreased breath sounds. She has no wheezes. She has no rhonchi. She has no rales.   Abdominal: She exhibits no distension.   Musculoskeletal:      Cervical back: Neck supple.      Comments: Slight erythema and minimal swelling to the left shoulder.  She has marked tenderness to the anterior and lateral left shoulder.  Significant limitation in range of motion due to pain.  Radial pulse intact.  Sensation intact.      Neurological: She is alert.   Skin: No rash noted.   Psychiatric: She has a normal mood and affect. Her behavior is normal.       ED Course   Procedures  Labs Reviewed   COMPREHENSIVE METABOLIC PANEL - Abnormal; Notable for the following components:       Result Value    CO2 22 (*)     Total Protein 8.5 (*)     All other components within normal limits   SEDIMENTATION RATE - Abnormal; Notable for the following components:    Sed Rate 42 (*)     All other components within normal limits   CBC W/ AUTO DIFFERENTIAL   C-REACTIVE PROTEIN          Imaging Results    None          Medications   morphine injection 4 mg (4 mg Intravenous Given 6/4/23 0800)   HYDROmorphone injection 0.5 mg (0.5 mg Intravenous Given 6/4/23 0845)     Medical Decision Making:   History:   Old Medical Records: I decided to obtain old medical records.  Initial Assessment:   63-year-old female presents to the ED with severe left shoulder pain after an injury several days ago.  Vitals within normal limits.  Afebrile.  Differential Diagnosis:   My differential diagnosis includes but is not limited to:  Rotator cuff injury, sprain, strain, septic arthritis  Clinical Tests:   Lab Tests: Ordered  Radiological Study:  Ordered  ED Management:  Minimal elevation in .  CRP is within normal limits.  No other concerning lab abnormalities.  She ultimately reported improvement in her pain after ED management.  I suspect that patient has a rotator cuff or other soft tissue injury.  I will discharge with a prescription for opiate pain medication.  Per , patient is prescribed 2 mg of alprazolam.  She states that she does not take this medication every day and only takes as needed.  I have counseled patient on the dangers of taking opiates with benzodiazepines and urged her not to take these medications in the same day.  Patient also advised to follow-up in orthopedic surgery clinic for further management of her shoulder pain.  She may ultimately need additional imaging.  Patient voiced understanding.  All questions answered.  ED return precautions given.  ED return precautions given.  Patient voiced understanding and is comfortable with discharge plan.  I have reviewed the patient's records and discussed this case with my supervising physician.                ED Course as of 06/04/23 1521   Sun Jun 04, 2023   0843 WBC: 6.52  No leukocytosis [EH]   0843 CRP: 2.5 [EH]   0843 Sed Rate(!): 42 [EH]      ED Course User Index  [EH] Alexia Bush PA-C                 Clinical Impression:   Final diagnoses:  [M25.512] Acute pain of left shoulder (Primary)  [S49.92XA] Injury of left shoulder, initial encounter        ED Disposition Condition    Discharge Stable          ED Prescriptions       Medication Sig Dispense Start Date End Date Auth. Provider    oxyCODONE-acetaminophen (PERCOCET) 7.5-325 mg per tablet Take 1 tablet by mouth every 6 (six) hours as needed for Pain. 12 tablet 6/4/2023 -- Alexia Bush PA-C          Follow-up Information       Follow up With Specialties Details Why Contact Info Additional Information    [facility] 5th Fl Orthoped[facility]   [address]  [facility]  [address]  [phone] [facility] - Main Building, 5th Floor Please park in [facility] and take [address] elevator             Alexia Bush PA-C  06/04/23 1538

## 2023-06-05 ENCOUNTER — TELEPHONE (OUTPATIENT)
Dept: INTERNAL MEDICINE | Facility: CLINIC | Age: 63
End: 2023-06-05
Payer: MEDICARE

## 2023-06-05 ENCOUNTER — PATIENT MESSAGE (OUTPATIENT)
Dept: FAMILY MEDICINE | Facility: CLINIC | Age: 63
End: 2023-06-05

## 2023-06-05 ENCOUNTER — PATIENT MESSAGE (OUTPATIENT)
Dept: INTERNAL MEDICINE | Facility: CLINIC | Age: 63
End: 2023-06-05
Payer: MEDICARE

## 2023-06-05 ENCOUNTER — OFFICE VISIT (OUTPATIENT)
Dept: FAMILY MEDICINE | Facility: CLINIC | Age: 63
End: 2023-06-05
Payer: MEDICARE

## 2023-06-05 ENCOUNTER — PATIENT OUTREACH (OUTPATIENT)
Dept: EMERGENCY MEDICINE | Facility: HOSPITAL | Age: 63
End: 2023-06-05
Payer: MEDICARE

## 2023-06-05 DIAGNOSIS — E78.2 MIXED HYPERLIPIDEMIA: Chronic | ICD-10-CM

## 2023-06-05 DIAGNOSIS — M25.512 ACUTE PAIN OF LEFT SHOULDER: Primary | ICD-10-CM

## 2023-06-05 DIAGNOSIS — M17.0 PRIMARY OSTEOARTHRITIS OF BOTH KNEES: ICD-10-CM

## 2023-06-05 DIAGNOSIS — I10 ESSENTIAL HYPERTENSION, BENIGN: Chronic | ICD-10-CM

## 2023-06-05 DIAGNOSIS — M79.7 FIBROMYALGIA: Chronic | ICD-10-CM

## 2023-06-05 PROCEDURE — 4010F ACE/ARB THERAPY RXD/TAKEN: CPT | Mod: CPTII,95,, | Performed by: NURSE PRACTITIONER

## 2023-06-05 PROCEDURE — 4010F PR ACE/ARB THEARPY RXD/TAKEN: ICD-10-PCS | Mod: CPTII,95,, | Performed by: NURSE PRACTITIONER

## 2023-06-05 PROCEDURE — 99214 PR OFFICE/OUTPT VISIT, EST, LEVL IV, 30-39 MIN: ICD-10-PCS | Mod: 95,,, | Performed by: NURSE PRACTITIONER

## 2023-06-05 PROCEDURE — 99214 OFFICE O/P EST MOD 30 MIN: CPT | Mod: 95,,, | Performed by: NURSE PRACTITIONER

## 2023-06-05 NOTE — TELEPHONE ENCOUNTER
"----- Message from Yamilex Benitez sent at 6/5/2023 12:10 PM CDT -----  "Type:  Patient Call Back    Who Called:PT    What is the reqeust in detail:Pt requesting call back has some questions pt has been in Er for the past few days and would like to know if doctor can send an order for an MRI. Please advise    Can the clinic reply by [facility]?no    Best Call Back Number:[phone]      Additional Information:            "

## 2023-06-05 NOTE — PROGRESS NOTES
The patient location is:  Patient Home  The chief complaint leading to consultation is: as below  Visit type: Virtual visit with synchronous audio and video  Total time spent with patient: 15 minutes  Each patient to whom he or she provides medical services by telemedicine is:  (1) informed of the relationship between the physician and patient and the respective role of any other health care provider with respect to management of the patient; and (2) notified that she may decline to receive medical services by telemedicine and may withdraw from such care at any time.      HPI     Chief Complaint:  Left shoulder pain      Breanna Guido is a 63 y.o. female with multiple medical diagnoses as listed in the medical history and problem list that presents for shoulder pain.  Pt is new to me but is known to this clinic with her last appointment being Visit date not found.        Recent hospital encounter. See below encounter note from 6/4/2023.    Chief Complaint   Patient presents with    Shoulder Pain       Pt states she felt a pop in her left shoulder while placing something in a closet on Monday. She states the pain is significantly worse today and cannot move her arm. Shoulder is tender to palpation.       63-year-old female with medical history significant for bipolar 1 disorder, COPD, lupus, fibromyalgia presents to the ED with shoulder pain.  Patient was pushing a box on a high shelf when she felt a pop in her left shoulder a few days ago.  She was seen in the ED 2 days ago for evaluation.  X-ray was negative.  She was discharged with naproxen and Robaxin.  She states that this medication is not working.  She states that her left shoulder pain is getting significantly worse and is now radiating down the entire left upper extremity.  Reporting that she is not able to lift her arm due to severe pain.  She denies chest pain, shortness of breath, numbness, fevers.       Musculoskeletal:      Cervical back: Neck  supple.      Comments: Slight erythema and minimal swelling to the left shoulder.  She has marked tenderness to the anterior and lateral left shoulder.  Significant limitation in range of motion due to pain.  Radial pulse intact.  Sensation intact.           Medications   morphine injection 4 mg (4 mg Intravenous Given 6/4/23 0800)   HYDROmorphone injection 0.5 mg (0.5 mg Intravenous Given 6/4/23 0845)      Medical Decision Making:   History:   Old Medical Records: I decided to obtain old medical records.  Initial Assessment:   63-year-old female presents to the ED with severe left shoulder pain after an injury several days ago.  Vitals within normal limits.  Afebrile.  Differential Diagnosis:   My differential diagnosis includes but is not limited to:  Rotator cuff injury, sprain, strain, septic arthritis  Clinical Tests:   Lab Tests: Ordered  Radiological Study: Ordered  ED Management:  Minimal elevation in .  CRP is within normal limits.  No other concerning lab abnormalities.  She ultimately reported improvement in her pain after ED management.  I suspect that patient has a rotator cuff or other soft tissue injury.  I will discharge with a prescription for opiate pain medication.  Per , patient is prescribed 2 mg of alprazolam.  She states that she does not take this medication every day and only takes as needed.  I have counseled patient on the dangers of taking opiates with benzodiazepines and urged her not to take these medications in the same day.  Patient also advised to follow-up in orthopedic surgery clinic for further management of her shoulder pain.  She may ultimately need additional imaging.  Patient voiced understanding.  All questions answered.  ED return precautions given.  ED return precautions given.  Patient voiced understanding and is comfortable with discharge plan.  I have reviewed the patient's records and discussed this case with my supervising physician.            See below  X-ray from 6/2/23:    X-Ray Shoulder Trauma Left  Status: Final result     MyChart Results Release    [employer] Status: Active  Results Release     PACS Images for Care Thread Viewer     Show images for X-Ray Shoulder Trauma Left  X-Ray Shoulder Trauma Left  Order: [id number]  Status: Final result     Visible to patient: Yes (seen)     Next appt: Today at 06:00 PM in Family Medicine (Karel Benitez NP)     Dx: Shoulder pain     0 Result Notes  Details    Reading Physician Reading Date Result Priority   Nikko Shelley III, MD  [phone]  [phone] 6/2/2023 STAT     Narrative & Impression  EXAMINATION:  XR SHOULDER TRAUMA 3 VIEW LEFT     CLINICAL HISTORY:  Pain in unspecified shoulder     FINDINGS:  Trauma shoulder left.     No fracture, dislocation, or bone destruction seen.  There is DJD.  No acute trauma seen.        Electronically signed by: Nikko Shelley MD  Date:                                            06/02/2023  Time:                                           11:38           Exam Ended: 06/02/23 11:35 Last Resulted: 06/02/23 11:38           Assessment & Plan     Problem List Items Addressed This Visit          Cardiac/Vascular    Mixed hyperlipidemia (Chronic)    discussed ways to lower triglycerides such as cutting simple sugars out of diet (white breads, candies, cookies, cakes, etc.) and reducing/eliminating intake of highly processed trans fatty acids.   Exercise 30 minutes a day for 4-5 days a week.   Eat more fiber.      discussed ways to lower triglycerides such as cutting simple sugars out of diet (white breads, candies, cookies, cakes, etc.) and reducing/eliminating intake of highly processed trans fatty acids.   Exercise 30 minutes a day for 4-5 days a week.   Eat more fiber.      Overview     Results for EDI LINARES (MRN [id number]) as of 7/19/2021 10:53   Ref. Range 2/22/2021 08:24   Cholesterol Latest Ref Range: 120 - 199 mg/dL 129   HDL Latest Ref Range: 40 - 75 mg/dL 46    HDL/Cholesterol Ratio Latest Ref Range: 20.0 - 50.0 % 35.7   LDL Cholesterol External Latest Ref Range: 63 - 159 mg/dL 71.6   Non-HDL Cholesterol Latest Units: mg/dL 83   Total Cholesterol/HDL Ratio Latest Ref Range: 2.0 - 5.0  2.8   Triglycerides Latest Ref Range: 30 - 150 mg/dL 57            Relevant Orders    Lipids Digital Medicine (LDMP) Enrollment Order (Completed)    Lipids Digital Medicine (LDMP): Assign Onboarding Questionnaires (Completed)    Essential hypertension, benign (Chronic)    BP Readings from Last 3 Encounters:   06/04/23 129/68   06/02/23 (!) 142/90   03/13/23 131/87       -continue current medication regimen  -DASH diet, regular cardiovascular exercises, portion control  - ?weight loss  -f/u with BP logs in 2 weeks     Enrolled in digital medicine program for this diagnosis      Relevant Orders    Hypertension Digital Medicine (HDMP) Enrollment Order (Completed)    Hypertension Digital Medicine (HDMP): Assign Onboarding Questionnaires (Completed)       Orthopedic    Fibromyalgia (Chronic)    The current medical regimen is effective;  continue present plan      Osteoarthritis of both knees    The current medical regimen is effective;  continue present plan       Other Visit Diagnoses       Acute pain of left shoulder    -  Primary    Pt believes she may have a rotator cuff tear and is requesting an MRI. Previous xray reviewed. Reports worsening pain and weakness in left shoulder. She states the percocet makes her feel nauseated and has stopped taking it.     Continue tylenol.     Rest    Ice    Follow up with ortho on 6/9/23.    MRI ordered    Discussed DDx, condition, and treatment.   Education sent to patient portal/included in after visit summary.  ED precautions given.   Notify provider if symptoms do not resolve or increase in severity.   Patient verbalizes understanding and agrees with plan of care.      Relevant Orders    MRI Shoulder Without Contrast Left               --------------------------------------------      Health Maintenance:  Health Maintenance         Date Due Completion Date    Hemoglobin A1c (Diabetic Prevention Screening) 09/13/2020 9/13/2017    Mammogram 10/28/2022 10/28/2021    Override on 9/27/2012: Done    COVID-19 Vaccine (7 - Pfizer series) 05/02/2023 9/21/2022    LDCT Lung Screen 06/28/2023 6/28/2022    TETANUS VACCINE 07/17/2023 7/17/2013    Pneumococcal Vaccines (Age 65+) (3 - PPSV23 if available, else PCV20) 09/29/2023 9/6/2020    DEXA Scan 07/22/2024 7/22/2021    Lipid Panel 06/08/2027 6/8/2022    Colorectal Cancer Screening 05/22/2028 5/22/2018    Override on 6/27/2012: Done            Advised patient on the importance of completing overdue health maintenance items    Follow Up:  Follow up in about 2 weeks (around 6/19/2023), or if symptoms worsen or fail to improve.    Exam     Review of Systems:  (as noted above)  Review of Systems   Constitutional:  Negative for fever.   HENT:  Negative for trouble swallowing.    Eyes:  Negative for visual disturbance.   Respiratory:  Negative for chest tightness and shortness of breath.    Cardiovascular:  Negative for chest pain.   Gastrointestinal:  Negative for blood in stool.   Musculoskeletal:  Positive for arthralgias.   Neurological:  Positive for weakness (left shoulder).     Physical Exam:   Physical Exam  Constitutional:       General: She is not in acute distress.     Appearance: She is not toxic-appearing or diaphoretic.   Pulmonary:      Effort: Pulmonary effort is normal.   Neurological:      Mental Status: She is alert.   Psychiatric:         Mood and Affect: Mood normal.     There were no vitals filed for this visit.   There is no height or weight on file to calculate BMI.        History     Past Medical History:  Past Medical History:   Diagnosis Date    Anxiety     Arthritis     Bipolar 1 disorder     Cataract     COPD (chronic obstructive pulmonary disease)     Depression     bipolar 2     Eyelid lesion     Fibromyalgia     H/O corneal abrasion     left eye with lead-based paint chips    Hyperlipidemia     Hypertension     Lupus     Migraine headache     Scleritis     Thyroid disease        Past Surgical History:  Past Surgical History:   Procedure Laterality Date    HYSTERECTOMY      for sterilization, ovaries intact    TRIGGER FINGER RELEASE Right 2021    Procedure: RELEASE, TRIGGER FINGER, right ring & small;  Surgeon: Mellissa Guadarrama MD;  Location: [facility];  Service: Orthopedics;  Laterality: Right;  REGIONAL MAC       Social History:  Social History     Socioeconomic History    Marital status:    Tobacco Use    Smoking status: Every Day     Packs/day: 1.00     Years: 45.00     Pack years: 45.00     Types: Cigarettes     Start date: 1974     Last attempt to quit: 2021     Years since quittin.4    Smokeless tobacco: Never   Substance and Sexual Activity    Alcohol use: Yes     Alcohol/week: 5.0 standard drinks     Types: 5 Glasses of wine per week     Comment: 0.5 to 1 bottle of wine per day     Drug use: Yes     Types: Marijuana     Comment: occasionally    Sexual activity: Not Currently     Birth control/protection: Abstinence, Surgical     Comment: Divored     Social Determinants of Health     Financial Resource Strain: High Risk    Difficulty of Paying Living Expenses: Very hard   Food Insecurity: Food Insecurity Present    Worried About Running Out of Food in the Last Year: Sometimes true    Ran Out of Food in the Last Year: Sometimes true   Transportation Needs: Unmet Transportation Needs    Lack of Transportation (Medical): Yes    Lack of Transportation (Non-Medical): Yes   Physical Activity: Sufficiently Active    Days of Exercise per Week: 7 days    Minutes of Exercise per Session: 60 min   Stress: Stress Concern Present    Feeling of Stress : To some extent   Social Connections: Socially Isolated    Frequency of Communication with Friends and Family:  More than three times a week    Frequency of Social Gatherings with Friends and Family: Once a week    Attends Mandaeism Services: Never    Active Member of Clubs or Organizations: No    Attends Club or Organization Meetings: Never    Marital Status:    Housing Stability: High Risk    Unable to Pay for Housing in the Last Year: No    Number of Places Lived in the Last Year: 3    Unstable Housing in the Last Year: No       Family History:  Family History   Problem Relation Age of Onset    Depression Father         suicide    Cataracts Mother     Rheum arthritis Mother     Lupus Mother     Arthritis Mother     Cataracts Sister     Rheum arthritis Sister     Breast cancer Maternal Aunt     Thyroid disease Daughter     No Known Problems Daughter     No Known Problems Daughter     Colon cancer Brother     Cancer Brother     No Known Problems Maternal Uncle     No Known Problems Paternal Aunt     No Known Problems Paternal Uncle     No Known Problems Maternal Grandmother     No Known Problems Maternal Grandfather     No Known Problems Paternal Grandmother     No Known Problems Paternal Grandfather     Cancer Sister     Cancer Brother     Early death Sister         Covid-19    Ovarian cancer Neg Hx     Melanoma Neg Hx     Amblyopia Neg Hx     Blindness Neg Hx     Diabetes Neg Hx     Glaucoma Neg Hx     Hypertension Neg Hx     Macular degeneration Neg Hx     Retinal detachment Neg Hx     Strabismus Neg Hx     Stroke Neg Hx        Allergies and Medications: (updated and reviewed)  Review of patient's allergies indicates:   Allergen Reactions    Bactrim [sulfamethoxazole-trimethoprim] Nausea And Vomiting    Sulfa (sulfonamide antibiotics)      Agitation^     Current Outpatient Medications   Medication Sig Dispense Refill    albuterol (PROVENTIL/VENTOLIN HFA) 90 mcg/actuation inhaler Inhale 2 puffs into the lungs every 6 (six) hours as needed for Wheezing. Rescue 54 g 2    alprazolam (XANAX) 2 MG Tab Take 2 mg by mouth  2 (two) times daily as needed (pt takes 1/2 tab BID and 1 tab  at night).      amLODIPine (NORVASC) 5 MG tablet TAKE 1 TABLET(5 MG) BY MOUTH EVERY DAY 90 tablet 3    butalbital-acetaminophen-caffeine -40 mg (FIORICET, ESGIC) -40 mg per tablet Take 1 tablet by mouth every 6 (six) hours as needed for Pain. 15 tablet 0    diazePAM (VALIUM) 5 MG tablet take 1 tablet by mouth when arrive at office for procedure 2 tablet 0    diclofenac sodium (VOLTAREN) 1 % Gel Apply 2 g topically 4 (four) times daily. 3 each 2    DULoxetine (CYMBALTA) 30 MG capsule Take 30 mg by mouth once daily.      duloxetine (CYMBALTA) 60 MG capsule Take one capsule along with cymbalta 30mg daily to equal 90mg daily 90 capsule 3    fluticasone propionate (FLONASE) 50 mcg/actuation nasal spray SHAKE LIQUID AND USE 1 SPRAY(50 MCG) IN EACH NOSTRIL EVERY DAY 16 g 0    hydrOXYchloroQUINE (PLAQUENIL) 200 mg tablet Take 1.5 tablets (300 mg total) by mouth once daily. 135 tablet 2    levocetirizine (XYZAL) 5 MG tablet Take 1 tablet (5 mg total) by mouth every evening. 30 tablet 11    levothyroxine (SYNTHROID) 88 MCG tablet TAKE 1 TABLET(88 MCG) BY MOUTH BEFORE BREAKFAST 90 tablet 3    losartan (COZAAR) 100 MG tablet TAKE 1 TABLET(100 MG) BY MOUTH EVERY DAY 90 tablet 3    methocarbamoL (ROBAXIN) 500 MG Tab Take 1 tablet (500 mg total) by mouth 3 (three) times daily. for 10 days 30 tablet 0    naproxen (NAPROSYN) 500 MG tablet Take 1 tablet (500 mg total) by mouth 2 (two) times daily with meals. 20 tablet 0    oxyCODONE-acetaminophen (PERCOCET) 7.5-325 mg per tablet Take 1 tablet by mouth every 6 (six) hours as needed for Pain. 12 tablet 0    pantoprazole (PROTONIX) 40 MG tablet Take 1 tablet (40 mg total) by mouth once daily. 90 tablet 2    pregabalin (LYRICA) 150 MG capsule Take 1 capsule (150 mg total) by mouth 2 (two) times daily. Generic is fine 180 capsule 1    quetiapine (SEROQUEL) 300 MG Tab Take 300 mg by mouth once daily.        rosuvastatin (CRESTOR) 40 MG Tab TAKE 1 TABLET(40 MG) BY MOUTH EVERY EVENING 90 tablet 1    TRELEGY ELLIPTA 100-62.5-25 mcg DsDv INHALE 1 PUFF INTO THE LUNGS EVERY DAY 60 each 3    vitamin D 1000 units Tab Take 185 mg by mouth once daily.       No current facility-administered medications for this visit.       Patient Care Team:  Bolivar Arambula MD as PCP - General (Internal Medicine)  Evangelist Miguel MA as Care Coordinator  Evangelist Miguel MA as Care Coordinator  Evangelist Miguel MA as Care Coordinator  Alice Spann MD as Consulting Physician (Obstetrics and Gynecology)  Kourtney Cervantes OD as Consulting Physician (Optometry)  Mellissa Guadarrama MD as Consulting Physician (Hand Surgery)  Mary Montiel DNP as Nurse Practitioner (Cardiology)  Eduardo Valencia MD as Consulting Physician (Rheumatology)  Marlon Liam as ED Navigator       - The patient was sent an After Visit Summary virtually that lists all medications with directions, allergies, education, orders placed during this encounter and follow-up instructions.      - I have reviewed the patient's medical information including past medical, family, and social history sections including the medications and allergies.      - We discussed the patient's current medications.     This note was created by combination of typed  and MModal dictation.  Transcription errors may be present.  If there are any questions, please contact me.       Karel Benitez NP

## 2023-06-05 NOTE — PATIENT INSTRUCTIONS
[facility]--[phone]  Imaging, Xray, CT, MRI, Ultrasound---[phone]  Bariatrics---[phone]  Breast Surgery---[phone]  Case Management---[phone]  Colonoscopy---[phone]  DME---[phone]  Infectious Disease---[phone]  Interventional Radiology---[phone]  Medical Records---[phone]  [facility]---[phone]  Optometry/Ophthalmology---[phone]  [facility]---[phone]  Physical Therapy---[phone]  Psychiatry---[phone] or [phone]  Plastic Surgery---[phone]  Recovery--[phone] option 2, or [phone].  Sleep Study---[phone]  Smoking Cessation---[phone]  Wound Care---[phone]  Referral Desk---[phone]

## 2023-06-07 NOTE — PROGRESS NOTES
Subjective:       Patient ID: Breanna Guido is a 63 y.o. female.    Chief Complaint: Shoulder Pain      Pt here for f/u.      She had incidental finding of ovarian cyst that appeared benign on last u/s 9/2022; I recommended she repeat this 6 mos later which she has not yet done but willing to do so.    She had positive h pylori stool antigen last year for which she completed quadruple tx; however, she never repeated stool antigen test--she is willing to do so.    Migraines are well controlled on current meds. No new features or red flag symptoms.       BP is well controlled. Denies cp/sob/ha/vision or neuro changes. Home readings are well controlled.     HLD has been tx with crestor which she has tolerated well.     She is clinically euthyroid on synthroid.     Bipolar is stable on meds and followed by psychiatry regularly. No SI/HI. This is stable.     Pt sees Dr. Valencia in [facility] for fibromyalgia dx and lupus dx; however, her serology has been negative. It was recommended she be maintained on plaquenil which she takes. She is being followed regularly by ophtho as well. 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 Her pain is manageable with lyrica.    COPD is stable on current meds. She has RUL lung nodule for which she saw pulmonary. This has been stable on last CT 8/2022.     She has osteopenia on most recent DEXA 7/2021. She is on ca/d.     She hurt shoulder 10 days ago when placing suitcase on shelf. Since then she has had pain in L shoulder associated with all axes of movement. She was seen in ED twice and then virtually by another provider. No relief with percocet. She says fioricet is all that has helped but has used sparingly. She has MRI pending as well as ortho referral. No chest pain/sob.     Hypertension  Pertinent negatives include no chest pain, headaches, neck pain, palpitations or shortness of breath.   Review of Systems   Constitutional:  Positive for activity change.  Negative for unexpected weight change.   HENT:  Negative for hearing loss, rhinorrhea and trouble swallowing.    Eyes:  Negative for discharge and visual disturbance.   Respiratory:  Negative for chest tightness, shortness of breath and wheezing.    Cardiovascular:  Negative for chest pain and palpitations.   Gastrointestinal:  Negative for abdominal pain, blood in stool, constipation, diarrhea, nausea and vomiting.   Endocrine: Negative for polydipsia and polyuria.   Genitourinary:  Negative for difficulty urinating, dysuria, frequency, hematuria and menstrual problem.   Musculoskeletal:  Positive for arthralgias. Negative for joint swelling and neck pain.   Neurological:  Negative for speech difficulty, weakness and headaches.   Hematological:  Negative for adenopathy.   Psychiatric/Behavioral:  Negative for confusion and dysphoric mood.      Objective:          Assessment:       1. Essential hypertension, benign    2. Mixed hyperlipidemia    3. Aortic atherosclerosis    4. Pulmonary granuloma    5. Fibromyalgia    6. Bipolar 2 disorder    7. Osteopenia of multiple sites    8. Chronic obstructive pulmonary disease, unspecified COPD type    9. Lupus    10. Acquired hypothyroidism    11. Lung nodule    12. Current mild episode of major depressive disorder without prior episode    13. Cigarette nicotine dependence with nicotine-induced disorder    14. Tobacco abuse    15. H. pylori infection        Plan:       1. Prior labs reviewed; additional labs   2. Keep f/u with specialists  3. Tobacco cessation  4. Repeat Pelvic u/s  5. Repeat h pylori stool antigen  6. Complete shoulder MRI and ortho referral      Physical Exam  Constitutional:       Appearance: Normal appearance. She is well-developed.   HENT:      Head: Normocephalic and atraumatic.   Eyes:      Extraocular Movements: Extraocular movements intact.      Pupils: Pupils are equal, round, and reactive to light.   Neck:      Thyroid: No thyroid mass or  thyromegaly.      Vascular: No carotid bruit.   Cardiovascular:      Rate and Rhythm: Normal rate and regular rhythm.      Heart sounds: S1 normal and S2 normal. Murmur heard.   Pulmonary:      Effort: Pulmonary effort is normal.      Breath sounds: Normal breath sounds. No wheezing.   Abdominal:      General: Bowel sounds are normal. There is no distension.      Palpations: Abdomen is soft. There is no mass.      Tenderness: There is no abdominal tenderness.   Musculoskeletal:      Right shoulder: Normal.      Left shoulder: Tenderness present. No bony tenderness. Decreased range of motion.      Cervical back: Neck supple.      Lumbar back: Normal.      Right lower leg: No edema.      Left lower leg: No edema.   Neurological:      Mental Status: She is alert and oriented to person, place, and time.      Cranial Nerves: No cranial nerve deficit.      Sensory: No sensory deficit.      Coordination: Coordination normal.      Gait: Gait normal.      Deep Tendon Reflexes:      Reflex Scores:       Bicep reflexes are 2+ on the right side and 2+ on the left side.       Patellar reflexes are 2+ on the right side and 2+ on the left side.  Psychiatric:         Behavior: Behavior normal.

## 2023-06-08 ENCOUNTER — LAB VISIT (OUTPATIENT)
Dept: LAB | Facility: OTHER | Age: 63
End: 2023-06-08
Attending: INTERNAL MEDICINE
Payer: MEDICARE

## 2023-06-08 ENCOUNTER — OFFICE VISIT (OUTPATIENT)
Dept: INTERNAL MEDICINE | Facility: CLINIC | Age: 63
End: 2023-06-08
Payer: MEDICARE

## 2023-06-08 VITALS
BODY MASS INDEX: 27.31 KG/M2 | WEIGHT: 154.13 LBS | OXYGEN SATURATION: 98 % | HEIGHT: 63 IN | DIASTOLIC BLOOD PRESSURE: 70 MMHG | SYSTOLIC BLOOD PRESSURE: 110 MMHG | HEART RATE: 73 BPM

## 2023-06-08 DIAGNOSIS — F31.81 BIPOLAR 2 DISORDER: Chronic | ICD-10-CM

## 2023-06-08 DIAGNOSIS — M85.89 OSTEOPENIA OF MULTIPLE SITES: ICD-10-CM

## 2023-06-08 DIAGNOSIS — M32.9 LUPUS: Chronic | ICD-10-CM

## 2023-06-08 DIAGNOSIS — E03.9 ACQUIRED HYPOTHYROIDISM: Chronic | ICD-10-CM

## 2023-06-08 DIAGNOSIS — Z72.0 TOBACCO ABUSE: ICD-10-CM

## 2023-06-08 DIAGNOSIS — F17.219 CIGARETTE NICOTINE DEPENDENCE WITH NICOTINE-INDUCED DISORDER: ICD-10-CM

## 2023-06-08 DIAGNOSIS — J44.9 CHRONIC OBSTRUCTIVE PULMONARY DISEASE, UNSPECIFIED COPD TYPE: Chronic | ICD-10-CM

## 2023-06-08 DIAGNOSIS — E78.2 MIXED HYPERLIPIDEMIA: Chronic | ICD-10-CM

## 2023-06-08 DIAGNOSIS — F32.0 CURRENT MILD EPISODE OF MAJOR DEPRESSIVE DISORDER WITHOUT PRIOR EPISODE: ICD-10-CM

## 2023-06-08 DIAGNOSIS — I10 ESSENTIAL HYPERTENSION, BENIGN: Primary | Chronic | ICD-10-CM

## 2023-06-08 DIAGNOSIS — J84.10 PULMONARY GRANULOMA: ICD-10-CM

## 2023-06-08 DIAGNOSIS — M79.7 FIBROMYALGIA: Chronic | ICD-10-CM

## 2023-06-08 DIAGNOSIS — I70.0 AORTIC ATHEROSCLEROSIS: ICD-10-CM

## 2023-06-08 DIAGNOSIS — A04.8 H. PYLORI INFECTION: ICD-10-CM

## 2023-06-08 DIAGNOSIS — R91.1 LUNG NODULE: ICD-10-CM

## 2023-06-08 LAB
CHOLEST SERPL-MCNC: 119 MG/DL (ref 120–199)
CHOLEST/HDLC SERPL: 2.7 {RATIO} (ref 2–5)
HDLC SERPL-MCNC: 44 MG/DL (ref 40–75)
HDLC SERPL: 37 % (ref 20–50)
LDLC SERPL CALC-MCNC: 63.2 MG/DL (ref 63–159)
NONHDLC SERPL-MCNC: 75 MG/DL
TRIGL SERPL-MCNC: 59 MG/DL (ref 30–150)

## 2023-06-08 PROCEDURE — 1101F PT FALLS ASSESS-DOCD LE1/YR: CPT | Mod: CPTII,S$GLB,, | Performed by: INTERNAL MEDICINE

## 2023-06-08 PROCEDURE — 3074F PR MOST RECENT SYSTOLIC BLOOD PRESSURE < 130 MM HG: ICD-10-PCS | Mod: CPTII,S$GLB,, | Performed by: INTERNAL MEDICINE

## 2023-06-08 PROCEDURE — 3078F PR MOST RECENT DIASTOLIC BLOOD PRESSURE < 80 MM HG: ICD-10-PCS | Mod: CPTII,S$GLB,, | Performed by: INTERNAL MEDICINE

## 2023-06-08 PROCEDURE — 99999 PR PBB SHADOW E&M-EST. PATIENT-LVL IV: ICD-10-PCS | Mod: PBBFAC,,, | Performed by: INTERNAL MEDICINE

## 2023-06-08 PROCEDURE — 3074F SYST BP LT 130 MM HG: CPT | Mod: CPTII,S$GLB,, | Performed by: INTERNAL MEDICINE

## 2023-06-08 PROCEDURE — 3008F PR BODY MASS INDEX (BMI) DOCUMENTED: ICD-10-PCS | Mod: CPTII,S$GLB,, | Performed by: INTERNAL MEDICINE

## 2023-06-08 PROCEDURE — 3008F BODY MASS INDEX DOCD: CPT | Mod: CPTII,S$GLB,, | Performed by: INTERNAL MEDICINE

## 2023-06-08 PROCEDURE — 3288F PR FALLS RISK ASSESSMENT DOCUMENTED: ICD-10-PCS | Mod: CPTII,S$GLB,, | Performed by: INTERNAL MEDICINE

## 2023-06-08 PROCEDURE — 4010F PR ACE/ARB THEARPY RXD/TAKEN: ICD-10-PCS | Mod: CPTII,S$GLB,, | Performed by: INTERNAL MEDICINE

## 2023-06-08 PROCEDURE — 36415 COLL VENOUS BLD VENIPUNCTURE: CPT | Performed by: INTERNAL MEDICINE

## 2023-06-08 PROCEDURE — 1160F RVW MEDS BY RX/DR IN RCRD: CPT | Mod: CPTII,S$GLB,, | Performed by: INTERNAL MEDICINE

## 2023-06-08 PROCEDURE — 80061 LIPID PANEL: CPT | Performed by: INTERNAL MEDICINE

## 2023-06-08 PROCEDURE — 1101F PR PT FALLS ASSESS DOC 0-1 FALLS W/OUT INJ PAST YR: ICD-10-PCS | Mod: CPTII,S$GLB,, | Performed by: INTERNAL MEDICINE

## 2023-06-08 PROCEDURE — 1159F MED LIST DOCD IN RCRD: CPT | Mod: CPTII,S$GLB,, | Performed by: INTERNAL MEDICINE

## 2023-06-08 PROCEDURE — 99214 OFFICE O/P EST MOD 30 MIN: CPT | Mod: S$GLB,,, | Performed by: INTERNAL MEDICINE

## 2023-06-08 PROCEDURE — 3078F DIAST BP <80 MM HG: CPT | Mod: CPTII,S$GLB,, | Performed by: INTERNAL MEDICINE

## 2023-06-08 PROCEDURE — 1160F PR REVIEW ALL MEDS BY PRESCRIBER/CLIN PHARMACIST DOCUMENTED: ICD-10-PCS | Mod: CPTII,S$GLB,, | Performed by: INTERNAL MEDICINE

## 2023-06-08 PROCEDURE — 3288F FALL RISK ASSESSMENT DOCD: CPT | Mod: CPTII,S$GLB,, | Performed by: INTERNAL MEDICINE

## 2023-06-08 PROCEDURE — 99214 PR OFFICE/OUTPT VISIT, EST, LEVL IV, 30-39 MIN: ICD-10-PCS | Mod: S$GLB,,, | Performed by: INTERNAL MEDICINE

## 2023-06-08 PROCEDURE — 1159F PR MEDICATION LIST DOCUMENTED IN MEDICAL RECORD: ICD-10-PCS | Mod: CPTII,S$GLB,, | Performed by: INTERNAL MEDICINE

## 2023-06-08 PROCEDURE — 4010F ACE/ARB THERAPY RXD/TAKEN: CPT | Mod: CPTII,S$GLB,, | Performed by: INTERNAL MEDICINE

## 2023-06-08 PROCEDURE — 99999 PR PBB SHADOW E&M-EST. PATIENT-LVL IV: CPT | Mod: PBBFAC,,, | Performed by: INTERNAL MEDICINE

## 2023-06-08 RX ORDER — BUTALBITAL, ACETAMINOPHEN AND CAFFEINE 50; 325; 40 MG/1; MG/1; MG/1
1 TABLET ORAL EVERY 6 HOURS PRN
Qty: 20 TABLET | Refills: 0 | Status: SHIPPED | OUTPATIENT
Start: 2023-06-08

## 2023-06-14 ENCOUNTER — HOSPITAL ENCOUNTER (OUTPATIENT)
Dept: RADIOLOGY | Facility: HOSPITAL | Age: 63
Discharge: HOME | End: 2023-06-14
Attending: NURSE PRACTITIONER
Payer: MEDICARE

## 2023-06-14 ENCOUNTER — TELEPHONE (OUTPATIENT)
Dept: FAMILY MEDICINE | Facility: CLINIC | Age: 63
End: 2023-06-14
Payer: MEDICARE

## 2023-06-14 DIAGNOSIS — M75.102 TEAR OF LEFT SUPRASPINATUS TENDON: Primary | ICD-10-CM

## 2023-06-14 DIAGNOSIS — M25.512 ACUTE PAIN OF LEFT SHOULDER: ICD-10-CM

## 2023-06-14 PROCEDURE — 73221 MRI SHOULDER WITHOUT CONTRAST LEFT: ICD-10-PCS | Mod: 26,LT,, | Performed by: RADIOLOGY

## 2023-06-14 PROCEDURE — 73221 MRI JOINT UPR EXTREM W/O DYE: CPT | Mod: TC,LT

## 2023-06-14 PROCEDURE — 73221 MRI JOINT UPR EXTREM W/O DYE: CPT | Mod: 26,LT,, | Performed by: RADIOLOGY

## 2023-06-14 NOTE — TELEPHONE ENCOUNTER
Called pt and discussed diagnostic results, all questions answered.     Problem List Items Addressed This Visit    None  Visit Diagnoses       Tear of left supraspinatus tendon    -  Primary    Relevant Orders    Ambulatory referral/consult to Orthopedics

## 2023-06-21 ENCOUNTER — HOSPITAL ENCOUNTER (OUTPATIENT)
Dept: RADIOLOGY | Facility: OTHER | Age: 63
Discharge: HOME | End: 2023-06-21
Attending: INTERNAL MEDICINE
Payer: MEDICARE

## 2023-06-21 DIAGNOSIS — N83.202 BILATERAL OVARIAN CYSTS: ICD-10-CM

## 2023-06-21 DIAGNOSIS — N83.201 BILATERAL OVARIAN CYSTS: ICD-10-CM

## 2023-06-21 PROCEDURE — 76856 US EXAM PELVIC COMPLETE: CPT | Mod: 26,,, | Performed by: RADIOLOGY

## 2023-06-21 PROCEDURE — 76856 US PELVIS COMPLETE NON OB: ICD-10-PCS | Mod: 26,,, | Performed by: RADIOLOGY

## 2023-06-21 PROCEDURE — 76856 US EXAM PELVIC COMPLETE: CPT | Mod: TC

## 2023-06-28 ENCOUNTER — OFFICE VISIT (OUTPATIENT)
Dept: ORTHOPEDICS | Facility: CLINIC | Age: 63
End: 2023-06-28
Payer: MEDICARE

## 2023-06-28 VITALS
SYSTOLIC BLOOD PRESSURE: 119 MMHG | HEART RATE: 74 BPM | DIASTOLIC BLOOD PRESSURE: 76 MMHG | BODY MASS INDEX: 27.31 KG/M2 | HEIGHT: 63 IN | WEIGHT: 154.13 LBS

## 2023-06-28 DIAGNOSIS — M75.102 TEAR OF LEFT SUPRASPINATUS TENDON: Primary | ICD-10-CM

## 2023-06-28 DIAGNOSIS — J44.9 CHRONIC OBSTRUCTIVE PULMONARY DISEASE, UNSPECIFIED COPD TYPE: Chronic | ICD-10-CM

## 2023-06-28 DIAGNOSIS — M75.52 BURSITIS OF LEFT SHOULDER: ICD-10-CM

## 2023-06-28 DIAGNOSIS — M17.0 PRIMARY OSTEOARTHRITIS OF BOTH KNEES: ICD-10-CM

## 2023-06-28 PROCEDURE — 1101F PR PT FALLS ASSESS DOC 0-1 FALLS W/OUT INJ PAST YR: ICD-10-PCS | Mod: CPTII,S$GLB,, | Performed by: PHYSICIAN ASSISTANT

## 2023-06-28 PROCEDURE — 3288F PR FALLS RISK ASSESSMENT DOCUMENTED: ICD-10-PCS | Mod: CPTII,S$GLB,, | Performed by: PHYSICIAN ASSISTANT

## 2023-06-28 PROCEDURE — 99999 PR PBB SHADOW E&M-EST. PATIENT-LVL V: CPT | Mod: PBBFAC,,, | Performed by: PHYSICIAN ASSISTANT

## 2023-06-28 PROCEDURE — 4010F ACE/ARB THERAPY RXD/TAKEN: CPT | Mod: CPTII,S$GLB,, | Performed by: PHYSICIAN ASSISTANT

## 2023-06-28 PROCEDURE — 3008F PR BODY MASS INDEX (BMI) DOCUMENTED: ICD-10-PCS | Mod: CPTII,S$GLB,, | Performed by: PHYSICIAN ASSISTANT

## 2023-06-28 PROCEDURE — 1159F MED LIST DOCD IN RCRD: CPT | Mod: CPTII,S$GLB,, | Performed by: PHYSICIAN ASSISTANT

## 2023-06-28 PROCEDURE — 3078F DIAST BP <80 MM HG: CPT | Mod: CPTII,S$GLB,, | Performed by: PHYSICIAN ASSISTANT

## 2023-06-28 PROCEDURE — 1101F PT FALLS ASSESS-DOCD LE1/YR: CPT | Mod: CPTII,S$GLB,, | Performed by: PHYSICIAN ASSISTANT

## 2023-06-28 PROCEDURE — 3008F BODY MASS INDEX DOCD: CPT | Mod: CPTII,S$GLB,, | Performed by: PHYSICIAN ASSISTANT

## 2023-06-28 PROCEDURE — 1160F PR REVIEW ALL MEDS BY PRESCRIBER/CLIN PHARMACIST DOCUMENTED: ICD-10-PCS | Mod: CPTII,S$GLB,, | Performed by: PHYSICIAN ASSISTANT

## 2023-06-28 PROCEDURE — 99214 OFFICE O/P EST MOD 30 MIN: CPT | Mod: S$GLB,,, | Performed by: PHYSICIAN ASSISTANT

## 2023-06-28 PROCEDURE — 3074F SYST BP LT 130 MM HG: CPT | Mod: CPTII,S$GLB,, | Performed by: PHYSICIAN ASSISTANT

## 2023-06-28 PROCEDURE — 3288F FALL RISK ASSESSMENT DOCD: CPT | Mod: CPTII,S$GLB,, | Performed by: PHYSICIAN ASSISTANT

## 2023-06-28 PROCEDURE — 1159F PR MEDICATION LIST DOCUMENTED IN MEDICAL RECORD: ICD-10-PCS | Mod: CPTII,S$GLB,, | Performed by: PHYSICIAN ASSISTANT

## 2023-06-28 PROCEDURE — 3078F PR MOST RECENT DIASTOLIC BLOOD PRESSURE < 80 MM HG: ICD-10-PCS | Mod: CPTII,S$GLB,, | Performed by: PHYSICIAN ASSISTANT

## 2023-06-28 PROCEDURE — 99214 PR OFFICE/OUTPT VISIT, EST, LEVL IV, 30-39 MIN: ICD-10-PCS | Mod: S$GLB,,, | Performed by: PHYSICIAN ASSISTANT

## 2023-06-28 PROCEDURE — 4010F PR ACE/ARB THEARPY RXD/TAKEN: ICD-10-PCS | Mod: CPTII,S$GLB,, | Performed by: PHYSICIAN ASSISTANT

## 2023-06-28 PROCEDURE — 99999 PR PBB SHADOW E&M-EST. PATIENT-LVL V: ICD-10-PCS | Mod: PBBFAC,,, | Performed by: PHYSICIAN ASSISTANT

## 2023-06-28 PROCEDURE — 3074F PR MOST RECENT SYSTOLIC BLOOD PRESSURE < 130 MM HG: ICD-10-PCS | Mod: CPTII,S$GLB,, | Performed by: PHYSICIAN ASSISTANT

## 2023-06-28 PROCEDURE — 1160F RVW MEDS BY RX/DR IN RCRD: CPT | Mod: CPTII,S$GLB,, | Performed by: PHYSICIAN ASSISTANT

## 2023-06-28 RX ORDER — MELOXICAM 15 MG/1
15 TABLET ORAL DAILY
Qty: 30 TABLET | Refills: 1 | Status: SHIPPED | OUTPATIENT
Start: 2023-06-28 | End: 2023-07-27

## 2023-06-28 NOTE — PROGRESS NOTES
SUBJECTIVE:     Chief Complaint & History of Present Illness:  Breanna Guido is a  New  patient 63 y.o. female who is seen here today with a complaint of    Chief Complaint   Patient presents with    Left Shoulder - Pain    .  Patient is here today for continuing care treatment for longstanding left shoulder pain.  States she had initial injury while pushing a container into a candidate about shoulder height 06/03/2023.  Has been seen by emergency room and a virtual visit from her family medicine doctor undergone x-ray and MRI of the shoulder which demonstrates partial tearing of the supraspinatus.  At her initial visit she had very limited range of motion secondary to pain but has slowly begun to regain a good portion of her strength and range of motion.  On a scale of 1-10, with 10 being worst pain imaginable, he rates this pain as 2 on good days and 5 on bad days.  she describes the pain as tender and sore.    Review of patient's allergies indicates:   Allergen Reactions    Bactrim [sulfamethoxazole-trimethoprim] Nausea And Vomiting    Sulfa (sulfonamide antibiotics)      Agitation^         Current Outpatient Medications   Medication Sig Dispense Refill    albuterol (PROVENTIL/VENTOLIN HFA) 90 mcg/actuation inhaler Inhale 2 puffs into the lungs every 6 (six) hours as needed for Wheezing. Rescue 54 g 2    alprazolam (XANAX) 2 MG Tab Take 2 mg by mouth 2 (two) times daily as needed (pt takes 1/2 tab BID and 1 tab  at night).      amLODIPine (NORVASC) 5 MG tablet TAKE 1 TABLET(5 MG) BY MOUTH EVERY DAY 90 tablet 3    butalbital-acetaminophen-caffeine -40 mg (FIORICET, ESGIC) -40 mg per tablet Take 1 tablet by mouth every 6 (six) hours as needed for Pain. 20 tablet 0    diclofenac sodium (VOLTAREN) 1 % Gel Apply 2 g topically 4 (four) times daily. 3 each 2    duloxetine (CYMBALTA) 60 MG capsule Take one capsule along with cymbalta 30mg daily to equal 90mg daily 90 capsule 3    hydrOXYchloroQUINE  (PLAQUENIL) 200 mg tablet Take 1.5 tablets (300 mg total) by mouth once daily. 135 tablet 2    levothyroxine (SYNTHROID) 88 MCG tablet TAKE 1 TABLET(88 MCG) BY MOUTH BEFORE BREAKFAST 90 tablet 3    losartan (COZAAR) 100 MG tablet TAKE 1 TABLET(100 MG) BY MOUTH EVERY DAY 90 tablet 3    pantoprazole (PROTONIX) 40 MG tablet Take 1 tablet (40 mg total) by mouth once daily. 90 tablet 2    pregabalin (LYRICA) 150 MG capsule Take 1 capsule (150 mg total) by mouth 2 (two) times daily. Generic is fine 180 capsule 1    quetiapine (SEROQUEL) 300 MG Tab Take 300 mg by mouth once daily.       rosuvastatin (CRESTOR) 40 MG Tab TAKE 1 TABLET(40 MG) BY MOUTH EVERY EVENING 90 tablet 1    TRELEGY ELLIPTA 100-62.5-25 mcg DsDv INHALE 1 PUFF INTO THE LUNGS EVERY  each 3    vitamin D 1000 units Tab Take 185 mg by mouth once daily.      diazePAM (VALIUM) 5 MG tablet take 1 tablet by mouth when arrive at office for procedure (Patient not taking: Reported on 6/8/2023) 2 tablet 0    fluticasone propionate (FLONASE) 50 mcg/actuation nasal spray SHAKE LIQUID AND USE 1 SPRAY(50 MCG) IN EACH NOSTRIL EVERY DAY (Patient not taking: Reported on 6/28/2023) 16 g 0    levocetirizine (XYZAL) 5 MG tablet Take 1 tablet (5 mg total) by mouth every evening. (Patient not taking: Reported on 6/8/2023) 30 tablet 11    meloxicam (MOBIC) 15 MG tablet Take 1 tablet (15 mg total) by mouth once daily. 30 tablet 1    oxyCODONE-acetaminophen (PERCOCET) 7.5-325 mg per tablet Take 1 tablet by mouth every 6 (six) hours as needed for Pain. (Patient not taking: Reported on 6/8/2023) 12 tablet 0     No current facility-administered medications for this visit.       Past Medical History:   Diagnosis Date    Anxiety     Arthritis     Bipolar 1 disorder     Cataract     COPD (chronic obstructive pulmonary disease)     Depression     bipolar 2    Eyelid lesion     Fibromyalgia     H/O corneal abrasion 2001    left eye with lead-based paint chips    Hyperlipidemia      "Hypertension     Lupus     Migraine headache     Scleritis     Thyroid disease        Past Surgical History:   Procedure Laterality Date    HYSTERECTOMY      for sterilization, ovaries intact    TRIGGER FINGER RELEASE Right 7/28/2021    Procedure: RELEASE, TRIGGER FINGER, right ring & small;  Surgeon: Mellissa Guadarrama MD;  Location: [facility];  Service: Orthopedics;  Laterality: Right;  REGIONAL MAC       Vital Signs (Most Recent)  Vitals:    06/28/23 0734   BP: 119/76   Pulse: 74       Review of Systems:  ROS:  Constitutional: no fever or chills, positive nicotine dependence  Eyes: no visual changes  ENT: no nasal congestion or sore throat  Respiratory:  Positive COPD granuloma lung  Cardiovascular: no chest pain or palpitations, aortic atherosclerosis, essential hypertension  Gastrointestinal: no nausea or vomiting, tolerating diet  Genitourinary: no hematuria or dysuria  Integument/Breast: no rash or pruritis  Hematologic/Lymphatic: no easy bruising or lymphadenopathy, positive for lupus  Musculoskeletal: no arthralgias or myalgias, positive for osteopenia, fibromyalgia  Neurological:   Behavioral/Psych: no auditory or visual hallucinations, no seizures or tremors, positive chronic migraines positive major depressive disorder, anxiety, bipolar 1, bipolar 2  Endocrine: no heat or cold intolerance, positive thyroid disease with acquired hypothyroidism      OBJECTIVE:     PHYSICAL EXAM:  Height: 5' 3" (160 cm) Weight: 69.9 kg (154 lb 1.6 oz), General Appearance: Well nourished, well developed, in no acute distress.  Neurological: Mood & affect are normal.  Shoulder exam: left  Tenderness: AC joint  ROM: forward flexion 180/180, extension 45/45, full abduction 180/180, abduction-glenohumeral 90/90, external rotation 50/50, pain at the extremes of mobility  Shoulder Strength: biceps 5/5, triceps 5/5, abduction 5/5, adduction 5/5, external rotation 5/5 with shoulder at side, flexion 5/5, and extension 5/5  negative " for tenderness about the glenohumeral joint, positive for tenderness over the acromioclavicular joint, and negative for impingement sign  Stability tests: anterior apprehension test negative and posterior apprehension test negative  Special Tests:Cross-chest abduction: diffuse pain and [place]field's test: negative                     RADIOGRAPHS:  Review of x-rays and MRI from previous visit demonstrate partial tearing of the supraspinatus on the bursal surface near the insertion of the humeral head with associated edema.  X-rays demonstrate mild degenerative changes with glenohumeral joint space narrowing no evidence of fracture dislocation    ASSESSMENT/PLAN:       ICD-10-CM ICD-9-CM   1. Tear of left supraspinatus tendon  M75.102 840.6   2. Bursitis of left shoulder  M75.52 726.10       Plan: We discussed with the patient at length all the different treatment options available for her left shoulder including anti-inflammatories, acetaminophen, rest, ice, Physical therapy to include strengthening exercise, occasional cortisone injections for temporary relief, arthroscopic surgical repair, and finally shoulder arthroplasty.   Patient like to proceed with conservative care  Meloxicam 15 mg q.d. with food times 10 days followed by p.r.n.  Physical therapy for strength range of motion dry needling  Patient also had an old consult to evaluate her knee issues will reschedule her for x-rays and evaluation of her knees and her follow-up visit in 3 weeks to consider moving forward with viscosupplementation injections

## 2023-07-27 RX ORDER — MELOXICAM 15 MG/1
TABLET ORAL
Qty: 30 TABLET | Refills: 1 | Status: SHIPPED | OUTPATIENT
Start: 2023-07-27 | End: 2023-10-25

## 2023-09-03 DIAGNOSIS — E03.9 ACQUIRED HYPOTHYROIDISM: Chronic | ICD-10-CM

## 2023-09-06 RX ORDER — LEVOTHYROXINE SODIUM 88 UG/1
TABLET ORAL
Qty: 90 TABLET | Refills: 3 | Status: SHIPPED | OUTPATIENT
Start: 2023-09-06

## 2023-09-06 RX ORDER — AMLODIPINE BESYLATE 5 MG/1
TABLET ORAL
Qty: 90 TABLET | Refills: 3 | Status: SHIPPED | OUTPATIENT
Start: 2023-09-06 | End: 2023-12-04 | Stop reason: SDUPTHER

## 2023-09-06 NOTE — TELEPHONE ENCOUNTER
Refill Routing Note   Medication(s) are not appropriate for processing by [facility] for the following reason(s):      Non-participating provider    [facility] action(s):  Route Care Due:  None identified            Appointments  past 12m or future 3m with PCP    Date Provider   Last Visit   6/8/2023 Bolivar Arambula MD   Next Visit   Visit date not found Bolivar Arambula MD   ED visits in past 90 days: 0        Note composed:11:50 AM 09/06/2023

## 2023-09-10 DIAGNOSIS — E78.2 MIXED HYPERLIPIDEMIA: ICD-10-CM

## 2023-09-11 NOTE — TELEPHONE ENCOUNTER
Patient has been contacted in regards to new PCP and medication refill request. Patient informed Dr Arambula is no longer with clinic and new PCP must be obtained. Patient also informed at this time no new patient are being taken and [facility] was suggested. However patient did decline due to transportation issues, and states that will be too much.     Patient informed message will be routed to manager to further advise.

## 2023-09-12 ENCOUNTER — ON-DEMAND VIRTUAL (OUTPATIENT)
Dept: URGENT CARE | Facility: CLINIC | Age: 63
End: 2023-09-12
Payer: MEDICARE

## 2023-09-12 DIAGNOSIS — E03.9 ACQUIRED HYPOTHYROIDISM: Chronic | ICD-10-CM

## 2023-09-12 DIAGNOSIS — E78.2 MIXED HYPERLIPIDEMIA: ICD-10-CM

## 2023-09-12 PROCEDURE — 99213 PR OFFICE/OUTPT VISIT, EST, LEVL III, 20-29 MIN: ICD-10-PCS | Mod: 95,,, | Performed by: FAMILY MEDICINE

## 2023-09-12 PROCEDURE — 99213 OFFICE O/P EST LOW 20 MIN: CPT | Mod: 95,,, | Performed by: FAMILY MEDICINE

## 2023-09-12 RX ORDER — LOSARTAN POTASSIUM 100 MG/1
100 TABLET ORAL DAILY
Qty: 90 TABLET | Refills: 0 | Status: SHIPPED | OUTPATIENT
Start: 2023-09-12 | End: 2023-12-04 | Stop reason: SDUPTHER

## 2023-09-12 RX ORDER — ROSUVASTATIN CALCIUM 40 MG/1
TABLET, COATED ORAL
Qty: 90 TABLET | Refills: 1 | OUTPATIENT
Start: 2023-09-12

## 2023-09-12 RX ORDER — ROSUVASTATIN CALCIUM 40 MG/1
40 TABLET, COATED ORAL NIGHTLY
Qty: 90 TABLET | Refills: 0 | Status: SHIPPED | OUTPATIENT
Start: 2023-09-12 | End: 2023-09-17

## 2023-09-12 NOTE — PROGRESS NOTES
Subjective:      Patient ID: Breanna Guido is a 63 y.o. female.    Vitals:  vitals were not taken for this visit.     Chief Complaint: Medication Refill      Visit Type: TELE AUDIOVISUAL    Present with the patient at the time of consultation: TELEMED PRESENT WITH PATIENT: None    Past Medical History:   Diagnosis Date    Anxiety     Arthritis     Bipolar 1 disorder     Cataract     COPD (chronic obstructive pulmonary disease)     Depression     bipolar 2    Eyelid lesion     Fibromyalgia     H/O corneal abrasion 2001    left eye with lead-based paint chips    Hyperlipidemia     Hypertension     Lupus     Migraine headache     Scleritis     Thyroid disease      Past Surgical History:   Procedure Laterality Date    HYSTERECTOMY      for sterilization, ovaries intact    TRIGGER FINGER RELEASE Right 7/28/2021    Procedure: RELEASE, TRIGGER FINGER, right ring & small;  Surgeon: Mellissa Guadarrama MD;  Location: [facility];  Service: Orthopedics;  Laterality: Right;  REGIONAL MAC     Review of patient's allergies indicates:   Allergen Reactions    Bactrim [sulfamethoxazole-trimethoprim] Nausea And Vomiting    Sulfa (sulfonamide antibiotics)      Agitation^     Current Outpatient Medications on File Prior to Visit   Medication Sig Dispense Refill    albuterol (PROVENTIL/VENTOLIN HFA) 90 mcg/actuation inhaler Inhale 2 puffs into the lungs every 6 (six) hours as needed for Wheezing. Rescue 54 g 2    alprazolam (XANAX) 2 MG Tab Take 2 mg by mouth 2 (two) times daily as needed (pt takes 1/2 tab BID and 1 tab  at night).      amLODIPine (NORVASC) 5 MG tablet TAKE 1 TABLET(5 MG) BY MOUTH EVERY DAY 90 tablet 3    butalbital-acetaminophen-caffeine -40 mg (FIORICET, ESGIC) -40 mg per tablet Take 1 tablet by mouth every 6 (six) hours as needed for Pain. 20 tablet 0    diazePAM (VALIUM) 5 MG tablet take 1 tablet by mouth when arrive at office for procedure (Patient not taking: Reported on 6/8/2023) 2 tablet 0     diclofenac sodium (VOLTAREN) 1 % Gel Apply 2 g topically 4 (four) times daily. 3 each 2    duloxetine (CYMBALTA) 60 MG capsule Take one capsule along with cymbalta 30mg daily to equal 90mg daily 90 capsule 3    fluticasone propionate (FLONASE) 50 mcg/actuation nasal spray SHAKE LIQUID AND USE 1 SPRAY(50 MCG) IN EACH NOSTRIL EVERY DAY (Patient not taking: Reported on 6/28/2023) 16 g 0    hydrOXYchloroQUINE (PLAQUENIL) 200 mg tablet Take 1.5 tablets (300 mg total) by mouth once daily. 135 tablet 2    levocetirizine (XYZAL) 5 MG tablet Take 1 tablet (5 mg total) by mouth every evening. (Patient not taking: Reported on 6/8/2023) 30 tablet 11    levothyroxine (SYNTHROID) 88 MCG tablet TAKE 1 TABLET(88 MCG) BY MOUTH BEFORE BREAKFAST 90 tablet 3    meloxicam (MOBIC) 15 MG tablet TAKE 1 TABLET(15 MG) BY MOUTH EVERY DAY 30 tablet 1    oxyCODONE-acetaminophen (PERCOCET) 7.5-325 mg per tablet Take 1 tablet by mouth every 6 (six) hours as needed for Pain. (Patient not taking: Reported on 6/8/2023) 12 tablet 0    pantoprazole (PROTONIX) 40 MG tablet Take 1 tablet (40 mg total) by mouth once daily. 90 tablet 2    pregabalin (LYRICA) 150 MG capsule Take 1 capsule (150 mg total) by mouth 2 (two) times daily. Generic is fine 180 capsule 1    quetiapine (SEROQUEL) 300 MG Tab Take 300 mg by mouth once daily.       TRELEGY ELLIPTA 100-62.5-25 mcg DsDv INHALE 1 PUFF INTO THE LUNGS EVERY  each 3    vitamin D 1000 units Tab Take 185 mg by mouth once daily.      [DISCONTINUED] losartan (COZAAR) 100 MG tablet TAKE 1 TABLET(100 MG) BY MOUTH EVERY DAY 90 tablet 3    [DISCONTINUED] rosuvastatin (CRESTOR) 40 MG Tab TAKE 1 TABLET(40 MG) BY MOUTH EVERY EVENING 90 tablet 1     No current facility-administered medications on file prior to visit.     Family History   Problem Relation Age of Onset    Depression Father         suicide    Cataracts Mother     Rheum arthritis Mother     Lupus Mother     Arthritis Mother     Cataracts Sister      Rheum arthritis Sister     Breast cancer Maternal Aunt     Thyroid disease Daughter     No Known Problems Daughter     No Known Problems Daughter     Colon cancer Brother     Cancer Brother     No Known Problems Maternal Uncle     No Known Problems Paternal Aunt     No Known Problems Paternal Uncle     No Known Problems Maternal Grandmother     No Known Problems Maternal Grandfather     No Known Problems Paternal Grandmother     No Known Problems Paternal Grandfather     Cancer Sister     Cancer Brother     Early death Sister         Covid-19    Ovarian cancer Neg Hx     Melanoma Neg Hx     Amblyopia Neg Hx     Blindness Neg Hx     Diabetes Neg Hx     Glaucoma Neg Hx     Hypertension Neg Hx     Macular degeneration Neg Hx     Retinal detachment Neg Hx     Strabismus Neg Hx     Stroke Neg Hx        Medications Ordered                [id number] - [place] - [address] AT [facility] [address]   [address], [place] [address]    Telephone: [phone]   Fax: [phone]   Hours: Not open 24 hours                         E-Prescribed (2 of 2)              losartan (COZAAR) 100 MG tablet    Sig: Take 1 tablet (100 mg total) by mouth once daily.       Start: 9/12/23     Quantity: 90 tablet Refills: 0                         rosuvastatin (CRESTOR) 40 MG Tab    Sig: Take 1 tablet (40 mg total) by mouth every evening.       Start: 9/12/23     Quantity: 90 tablet Refills: 0                           Ohs Peq Odvv Intake    9/12/2023 10:52 AM CDT - Filed by Patient   Describe your reason for todays visit Medication   What is your current physical address in the event of a medical emergency?    Are you able to take your vital signs? No   Please attach any relevant images or files          Patient is currently looking for a pcp, but can't find anyone who is taking patients within driving distance for her.     Medication Refill  This is a chronic (Patient requests refill of  her medications.  Her PCP has changed jobs.) problem. The problem has been unchanged. Pertinent negatives include no abdominal pain, anorexia, arthralgias, change in bowel habit, chest pain, chills, congestion, coughing, diaphoresis, fatigue, fever, headaches, joint swelling, myalgias, nausea, neck pain, numbness, rash, sore throat, swollen glands, urinary symptoms, vertigo, visual change, vomiting or weakness. Nothing aggravates the symptoms. She has tried nothing for the symptoms.       Constitution: Negative for chills, sweating, fatigue and fever.   HENT:  Negative for congestion and sore throat.    Neck: Negative for neck pain.   Cardiovascular:  Negative for chest pain.   Respiratory:  Negative for cough.    Gastrointestinal:  Negative for abdominal pain, nausea and vomiting.   Musculoskeletal:  Negative for joint pain, joint swelling and muscle ache.   Skin:  Negative for rash.   Neurological:  Negative for history of vertigo, headaches and numbness.        Objective:   The physical exam was conducted virtually.  Physical Exam   Constitutional: She is oriented to person, place, and time.   HENT:   Head: Normocephalic and atraumatic.   Eyes: Conjunctivae are normal. Extraocular movement intact   Neck: Neck supple.   Pulmonary/Chest: Effort normal. No respiratory distress.   Abdominal: Normal appearance. Soft.   Musculoskeletal:         General: No tenderness.   Neurological: She is alert and oriented to person, place, and time.   Skin: Skin is no rash.       Assessment:     1. Acquired hypothyroidism    2. Mixed hyperlipidemia        Plan:       Acquired hypothyroidism  -     losartan (COZAAR) 100 MG tablet; Take 1 tablet (100 mg total) by mouth once daily.  Dispense: 90 tablet; Refill: 0    Mixed hyperlipidemia  -     rosuvastatin (CRESTOR) 40 MG Tab; Take 1 tablet (40 mg total) by mouth every evening.  Dispense: 90 tablet; Refill: 0

## 2023-09-15 DIAGNOSIS — E78.2 MIXED HYPERLIPIDEMIA: ICD-10-CM

## 2023-09-17 RX ORDER — ROSUVASTATIN CALCIUM 40 MG/1
40 TABLET, COATED ORAL NIGHTLY
Qty: 90 TABLET | Refills: 0 | Status: SHIPPED | OUTPATIENT
Start: 2023-09-17 | End: 2023-12-04 | Stop reason: SDUPTHER

## 2023-10-12 ENCOUNTER — TELEPHONE (OUTPATIENT)
Dept: RHEUMATOLOGY | Facility: CLINIC | Age: 63
End: 2023-10-12
Payer: MEDICARE

## 2023-10-12 DIAGNOSIS — M79.7 FIBROMYALGIA: Chronic | ICD-10-CM

## 2023-10-12 DIAGNOSIS — M32.19 OTHER SYSTEMIC LUPUS ERYTHEMATOSUS WITH OTHER ORGAN INVOLVEMENT: Chronic | ICD-10-CM

## 2023-10-12 RX ORDER — HYDROXYCHLOROQUINE SULFATE 200 MG/1
300 TABLET, FILM COATED ORAL DAILY
Qty: 135 TABLET | Refills: 0 | Status: SHIPPED | OUTPATIENT
Start: 2023-10-12 | End: 2023-11-28 | Stop reason: SDUPTHER

## 2023-10-12 RX ORDER — PREGABALIN 150 MG/1
150 CAPSULE ORAL 2 TIMES DAILY
Qty: 180 CAPSULE | Refills: 1 | Status: SHIPPED | OUTPATIENT
Start: 2023-10-12 | End: 2023-11-28 | Stop reason: SDUPTHER

## 2023-10-12 NOTE — TELEPHONE ENCOUNTER
Pt requesting hydroxychloroquine refill. Hasn't seen Dr. Cervantes(who has left) since April 2022. Please ask her to schedule appt in Optometry. Thank you KALEE

## 2023-10-16 ENCOUNTER — TELEPHONE (OUTPATIENT)
Dept: RHEUMATOLOGY | Facility: CLINIC | Age: 63
End: 2023-10-16
Payer: MEDICARE

## 2023-10-16 NOTE — TELEPHONE ENCOUNTER
Spoke with the patient and gave her the phone number to Dr Cervantes to call and schedule her eye exam for medication refill

## 2023-10-17 ENCOUNTER — TELEPHONE (OUTPATIENT)
Dept: OPHTHALMOLOGY | Facility: CLINIC | Age: 63
End: 2023-10-17
Payer: MEDICARE

## 2023-10-17 NOTE — TELEPHONE ENCOUNTER
Schedule pt an appt with Dr. Handy for 12/5 @ 3.20 and [facility] testing @ 2:30     ----- Message from Keke Pham sent at 10/17/2023  5:05 PM CDT -----    ----- Message -----  From: Ramón Al  Sent: 10/16/2023   2:00 PM CDT  To: Ole [facility]CH[facility] Staff    Consult/Advisory    Name Of Caller:Vanessa       Contact Preference:[phone]    Nature of call:  Ptn is has lupus her dr asked her to see if she can get a appt in nov. He will like to see her after her eye appt due to thePlaquenil   meds does something to her eyes before he prescribes her lupus meds

## 2023-10-25 ENCOUNTER — HOSPITAL ENCOUNTER (OUTPATIENT)
Dept: RADIOLOGY | Facility: OTHER | Age: 63
Discharge: HOME | End: 2023-10-25
Attending: INTERNAL MEDICINE
Payer: MEDICARE

## 2023-10-25 ENCOUNTER — OFFICE VISIT (OUTPATIENT)
Dept: INTERNAL MEDICINE | Facility: CLINIC | Age: 63
End: 2023-10-25
Payer: MEDICARE

## 2023-10-25 VITALS
HEART RATE: 77 BPM | DIASTOLIC BLOOD PRESSURE: 70 MMHG | RESPIRATION RATE: 12 BRPM | BODY MASS INDEX: 27.65 KG/M2 | SYSTOLIC BLOOD PRESSURE: 120 MMHG | WEIGHT: 156.06 LBS | HEIGHT: 63 IN

## 2023-10-25 DIAGNOSIS — Z76.89 ENCOUNTER TO ESTABLISH CARE WITH NEW DOCTOR: Primary | ICD-10-CM

## 2023-10-25 DIAGNOSIS — Z12.31 ENCOUNTER FOR SCREENING MAMMOGRAM FOR BREAST CANCER: ICD-10-CM

## 2023-10-25 DIAGNOSIS — M79.7 FIBROMYALGIA: Chronic | ICD-10-CM

## 2023-10-25 DIAGNOSIS — E03.9 ACQUIRED HYPOTHYROIDISM: Chronic | ICD-10-CM

## 2023-10-25 DIAGNOSIS — F31.81 BIPOLAR 2 DISORDER: Chronic | ICD-10-CM

## 2023-10-25 DIAGNOSIS — J44.9 CHRONIC OBSTRUCTIVE PULMONARY DISEASE, UNSPECIFIED COPD TYPE: ICD-10-CM

## 2023-10-25 DIAGNOSIS — F17.219 CIGARETTE NICOTINE DEPENDENCE WITH NICOTINE-INDUCED DISORDER: ICD-10-CM

## 2023-10-25 DIAGNOSIS — I10 ESSENTIAL HYPERTENSION, BENIGN: Chronic | ICD-10-CM

## 2023-10-25 DIAGNOSIS — G43.909 MIGRAINE WITHOUT STATUS MIGRAINOSUS, NOT INTRACTABLE, UNSPECIFIED MIGRAINE TYPE: ICD-10-CM

## 2023-10-25 DIAGNOSIS — E78.2 MIXED HYPERLIPIDEMIA: Chronic | ICD-10-CM

## 2023-10-25 PROCEDURE — 1100F PTFALLS ASSESS-DOCD GE2>/YR: CPT | Mod: CPTII,S$GLB,, | Performed by: STUDENT IN AN ORGANIZED HEALTH CARE EDUCATION/TRAINING PROGRAM

## 2023-10-25 PROCEDURE — 99999 PR PBB SHADOW E&M-EST. PATIENT-LVL III: CPT | Mod: PBBFAC,,, | Performed by: STUDENT IN AN ORGANIZED HEALTH CARE EDUCATION/TRAINING PROGRAM

## 2023-10-25 PROCEDURE — 77067 SCR MAMMO BI INCL CAD: CPT | Mod: 26,,, | Performed by: RADIOLOGY

## 2023-10-25 PROCEDURE — 4010F ACE/ARB THERAPY RXD/TAKEN: CPT | Mod: CPTII,S$GLB,, | Performed by: STUDENT IN AN ORGANIZED HEALTH CARE EDUCATION/TRAINING PROGRAM

## 2023-10-25 PROCEDURE — 77067 SCR MAMMO BI INCL CAD: CPT | Mod: TC

## 2023-10-25 PROCEDURE — 1100F PR PT FALLS ASSESS DOC 2+ FALLS/FALL W/INJURY/YR: ICD-10-PCS | Mod: CPTII,S$GLB,, | Performed by: STUDENT IN AN ORGANIZED HEALTH CARE EDUCATION/TRAINING PROGRAM

## 2023-10-25 PROCEDURE — 77063 MAMMO DIGITAL SCREENING BILAT WITH TOMO: ICD-10-PCS | Mod: 26,,, | Performed by: RADIOLOGY

## 2023-10-25 PROCEDURE — 3078F PR MOST RECENT DIASTOLIC BLOOD PRESSURE < 80 MM HG: ICD-10-PCS | Mod: CPTII,S$GLB,, | Performed by: STUDENT IN AN ORGANIZED HEALTH CARE EDUCATION/TRAINING PROGRAM

## 2023-10-25 PROCEDURE — 99214 OFFICE O/P EST MOD 30 MIN: CPT | Mod: S$GLB,,, | Performed by: STUDENT IN AN ORGANIZED HEALTH CARE EDUCATION/TRAINING PROGRAM

## 2023-10-25 PROCEDURE — 1159F PR MEDICATION LIST DOCUMENTED IN MEDICAL RECORD: ICD-10-PCS | Mod: CPTII,S$GLB,, | Performed by: STUDENT IN AN ORGANIZED HEALTH CARE EDUCATION/TRAINING PROGRAM

## 2023-10-25 PROCEDURE — 77063 BREAST TOMOSYNTHESIS BI: CPT | Mod: 26,,, | Performed by: RADIOLOGY

## 2023-10-25 PROCEDURE — 1160F RVW MEDS BY RX/DR IN RCRD: CPT | Mod: CPTII,S$GLB,, | Performed by: STUDENT IN AN ORGANIZED HEALTH CARE EDUCATION/TRAINING PROGRAM

## 2023-10-25 PROCEDURE — 3008F BODY MASS INDEX DOCD: CPT | Mod: CPTII,S$GLB,, | Performed by: STUDENT IN AN ORGANIZED HEALTH CARE EDUCATION/TRAINING PROGRAM

## 2023-10-25 PROCEDURE — 3008F PR BODY MASS INDEX (BMI) DOCUMENTED: ICD-10-PCS | Mod: CPTII,S$GLB,, | Performed by: STUDENT IN AN ORGANIZED HEALTH CARE EDUCATION/TRAINING PROGRAM

## 2023-10-25 PROCEDURE — 3288F PR FALLS RISK ASSESSMENT DOCUMENTED: ICD-10-PCS | Mod: CPTII,S$GLB,, | Performed by: STUDENT IN AN ORGANIZED HEALTH CARE EDUCATION/TRAINING PROGRAM

## 2023-10-25 PROCEDURE — 3288F FALL RISK ASSESSMENT DOCD: CPT | Mod: CPTII,S$GLB,, | Performed by: STUDENT IN AN ORGANIZED HEALTH CARE EDUCATION/TRAINING PROGRAM

## 2023-10-25 PROCEDURE — 3074F SYST BP LT 130 MM HG: CPT | Mod: CPTII,S$GLB,, | Performed by: STUDENT IN AN ORGANIZED HEALTH CARE EDUCATION/TRAINING PROGRAM

## 2023-10-25 PROCEDURE — 3078F DIAST BP <80 MM HG: CPT | Mod: CPTII,S$GLB,, | Performed by: STUDENT IN AN ORGANIZED HEALTH CARE EDUCATION/TRAINING PROGRAM

## 2023-10-25 PROCEDURE — 1159F MED LIST DOCD IN RCRD: CPT | Mod: CPTII,S$GLB,, | Performed by: STUDENT IN AN ORGANIZED HEALTH CARE EDUCATION/TRAINING PROGRAM

## 2023-10-25 PROCEDURE — 99214 PR OFFICE/OUTPT VISIT, EST, LEVL IV, 30-39 MIN: ICD-10-PCS | Mod: S$GLB,,, | Performed by: STUDENT IN AN ORGANIZED HEALTH CARE EDUCATION/TRAINING PROGRAM

## 2023-10-25 PROCEDURE — 77067 MAMMO DIGITAL SCREENING BILAT WITH TOMO: ICD-10-PCS | Mod: 26,,, | Performed by: RADIOLOGY

## 2023-10-25 PROCEDURE — 3074F PR MOST RECENT SYSTOLIC BLOOD PRESSURE < 130 MM HG: ICD-10-PCS | Mod: CPTII,S$GLB,, | Performed by: STUDENT IN AN ORGANIZED HEALTH CARE EDUCATION/TRAINING PROGRAM

## 2023-10-25 PROCEDURE — 4010F PR ACE/ARB THEARPY RXD/TAKEN: ICD-10-PCS | Mod: CPTII,S$GLB,, | Performed by: STUDENT IN AN ORGANIZED HEALTH CARE EDUCATION/TRAINING PROGRAM

## 2023-10-25 PROCEDURE — 1160F PR REVIEW ALL MEDS BY PRESCRIBER/CLIN PHARMACIST DOCUMENTED: ICD-10-PCS | Mod: CPTII,S$GLB,, | Performed by: STUDENT IN AN ORGANIZED HEALTH CARE EDUCATION/TRAINING PROGRAM

## 2023-10-25 PROCEDURE — 99999 PR PBB SHADOW E&M-EST. PATIENT-LVL III: ICD-10-PCS | Mod: PBBFAC,,, | Performed by: STUDENT IN AN ORGANIZED HEALTH CARE EDUCATION/TRAINING PROGRAM

## 2023-10-25 RX ORDER — SUMATRIPTAN 50 MG/1
50 TABLET, FILM COATED ORAL
Qty: 30 TABLET | Refills: 0 | Status: SHIPPED | OUTPATIENT
Start: 2023-10-25 | End: 2024-03-05 | Stop reason: SDUPTHER

## 2023-10-25 NOTE — PROGRESS NOTES
Subjective:       Patient ID: Breanna Guido is a 63 y.o. female.    Chief Complaint: No chief complaint on file.    HPI  Ovarian cyst-stable BL ovarian cysts on U/S 06/2023 compared to U/S on 09/2022. Following w/ U/S. Discussed repeat in 6 months but per discussion, pt would rather push out to 1 year. Will repeat in 06/2024. Currently denies any pelvic issues, vaginal bleeding, lower abd pain.    Migraines-was taking fioricet for migraines but no longer helps. Was previously taking nasal spray as well and has tried every OTC meds. Reports over the last few months, HA has been more frequent after she lost her dog, having HA 3-4x per month, previously it was 3-4 per year.    HTN-current medication(s) include losartan 100mg daily and amlodipine 5mg. Checks BP at home, yes, numbers similar to measurement in clinic.. Denies, vision changes, CP, SOB.     HLD-Currently on crestor 40mg qhs. The ASCVD Risk score (Alisa FUNEZ, et al., 2019) failed to calculate for the following reasons:    The valid total cholesterol range is 130 to 320 mg/dL . Issues with medication none.    Hx of hypothyroidism-taking synthroid 88mcg. Denies cold complaints, constipation, fatigue.    Bipolar disorder-follows with psych, Dr. Zheng, in [facility], has f/u in a few days. Takes Seroquel and and cymbalta nightly. Also takes xanax BID PRN.    Fibromyalgia and Lupus dx-she is on plaquenil. Following with rheum and ophtho. Is taking lyrica for pain control.    COPD-on trelegy ellipta daily. Takes albuterol PRN if she gets SOB with lost of walking, but reports only occasionally. Established with pulm, last seen in 08/2022. Needs to f/u, she will make appointment in [facility].     Tobacco use disorder-smokes 1/2 ppd currently. Started at 15 yo. Previously smoked 1ppd-2ppd.     Takes daily vitamin D.    Tests to Keep You Healthy    Mammogram: SCHEDULED  Colon Cancer Screening: Met on 5/22/2018  Last Blood Pressure <= 139/89 (10/25/2023):  Yes  Tobacco Cessation: NO      Social History     Social History Narrative    Not on file       Family History   Problem Relation Age of Onset    Depression Father         suicide    Cataracts Mother     Rheum arthritis Mother     Lupus Mother     Arthritis Mother     Cataracts Sister     Rheum arthritis Sister     Breast cancer Maternal Aunt     Thyroid disease Daughter     No Known Problems Daughter     No Known Problems Daughter     Colon cancer Brother     Cancer Brother     No Known Problems Maternal Uncle     No Known Problems Paternal Aunt     No Known Problems Paternal Uncle     No Known Problems Maternal Grandmother     No Known Problems Maternal Grandfather     No Known Problems Paternal Grandmother     No Known Problems Paternal Grandfather     Cancer Sister     Cancer Brother     Early death Sister         Covid-19    Ovarian cancer Neg Hx     Melanoma Neg Hx     Amblyopia Neg Hx     Blindness Neg Hx     Diabetes Neg Hx     Glaucoma Neg Hx     Hypertension Neg Hx     Macular degeneration Neg Hx     Retinal detachment Neg Hx     Strabismus Neg Hx     Stroke Neg Hx        Current Outpatient Medications:     albuterol (PROVENTIL/VENTOLIN HFA) 90 mcg/actuation inhaler, Inhale 2 puffs into the lungs every 6 (six) hours as needed for Wheezing. Rescue, Disp: 54 g, Rfl: 2    alprazolam (XANAX) 2 MG Tab, Take 2 mg by mouth 2 (two) times daily as needed (pt takes 1/2 tab BID and 1 tab  at night)., Disp: , Rfl:     amLODIPine (NORVASC) 5 MG tablet, TAKE 1 TABLET(5 MG) BY MOUTH EVERY DAY, Disp: 90 tablet, Rfl: 3    butalbital-acetaminophen-caffeine -40 mg (FIORICET, ESGIC) -40 mg per tablet, Take 1 tablet by mouth every 6 (six) hours as needed for Pain., Disp: 20 tablet, Rfl: 0    diclofenac sodium (VOLTAREN) 1 % Gel, Apply 2 g topically 4 (four) times daily., Disp: 3 each, Rfl: 2    duloxetine (CYMBALTA) 60 MG capsule, Take one capsule along with cymbalta 30mg daily to equal 90mg daily, Disp: 90  "capsule, Rfl: 3    hydroxychloroquine (PLAQUENIL) 200 mg tablet, Take 1.5 tablets (300 mg total) by mouth once daily., Disp: 135 tablet, Rfl: 0    levothyroxine (SYNTHROID) 88 MCG tablet, TAKE 1 TABLET(88 MCG) BY MOUTH BEFORE BREAKFAST, Disp: 90 tablet, Rfl: 3    losartan (COZAAR) 100 MG tablet, Take 1 tablet (100 mg total) by mouth once daily., Disp: 90 tablet, Rfl: 0    pregabalin (LYRICA) 150 MG capsule, Take 1 capsule (150 mg total) by mouth 2 (two) times daily. Generic is fine, Disp: 180 capsule, Rfl: 1    quetiapine (SEROQUEL) 300 MG Tab, Take 300 mg by mouth once daily. , Disp: , Rfl:     rosuvastatin (CRESTOR) 40 MG Tab, TAKE 1 TABLET(40 MG) BY MOUTH EVERY EVENING, Disp: 90 tablet, Rfl: 0    vitamin D 1000 units Tab, Take 185 mg by mouth once daily., Disp: , Rfl:     diphth,pertus,acell,,tetanus (BOOSTRIX TDAP) 2.5-8-5 Lf-mcg-Lf/0.5mL Syrg injection, Inject 0.5 mLs into the muscle once. For one dose. for 1 dose, Disp: 0.5 mL, Rfl: 0    fluticasone-umeclidin-vilanter (TRELEGY ELLIPTA) 100-62.5-25 mcg DsDv, Inhale 1 puff into the lungs once daily., Disp: 180 each, Rfl: 3    sumatriptan (IMITREX) 50 MG tablet, Take 1 tablet (50 mg total) by mouth every 2 (two) hours as needed for Migraine. Do not take more than 4 tablets total in 24 hours, Disp: 30 tablet, Rfl: 0    Review of Systems   Constitutional:  Negative for chills and fever.   Respiratory:  Negative for cough.    Gastrointestinal:  Negative for nausea and vomiting.   Musculoskeletal:  Negative for gait problem.   Neurological:  Negative for weakness.   Psychiatric/Behavioral:  Negative for behavioral problems.        Objective:   /70 (BP Location: Right arm, Patient Position: Sitting, BP Method: Medium (Automatic))   Pulse 77   Resp 12   Ht 5' 3" (1.6 m)   Wt 70.8 kg (156 lb 1.4 oz)   BMI 27.65 kg/m²      Physical Exam  Vitals and nursing note reviewed.   Constitutional:       General: She is not in acute distress.     Appearance: Normal " appearance. She is not ill-appearing, toxic-appearing or diaphoretic.   Eyes:      General:         Right eye: No discharge.         Left eye: No discharge.      Conjunctiva/sclera: Conjunctivae normal.   Cardiovascular:      Rate and Rhythm: Normal rate and regular rhythm.   Pulmonary:      Effort: Pulmonary effort is normal. No respiratory distress.      Breath sounds: Normal breath sounds. No wheezing.   Neurological:      Mental Status: She is alert.   Psychiatric:         Behavior: Behavior normal.         Assessment & Plan   1. Encounter to establish care with new doctor  -Overdue vaccines reviewed with patient. Encouraged Tdap today.    2. Chronic obstructive pulmonary disease, unspecified COPD type  -continue inhaler. Stable. Encouraged smoking cessaiton and or reductions.  - fluticasone-umeclidin-vilanter (TRELEGY ELLIPTA) 100-62.5-25 mcg DsDv; Inhale 1 puff into the lungs once daily.  Dispense: 180 each; Refill: 3    3. Cigarette nicotine dependence with nicotine-induced disorder  -pt opts for screening after r/b discussion.  - CT Chest Lung Screening Low Dose; Future    4. Migraine without status migrainosus, not intractable, unspecified migraine type  -trial of imitrex. HA similar to previous migraines, more frequent since dog . Discussed improvement in sleep hygiene, reducing smoking and tea at night.  - sumatriptan (IMITREX) 50 MG tablet; Take 1 tablet (50 mg total) by mouth every 2 (two) hours as needed for Migraine. Do not take more than 4 tablets total in 24 hours  Dispense: 30 tablet; Refill: 0    5. Essential hypertension, benign  -stable. Continue current meds.    6. Mixed hyperlipidemia  -stable, continue current meds.    7. Acquired hypothyroidism  -clinically euthyroid. Continue current med.    8. Fibromyalgia  -f/u with rheum as scheduled. Continue current med.    9. Bipolar 2 disorder  -f/u with psych. Stable on current regimen.    Follow up in about 6 months (around  4/25/2024).    Disclaimer:  This note may have been prepared using voice recognition software, it may have not been extensively proofed, as such there could be errors within the text such as sound alike errors.

## 2023-11-16 ENCOUNTER — LAB VISIT (OUTPATIENT)
Dept: LAB | Facility: HOSPITAL | Age: 63
End: 2023-11-16
Attending: INTERNAL MEDICINE
Payer: MEDICARE

## 2023-11-16 ENCOUNTER — OFFICE VISIT (OUTPATIENT)
Dept: RHEUMATOLOGY | Facility: CLINIC | Age: 63
End: 2023-11-16
Payer: MEDICARE

## 2023-11-16 ENCOUNTER — PATIENT MESSAGE (OUTPATIENT)
Dept: INTERNAL MEDICINE | Facility: CLINIC | Age: 63
End: 2023-11-16
Payer: MEDICARE

## 2023-11-16 VITALS
DIASTOLIC BLOOD PRESSURE: 74 MMHG | WEIGHT: 154.56 LBS | BODY MASS INDEX: 27.39 KG/M2 | SYSTOLIC BLOOD PRESSURE: 116 MMHG | HEART RATE: 77 BPM | HEIGHT: 63 IN

## 2023-11-16 DIAGNOSIS — M85.9 LOW BONE DENSITY: Primary | ICD-10-CM

## 2023-11-16 DIAGNOSIS — M32.19 OTHER SYSTEMIC LUPUS ERYTHEMATOSUS WITH OTHER ORGAN INVOLVEMENT: ICD-10-CM

## 2023-11-16 DIAGNOSIS — Z78.0 MENOPAUSE: ICD-10-CM

## 2023-11-16 DIAGNOSIS — R21 RASH: ICD-10-CM

## 2023-11-16 LAB
BILIRUB UR QL STRIP: NEGATIVE
CLARITY UR REFRACT.AUTO: CLEAR
COLOR UR AUTO: YELLOW
CREAT UR-MCNC: 178 MG/DL (ref 15–325)
GLUCOSE UR QL STRIP: NEGATIVE
HGB UR QL STRIP: NEGATIVE
KETONES UR QL STRIP: NEGATIVE
LEUKOCYTE ESTERASE UR QL STRIP: NEGATIVE
NITRITE UR QL STRIP: NEGATIVE
PH UR STRIP: 5 [PH] (ref 5–8)
PROT UR QL STRIP: NEGATIVE
PROT UR-MCNC: 11 MG/DL (ref 0–15)
PROT/CREAT UR: 0.06 MG/G{CREAT} (ref 0–0.2)
SP GR UR STRIP: 1.02 (ref 1–1.03)
URN SPEC COLLECT METH UR: NORMAL

## 2023-11-16 PROCEDURE — 99999 PR PBB SHADOW E&M-EST. PATIENT-LVL IV: ICD-10-PCS | Mod: PBBFAC,,, | Performed by: INTERNAL MEDICINE

## 2023-11-16 PROCEDURE — 3074F SYST BP LT 130 MM HG: CPT | Mod: CPTII,S$GLB,, | Performed by: INTERNAL MEDICINE

## 2023-11-16 PROCEDURE — 99214 OFFICE O/P EST MOD 30 MIN: CPT | Mod: S$GLB,,, | Performed by: INTERNAL MEDICINE

## 2023-11-16 PROCEDURE — 99999 PR PBB SHADOW E&M-EST. PATIENT-LVL IV: CPT | Mod: PBBFAC,,, | Performed by: INTERNAL MEDICINE

## 2023-11-16 PROCEDURE — 3074F PR MOST RECENT SYSTOLIC BLOOD PRESSURE < 130 MM HG: ICD-10-PCS | Mod: CPTII,S$GLB,, | Performed by: INTERNAL MEDICINE

## 2023-11-16 PROCEDURE — 4010F ACE/ARB THERAPY RXD/TAKEN: CPT | Mod: CPTII,S$GLB,, | Performed by: INTERNAL MEDICINE

## 2023-11-16 PROCEDURE — 3008F PR BODY MASS INDEX (BMI) DOCUMENTED: ICD-10-PCS | Mod: CPTII,S$GLB,, | Performed by: INTERNAL MEDICINE

## 2023-11-16 PROCEDURE — 4010F PR ACE/ARB THEARPY RXD/TAKEN: ICD-10-PCS | Mod: CPTII,S$GLB,, | Performed by: INTERNAL MEDICINE

## 2023-11-16 PROCEDURE — 3078F DIAST BP <80 MM HG: CPT | Mod: CPTII,S$GLB,, | Performed by: INTERNAL MEDICINE

## 2023-11-16 PROCEDURE — 81003 URINALYSIS AUTO W/O SCOPE: CPT | Performed by: INTERNAL MEDICINE

## 2023-11-16 PROCEDURE — 1159F MED LIST DOCD IN RCRD: CPT | Mod: CPTII,S$GLB,, | Performed by: INTERNAL MEDICINE

## 2023-11-16 PROCEDURE — 3008F BODY MASS INDEX DOCD: CPT | Mod: CPTII,S$GLB,, | Performed by: INTERNAL MEDICINE

## 2023-11-16 PROCEDURE — 99214 PR OFFICE/OUTPT VISIT, EST, LEVL IV, 30-39 MIN: ICD-10-PCS | Mod: S$GLB,,, | Performed by: INTERNAL MEDICINE

## 2023-11-16 PROCEDURE — 84156 ASSAY OF PROTEIN URINE: CPT | Performed by: INTERNAL MEDICINE

## 2023-11-16 PROCEDURE — 3078F PR MOST RECENT DIASTOLIC BLOOD PRESSURE < 80 MM HG: ICD-10-PCS | Mod: CPTII,S$GLB,, | Performed by: INTERNAL MEDICINE

## 2023-11-16 PROCEDURE — 1159F PR MEDICATION LIST DOCUMENTED IN MEDICAL RECORD: ICD-10-PCS | Mod: CPTII,S$GLB,, | Performed by: INTERNAL MEDICINE

## 2023-11-16 ASSESSMENT — SYSTEMIC LUPUS ERYTHEMATOSUS DISEASE ACTIVITY INDEX (SLEDAI): TOTAL_SCORE: 0

## 2023-11-16 NOTE — PATIENT INSTRUCTIONS
Standing lupus labs today  *new Covid vaccine  STOP SMOKING referred to Smoking Cessation again  Continue hydroxychloroquine 300mg daily with annual Ophthalmology check overdue   Nick Chi, yoga on You Tube  Walking program, treadmill  Mediterranean diet  DXA  RTC 6 months with standing labs

## 2023-11-16 NOTE — PROGRESS NOTES
"Subjective:      Patient ID: Doreamichelle Guido is a 63 y.o. female.    Chief Complaint: SLE; fibromyalgia    HPI her 15 y.o. dog  in August. Depressed. Less exercise. Some walking, gardening. No motivation. Alopecia stable. Has pruritus lateral face and necklace area.  Review of Systems   Constitutional:  Positive for unexpected weight change. Negative for appetite change, fatigue and fever.   HENT:  Negative for mouth sores and trouble swallowing.    Eyes:  Negative for redness and visual disturbance.   Respiratory:  Positive for cough. Negative for shortness of breath and wheezing.    Cardiovascular:  Negative for chest pain and palpitations.   Gastrointestinal:  Negative for abdominal pain, anal bleeding, blood in stool, constipation, diarrhea, nausea and vomiting.   Genitourinary:  Negative for dysuria, frequency, genital sores and urgency.   Musculoskeletal:  Negative for arthralgias, back pain, gait problem, joint swelling, myalgias, neck pain and neck stiffness.   Skin:  Positive for rash.   Neurological:  Positive for headaches. Negative for weakness and numbness.   Hematological:  Negative for adenopathy. Does not bruise/bleed easily.   Psychiatric/Behavioral:  Negative for sleep disturbance. The patient is not nervous/anxious.         Objective:   /74   Pulse 77   Ht 5' 3" (1.6 m)   Wt 70.1 kg (154 lb 8.7 oz)   BMI 27.38 kg/m²   Physical Exam   Constitutional: She is oriented to person, place, and time.   HENT:   Head: Normocephalic and atraumatic.   Mouth/Throat: Oropharynx is clear and moist.   Head: Alopecia, has net on   Eyes: Conjunctivae are normal.   Neck: No thyromegaly present.   Cardiovascular: Normal rate, regular rhythm and normal heart sounds. Exam reveals no gallop and no friction rub.   No murmur heard.  Pulmonary/Chest: Breath sounds normal. She has no wheezes. She has no rales. She exhibits no tenderness.   Abdominal: Soft. She exhibits no distension and no mass. There is " no abdominal tenderness.   Musculoskeletal:      Right shoulder: Normal.      Left shoulder: Normal.      Right elbow: Normal.      Left elbow: Normal.      Right wrist: Normal.      Left wrist: Normal.      Cervical back: Normal range of motion and neck supple.      Right knee: Normal.      Left knee: Normal.   Lymphadenopathy:     She has no cervical adenopathy.   Neurological: She is alert and oriented to person, place, and time. She displays normal reflexes. Gait normal.   Nl motor strength UE and LE bilateral       Right Side Rheumatological Exam     Examination finds the shoulder, elbow, wrist, knee, 1st PIP, 1st MCP, 2nd PIP, 2nd MCP, 3rd PIP, 3rd MCP, 4th PIP, 4th MCP, 5th PIP and 5th MCP normal.    Left Side Rheumatological Exam     Examination finds the shoulder, elbow, wrist, knee, 1st PIP, 1st MCP, 2nd PIP, 2nd MCP, 3rd PIP, 3rd MCP, 4th PIP, 4th MCP, 5th PIP and 5th MCP normal.            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

## 2023-11-16 NOTE — PROGRESS NOTES
11/16/2023     9:12 AM   Rapid3 Question Responses and Scores   MDHAQ Score 0.7   Psychologic Score 3.3   Pain Score 6   When you awakened in the morning OVER THE LAST WEEK, did you feel stiff? Yes   If Yes, please indicate the number of hours until you are as limber as you will be for the day 2   Fatigue Score 5   Global Health Score 5   RAPID3 Score 4.44     Answers submitted by the patient for this visit:  Rheumatology Questionnaire (Submitted on 11/16/2023)  fever: No  eye redness: No  mouth sores: No  headaches: Yes  shortness of breath: No  chest pain: No  trouble swallowing: No  diarrhea: No  constipation: No  unexpected weight change: Yes  genital sore: No  dysuria: No  During the last 3 days, have you had a skin rash?: Yes  Bruises or bleeds easily: No  cough: Yes

## 2023-11-28 DIAGNOSIS — M32.19 OTHER SYSTEMIC LUPUS ERYTHEMATOSUS WITH OTHER ORGAN INVOLVEMENT: Chronic | ICD-10-CM

## 2023-11-28 DIAGNOSIS — M79.7 FIBROMYALGIA: Chronic | ICD-10-CM

## 2023-11-28 RX ORDER — PREGABALIN 150 MG/1
150 CAPSULE ORAL 2 TIMES DAILY
Qty: 180 CAPSULE | Refills: 1 | Status: SHIPPED | OUTPATIENT
Start: 2023-11-28 | End: 2024-01-11 | Stop reason: SDUPTHER

## 2023-11-28 RX ORDER — HYDROXYCHLOROQUINE SULFATE 200 MG/1
300 TABLET, FILM COATED ORAL DAILY
Qty: 135 TABLET | Refills: 0 | Status: SHIPPED | OUTPATIENT
Start: 2023-11-28 | End: 2024-01-09

## 2023-12-04 ENCOUNTER — PATIENT MESSAGE (OUTPATIENT)
Dept: INTERNAL MEDICINE | Facility: CLINIC | Age: 63
End: 2023-12-04
Payer: MEDICARE

## 2023-12-04 DIAGNOSIS — E03.9 ACQUIRED HYPOTHYROIDISM: Chronic | ICD-10-CM

## 2023-12-04 DIAGNOSIS — E78.2 MIXED HYPERLIPIDEMIA: ICD-10-CM

## 2023-12-04 RX ORDER — PANTOPRAZOLE SODIUM 40 MG/1
40 TABLET, DELAYED RELEASE ORAL DAILY
Qty: 30 TABLET | Refills: 11 | Status: SHIPPED | OUTPATIENT
Start: 2023-12-04 | End: 2024-12-03

## 2023-12-04 RX ORDER — AMLODIPINE BESYLATE 5 MG/1
5 TABLET ORAL DAILY
Qty: 90 TABLET | Refills: 3 | Status: SHIPPED | OUTPATIENT
Start: 2023-12-04

## 2023-12-04 RX ORDER — ROSUVASTATIN CALCIUM 40 MG/1
40 TABLET, COATED ORAL NIGHTLY
Qty: 90 TABLET | Refills: 3 | Status: CANCELLED | OUTPATIENT
Start: 2023-12-04

## 2023-12-04 RX ORDER — LOSARTAN POTASSIUM 100 MG/1
100 TABLET ORAL DAILY
Qty: 90 TABLET | Refills: 3 | Status: CANCELLED | OUTPATIENT
Start: 2023-12-04

## 2023-12-04 RX ORDER — ROSUVASTATIN CALCIUM 40 MG/1
40 TABLET, COATED ORAL NIGHTLY
Qty: 90 TABLET | Refills: 3 | Status: SHIPPED | OUTPATIENT
Start: 2023-12-04

## 2023-12-26 ENCOUNTER — HOSPITAL ENCOUNTER (EMERGENCY)
Facility: OTHER | Age: 63
Discharge: HOME | End: 2023-12-26
Attending: EMERGENCY MEDICINE
Payer: MEDICARE

## 2023-12-26 ENCOUNTER — TELEPHONE (OUTPATIENT)
Dept: ORTHOPEDICS | Facility: CLINIC | Age: 63
End: 2023-12-26
Payer: MEDICARE

## 2023-12-26 VITALS
HEIGHT: 63 IN | WEIGHT: 152 LBS | HEART RATE: 90 BPM | OXYGEN SATURATION: 98 % | BODY MASS INDEX: 26.93 KG/M2 | SYSTOLIC BLOOD PRESSURE: 133 MMHG | DIASTOLIC BLOOD PRESSURE: 74 MMHG | TEMPERATURE: 98 F | RESPIRATION RATE: 19 BRPM

## 2023-12-26 DIAGNOSIS — S80.02XA CONTUSION OF LEFT KNEE, INITIAL ENCOUNTER: ICD-10-CM

## 2023-12-26 DIAGNOSIS — M25.562 ACUTE PAIN OF LEFT KNEE: Primary | ICD-10-CM
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
## 2023-12-26 PROCEDURE — 29505 APPLICATION LONG LEG SPLINT: CPT | Mod: RT

## 2023-12-26 PROCEDURE — 99284 EMERGENCY DEPT VISIT MOD MDM: CPT | Mod: 25

## 2023-12-26 PROCEDURE — 25000003 PHARM REV CODE 250: Performed by: NURSE PRACTITIONER

## 2023-12-26 RX ORDER — NAPROXEN 500 MG/1
500 TABLET ORAL
Status: COMPLETED | OUTPATIENT
Start: 2023-12-26 | End: 2023-12-26

## 2023-12-26 RX ADMIN — NAPROXEN 500 MG: 500 TABLET ORAL at 09:12

## 2023-12-26 NOTE — ED PROVIDER NOTES
Source of History:  Patient     Chief complaint:  Knee Pain (Pt reporting R knee pain and swelling after trip and fall last night. Denies hitting head, denies LOC. Denies blood thinner use. Pt hopping in triage. )      HPI:  Breanna Guido is a 63 y.o. female presenting to the emergency department with complaint of right anterior knee pain and swelling that began last night.  She had a trip and fall landing on her right knee.  Reports having difficulty ambulating since the fall.  She denies hitting her head or any LOC.  Denies any hip or ankle pain      This is the extent to the patients complaints today here in the emergency department.    PMH:  As per HPI and below:  Past Medical History:   Diagnosis Date    Anxiety     Arthritis     Bipolar 1 disorder     Cataract     COPD (chronic obstructive pulmonary disease)     Depression     bipolar 2    Eyelid lesion     Fibromyalgia     H/O corneal abrasion     left eye with lead-based paint chips    Hyperlipidemia     Hypertension     Lupus     Migraine headache     Scleritis     Thyroid disease      Past Surgical History:   Procedure Laterality Date    HYSTERECTOMY      for sterilization, ovaries intact    TRIGGER FINGER RELEASE Right 2021    Procedure: RELEASE, TRIGGER FINGER, right ring & small;  Surgeon: Mellissa Guadarrama MD;  Location: [facility];  Service: Orthopedics;  Laterality: Right;  REGIONAL MAC       Social History     Tobacco Use    Smoking status: Every Day     Current packs/day: 0.00     Average packs/day: 1 pack/day for 46.7 years (46.7 ttl pk-yrs)     Types: Cigarettes     Start date: 1974     Last attempt to quit: 2021     Years since quittin.9    Smokeless tobacco: Never   Substance Use Topics    Alcohol use: Yes     Alcohol/week: 5.0 standard drinks of alcohol     Types: 5 Glasses of wine per week     Comment: 0.5 to 1 bottle of wine per day     Drug use: Yes     Types: Marijuana     Comment: occasionally       Review of  "patient's allergies indicates:   Allergen Reactions    Bactrim [sulfamethoxazole-trimethoprim] Nausea And Vomiting    Sulfa (sulfonamide antibiotics)      Agitation^       ROS: As per HPI and below:  General: No fever.  No chills.  Eyes: No visual changes.   ENT: No sore throat. No ear pain.  Urinary: No abnormal urination.  MSK:  Knee pain  Integument:  No rashes or lesions.       Physical Exam:    /74 (BP Location: Left arm, Patient Position: Sitting)   Pulse 90   Temp 98.4 °F (36.9 °C) (Oral)   Resp 19   Ht 5' 3" (1.6 m)   Wt 68.9 kg (152 lb)   SpO2 98%   BMI 26.93 kg/m²   Vitals:    12/26/23 0823   BP: 133/74   Pulse: 90   Resp: 19   Temp: 98.4 °F (36.9 °C)   TempSrc: Oral   SpO2: 98%   Weight: 68.9 kg (152 lb)   Height: 5' 3" (1.6 m)       Nursing note and vital signs reviewed.  Appearance: No acute distress.  Eyes: No conjunctival injection.  Extraocular muscles are intact.  ENT: Normal phonation.  Cardio:  DP +2 bilaterally.  Musculoskeletal:  Right anterior knee is swollen, no abrasions present.  Patient is able to range her knee but with pain.  Tenderness Anterior aspect is noted.  No significant deformity is noted.  Skin:  No rashes seen.  Good turgor.  No abrasions.  No ecchymoses.  Mental Status:  Alert and oriented x 3.  Appropriate, conversant.    Initial MDM:  63-year-old female with a trip and fall yesterday landing on her right knee.  There is swelling to the anterior aspect and pain is elicited with movement.  Will obtain x-rays    Labs Reviewed - No data to display    CT Knee Without Contrast Right   Final Result      Advanced tri compartment DJD.      Joint effusion.      Popliteal cyst.      No acute fracture is identified, if the patient remains symptomatic and cannot bear weight MRI examination is recommended.         Electronically signed by: Carly Ryder MD   Date:    12/26/2023   Time:    09:57      X-Ray Knee 3 View Right   Final Result      Small joint effusion.  " There are chronic degenerative changes as above.         Electronically signed by: Nikko Shelley MD   Date:    12/26/2023   Time:    09:05            Initial Impression/ Differential Dx:  Differential Diagnosis includes, but is not limited to:  Knee contusion, sprain, fracture, referred pain from hip or ankle, lumbar radiculopathy, ITB syndrome, septic arthritis, osteoarthritis, effusion, meniscus injury, cellulitis    Medical Decision Making  Amount and/or Complexity of Data Reviewed  Radiology: ordered.    Risk  Prescription drug management.         MDM:    63 y.o. female with right knee pain after trip and fall yesterday.  Having difficulty ambulating since the fall.  X-rays reveal no acute fractures however as patient was having difficulty bearing weight CT was obtained to rule out a tibial plateau fracture.  CT was negative for any tibial plateau or other fracture, small joint effusion was present.  Patient was placed in a knee immobilizer for comfort discussed use of over-the-counter medications.  Patient will need close follow-up with Orthopedics.  Referral was placed for the patient I sent a message to our  to assist with scheduling.  Breath         Diagnostic Impression:    1. Acute pain of left knee    2. Fall    3. Contusion of left knee, initial encounter         ED Disposition Condition    Discharge Stable            ED Prescriptions    None       Follow-up Information       Follow up With Specialties Details Why Contact Info    Jay Matias MD Family Medicine Schedule an appointment as soon as possible for a visit in 3 days  [address] [facility] [address]  [facility] [address]  [phone]      [facility] - [facility] Dept Emergency Medicine Go to  If symptoms worsen [address] [facility] [address]  [phone]               Alicia Bradford, P  12/26/23 1451

## 2023-12-26 NOTE — ED TRIAGE NOTES
Pt reports to ED with c/o knee pain x 1 day. Pt reports trip and fall yesterday, landing on R leg. Pt reporting pain to R knee on ambulation and certain movements. Pt denies head trauma or LOC in fall.

## 2023-12-27 ENCOUNTER — PATIENT OUTREACH (OUTPATIENT)
Dept: EMERGENCY MEDICINE | Facility: OTHER | Age: 63
End: 2023-12-27
Payer: MEDICARE

## 2023-12-28 ENCOUNTER — TELEPHONE (OUTPATIENT)
Dept: ORTHOPEDICS | Facility: CLINIC | Age: 63
End: 2023-12-28
Payer: MEDICARE

## 2024-01-09 ENCOUNTER — TELEPHONE (OUTPATIENT)
Dept: RHEUMATOLOGY | Facility: CLINIC | Age: 64
End: 2024-01-09
Payer: MEDICARE

## 2024-01-09 ENCOUNTER — PATIENT MESSAGE (OUTPATIENT)
Dept: RHEUMATOLOGY | Facility: CLINIC | Age: 64
End: 2024-01-09
Payer: MEDICARE

## 2024-01-09 DIAGNOSIS — M32.19 OTHER SYSTEMIC LUPUS ERYTHEMATOSUS WITH OTHER ORGAN INVOLVEMENT: Chronic | ICD-10-CM

## 2024-01-09 RX ORDER — HYDROXYCHLOROQUINE SULFATE 200 MG/1
TABLET, FILM COATED ORAL
Qty: 135 TABLET | Refills: 0 | Status: SHIPPED | OUTPATIENT
Start: 2024-01-09

## 2024-01-09 NOTE — TELEPHONE ENCOUNTER
Pt. Requesting hydroxychloroquine. Overdue for annual Optometry check used  Dr. Cervantes. Please ask her to schedule appt in Optometry . Thank you  KALEE

## 2024-01-11 ENCOUNTER — TELEPHONE (OUTPATIENT)
Dept: OPHTHALMOLOGY | Facility: CLINIC | Age: 64
End: 2024-01-11
Payer: MEDICARE

## 2024-01-11 DIAGNOSIS — M79.7 FIBROMYALGIA: Chronic | ICD-10-CM

## 2024-01-11 RX ORDER — PREGABALIN 150 MG/1
150 CAPSULE ORAL 2 TIMES DAILY
Qty: 180 CAPSULE | Refills: 1 | Status: SHIPPED | OUTPATIENT
Start: 2024-01-11

## 2024-01-11 NOTE — TELEPHONE ENCOUNTER
Spoke to pt and r/s 1/25 appt    ----- Message from Keke Pham sent at 1/9/2024  4:57 PM CST -----  Contact: pt @ [phone]    ----- Message -----  From: Zara Villegas  Sent: 1/9/2024   3:25 PM CST  To: [facility] Staff    Breanna Guido calling regarding Appointment Access  (message) for #pt is calling to reschedule appt she had on 1/25 for routine and HVF, pt need ,appt on Wed or Thrus, asking for call back

## 2024-01-12 ENCOUNTER — PATIENT MESSAGE (OUTPATIENT)
Dept: DERMATOLOGY | Facility: CLINIC | Age: 64
End: 2024-01-12
Payer: MEDICARE

## 2024-01-22 ENCOUNTER — TELEPHONE (OUTPATIENT)
Dept: ORTHOPEDICS | Facility: CLINIC | Age: 64
End: 2024-01-22
Payer: MEDICARE

## 2024-02-29 ENCOUNTER — TELEPHONE (OUTPATIENT)
Dept: INTERNAL MEDICINE | Facility: CLINIC | Age: 64
End: 2024-02-29
Payer: MEDICARE

## 2024-02-29 NOTE — TELEPHONE ENCOUNTER
----- Message from Chanel Johnson sent at 2/29/2024 11:33 AM CST -----  Regarding: call back  Type: Patient Call Back    Who called: pt     What is the request in detail: requesting a call to establish care with provider; currently sees Dr Matias but wants to transition care to Dr Gruber     Can the clinic reply by [facility]?no    Would the patient rather a call back or a response via [facility]? call    Best call back number: [phone]     Additional Information:

## 2024-03-05 ENCOUNTER — TELEPHONE (OUTPATIENT)
Dept: OPHTHALMOLOGY | Facility: CLINIC | Age: 64
End: 2024-03-05
Payer: MEDICARE

## 2024-03-05 DIAGNOSIS — G43.909 MIGRAINE WITHOUT STATUS MIGRAINOSUS, NOT INTRACTABLE, UNSPECIFIED MIGRAINE TYPE: ICD-10-CM

## 2024-03-06 RX ORDER — SUMATRIPTAN 50 MG/1
50 TABLET, FILM COATED ORAL
Qty: 30 TABLET | Refills: 0 | Status: SHIPPED | OUTPATIENT
Start: 2024-03-06

## 2024-03-08 ENCOUNTER — PATIENT MESSAGE (OUTPATIENT)
Dept: OPTOMETRY | Facility: CLINIC | Age: 64
End: 2024-03-08
Payer: MEDICARE

## 2024-04-10 ENCOUNTER — PATIENT MESSAGE (OUTPATIENT)
Dept: OPTOMETRY | Facility: CLINIC | Age: 64
End: 2024-04-10
Payer: MEDICARE

## 2024-05-03 ENCOUNTER — HOSPITAL ENCOUNTER (EMERGENCY)
Facility: OTHER | Age: 64
Discharge: HOME | End: 2024-05-03
Attending: EMERGENCY MEDICINE
Payer: MEDICARE

## 2024-05-03 VITALS
SYSTOLIC BLOOD PRESSURE: 137 MMHG | BODY MASS INDEX: 26.58 KG/M2 | HEART RATE: 57 BPM | DIASTOLIC BLOOD PRESSURE: 73 MMHG | WEIGHT: 150 LBS | OXYGEN SATURATION: 96 % | HEIGHT: 63 IN | TEMPERATURE: 98 F | RESPIRATION RATE: 16 BRPM

## 2024-05-03 DIAGNOSIS — Z87.09 HISTORY OF COPD: ICD-10-CM

## 2024-05-03 DIAGNOSIS — Z72.0 TOBACCO ABUSE: ICD-10-CM

## 2024-05-03 DIAGNOSIS — R06.00 DYSPNEA, UNSPECIFIED TYPE: ICD-10-CM

## 2024-05-03 DIAGNOSIS — R07.9 CHEST PAIN: ICD-10-CM

## 2024-05-03 DIAGNOSIS — R42 LIGHTHEADEDNESS: ICD-10-CM

## 2024-05-03 DIAGNOSIS — R00.2 PALPITATIONS: Primary | ICD-10-CM

## 2024-05-03 LAB
ALBUMIN SERPL BCP-MCNC: 4.4 G/DL (ref 3.5–5.2)
ALP SERPL-CCNC: 67 U/L (ref 55–135)
ALT SERPL W/O P-5'-P-CCNC: 18 U/L (ref 10–44)
ANION GAP SERPL CALC-SCNC: 10 MMOL/L (ref 8–16)
AST SERPL-CCNC: 22 U/L (ref 10–40)
BASOPHILS # BLD AUTO: 0.04 K/UL (ref 0–0.2)
BASOPHILS NFR BLD: 0.7 % (ref 0–1.9)
BILIRUB SERPL-MCNC: 0.4 MG/DL (ref 0.1–1)
BILIRUB UR QL STRIP: NEGATIVE
BNP SERPL-MCNC: <10 PG/ML (ref 0–99)
BUN SERPL-MCNC: 13 MG/DL (ref 8–23)
CALCIUM SERPL-MCNC: 9.9 MG/DL (ref 8.7–10.5)
CHLORIDE SERPL-SCNC: 107 MMOL/L (ref 95–110)
CLARITY UR: CLEAR
CO2 SERPL-SCNC: 24 MMOL/L (ref 23–29)
COLOR UR: COLORLESS
CREAT SERPL-MCNC: 0.9 MG/DL (ref 0.5–1.4)
D DIMER PPP IA.FEU-MCNC: 0.23 MG/L FEU
DIFFERENTIAL METHOD BLD: NORMAL
EOSINOPHIL # BLD AUTO: 0.1 K/UL (ref 0–0.5)
EOSINOPHIL NFR BLD: 0.9 % (ref 0–8)
ERYTHROCYTE [DISTWIDTH] IN BLOOD BY AUTOMATED COUNT: 13.6 % (ref 11.5–14.5)
EST. GFR  (NO RACE VARIABLE): >60 ML/MIN/1.73 M^2
GLUCOSE SERPL-MCNC: 97 MG/DL (ref 70–110)
GLUCOSE UR QL STRIP: NEGATIVE
HCT VFR BLD AUTO: 43.7 % (ref 37–48.5)
HCV AB SERPL QL IA: NEGATIVE
HGB BLD-MCNC: 14.8 G/DL (ref 12–16)
HGB UR QL STRIP: NEGATIVE
HIV 1+2 AB+HIV1 P24 AG SERPL QL IA: NEGATIVE
IMM GRANULOCYTES # BLD AUTO: 0.02 K/UL (ref 0–0.04)
IMM GRANULOCYTES NFR BLD AUTO: 0.3 % (ref 0–0.5)
KETONES UR QL STRIP: NEGATIVE
LEUKOCYTE ESTERASE UR QL STRIP: NEGATIVE
LYMPHOCYTES # BLD AUTO: 2.1 K/UL (ref 1–4.8)
LYMPHOCYTES NFR BLD: 36.1 % (ref 18–48)
MCH RBC QN AUTO: 31 PG (ref 27–31)
MCHC RBC AUTO-ENTMCNC: 33.9 G/DL (ref 32–36)
MCV RBC AUTO: 91 FL (ref 82–98)
MONOCYTES # BLD AUTO: 0.6 K/UL (ref 0.3–1)
MONOCYTES NFR BLD: 9.6 % (ref 4–15)
NEUTROPHILS # BLD AUTO: 3 K/UL (ref 1.8–7.7)
NEUTROPHILS NFR BLD: 52.4 % (ref 38–73)
NITRITE UR QL STRIP: NEGATIVE
NRBC BLD-RTO: 0 /100 WBC
OHS QRS DURATION: 74 MS
OHS QTC CALCULATION: 427 MS
PH UR STRIP: 7 [PH] (ref 5–8)
PLATELET # BLD AUTO: 195 K/UL (ref 150–450)
PMV BLD AUTO: 10.4 FL (ref 9.2–12.9)
POTASSIUM SERPL-SCNC: 3.9 MMOL/L (ref 3.5–5.1)
PROT SERPL-MCNC: 8.3 G/DL (ref 6–8.4)
PROT UR QL STRIP: NEGATIVE
RBC # BLD AUTO: 4.78 M/UL (ref 4–5.4)
SODIUM SERPL-SCNC: 141 MMOL/L (ref 136–145)
SP GR UR STRIP: <1.005 (ref 1–1.03)
TROPONIN I SERPL DL<=0.01 NG/ML-MCNC: 0.01 NG/ML (ref 0–0.03)
TSH SERPL DL<=0.005 MIU/L-ACNC: 1.23 UIU/ML (ref 0.4–4)
URN SPEC COLLECT METH UR: ABNORMAL
UROBILINOGEN UR STRIP-ACNC: NEGATIVE EU/DL
WBC # BLD AUTO: 5.73 K/UL (ref 3.9–12.7)

## 2024-05-03 PROCEDURE — 87389 HIV-1 AG W/HIV-1&-2 AB AG IA: CPT | Performed by: EMERGENCY MEDICINE

## 2024-05-03 PROCEDURE — 85379 FIBRIN DEGRADATION QUANT: CPT | Performed by: EMERGENCY MEDICINE

## 2024-05-03 PROCEDURE — 85025 COMPLETE CBC W/AUTO DIFF WBC: CPT | Performed by: NURSE PRACTITIONER

## 2024-05-03 PROCEDURE — 80053 COMPREHEN METABOLIC PANEL: CPT | Performed by: NURSE PRACTITIONER

## 2024-05-03 PROCEDURE — 93010 ELECTROCARDIOGRAM REPORT: CPT | Mod: ,,, | Performed by: INTERNAL MEDICINE

## 2024-05-03 PROCEDURE — 99285 EMERGENCY DEPT VISIT HI MDM: CPT | Mod: 25

## 2024-05-03 PROCEDURE — 83880 ASSAY OF NATRIURETIC PEPTIDE: CPT | Performed by: NURSE PRACTITIONER

## 2024-05-03 PROCEDURE — 93005 ELECTROCARDIOGRAM TRACING: CPT

## 2024-05-03 PROCEDURE — 84443 ASSAY THYROID STIM HORMONE: CPT | Performed by: NURSE PRACTITIONER

## 2024-05-03 PROCEDURE — 84484 ASSAY OF TROPONIN QUANT: CPT | Performed by: NURSE PRACTITIONER

## 2024-05-03 PROCEDURE — 86803 HEPATITIS C AB TEST: CPT | Performed by: EMERGENCY MEDICINE

## 2024-05-03 PROCEDURE — 81003 URINALYSIS AUTO W/O SCOPE: CPT | Performed by: NURSE PRACTITIONER

## 2024-05-03 NOTE — ED TRIAGE NOTES
Patient states she has been having episodes of palpitations with lightheadedness and dizziness off and on over past 2 weeks. Occurs at rest and with activity. Today a little worse with c/o mild dyspnea. Denies cough/cold or seasonal allergy symptoms. Denies OTC meds. Drinks coffee daily but otherwise denies caffeine. No h/o anemia. States appetite unchanged over past weeks with no N/V. Presents awake, alert. No distress noted.

## 2024-05-03 NOTE — FIRST PROVIDER EVALUATION
" Emergency Department TeleTriage Encounter Note      CHIEF COMPLAINT    Chief Complaint   Patient presents with    Palpitations     Pt reports palpitations, feeling dizzy and having difficulty taking a deep breath for the past few weeks that worsened today       VITAL SIGNS   Initial Vitals [05/03/24 1235]   BP Pulse Resp Temp SpO2   (!) 180/79 72 18 97.6 °F (36.4 °C) 98 %      MAP       --            ALLERGIES    Review of patient's allergies indicates:   Allergen Reactions    Bactrim [sulfamethoxazole-trimethoprim] Nausea And Vomiting    Sulfa (sulfonamide antibiotics)      Agitation^       PROVIDER TRIAGE NOTE  This is a teletriage evaluation of a 64 y.o. female presenting to the ED complaining of intermittent palpitations with light-headedness and SOB for the past month but worse since yesterday. Associated chest "tightness." States feels like heart is "beating fast."     Alert, no resp distress.     Initial orders will be placed and care will be transferred to an alternate provider when patient is roomed for a full evaluation. Any additional orders and the final disposition will be determined by that provider.         ORDERS  Labs Reviewed   HIV 1 / 2 ANTIBODY   HEPATITIS C ANTIBODY       ED Orders (720h ago, onward)      Start Ordered     Status Ordering Provider    05/03/24 1242 05/03/24 1241  Vital signs  Every 15 min         Ordered JUANA RESENDIZ N.    05/03/24 1242 05/03/24 1241  Cardiac Monitoring - Adult  Continuous        Comments: Notify Physician If:    Ordered JUANA RESENDIZ N.    05/03/24 1242 05/03/24 1241  Pulse Oximetry Continuous  Continuous         Ordered JUANA RESENDIZ N.    05/03/24 1242 05/03/24 1241  Diet NPO  Diet effective now         Ordered JUANA RESENDIZ N.    05/03/24 1242 05/03/24 1241  Saline lock IV  Once         Ordered JUANA RESENDIZ N.    05/03/24 1242 05/03/24 1241  EKG 12-lead  Once        Comments: Do not perform if previously done " during this visit/ triage    Ordered JUANA RESENDIZ N.    05/03/24 1242 05/03/24 1241  CBC auto differential  STAT         Ordered JUANA RESENDIZ N.    05/03/24 1242 05/03/24 1241  Comprehensive metabolic panel  STAT         Ordered JUANA RESENDIZ N.    05/03/24 1242 05/03/24 1241  Troponin I #1  STAT         Ordered JUANA RESENDIZ N.    05/03/24 1242 05/03/24 1241  B-Type natriuretic peptide (BNP)  STAT         Ordered JUANA RESENDIZ N.    05/03/24 1242 05/03/24 1241  X-Ray Chest AP Portable  1 time imaging         Ordered JUANA RESENDIZ N.    05/03/24 1242 05/03/24 1241  TSH  STAT         Ordered JUANA RESENDIZ N.    05/03/24 1242 05/03/24 1241  Urinalysis, Reflex to Urine Culture Urine, Clean Catch  STAT         Ordered JUANA RESENDIZ N.    05/03/24 1238 05/03/24 1237  EKG 12-lead  Once         Completed by MACHELLE COPPOLA on 5/3/2024 at 12:40 PM VANDA TREVINO    05/03/24 1238 05/03/24 1237  HIV 1/2 Ag/Ab (4th Gen)  STAT         Ordered VANDA TREVINO    05/03/24 1238 05/03/24 1237  Hepatitis C Antibody  STAT         Ordered VANDA TREVINO              Virtual Visit Note: The provider triage portion of this emergency department evaluation and documentation was performed via AppSheet, a HIPAA-compliant telemedicine application, in concert with a tele-presenter in the room. A face to face patient evaluation with one of my colleagues will occur once the patient is placed in an emergency department room.      DISCLAIMER: This note was prepared with CAPPTURE voice recognition transcription software. Garbled syntax, mangled pronouns, and other bizarre constructions may be attributed to that software system.

## 2024-05-03 NOTE — ED PROVIDER NOTES
Encounter Date: 5/3/2024    SCRIBE #1 NOTE: IElías am scribing for, and in the presence of,  Angel Ac II, MD. I have scribed the following portions of the note - Other sections scribed: HPI, ROS, PE.       History     Chief Complaint   Patient presents with    Palpitations     Pt reports palpitations, feeling dizzy and having difficulty taking a deep breath for the past few weeks that worsened today     Time seen by provider: 2:15 PM    This is a 64 y.o. female with COPD who presents with complaint of episodic palpitations which were accompanied by lightheadedness today. She describes feeling her heart pausing before restarting at a faster rate, then normalizing. She has had brief episodes of this for the past few weeks. However, today the episode was accompanied by shortness of breath and lightheadedness. She cannot recall any prior instances of these symptoms and has no cardiac history. Patient does report a SHx of daily tobacco and occasional alcohol and marijuana use. This is the extent of the patient's complaints at this time.    The history is provided by the patient.     Review of patient's allergies indicates:   Allergen Reactions    Bactrim [sulfamethoxazole-trimethoprim] Nausea And Vomiting    Sulfa (sulfonamide antibiotics)      Agitation^     Past Medical History:   Diagnosis Date    Anxiety     Arthritis     Bipolar 1 disorder     Cataract     COPD (chronic obstructive pulmonary disease)     Depression     bipolar 2    Eyelid lesion     Fibromyalgia     H/O corneal abrasion 2001    left eye with lead-based paint chips    Hyperlipidemia     Hypertension     Lupus     Migraine headache     Scleritis     Thyroid disease      Past Surgical History:   Procedure Laterality Date    HYSTERECTOMY      for sterilization, ovaries intact    TRIGGER FINGER RELEASE Right 7/28/2021    Procedure: RELEASE, TRIGGER FINGER, right ring & small;  Surgeon: Mellissa Guadarrama MD;  Location: [facility] OR;   Service: Orthopedics;  Laterality: Right;  REGIONAL MAC     Family History   Problem Relation Name Age of Onset    Depression Father          suicide    Cataracts Mother Mama     Rheum arthritis Mother Mama     Lupus Mother Mama     Arthritis Mother Mama     Cataracts Sister      Rheum arthritis Sister      Breast cancer Maternal Aunt      Thyroid disease Daughter      No Known Problems Daughter      No Known Problems Daughter      Colon cancer Brother Ger Guido     Cancer Brother Ger Guido     No Known Problems Maternal Uncle      No Known Problems Paternal Aunt      No Known Problems Paternal Uncle      No Known Problems Maternal Grandmother      No Known Problems Maternal Grandfather      No Known Problems Paternal Grandmother      No Known Problems Paternal Grandfather      Cancer Sister Nat Guido     Cancer Brother Ilia Guido     Early death Sister Jazmyn Bird         Covid-19    Ovarian cancer Neg Hx      Melanoma Neg Hx      Amblyopia Neg Hx      Blindness Neg Hx      Diabetes Neg Hx      Glaucoma Neg Hx      Hypertension Neg Hx      Macular degeneration Neg Hx      Retinal detachment Neg Hx      Strabismus Neg Hx      Stroke Neg Hx       Social History     Tobacco Use    Smoking status: Every Day     Current packs/day: 0.00     Average packs/day: 1 pack/day for 46.7 years (46.7 ttl pk-yrs)     Types: Cigarettes     Start date: 5/2/1974     Last attempt to quit: 1/1/2021     Years since quitting: 3.3    Smokeless tobacco: Never   Substance Use Topics    Alcohol use: Yes     Alcohol/week: 5.0 standard drinks of alcohol     Types: 5 Glasses of wine per week     Comment: 0.5 to 1 bottle of wine per day     Drug use: Yes     Types: Marijuana     Comment: occasionally     Review of Systems  See HPI.    Physical Exam     Initial Vitals [05/03/24 1235]   BP Pulse Resp Temp SpO2   (!) 180/79 72 18 97.6 °F (36.4 °C) 98 %      MAP       --         Physical Exam    Nursing note and vitals  reviewed.  Constitutional: She appears well-developed and well-nourished.   HENT:   Head: Normocephalic and atraumatic.   Eyes: Conjunctivae are normal.   Neck: Neck supple.   Cardiovascular:  Normal rate.     Exam reveals no gallop and no friction rub.       No murmur heard.  Occasional irregular beats noted on pulse and monitor.    Pulmonary/Chest: Breath sounds normal. No respiratory distress. She has no wheezes.   Musculoskeletal:         General: No edema.      Cervical back: Neck supple.     Neurological: She is alert and oriented to person, place, and time.   Skin: Skin is warm and dry.   Psychiatric: She has a normal mood and affect.         ED Course   Procedures  Labs Reviewed   URINALYSIS, REFLEX TO URINE CULTURE - Abnormal; Notable for the following components:       Result Value    Color, UA Colorless (*)     Specific Gravity, UA <1.005 (*)     All other components within normal limits    Narrative:     Specimen Source->Urine   HIV 1 / 2 ANTIBODY    Narrative:     Release to patient->Immediate   HEPATITIS C ANTIBODY    Narrative:     Release to patient->Immediate   CBC W/ AUTO DIFFERENTIAL    Narrative:     Release to patient->Immediate   COMPREHENSIVE METABOLIC PANEL    Narrative:     Release to patient->Immediate   TROPONIN I    Narrative:     Release to patient->Immediate   B-TYPE NATRIURETIC PEPTIDE    Narrative:     Release to patient->Immediate   TSH    Narrative:     Release to patient->Immediate   D DIMER, QUANTITATIVE   D DIMER, QUANTITATIVE     EKG Readings: (Independently Interpreted)   Sinus rhythm. Rate of 60. No ST or T wave changes. No ischemia, arrhythmia or premature beats.      ECG Results              EKG 12-lead (Final result)        Collection Time Result Time QRS Duration OHS QTC Calculation    05/03/24 12:32:59 05/03/24 18:12:06 74 427                     Final result by Interface, Lab In [facility] (05/03/24 18:12:13)                   Narrative:    Test Reason : R00.2,    Vent.  Rate : 063 BPM     Atrial Rate : 063 BPM     P-R Int : 156 ms          QRS Dur : 074 ms      QT Int : 418 ms       P-R-T Axes : 054 012 043 degrees     QTc Int : 427 ms    Normal sinus rhythm  Cannot rule out Anterior infarct ,age undetermined  Abnormal ECG    Confirmed by Familia Duckworth MD ([id number]) on 5/3/2024 6:12:01 PM    Referred By: [email or web address]   SELF           Confirmed By:Familia Duckworth MD                                  Imaging Results              X-Ray Chest 1 View (Final result)  Result time 05/03/24 13:10:52   Procedure changed from X-Ray Chest AP Portable     Final result by Shelly Monroy MD (05/03/24 13:10:52)                   Impression:      No acute cardiopulmonary process seen.      Electronically signed by: Shelly Monroy  Date:    05/03/2024  Time:    13:10               Narrative:    EXAMINATION:  XR CHEST 1 VIEW    CLINICAL HISTORY:  Chest Pain;    TECHNIQUE:  Single frontal view of the chest was performed.    COMPARISON:  08/16/2022    FINDINGS:  Lungs are well expanded.  No acute consolidation, pleural effusion, or pneumothorax.    Cardiac silhouette is normal in size.                                    X-Rays:   Independently Interpreted Readings:   Chest X-Ray: No focal infiltrate or effusion. No pulmonary edema.     Medications - No data to display  Medical Decision Making  Amount and/or Complexity of Data Reviewed  Labs: ordered. Decision-making details documented in ED Course.  Radiology: ordered and independent interpretation performed.     Details: See interpretation above.   ECG/medicine tests: ordered and independent interpretation performed.     Details: See interpretation above.     Patient presents complaining of palpitations which were associated with lightheadedness.  During the day today exams, frequent PACs with compensatory pause were noted on the monitor which correlated with the patient's perceived palpitations.  Reassured patient no other arrhythmia were noted these  may be bothersome but are safe.  Lab workup is unremarkable, without electrolyte derangement, anemia, or renal insufficiency.  Troponin was negative, BNP was negative.  D-dimer likewise negative and patient is low pretest probability.  Patient was given IV fluids during workup, is feeling better in his noted frequency of palpitations have decreased.  I discussed return precautions with the patient she feels comfortable with discharge at this time.  Encouraged follow-up with primary care, especially persist.        Scribe Attestation:   Scribe #1: I performed the above scribed service and the documentation accurately describes the services I performed. I attest to the accuracy of the note.    Physician Attestation for Scribe: I, TL, reviewed documentation as scribed in my presence, which is both accurate and complete.                             Clinical Impression:  Final diagnoses:  [R00.2] Palpitations (Primary)  [R07.9] Chest pain  [R42] Lightheadedness  [R06.00] Dyspnea, unspecified type  [Z87.09] History of COPD  [Z72.0] Tobacco abuse          ED Disposition Condition    Discharge Stable          ED Prescriptions    None       Follow-up Information       Follow up With Specialties Details Why Contact Info    Jay Matias MD Family Medicine In 5 days  [address]  [address]  [facility] [address]  [phone]               Angel Ac II, MD  05/03/24 3275

## 2024-05-06 ENCOUNTER — PATIENT OUTREACH (OUTPATIENT)
Dept: EMERGENCY MEDICINE | Facility: OTHER | Age: 64
End: 2024-05-06
Payer: MEDICARE

## 2024-05-09 ENCOUNTER — HOSPITAL ENCOUNTER (EMERGENCY)
Facility: OTHER | Age: 64
Discharge: HOME | End: 2024-05-09
Attending: EMERGENCY MEDICINE
Payer: MEDICARE

## 2024-05-09 VITALS
DIASTOLIC BLOOD PRESSURE: 73 MMHG | TEMPERATURE: 98 F | WEIGHT: 150 LBS | HEIGHT: 60 IN | SYSTOLIC BLOOD PRESSURE: 125 MMHG | OXYGEN SATURATION: 98 % | RESPIRATION RATE: 20 BRPM | BODY MASS INDEX: 29.45 KG/M2 | HEART RATE: 75 BPM

## 2024-05-09 DIAGNOSIS — T63.481A INSECT STINGS, ACCIDENTAL OR UNINTENTIONAL, INITIAL ENCOUNTER: Primary | ICD-10-CM

## 2024-05-09 PROCEDURE — 25000003 PHARM REV CODE 250: Performed by: NURSE PRACTITIONER

## 2024-05-09 PROCEDURE — 99283 EMERGENCY DEPT VISIT LOW MDM: CPT

## 2024-05-09 RX ORDER — PREDNISONE 20 MG/1
20 TABLET ORAL DAILY
Qty: 5 TABLET | Refills: 0 | Status: SHIPPED | OUTPATIENT
Start: 2024-05-09 | End: 2024-05-14

## 2024-05-09 RX ORDER — DIPHENHYDRAMINE HCL 25 MG
50 CAPSULE ORAL
Status: COMPLETED | OUTPATIENT
Start: 2024-05-09 | End: 2024-05-09

## 2024-05-09 RX ADMIN — DIPHENHYDRAMINE HYDROCHLORIDE 50 MG: 25 CAPSULE ORAL at 03:05

## 2024-05-09 NOTE — FIRST PROVIDER EVALUATION
Emergency Department TeleTriage Encounter Note      CHIEF COMPLAINT    Chief Complaint   Patient presents with    Insect Bite     Pt. Was stung multiple times by a yellow jacket yesterday on her right hand. Pt.'s hand is swollen and warm to touch. Redness is present and moving up her arm.       VITAL SIGNS   Initial Vitals [05/09/24 1454]   BP Pulse Resp Temp SpO2   125/73 75 20 98.2 °F (36.8 °C) 98 %      MAP       --            ALLERGIES    Review of patient's allergies indicates:   Allergen Reactions    Bactrim [sulfamethoxazole-trimethoprim] Nausea And Vomiting    Sulfa (sulfonamide antibiotics)      Agitation^       PROVIDER TRIAGE NOTE  This is a teletriage evaluation of a 64 y.o. female presenting to the ED complaining of hand swelling. Pt was stung several times by a yellowjacket yesterday on right hand. Pt reports swelling now going up her arm.  No significant redness. Has not tried any oral medications to help.     Alert, no distress.     Initial orders will be placed and care will be transferred to an alternate provider when patient is roomed for a full evaluation. Any additional orders and the final disposition will be determined by that provider.         ORDERS  Labs Reviewed - No data to display    ED Orders (720h ago, onward)      Start Ordered     Status Ordering Provider    05/09/24 1515 05/09/24 1501  diphenhydrAMINE capsule 50 mg  ED 1 Time         Ordered JUANA RESENDIZ.    05/09/24 1501 05/09/24 1501  Ice to affected area  Once         Ordered JUANA RESENDIZ              Virtual Visit Note: The provider triage portion of this emergency department evaluation and documentation was performed via [employer], a HIPAA-compliant telemedicine application, in concert with a tele-presenter in the room. A face to face patient evaluation with one of my colleagues will occur once the patient is placed in an emergency department room.      DISCLAIMER: This note was prepared with  M*Modal voice recognition transcription software. Garbled syntax, mangled pronouns, and other bizarre constructions may be attributed to that software system.

## 2024-05-09 NOTE — ED PROVIDER NOTES
Encounter Date: 5/9/2024       History     Chief Complaint   Patient presents with    Insect Bite     Pt. Was stung multiple times by a yellow jacket yesterday on her right hand. Pt.'s hand is swollen and warm to touch. Redness is present and moving up her arm.     Breanna Guido is a 64-year-old female with a past medical history of COPD, depression, hypertension presenting today after being stung by a yellow jacket.  She was gardening yesterday afternoon when she was son several times to her right hand.  She applied hydrocortisone cream last night.  When she woke up this morning she noted significant swelling to her right hand that extended to her mid forearm which she says is warm to touch and red.  She has not had a reaction similar to this in the past.  She denies throat closing, sore throat, cough, shortness of breath, nausea or vomiting.  No rash.        Review of patient's allergies indicates:   Allergen Reactions    Bactrim [sulfamethoxazole-trimethoprim] Nausea And Vomiting    Sulfa (sulfonamide antibiotics)      Agitation^     Past Medical History:   Diagnosis Date    Anxiety     Arthritis     Bipolar 1 disorder     Cataract     COPD (chronic obstructive pulmonary disease)     Depression     bipolar 2    Eyelid lesion     Fibromyalgia     H/O corneal abrasion 2001    left eye with lead-based paint chips    Hyperlipidemia     Hypertension     Lupus     Migraine headache     Scleritis     Thyroid disease      Past Surgical History:   Procedure Laterality Date    HYSTERECTOMY      for sterilization, ovaries intact    TRIGGER FINGER RELEASE Right 7/28/2021    Procedure: RELEASE, TRIGGER FINGER, right ring & small;  Surgeon: Mellissa Guadarrama MD;  Location: [facility];  Service: Orthopedics;  Laterality: Right;  REGIONAL MAC     Family History   Problem Relation Name Age of Onset    Depression Father          suicide    Cataracts Mother Mama     Rheum arthritis Mother Mama     Lupus Mother Mama      Arthritis Mother Mama     Cataracts Sister      Rheum arthritis Sister      Breast cancer Maternal Aunt      Thyroid disease Daughter      No Known Problems Daughter      No Known Problems Daughter      Colon cancer Brother Ger Guido     Cancer Brother Ger Guido     No Known Problems Maternal Uncle      No Known Problems Paternal Aunt      No Known Problems Paternal Uncle      No Known Problems Maternal Grandmother      No Known Problems Maternal Grandfather      No Known Problems Paternal Grandmother      No Known Problems Paternal Grandfather      Cancer Sister Nat Guido     Cancer Brother Ilia Guido     Early death Sister Jazmyn Bird         Covid-19    Ovarian cancer Neg Hx      Melanoma Neg Hx      Amblyopia Neg Hx      Blindness Neg Hx      Diabetes Neg Hx      Glaucoma Neg Hx      Hypertension Neg Hx      Macular degeneration Neg Hx      Retinal detachment Neg Hx      Strabismus Neg Hx      Stroke Neg Hx       Social History     Tobacco Use    Smoking status: Every Day     Current packs/day: 0.00     Average packs/day: 1 pack/day for 46.7 years (46.7 ttl pk-yrs)     Types: Cigarettes     Start date: 5/2/1974     Last attempt to quit: 1/1/2021     Years since quitting: 3.3    Smokeless tobacco: Never   Substance Use Topics    Alcohol use: Yes     Alcohol/week: 5.0 standard drinks of alcohol     Types: 5 Glasses of wine per week     Comment: 0.5 to 1 bottle of wine per day     Drug use: Yes     Types: Marijuana     Comment: occasionally     Review of Systems    Physical Exam     Initial Vitals [05/09/24 1454]   BP Pulse Resp Temp SpO2   125/73 75 20 98.2 °F (36.8 °C) 98 %      MAP       --         Physical Exam    Nursing note and vitals reviewed.  Constitutional: She appears well-developed and well-nourished. No distress.   HENT:   Mouth/Throat: Oropharynx is clear and moist.   Eyes: Conjunctivae are normal. No scleral icterus.   Neck: No JVD present.   Cardiovascular:  Normal  rate, regular rhythm and intact distal pulses.           Pulmonary/Chest: Breath sounds normal. No respiratory distress. She has no wheezes. She has no rales.   Abdominal: Abdomen is soft. Bowel sounds are normal. She exhibits no distension. There is no abdominal tenderness. There is no rebound.   Musculoskeletal:      Comments: Flexion and extension of the fingers and wrist intact     Lymphadenopathy:     She has no cervical adenopathy.   Neurological: She is alert and oriented to person, place, and time.   Skin: Skin is warm. No rash noted.   + local swelling and warmth to the right dorsum of the hand that radiates to the mid forearm.          ED Course   Procedures  Labs Reviewed - No data to display       Imaging Results    None          Medications   diphenhydrAMINE capsule 50 mg (50 mg Oral Given 5/9/24 1523)     Medical Decision Making  Emergent evaluation a 64-year-old female presenting today for acute right hand swelling after being stung by a yellow jacket yesterday.    Vital signs stable.  Overall well-appearing, no acute distress.    Neurovascular intact, swelling to the dorsum of the hand noted, able to fully extend and flex all joints of the right hand and wrist.    Differential includes but not limited to local inflammatory reaction, low suspicion for allergic reaction or anaphylaxis.    Plan for prednisone, recommend continue hydrocortisone topical cream or Benadryl cream.  Rest, ice, elevate.  She expressed understanding.  No indication for antibiotics.  Stable for discharge.    Amount and/or Complexity of Data Reviewed  External Data Reviewed: notes.     Details: Last seen on 05/03 for palpitations, EKG concerning for PACs    Risk  OTC drugs.  Prescription drug management.                                      Clinical Impression:  Final diagnoses:  [T63.481A] Insect stings, accidental or unintentional, initial encounter (Primary)          ED Disposition Condition    Discharge Stable          ED  Prescriptions       Medication Sig Dispense Start Date End Date Auth. Provider    predniSONE (DELTASONE) 20 MG tablet Take 1 tablet (20 mg total) by mouth once daily. for 5 days 5 tablet 5/9/2024 5/14/2024 Melani Richards MD          Follow-up Information       Follow up With Specialties Details Why Contact Info    Jay Matias MD Family Medicine Schedule an appointment as soon as possible for a visit   [address]  [facility] [address]  [facility] [address]  [phone]               Melani Richards MD  05/09/24 1562

## 2024-05-09 NOTE — DISCHARGE INSTRUCTIONS
Please make sure you elevate and ice your hand.  This is a local inflammatory reaction which is common.  It does extend up to her mid forearm therefore I prescribed you steroids to help with the swelling.  You can also apply topical hydrocortisone cream.  Return to emergency department for any concerning symptoms otherwise follow up with the primary doctor.

## 2024-05-09 NOTE — ED TRIAGE NOTES
Pt stung by several bees while gardening yesterday. Denies pain but complaining that the swelling is moving up her arm. Skin is not discolored on my exam. Motion and sensation intact.

## 2024-05-31 ENCOUNTER — HOSPITAL ENCOUNTER (OUTPATIENT)
Dept: RADIOLOGY | Facility: OTHER | Age: 64
Discharge: HOME | End: 2024-05-31
Attending: INTERNAL MEDICINE
Payer: MEDICARE

## 2024-05-31 DIAGNOSIS — M85.9 LOW BONE DENSITY: ICD-10-CM

## 2024-05-31 DIAGNOSIS — Z78.0 MENOPAUSE: ICD-10-CM

## 2024-05-31 PROCEDURE — 77080 DXA BONE DENSITY AXIAL: CPT | Mod: 26,,, | Performed by: RADIOLOGY

## 2024-05-31 PROCEDURE — 77080 DXA BONE DENSITY AXIAL: CPT | Mod: TC

## 2024-06-05 ENCOUNTER — OFFICE VISIT (OUTPATIENT)
Dept: RHEUMATOLOGY | Facility: CLINIC | Age: 64
End: 2024-06-05
Payer: MEDICARE

## 2024-06-05 ENCOUNTER — LAB VISIT (OUTPATIENT)
Dept: LAB | Facility: HOSPITAL | Age: 64
End: 2024-06-05
Attending: INTERNAL MEDICINE
Payer: MEDICARE

## 2024-06-05 VITALS
BODY MASS INDEX: 30.01 KG/M2 | DIASTOLIC BLOOD PRESSURE: 81 MMHG | HEART RATE: 66 BPM | WEIGHT: 153.69 LBS | SYSTOLIC BLOOD PRESSURE: 128 MMHG

## 2024-06-05 DIAGNOSIS — M79.7 FIBROMYALGIA: Chronic | ICD-10-CM

## 2024-06-05 DIAGNOSIS — Z79.899 LONG-TERM USE OF PLAQUENIL: ICD-10-CM

## 2024-06-05 DIAGNOSIS — M54.50 LOW BACK PAIN, UNSPECIFIED BACK PAIN LATERALITY, UNSPECIFIED CHRONICITY, UNSPECIFIED WHETHER SCIATICA PRESENT: ICD-10-CM

## 2024-06-05 DIAGNOSIS — E78.5 HYPERLIPIDEMIA, UNSPECIFIED HYPERLIPIDEMIA TYPE: ICD-10-CM

## 2024-06-05 DIAGNOSIS — E78.5 HYPERLIPIDEMIA, UNSPECIFIED HYPERLIPIDEMIA TYPE: Primary | ICD-10-CM

## 2024-06-05 DIAGNOSIS — M32.19 OTHER SYSTEMIC LUPUS ERYTHEMATOSUS WITH OTHER ORGAN INVOLVEMENT: Chronic | ICD-10-CM

## 2024-06-05 LAB
CHOLEST SERPL-MCNC: 145 MG/DL (ref 120–199)
CHOLEST/HDLC SERPL: 2.7 {RATIO} (ref 2–5)
HDLC SERPL-MCNC: 53 MG/DL (ref 40–75)
HDLC SERPL: 36.6 % (ref 20–50)
LDLC SERPL CALC-MCNC: 81.8 MG/DL (ref 63–159)
NONHDLC SERPL-MCNC: 92 MG/DL
TRIGL SERPL-MCNC: 51 MG/DL (ref 30–150)

## 2024-06-05 PROCEDURE — 1159F MED LIST DOCD IN RCRD: CPT | Mod: CPTII,S$GLB,, | Performed by: INTERNAL MEDICINE

## 2024-06-05 PROCEDURE — 80061 LIPID PANEL: CPT | Performed by: INTERNAL MEDICINE

## 2024-06-05 PROCEDURE — 3079F DIAST BP 80-89 MM HG: CPT | Mod: CPTII,S$GLB,, | Performed by: INTERNAL MEDICINE

## 2024-06-05 PROCEDURE — 1125F AMNT PAIN NOTED PAIN PRSNT: CPT | Mod: CPTII,S$GLB,, | Performed by: INTERNAL MEDICINE

## 2024-06-05 PROCEDURE — 4010F ACE/ARB THERAPY RXD/TAKEN: CPT | Mod: CPTII,S$GLB,, | Performed by: INTERNAL MEDICINE

## 2024-06-05 PROCEDURE — 99214 OFFICE O/P EST MOD 30 MIN: CPT | Mod: S$GLB,,, | Performed by: INTERNAL MEDICINE

## 2024-06-05 PROCEDURE — 36415 COLL VENOUS BLD VENIPUNCTURE: CPT | Performed by: INTERNAL MEDICINE

## 2024-06-05 PROCEDURE — 3008F BODY MASS INDEX DOCD: CPT | Mod: CPTII,S$GLB,, | Performed by: INTERNAL MEDICINE

## 2024-06-05 PROCEDURE — 99999 PR PBB SHADOW E&M-EST. PATIENT-LVL V: CPT | Mod: PBBFAC,,, | Performed by: INTERNAL MEDICINE

## 2024-06-05 PROCEDURE — 3074F SYST BP LT 130 MM HG: CPT | Mod: CPTII,S$GLB,, | Performed by: INTERNAL MEDICINE

## 2024-06-05 RX ORDER — DULOXETIN HYDROCHLORIDE 30 MG/1
30 CAPSULE, DELAYED RELEASE ORAL
COMMUNITY
Start: 2024-01-22

## 2024-06-05 RX ORDER — HYDROXYCHLOROQUINE SULFATE 200 MG/1
300 TABLET, FILM COATED ORAL DAILY
Qty: 135 TABLET | Refills: 0 | Status: SHIPPED | OUTPATIENT
Start: 2024-06-05

## 2024-06-05 RX ORDER — PREGABALIN 150 MG/1
150 CAPSULE ORAL 2 TIMES DAILY
Qty: 180 CAPSULE | Refills: 1 | Status: SHIPPED | OUTPATIENT
Start: 2024-06-05

## 2024-06-05 ASSESSMENT — ROUTINE ASSESSMENT OF PATIENT INDEX DATA (RAPID3)
PATIENT GLOBAL ASSESSMENT SCORE: 5
FATIGUE SCORE: 4.5
AM STIFFNESS SCORE: 1, YES
PSYCHOLOGICAL DISTRESS SCORE: 2.2
TOTAL RAPID3 SCORE: 4
PAIN SCORE: 5
MDHAQ FUNCTION SCORE: 0.6

## 2024-06-05 ASSESSMENT — SYSTEMIC LUPUS ERYTHEMATOSUS DISEASE ACTIVITY INDEX (SLEDAI): TOTAL_SCORE: 0

## 2024-06-05 NOTE — PROGRESS NOTES
Patient ID:  Breanna Guido    YOB: 1960     MRN:  [id number]     Subjective:     Chief Complaint:  Disease Management     History of Present Illness:  Pt is a 64 y.o. female with SLE and fibromyalgia who presents today for f/u. LCV was 11/16/23. At that time symptoms were stable.    Today: Patient reports acute on chronic low back pain which has progressed over the past couple of months. She has a history of left lumbosacral radiculopathy which was treated with epidural steroid injections in the past (most recently 2017 or 2018 per patient). Her pain lately is primarily axial, located in the right side of the low back. No preceding injury or trauma. Pain does not radiate and denies associated RLE numbness or tingling. Associated with back muscle cramps/spasms. Pain worse with activity and lumbar flexion. She has been using Icy Hot with mild relief. She is interested in evaluation for injections. She has not done PT for her LBP in years.     She has been less active lately since the death of her dog. She has not resumed walking program since they used to walk together. Has been depressed. Working on mediterranean diet--eats lots of vegetables from her garden. She continues to smoke but has cut back from almost 2 packs per day to 0.75 pack per day.     Denies hair loss, dry eyes, vision changes, dry mouth, oral/nasal ulcers, trouble swallowing, new rashes, joint swelling, morning stiffness, or GI disturbances.      Review of Systems   Review of Systems   Constitutional:  Negative for appetite change, fever and unexpected weight change.   HENT:  Negative for mouth sores and trouble swallowing.    Eyes:  Negative for pain.   Respiratory:  Negative for cough and shortness of breath.    Cardiovascular:  Positive for palpitations. Negative for chest pain.   Gastrointestinal:  Negative for abdominal pain, constipation and diarrhea.   Musculoskeletal:  Positive for back pain and myalgias. Negative for  gait problem and joint swelling.   Skin:  Negative for rash.   Neurological:  Negative for seizures, light-headedness and headaches.   Hematological:  Negative for adenopathy.   Psychiatric/Behavioral:  Negative for sleep disturbance.         Current Medications:    Current Outpatient Medications:     albuterol (PROVENTIL/VENTOLIN HFA) 90 mcg/actuation inhaler, Inhale 2 puffs into the lungs every 6 (six) hours as needed for Wheezing. Rescue, Disp: 54 g, Rfl: 2    alprazolam (XANAX) 2 MG Tab, Take 2 mg by mouth 2 (two) times daily as needed (pt takes 1/2 tab BID and 1 tab  at night)., Disp: , Rfl:     amLODIPine (NORVASC) 5 MG tablet, Take 1 tablet (5 mg total) by mouth once daily., Disp: 90 tablet, Rfl: 3    DULoxetine (CYMBALTA) 30 MG capsule, Take 30 mg by mouth., Disp: , Rfl:     duloxetine (CYMBALTA) 60 MG capsule, Take one capsule along with cymbalta 30mg daily to equal 90mg daily, Disp: 90 capsule, Rfl: 3    fluticasone-umeclidin-vilanter (TRELEGY ELLIPTA) 100-62.5-25 mcg DsDv, Inhale 1 puff into the lungs once daily., Disp: 180 each, Rfl: 3    levothyroxine (SYNTHROID) 88 MCG tablet, TAKE 1 TABLET(88 MCG) BY MOUTH BEFORE BREAKFAST, Disp: 90 tablet, Rfl: 3    losartan (COZAAR) 100 MG tablet, Take 1 tablet (100 mg total) by mouth once daily., Disp: 90 tablet, Rfl: 3    pantoprazole (PROTONIX) 40 MG tablet, Take 1 tablet (40 mg total) by mouth once daily., Disp: 30 tablet, Rfl: 11    quetiapine (SEROQUEL) 300 MG Tab, Take 300 mg by mouth once daily. , Disp: , Rfl:     rosuvastatin (CRESTOR) 40 MG Tab, Take 1 tablet (40 mg total) by mouth every evening., Disp: 90 tablet, Rfl: 3    sumatriptan (IMITREX) 50 MG tablet, Take 1 tablet (50 mg total) by mouth every 2 (two) hours as needed for Migraine. Do not take more than 4 tablets total in 24 hours, Disp: 30 tablet, Rfl: 0    vitamin D 1000 units Tab, Take 185 mg by mouth once daily., Disp: , Rfl:     butalbital-acetaminophen-caffeine -40 mg (FIORICET, ESGIC)  -40 mg per tablet, Take 1 tablet by mouth every 6 (six) hours as needed for Pain., Disp: 20 tablet, Rfl: 0    diclofenac sodium (VOLTAREN) 1 % Gel, Apply 2 g topically 4 (four) times daily., Disp: 3 each, Rfl: 2    hydroxychloroquine (PLAQUENIL) 200 mg tablet, Take 1.5 tablets (300 mg total) by mouth once daily., Disp: 135 tablet, Rfl: 0    pregabalin (LYRICA) 150 MG capsule, Take 1 capsule (150 mg total) by mouth 2 (two) times daily. Generic is fine, Disp: 180 capsule, Rfl: 1     Objective:     Vitals:    06/05/24 1001   BP: 128/81   Pulse: 66      Body mass index is 30.01 kg/m².     Physical Exam   Constitutional: She is oriented to person, place, and time. normal appearance.   HENT:   Head: Normocephalic and atraumatic.   Nose: Nose normal. No nasal congestion.   Mouth/Throat: Mucous membranes are moist. Oropharynx is clear.   Eyes: Pupils are equal, round, and reactive to light. Conjunctivae are normal.   Cardiovascular: Normal rate, regular rhythm, normal heart sounds and normal pulses.   Pulmonary/Chest: Effort normal and breath sounds normal. She has no wheezes. She has no rales.   Abdominal: Soft. There is no abdominal tenderness.   Musculoskeletal:         General: Tenderness (right lumbar paraspinals) present. No swelling.   Neurological: She is alert and oriented to person, place, and time. She displays no weakness. No sensory deficit. Gait normal.   Skin: Skin is warm and dry. Capillary refill takes less than 2 seconds.   Psychiatric: Her behavior is normal.   Vitals reviewed.         6/5/2024   Tender (RUIZ-28) 0 / 28    Swollen (RUIZ-28) 0 / 28    Provider Global --   Patient Global --   ESR 29 mm/hr   CRP 2.7 mg/L   RUIZ-28 (ESR) --   RUIZ-28 (CRP) --   CDAI Score --                 Latest Reference Range & Units 05/31/24 09:13   WBC 3.90 - 12.70 K/uL 3.88 (L)   RBC 4.00 - 5.40 M/uL 4.58   Hemoglobin 12.0 - 16.0 g/dL 14.1   Hematocrit 37.0 - 48.5 % 42.6   MCV 82 - 98 fL 93   MCH 27.0 - 31.0 pg 30.8    MCHC 32.0 - 36.0 g/dL 33.1   RDW 11.5 - 14.5 % 13.5   Platelet Count 150 - 450 K/uL 168   MPV 9.2 - 12.9 fL 10.9   Gran % 38.0 - 73.0 % 45.9   Lymph % 18.0 - 48.0 % 41.0   Mono % 4.0 - 15.0 % 10.3   Eos % 0.0 - 8.0 % 1.5   Basophil % 0.0 - 1.9 % 1.0   Immature Granulocytes 0.0 - 0.5 % 0.3   Gran # (ANC) 1.8 - 7.7 K/uL 1.8   Lymph # 1.0 - 4.8 K/uL 1.6   Mono # 0.3 - 1.0 K/uL 0.4   Eos # 0.0 - 0.5 K/uL 0.1   Baso # 0.00 - 0.20 K/uL 0.04   Immature Grans (Abs) 0.00 - 0.04 K/uL 0.01   nRBC 0 /100 WBC 0   Differential Method  Automated   Sed Rate 0 - 36 mm/Hr 29   Sodium 136 - 145 mmol/L 143   Potassium 3.5 - 5.1 mmol/L 3.8   Chloride 95 - 110 mmol/L 107   CO2 23 - 29 mmol/L 26   Anion Gap 8 - 16 mmol/L 10   BUN 8 - 23 mg/dL 12   Creatinine 0.5 - 1.4 mg/dL 0.8   eGFR >60 mL/min/1.73 m^2 >60   Glucose 70 - 110 mg/dL 98   Calcium 8.7 - 10.5 mg/dL 9.6   ALP 55 - 135 U/L 65   PROTEIN TOTAL 6.0 - 8.4 g/dL 7.7   Albumin 3.5 - 5.2 g/dL 4.1   BILIRUBIN TOTAL 0.1 - 1.0 mg/dL 0.4   AST 10 - 40 U/L 22   ALT 10 - 44 U/L 17   CRP 0.0 - 8.2 mg/L 2.7   (L): Data is abnormally low     Latest Reference Range & Units 05/31/24 09:13   ds DNA Ab Negative 1:10  Negative 1:10   Complement (C-3) 50 - 180 mg/dL 139   Complement (C-4) 11 - 44 mg/dL 34      Latest Reference Range & Units 05/31/24 08:58   Specimen UA  Urine, Clean Catch   Color, UA Yellow, Straw, Nini  Yellow   Appearance, UA Clear  Clear   Spec Grav UA 1.005 - 1.030  1.025   pH, UA 5.0 - 8.0  6.0   Protein, UA Negative  Negative   Glucose, UA Negative  Negative   Ketones, UA Negative  Negative   Blood, UA Negative  Negative   NITRITE UA Negative  Negative   UROBILINOGEN UA <2.0 EU/dL 2.0-3.0 !   Bilirubin (UA) Negative  Negative   Leukocyte Esterase, UA Negative  Negative   Urine Creatinine 15.0 - 325.0 mg/dL 236.1   Urine Protein 0 - 15 mg/dL 16 (H)   Urine Protein/Creatinine Ratio 0.00 - 0.20  0.07   !: Data is abnormal  (H): Data is abnormally high    DXA  5/31/24:  FINDINGS:  The L1 to L4 vertebral bone mineral density is equal to 1.30 g/cm squared with a T score of 5 0.9.  The left femoral neck bone mineral density is equal to 0.825 g/cm squared with a T score of -1.5.  The total hip bone mineral density is equal to 0.891 g/cm squared with a T score of -0.9.  There is a 4.7% risk of a major osteoporotic fracture and a 1.1% risk of hip fracture in the next 10 years (FRAX).  Impression: Osteopenia of the femoral necks.    Assessment:     1. Hyperlipidemia, unspecified hyperlipidemia type    2. Long-term use of Plaquenil    3. Fibromyalgia    4. Other systemic lupus erythematosus with other organ involvement    5. Low back pain, unspecified back pain laterality, unspecified chronicity, unspecified whether sciatica present          Plan:      Problem List Items Addressed This Visit          Orthopedic    Fibromyalgia (Chronic)    Relevant Medications    pregabalin (LYRICA) 150 MG capsule     Other Visit Diagnoses       Hyperlipidemia, unspecified hyperlipidemia type    -  Primary    Relevant Orders    LIPID PANEL    Long-term use of Plaquenil        Relevant Orders    Ambulatory referral/consult to Ophthalmology    Other systemic lupus erythematosus with other organ involvement  (Chronic)       Relevant Medications    hydroxychloroquine (PLAQUENIL) 200 mg tablet    Low back pain, unspecified back pain laterality, unspecified chronicity, unspecified whether sciatica present        Relevant Orders    X-Ray Lumbar Spine AP And Lateral    Ambulatory referral/consult to [facility] Sp[facility] Clinic    Ambulatory referral/consult to Physical/Occupational Therapy              Lumbar spine x-ray  Ref to Back and Spine  Ref to PT  Lipid panel  STOP SMOKING she felt she was harrassed by Smoking Cessation and stopped  Continue hydroxychloroquine 300mg daily with annual Ophthalmology check overdue but was unable to schedule appt, but will do so now.  Referral placed to facilitate  Cont  Lyrica 150mg BID and Cymbalta 30mg daily for fibromyalgia  Nick Chi, yoga on You Tube  Walking program  Mediterranean diet  RTC 6 months with standing  lupus labs     Georgi Michael M.D.  PGY-1  [employer] [facility]

## 2024-06-05 NOTE — PATIENT INSTRUCTIONS
Lumbar spine x-ray  Ref to Back and Spine  Ref to PT  Lipid panel  STOP SMOKING she felt she was harrassed by Smoking Cessation and stopped  Continue hydroxychloroquine 300mg daily with annual Ophthalmology check overdue but was unable to schedule appt, but will do so now.  Referral placed to facilitate  Nick Chi, yoga on You Tube  Walking program,  Mediterranean diet  RTC 6 months with standing  lupus labs

## 2024-06-05 NOTE — PROGRESS NOTES
I have personally taken the history and examined the patient and agree with the resident,s note as stated above       Latest Reference Range & Units 05/31/24 09:13 05/31/24 09:43   WBC 3.90 - 12.70 K/uL 3.88 (L)    RBC 4.00 - 5.40 M/uL 4.58    Hemoglobin 12.0 - 16.0 g/dL 14.1    Hematocrit 37.0 - 48.5 % 42.6    MCV 82 - 98 fL 93    MCH 27.0 - 31.0 pg 30.8    MCHC 32.0 - 36.0 g/dL 33.1    RDW 11.5 - 14.5 % 13.5    Platelet Count 150 - 450 K/uL 168    MPV 9.2 - 12.9 fL 10.9    Gran % 38.0 - 73.0 % 45.9    Lymph % 18.0 - 48.0 % 41.0    Mono % 4.0 - 15.0 % 10.3    Eos % 0.0 - 8.0 % 1.5    Basophil % 0.0 - 1.9 % 1.0    Immature Granulocytes 0.0 - 0.5 % 0.3    Gran # (ANC) 1.8 - 7.7 K/uL 1.8    Lymph # 1.0 - 4.8 K/uL 1.6    Mono # 0.3 - 1.0 K/uL 0.4    Eos # 0.0 - 0.5 K/uL 0.1    Baso # 0.00 - 0.20 K/uL 0.04    Immature Grans (Abs) 0.00 - 0.04 K/uL 0.01    nRBC 0 /100 WBC 0    Differential Method  Automated    Sed Rate 0 - 36 mm/Hr 29    Sodium 136 - 145 mmol/L 143    Potassium 3.5 - 5.1 mmol/L 3.8    Chloride 95 - 110 mmol/L 107    CO2 23 - 29 mmol/L 26    Anion Gap 8 - 16 mmol/L 10    BUN 8 - 23 mg/dL 12    Creatinine 0.5 - 1.4 mg/dL 0.8    eGFR >60 mL/min/1.73 m^2 >60    Glucose 70 - 110 mg/dL 98    Calcium 8.7 - 10.5 mg/dL 9.6    ALP 55 - 135 U/L 65    PROTEIN TOTAL 6.0 - 8.4 g/dL 7.7    Albumin 3.5 - 5.2 g/dL 4.1    BILIRUBIN TOTAL 0.1 - 1.0 mg/dL 0.4    AST 10 - 40 U/L 22    ALT 10 - 44 U/L 17    CRP 0.0 - 8.2 mg/L 2.7    ds DNA Ab Negative 1:10  Negative 1:10    Complement (C-3) 50 - 180 mg/dL 139    Complement (C-4) 11 - 44 mg/dL 34    DXA BONE DENSITY AXIAL SKELETON 1 OR MORE SITES   Rpt   (L): Data is abnormally low  Rpt: View report in Results Review for more information   Latest Reference Range & Units 05/31/24 08:58   Specimen UA  Urine, Clean Catch   Color, UA Yellow, Straw, Nini  Yellow   Appearance, UA Clear  Clear   Spec Grav UA 1.005 - 1.030  1.025   pH, UA 5.0 - 8.0  6.0   Protein, UA Negative   Negative   Glucose, UA Negative  Negative   Ketones, UA Negative  Negative   Blood, UA Negative  Negative   NITRITE UA Negative  Negative   UROBILINOGEN UA <2.0 EU/dL 2.0-3.0 !   Bilirubin (UA) Negative  Negative   Leukocyte Esterase, UA Negative  Negative   Urine Creatinine 15.0 - 325.0 mg/dL 236.1   Urine Protein 0 - 15 mg/dL 16 (H)   Urine Protein/Creatinine Ratio 0.00 - 0.20  0.07   !: Data is abnormal  (H): Data is abnormally high    ASSESSMENT:    SLE: SLEDAI 0  H/o scleritis in remission (post rituximab 1000mg IV 8/24/15, 9/15/15 and  4/29/16)  Fibromyalgia  Low bone density DXA 5/31/24 :  FRAX hip 1.1%  MOP 4.7%  OA knees  Bilateral rotator cuff tendinitis  EH    Tobacco abuse  LDL 63.2 6/8/23      PLAN:         Lumbar spine x-ray  Ref to Back and Spine  Ref to PT  Lipid panel  STOP SMOKING she felt she was harrassed by Smoking Cessation and stopped  Continue hydroxychloroquine 300mg daily with annual Ophthalmology check overdue but was unable to schedule appt, but will do so now.  Referral placed to facilitate  Nick Chi, yoga on [employer]  Walking program  Mediterranean diet  RTC 6 months with standing  lupus labs

## 2024-06-06 ENCOUNTER — HOSPITAL ENCOUNTER (OUTPATIENT)
Dept: RADIOLOGY | Facility: OTHER | Age: 64
Discharge: HOME | End: 2024-06-06
Attending: INTERNAL MEDICINE
Payer: MEDICARE

## 2024-06-06 DIAGNOSIS — M54.50 LOW BACK PAIN, UNSPECIFIED BACK PAIN LATERALITY, UNSPECIFIED CHRONICITY, UNSPECIFIED WHETHER SCIATICA PRESENT: ICD-10-CM

## 2024-06-06 PROCEDURE — 72100 X-RAY EXAM L-S SPINE 2/3 VWS: CPT | Mod: TC,FY

## 2024-06-06 PROCEDURE — 72100 X-RAY EXAM L-S SPINE 2/3 VWS: CPT | Mod: 26,,, | Performed by: RADIOLOGY

## 2024-06-06 NOTE — PROGRESS NOTES
DATE: 6/13/2024  PATIENT: Breanna Guido    Supervising Physician: Terrence Lang M.D.    CHIEF COMPLAINT: back pain    HISTORY:  Breanna Guido is a 64 y.o. female here for initial evaluation of low back bilateral (L>R) leg pain (Back - 7, Leg - 5).  The pain in the low back is what bothers her most.  The pain has been present for years. The patient describes the pain as dull.  The pain is worse with walking and improved by nothing. There is associated numbness and tingling. There is intermittent subjective weakness in the left leg. Prior treatments have included Lyrica, Aleve, Tylenol, Flexeril, Cymbalta, ESIs in 2017, but no surgery.  The patient denies myelopathic symptoms such as handwriting changes or difficulty with buttons/coins/keys. Denies perineal paresthesias, bowel/bladder dysfunction.    PAST MEDICAL/SURGICAL HISTORY:  Past Medical History:   Diagnosis Date    Anxiety     Arthritis     Bipolar 1 disorder     Cataract     COPD (chronic obstructive pulmonary disease)     Depression     bipolar 2    Eyelid lesion     Fibromyalgia     H/O corneal abrasion 2001    left eye with lead-based paint chips    Hyperlipidemia     Hypertension     Lupus     Migraine headache     Scleritis     Thyroid disease      Past Surgical History:   Procedure Laterality Date    HYSTERECTOMY      for sterilization, ovaries intact    TRIGGER FINGER RELEASE Right 7/28/2021    Procedure: RELEASE, TRIGGER FINGER, right ring & small;  Surgeon: Mellissa Guadarrama MD;  Location: [facility];  Service: Orthopedics;  Laterality: Right;  REGIONAL MAC       Medications:   Current Outpatient Medications on File Prior to Visit   Medication Sig Dispense Refill    albuterol (PROVENTIL/VENTOLIN HFA) 90 mcg/actuation inhaler Inhale 2 puffs into the lungs every 6 (six) hours as needed for Wheezing. Rescue 54 g 2    alprazolam (XANAX) 2 MG Tab Take 2 mg by mouth 2 (two) times daily as needed (pt takes 1/2 tab BID and 1 tab  at  night).      amLODIPine (NORVASC) 5 MG tablet Take 1 tablet (5 mg total) by mouth once daily. 90 tablet 3    DULoxetine (CYMBALTA) 30 MG capsule Take 30 mg by mouth.      duloxetine (CYMBALTA) 60 MG capsule Take one capsule along with cymbalta 30mg daily to equal 90mg daily 90 capsule 3    fluticasone-umeclidin-vilanter (TRELEGY ELLIPTA) 100-62.5-25 mcg DsDv Inhale 1 puff into the lungs once daily. 180 each 3    hydroxychloroquine (PLAQUENIL) 200 mg tablet Take 1.5 tablets (300 mg total) by mouth once daily. 135 tablet 0    levothyroxine (SYNTHROID) 88 MCG tablet TAKE 1 TABLET(88 MCG) BY MOUTH BEFORE BREAKFAST 90 tablet 3    losartan (COZAAR) 100 MG tablet Take 1 tablet (100 mg total) by mouth once daily. 90 tablet 3    pantoprazole (PROTONIX) 40 MG tablet Take 1 tablet (40 mg total) by mouth once daily. 30 tablet 11    pregabalin (LYRICA) 150 MG capsule Take 1 capsule (150 mg total) by mouth 2 (two) times daily. Generic is fine 180 capsule 1    quetiapine (SEROQUEL) 300 MG Tab Take 300 mg by mouth once daily.       rosuvastatin (CRESTOR) 40 MG Tab Take 1 tablet (40 mg total) by mouth every evening. 90 tablet 3    sumatriptan (IMITREX) 50 MG tablet Take 1 tablet (50 mg total) by mouth every 2 (two) hours as needed for Migraine. Do not take more than 4 tablets total in 24 hours 30 tablet 0    vitamin D 1000 units Tab Take 185 mg by mouth once daily.      butalbital-acetaminophen-caffeine -40 mg (FIORICET, ESGIC) -40 mg per tablet Take 1 tablet by mouth every 6 (six) hours as needed for Pain. 20 tablet 0    diclofenac sodium (VOLTAREN) 1 % Gel Apply 2 g topically 4 (four) times daily. 3 each 2     No current facility-administered medications on file prior to visit.       Social History:   Social History     Socioeconomic History    Marital status:    Tobacco Use    Smoking status: Every Day     Current packs/day: 0.00     Average packs/day: 1 pack/day for 46.7 years (46.7 ttl pk-yrs)     Types:  Cigarettes     Start date: 5/2/1974     Last attempt to quit: 1/1/2021     Years since quitting: 3.4    Smokeless tobacco: Never   Substance and Sexual Activity    Alcohol use: Yes     Alcohol/week: 5.0 standard drinks of alcohol     Types: 5 Glasses of wine per week     Comment: 0.5 to 1 bottle of wine per day     Drug use: Yes     Types: Marijuana     Comment: occasionally    Sexual activity: Not Currently     Birth control/protection: Abstinence, Surgical     Comment: Divored     Social Determinants of Health     Financial Resource Strain: Medium Risk (6/7/2024)    Overall Financial Resource Strain (CARDIA)     Difficulty of Paying Living Expenses: Somewhat hard   Food Insecurity: No Food Insecurity (6/7/2024)    Hunger Vital Sign     Worried About Running Out of Food in the Last Year: Never true     Ran Out of Food in the Last Year: Never true   Transportation Needs: Unmet Transportation Needs (10/4/2022)    PRAPARE - Transportation     Lack of Transportation (Medical): Yes     Lack of Transportation (Non-Medical): Yes   Physical Activity: Insufficiently Active (6/7/2024)    Exercise Vital Sign     Days of Exercise per Week: 4 days     Minutes of Exercise per Session: 30 min   Stress: Stress Concern Present (6/7/2024)    [employer] [place] [employer] - Occupational Stress Questionnaire     Feeling of Stress : To some extent   Housing Stability: High Risk (10/4/2022)    Housing Stability Vital Sign     Unable to Pay for Housing in the Last Year: No     Number of Places Lived in the Last Year: 3     Unstable Housing in the Last Year: No       REVIEW OF SYSTEMS:  Constitution: Negative. Negative for chills, fever and night sweats.   Cardiovascular: Negative for chest pain and syncope.   Respiratory: Negative for cough and shortness of breath.   Gastrointestinal: See HPI. Negative for nausea/vomiting. Negative for abdominal pain.  Genitourinary: See HPI. Negative for discoloration or dysuria.  Skin:  Negative for dry skin, itching and rash.   Hematologic/Lymphatic: Negative for bleeding problem. Does not bruise/bleed easily.   Musculoskeletal: Negative for falls and muscle weakness.   Neurological: See HPI. No seizures.   Endocrine: Negative for polydipsia, polyphagia and polyuria.   Allergic/Immunologic: Negative for hives and persistent infections.     EXAM:  Ht 5' (1.524 m)   Wt 69.9 kg (154 lb 1.6 oz)   BMI 30.10 kg/m²     General: The patient is a 64 y.o. female in no apparent distress, the patient is oriented to person, place and time.  Psych: Normal mood and affect  HEENT: Vision grossly intact, hearing intact to the spoken word.  Lungs: Respirations unlabored.  Gait: Normal station and gait, no difficulty with toe or heel walk.   Skin: Dorsal lumbar skin negative for rashes, lesions, hairy patches and surgical scars. There is mild lumbar tenderness to palpation.  Range of motion: Lumbar range of motion is acceptable.  Spinal Balance: Global saggital and coronal spinal balance acceptable, not significant for scoliosis and kyphosis.  Musculoskeletal: No pain with the range of motion of the bilateral hips. No trochanteric tenderness to palpation.  Vascular: Bilateral lower extremities warm and well perfused, dorsalis pedis pulses 2+ bilaterally.  Neurological: Normal strength and tone in all major motor groups in the bilateral lower extremities. Normal sensation to light touch in the L2-S1 dermatomes bilaterally.  Deep tendon reflexes symmetric 2+ in the bilateral lower extremities.  Negative Babinski bilaterally. Straight leg raise negative bilaterally.    IMAGING:      Today I personally reviewed AP, Lat and Flex/Ex  upright L-spine films that demonstrate stepwise retrolisthesis from L2-5. Severe DDD throughout.       Body mass index is 30.1 kg/m².    Hemoglobin A1C   Date Value Ref Range Status   09/13/2017 5.4 4.0 - 5.6 % Final     Comment:     According to ADA guidelines, hemoglobin A1c <7.0%  represents  optimal control in non-pregnant diabetic patients. Different  metrics may apply to specific patient populations.   Standards of Medical Care in Diabetes-2016.  For the purpose of screening for the presence of diabetes:  <5.7%     Consistent with the absence of diabetes  5.7-6.4%  Consistent with increasing risk for diabetes   (prediabetes)  >or=6.5%  Consistent with diabetes  Currently, no consensus exists for use of hemoglobin A1c  for diagnosis of diabetes for children.  This Hemoglobin A1c assay has significant interference with fetal   hemoglobin   (HbF). The results are invalid for patients with abnormal amounts of   HbF,   including those with known Hereditary Persistence   of Fetal Hemoglobin. Heterozygous hemoglobin variants (HbAS, HbAC,   HbAD, HbAE, HbA2) do not significantly interfere with this assay;   however, presence of multiple variants in a sample may impact the %   interference.             ASSESSMENT/PLAN:    Breanna was seen today for low-back pain, back pain and neck pain.    Diagnoses and all orders for this visit:    Dorsalgia, unspecified  -     MRI Lumbar Spine Without Contrast; Future    Chronic bilateral low back pain with bilateral sciatica  -     Ambulatory referral/consult to Back & Spine Clinic  -     meloxicam (MOBIC) 15 MG tablet; Take 1 tablet (15 mg total) by mouth daily as needed for Pain.  -     MRI Lumbar Spine Without Contrast; Future      Today we discussed at length all of the different treatment options including anti-inflammatories, acetaminophen, rest, ice, heat, physical therapy including strengthening and stretching exercises, home exercises, ROM, aerobic conditioning, aqua therapy, other modalities including ultrasound, massage, and dry needling, epidural steroid injections and finally surgical intervention.  MRI ordered, I will call with results and order an MARITZA.

## 2024-06-07 ENCOUNTER — PATIENT MESSAGE (OUTPATIENT)
Dept: RHEUMATOLOGY | Facility: CLINIC | Age: 64
End: 2024-06-07
Payer: MEDICARE

## 2024-06-13 ENCOUNTER — PATIENT MESSAGE (OUTPATIENT)
Dept: INTERNAL MEDICINE | Facility: CLINIC | Age: 64
End: 2024-06-13
Payer: MEDICARE

## 2024-06-13 ENCOUNTER — OFFICE VISIT (OUTPATIENT)
Dept: ORTHOPEDICS | Facility: CLINIC | Age: 64
End: 2024-06-13
Payer: MEDICARE

## 2024-06-13 VITALS — WEIGHT: 154.13 LBS | BODY MASS INDEX: 30.26 KG/M2 | HEIGHT: 60 IN

## 2024-06-13 DIAGNOSIS — M54.41 CHRONIC BILATERAL LOW BACK PAIN WITH BILATERAL SCIATICA: ICD-10-CM

## 2024-06-13 DIAGNOSIS — G89.29 CHRONIC BILATERAL LOW BACK PAIN WITH BILATERAL SCIATICA: ICD-10-CM

## 2024-06-13 DIAGNOSIS — M54.42 CHRONIC BILATERAL LOW BACK PAIN WITH BILATERAL SCIATICA: ICD-10-CM

## 2024-06-13 DIAGNOSIS — M54.9 DORSALGIA, UNSPECIFIED: Primary | ICD-10-CM

## 2024-06-13 PROCEDURE — 99999 PR PBB SHADOW E&M-EST. PATIENT-LVL IV: CPT | Mod: PBBFAC,,, | Performed by: REGISTERED NURSE

## 2024-06-13 PROCEDURE — 1159F MED LIST DOCD IN RCRD: CPT | Mod: CPTII,S$GLB,, | Performed by: REGISTERED NURSE

## 2024-06-13 PROCEDURE — 1125F AMNT PAIN NOTED PAIN PRSNT: CPT | Mod: CPTII,S$GLB,, | Performed by: REGISTERED NURSE

## 2024-06-13 PROCEDURE — 3288F FALL RISK ASSESSMENT DOCD: CPT | Mod: CPTII,S$GLB,, | Performed by: REGISTERED NURSE

## 2024-06-13 PROCEDURE — 99214 OFFICE O/P EST MOD 30 MIN: CPT | Mod: S$GLB,,, | Performed by: REGISTERED NURSE

## 2024-06-13 PROCEDURE — 4010F ACE/ARB THERAPY RXD/TAKEN: CPT | Mod: CPTII,S$GLB,, | Performed by: REGISTERED NURSE

## 2024-06-13 PROCEDURE — 3008F BODY MASS INDEX DOCD: CPT | Mod: CPTII,S$GLB,, | Performed by: REGISTERED NURSE

## 2024-06-13 PROCEDURE — 1160F RVW MEDS BY RX/DR IN RCRD: CPT | Mod: CPTII,S$GLB,, | Performed by: REGISTERED NURSE

## 2024-06-13 PROCEDURE — 1101F PT FALLS ASSESS-DOCD LE1/YR: CPT | Mod: CPTII,S$GLB,, | Performed by: REGISTERED NURSE

## 2024-06-13 RX ORDER — MELOXICAM 15 MG/1
15 TABLET ORAL DAILY PRN
Qty: 30 TABLET | Refills: 0 | Status: SHIPPED | OUTPATIENT
Start: 2024-06-13

## 2024-06-21 ENCOUNTER — TELEPHONE (OUTPATIENT)
Dept: INTERNAL MEDICINE | Facility: CLINIC | Age: 64
End: 2024-06-21
Payer: MEDICARE

## 2024-06-21 NOTE — TELEPHONE ENCOUNTER
----- Message from Miri Gaspar sent at 6/21/2024  1:18 PM CDT -----  Regarding: appt NP  Type:  Patient Returning Call      Name of who is calling:patient         What is request in detail:patient is requesting a call back to establish care. She is one of Dr. Matias's patients but nothing is available on line.        Can clinic reply by [facility]:no        What number to call back if not in [facility]:[phone]

## 2024-06-24 NOTE — PROGRESS NOTES
Established Patient - Audio Only Telehealth Visit     The patient location is: [place]  The chief complaint leading to consultation is: MRI results  Visit type: Virtual visit with audio only (telephone)  Total time spent with patient: 15min     The reason for the audio only service rather than synchronous audio and video virtual visit was related to technical difficulties or patient preference/necessity.     Each patient to whom I provide medical services by telemedicine is:  (1) informed of the relationship between the physician and patient and the respective role of any other health care provider with respect to management of the patient; and (2) notified that they may decline to receive medical services by telemedicine and may withdraw from such care at any time. Patient verbally consented to receive this service via voice-only telephone call.    DATE: 6/28/2024  PATIENT: Breanna Guido    Attending Physician: Terrence Lang M.D.    HISTORY:  Breanna Guido is a 64 y.o. female who returns to me today for MRI results.  She was last seen by me 6/13/2024.  Today she is doing well but notes low back bilateral (L>R) leg pain (Back - 7, Leg - 5).   The Patient denies myelopathic symptoms such as handwriting changes or difficulty with buttons/coins/keys. Denies perineal paresthesias, bowel/bladder dysfunction.    PMH/PSH/FamHx/SocHx:  Unchanged from prior visit    ROS:  REVIEW OF SYSTEMS:  Constitution: Negative. Negative for chills, fever and night sweats.   HENT: Negative for congestion and headaches.    Eyes: Negative for blurred vision, left vision loss and right vision loss.   Cardiovascular: Negative for chest pain and syncope.   Respiratory: Negative for cough and shortness of breath.    Endocrine: Negative for polydipsia, polyphagia and polyuria.   Hematologic/Lymphatic: Negative for bleeding problem. Does not bruise/bleed easily.   Skin: Negative for dry skin, itching and rash.   Musculoskeletal:  Negative for falls and muscle weakness.   Gastrointestinal: Negative for abdominal pain and bowel incontinence.   Allergic/Immunologic: Negative for hives and persistent infections.  Genitourinary: Negative for urinary retention/incontinence and nocturia.   Neurological: negative for disturbances in coordination, no myelopathic symptoms such as handwriting changes or difficulty with buttons, coins, keys or small objects. No loss of balance and seizures.   Psychiatric/Behavioral: Negative for depression. The patient does not have insomnia.   Denies perineal paresthesias, bowel or bladder incontinence    EXAM:  There were no vitals taken for this visit.  Stable.     IMAGING:    Today I personally re- reviewed AP, Lat and Flex/Ex  upright L-spine that demonstrate stepwise retrolisthesis from L2-5. Severe DDD throughout.     Lumbar MRI shows severe stenosis at L2/3 and L3/4. Moderate stenosis from L4-S1. Multilevel foraminal narrowing.     There is no height or weight on file to calculate BMI.    Hemoglobin A1C   Date Value Ref Range Status   09/13/2017 5.4 4.0 - 5.6 % Final     Comment:     According to ADA guidelines, hemoglobin A1c <7.0% represents  optimal control in non-pregnant diabetic patients. Different  metrics may apply to specific patient populations.   Standards of Medical Care in Diabetes-2016.  For the purpose of screening for the presence of diabetes:  <5.7%     Consistent with the absence of diabetes  5.7-6.4%  Consistent with increasing risk for diabetes   (prediabetes)  >or=6.5%  Consistent with diabetes  Currently, no consensus exists for use of hemoglobin A1c  for diagnosis of diabetes for children.  This Hemoglobin A1c assay has significant interference with fetal   hemoglobin   (HbF). The results are invalid for patients with abnormal amounts of   HbF,   including those with known Hereditary Persistence   of Fetal Hemoglobin. Heterozygous hemoglobin variants (HbAS, HbAC,   HbAD, HbAE, HbA2) do not  significantly interfere with this assay;   however, presence of multiple variants in a sample may impact the %   interference.           ASSESSMENT/PLAN:    Diagnoses and all orders for this visit:    Spinal stenosis of lumbar region with neurogenic claudication  -     Procedure Order to Pain Management; Future      MARITZA ordered. She will follow up 2 weeks after. If her pain persists, I will discuss surgery.      This service was not originating from a related E/M service provided within the previous 7 days nor will  to an E/M service or procedure within the next 24 hours or my soonest available appointment.  Prevailing standard of care was able to be met in this audio-only visit.

## 2024-06-27 ENCOUNTER — HOSPITAL ENCOUNTER (OUTPATIENT)
Dept: RADIOLOGY | Facility: OTHER | Age: 64
Discharge: HOME | End: 2024-06-27
Attending: REGISTERED NURSE
Payer: MEDICARE

## 2024-06-27 DIAGNOSIS — M54.41 CHRONIC BILATERAL LOW BACK PAIN WITH BILATERAL SCIATICA: ICD-10-CM

## 2024-06-27 DIAGNOSIS — M54.9 DORSALGIA, UNSPECIFIED: ICD-10-CM

## 2024-06-27 DIAGNOSIS — M54.42 CHRONIC BILATERAL LOW BACK PAIN WITH BILATERAL SCIATICA: ICD-10-CM

## 2024-06-27 DIAGNOSIS — G89.29 CHRONIC BILATERAL LOW BACK PAIN WITH BILATERAL SCIATICA: ICD-10-CM

## 2024-06-27 PROCEDURE — 72148 MRI LUMBAR SPINE W/O DYE: CPT | Mod: TC

## 2024-06-27 PROCEDURE — 72148 MRI LUMBAR SPINE W/O DYE: CPT | Mod: 26,,, | Performed by: RADIOLOGY

## 2024-06-28 ENCOUNTER — PATIENT MESSAGE (OUTPATIENT)
Dept: PAIN MEDICINE | Facility: OTHER | Age: 64
End: 2024-06-28
Payer: MEDICARE

## 2024-06-28 ENCOUNTER — OFFICE VISIT (OUTPATIENT)
Dept: ORTHOPEDICS | Facility: CLINIC | Age: 64
End: 2024-06-28
Payer: MEDICARE

## 2024-06-28 ENCOUNTER — TELEPHONE (OUTPATIENT)
Dept: INTERNAL MEDICINE | Facility: CLINIC | Age: 64
End: 2024-06-28
Payer: MEDICARE

## 2024-06-28 DIAGNOSIS — M48.062 SPINAL STENOSIS OF LUMBAR REGION WITH NEUROGENIC CLAUDICATION: Primary | ICD-10-CM

## 2024-06-28 NOTE — TELEPHONE ENCOUNTER
----- Message from Geniadylon Faganling sent at 6/28/2024 12:02 PM CDT -----  Regarding: Returning call  Type: Patient Call Back    Who called:    What is the request in detail: p/t is returning call to Umberto regarding getting her set up with a new primary care since provider is leaving. Please call to assist.     Can the clinic reply by [facility]? Yes     Would the patient rather a call back or a response via [facility]?     Best call back number: [phone] (home)       Additional Information:

## 2024-07-07 ENCOUNTER — PATIENT MESSAGE (OUTPATIENT)
Dept: ORTHOPEDICS | Facility: CLINIC | Age: 64
End: 2024-07-07
Payer: MEDICARE

## 2024-07-11 ENCOUNTER — OFFICE VISIT (OUTPATIENT)
Dept: OPTOMETRY | Facility: CLINIC | Age: 64
End: 2024-07-11
Payer: MEDICARE

## 2024-07-11 ENCOUNTER — HOSPITAL ENCOUNTER (OUTPATIENT)
Facility: OTHER | Age: 64
Discharge: HOME | End: 2024-07-11
Attending: STUDENT IN AN ORGANIZED HEALTH CARE EDUCATION/TRAINING PROGRAM | Admitting: ANESTHESIOLOGY
Payer: MEDICARE

## 2024-07-11 VITALS
HEIGHT: 63 IN | OXYGEN SATURATION: 97 % | WEIGHT: 150 LBS | RESPIRATION RATE: 16 BRPM | BODY MASS INDEX: 26.58 KG/M2 | TEMPERATURE: 98 F | SYSTOLIC BLOOD PRESSURE: 129 MMHG | HEART RATE: 62 BPM | DIASTOLIC BLOOD PRESSURE: 74 MMHG

## 2024-07-11 DIAGNOSIS — H52.4 REGULAR ASTIGMATISM OF BOTH EYES WITH PRESBYOPIA: ICD-10-CM

## 2024-07-11 DIAGNOSIS — Z13.5 GLAUCOMA SCREENING: ICD-10-CM

## 2024-07-11 DIAGNOSIS — H25.13 NUCLEAR SCLEROSIS, BILATERAL: ICD-10-CM

## 2024-07-11 DIAGNOSIS — Z79.899 LONG-TERM USE OF PLAQUENIL: ICD-10-CM

## 2024-07-11 DIAGNOSIS — M54.16 LUMBAR BACK PAIN WITH RADICULOPATHY AFFECTING LEFT LOWER EXTREMITY: ICD-10-CM

## 2024-07-11 DIAGNOSIS — G89.29 CHRONIC PAIN: ICD-10-CM

## 2024-07-11 DIAGNOSIS — H52.223 REGULAR ASTIGMATISM OF BOTH EYES WITH PRESBYOPIA: ICD-10-CM

## 2024-07-11 DIAGNOSIS — M32.9 LUPUS: Primary | Chronic | ICD-10-CM

## 2024-07-11 PROCEDURE — 62323 NJX INTERLAMINAR LMBR/SAC: CPT | Performed by: STUDENT IN AN ORGANIZED HEALTH CARE EDUCATION/TRAINING PROGRAM

## 2024-07-11 PROCEDURE — 62323 NJX INTERLAMINAR LMBR/SAC: CPT | Mod: ,,, | Performed by: STUDENT IN AN ORGANIZED HEALTH CARE EDUCATION/TRAINING PROGRAM

## 2024-07-11 PROCEDURE — 63600175 PHARM REV CODE 636 W HCPCS: Performed by: STUDENT IN AN ORGANIZED HEALTH CARE EDUCATION/TRAINING PROGRAM

## 2024-07-11 PROCEDURE — A4216 STERILE WATER/SALINE, 10 ML: HCPCS | Performed by: STUDENT IN AN ORGANIZED HEALTH CARE EDUCATION/TRAINING PROGRAM

## 2024-07-11 PROCEDURE — 25000003 PHARM REV CODE 250: Performed by: STUDENT IN AN ORGANIZED HEALTH CARE EDUCATION/TRAINING PROGRAM

## 2024-07-11 PROCEDURE — 99999 PR PBB SHADOW E&M-EST. PATIENT-LVL IV: CPT | Mod: PBBFAC,,, | Performed by: OPTOMETRIST

## 2024-07-11 PROCEDURE — 25500020 PHARM REV CODE 255: Performed by: STUDENT IN AN ORGANIZED HEALTH CARE EDUCATION/TRAINING PROGRAM

## 2024-07-11 RX ORDER — SODIUM CHLORIDE 9 MG/ML
INJECTION, SOLUTION INTRAMUSCULAR; INTRAVENOUS; SUBCUTANEOUS
Status: DISCONTINUED | OUTPATIENT
Start: 2024-07-11 | End: 2024-07-11 | Stop reason: HOSPADM

## 2024-07-11 RX ORDER — SODIUM CHLORIDE 9 MG/ML
INJECTION, SOLUTION INTRAVENOUS CONTINUOUS
Status: DISCONTINUED | OUTPATIENT
Start: 2024-07-11 | End: 2024-07-11 | Stop reason: HOSPADM

## 2024-07-11 RX ORDER — LIDOCAINE HYDROCHLORIDE 20 MG/ML
INJECTION, SOLUTION INFILTRATION; PERINEURAL
Status: DISCONTINUED | OUTPATIENT
Start: 2024-07-11 | End: 2024-07-11 | Stop reason: HOSPADM

## 2024-07-11 RX ORDER — MIDAZOLAM HYDROCHLORIDE 1 MG/ML
INJECTION INTRAMUSCULAR; INTRAVENOUS
Status: DISCONTINUED | OUTPATIENT
Start: 2024-07-11 | End: 2024-07-11 | Stop reason: HOSPADM

## 2024-07-11 RX ORDER — LIDOCAINE HYDROCHLORIDE 10 MG/ML
INJECTION, SOLUTION EPIDURAL; INFILTRATION; INTRACAUDAL; PERINEURAL
Status: DISCONTINUED | OUTPATIENT
Start: 2024-07-11 | End: 2024-07-11 | Stop reason: HOSPADM

## 2024-07-11 RX ORDER — FENTANYL CITRATE 50 UG/ML
INJECTION, SOLUTION INTRAMUSCULAR; INTRAVENOUS
Status: DISCONTINUED | OUTPATIENT
Start: 2024-07-11 | End: 2024-07-11 | Stop reason: HOSPADM

## 2024-07-11 RX ORDER — DEXAMETHASONE SODIUM PHOSPHATE 10 MG/ML
INJECTION INTRAMUSCULAR; INTRAVENOUS
Status: DISCONTINUED | OUTPATIENT
Start: 2024-07-11 | End: 2024-07-11 | Stop reason: HOSPADM

## 2024-07-11 NOTE — DISCHARGE INSTRUCTIONS

## 2024-07-11 NOTE — PROGRESS NOTES
NOTES        10-2  PLAQ HVF   DONE-OU        MAC OCT   OU- DONE        MRX   -0.25 +1.00 135  +0.75 +1.75 025    RELAXATION & FIXATION- GOOD-OU   COOPERATION- GOOD          PATIENT DENIES ANY ALLERGIES TO LATEX OR ADHESIVES AT THIS TIME.        TR 7/11/2024

## 2024-07-11 NOTE — PROGRESS NOTES
HPI    Room 2     Annual Plaq Eye Exam   Pt states vision is poor @ Near   Pt reports does not see as well as she use to     Pt denies Flashes   Pt reports Floaters- Stable      Pt denies Dry/ Itchy/ Burning  Gtt: No    Pt has been using Plaquenil for 15+ Years   Pt is currently taking 300 Daily PO   Pt denies Dry/ Itchy/ Burning       Last edited by Eduardo Knight, OD on 7/11/2024  9:10 AM.            Assessment /Plan     For exam results, see Encounter Report.    Lupus  -     Garrido Visual Field - OU - Extended - Both Eyes  -     OCT, Retina - OU - Both Eyes  Long-term use of Plaquenil  -     Ambulatory referral/consult to Ophthalmology  -     Garrido Visual Field - OU - Extended - Both Eyes  -     OCT, Retina - OU - Both Eyes  -No retinopathy noted today. RTC 12 mos for dilated fundus exam, color vision screening, garrido visual field 10-2, OCT.    Nuclear sclerosis, bilateral  -Educated patient on presence of cataracts at today's exam, monitor at annual dilated fundus exam. 5+ years surgical estimate.    Glaucoma screening  -Monitor with annual eye exam and IOP check    Regular astigmatism of both eyes with presbyopia  Eyeglass Final Rx       Eyeglass Final Rx         Sphere Cylinder Axis Dist VA Add    Right -0.25 +1.25 135 20/30+ +2.50    Left +0.75 +1.50 025 20/30+       Type: PAL    Expiration Date: 7/11/2025                      RTC 1 yr

## 2024-07-11 NOTE — OP NOTE
Lumbar Interlaminar Epidural Steroid Injection under Fluoroscopic Guidance    The procedure, risks, benefits, and options were discussed with the patient. There are no contraindications to the procedure. The patent expressed understanding and agreed to the procedure. Informed written consent was obtained prior to the start of the procedure and can be found in the patient's chart.    PATIENT NAME: Breanna Guido   MRN: [id number]     DATE OF PROCEDURE: 07/11/2024    PROCEDURE: Lumbar Interlaminar Epidural Steroid Injection L3/L4 under Fluoroscopic Guidance    PRE-OP DIAGNOSIS: Spinal stenosis of lumbar region with neurogenic claudication [M48.062] Lumbar radiculopathy [M54.16]    POST-OP DIAGNOSIS: Same    PHYSICIAN: Osman Eldridge MD    ASSISTANTS: Arya Behkradi, MD     MEDICATIONS INJECTED: Preservative-free Decadron 10mg with 4cc of Lidocaine 1% MPF and preservative free normal saline    LOCAL ANESTHETIC INJECTED: Xylocaine 2%     SEDATION: Versed 2mg and Fentanyl 50mcg                                                                                                                                                                                     Conscious sedation ordered by MEILEEN. Patient re-evaluation prior to administration of conscious sedation. No changes noted in patient's status from initial evaluation. The patient's vital signs were monitored by RN and patient remained hemodynamically stable throughout the procedure.    Event Time In   Sedation Start 1035   Sedation End 1044       ESTIMATED BLOOD LOSS: None    COMPLICATIONS: None    TECHNIQUE: Time-out was performed to identify the patient and procedure to be performed. With the patient laying in a prone position, the surgical area was prepped and draped in the usual sterile fashion using ChloraPrep and a fenestrated drape. The level was determined under fluoroscopy guidance. Skin anesthesia was achieved by injecting Lidocaine 2% over the injection site. The  interlaminar space was then approached with a 20 gauge,  3.5 inch Tuohy needle that was introduced under fluoroscopic guidance in the AP, lateral and/or contralateral oblique imaging. Once the Ligamentum flavum was encountered loss of resistance to saline was used to enter the epidural space. With positive loss of resistance and negative aspiration for CSF or Blood, contrast dye Omnipaque (300mg/mL) was injected to confirm placement and there was no vascular runoff. 5 mL of the medication mixture listed above was injected slowly. Displacement of the radio opaque contrast after injection of the medication confirmed that the medication went into the area of the epidural space. The needles were removed and bleeding was nil. A sterile dressing was applied. No specimens collected. The patient tolerated the procedure well.     The patient was monitored after the procedure in the recovery area. They were given post-procedure and discharge instructions to follow at home. The patient was discharged in a stable condition.    Osman Eldridge MD

## 2024-07-11 NOTE — DISCHARGE SUMMARY
Discharge Note  Short Stay      SUMMARY     Admit Date: 7/11/2024    Attending Physician: Osman Eldridge MD PhD    Discharge Physician: Osman Eldridge      Discharge Date: 7/11/2024 10:33 AM    Procedure(s) (LRB):  LUMBAR L3/4 MARITZA DIRECT REFERRAL (N/A)    Final Diagnosis: Spinal stenosis of lumbar region with neurogenic claudication [M48.062]    Disposition: Home or self care    Patient Instructions:   Current Discharge Medication List        CONTINUE these medications which have NOT CHANGED    Details   albuterol (PROVENTIL/VENTOLIN HFA) 90 mcg/actuation inhaler Inhale 2 puffs into the lungs every 6 (six) hours as needed for Wheezing. Rescue  Qty: 54 g, Refills: 2    Comments: Attention pharmacy: Order is for 3 inhalers. Patient has been using 8.5g sized inhalers; may substitute size as insurance requirements fluctuate.  Associated Diagnoses: Chronic obstructive pulmonary disease, unspecified COPD type      alprazolam (XANAX) 2 MG Tab Take 2 mg by mouth 2 (two) times daily as needed (pt takes 1/2 tab BID and 1 tab  at night).      amLODIPine (NORVASC) 5 MG tablet Take 1 tablet (5 mg total) by mouth once daily.  Qty: 90 tablet, Refills: 3    Comments: ZERO refills remain on this prescription. Your patient is requesting advance approval of refills for this medication to PREVENT ANY MISSED DOSES - .      !! DULoxetine (CYMBALTA) 30 MG capsule Take 30 mg by mouth.      !! duloxetine (CYMBALTA) 60 MG capsule Take one capsule along with cymbalta 30mg daily to equal 90mg daily  Qty: 90 capsule, Refills: 3      fluticasone-umeclidin-vilanter (TRELEGY ELLIPTA) 100-62.5-25 mcg DsDv Inhale 1 puff into the lungs once daily.  Qty: 180 each, Refills: 3    Associated Diagnoses: Chronic obstructive pulmonary disease, unspecified COPD type      hydroxychloroquine (PLAQUENIL) 200 mg tablet Take 1.5 tablets (300 mg total) by mouth once daily.  Qty: 135 tablet, Refills: 0    Associated Diagnoses: Other systemic lupus erythematosus with  other organ involvement      levothyroxine (SYNTHROID) 88 MCG tablet TAKE 1 TABLET(88 MCG) BY MOUTH BEFORE BREAKFAST  Qty: 90 tablet, Refills: 3    Comments: ZERO refills remain on this prescription. Your patient is requesting advance approval of refills for this medication to PREVENT ANY MISSED DOSES  Associated Diagnoses: Acquired hypothyroidism      losartan (COZAAR) 100 MG tablet Take 1 tablet (100 mg total) by mouth once daily.  Qty: 90 tablet, Refills: 3    Associated Diagnoses: Acquired hypothyroidism      meloxicam (MOBIC) 15 MG tablet Take 1 tablet (15 mg total) by mouth daily as needed for Pain.  Qty: 30 tablet, Refills: 0    Associated Diagnoses: Chronic bilateral low back pain with bilateral sciatica      pantoprazole (PROTONIX) 40 MG tablet Take 1 tablet (40 mg total) by mouth once daily.  Qty: 30 tablet, Refills: 11      pregabalin (LYRICA) 150 MG capsule Take 1 capsule (150 mg total) by mouth 2 (two) times daily. Generic is fine  Qty: 180 capsule, Refills: 1    Associated Diagnoses: Fibromyalgia      quetiapine (SEROQUEL) 300 MG Tab Take 300 mg by mouth once daily.       rosuvastatin (CRESTOR) 40 MG Tab Take 1 tablet (40 mg total) by mouth every evening.  Qty: 90 tablet, Refills: 3    Associated Diagnoses: Mixed hyperlipidemia      sumatriptan (IMITREX) 50 MG tablet Take 1 tablet (50 mg total) by mouth every 2 (two) hours as needed for Migraine. Do not take more than 4 tablets total in 24 hours  Qty: 30 tablet, Refills: 0    Associated Diagnoses: Migraine without status migrainosus, not intractable, unspecified migraine type      vitamin D 1000 units Tab Take 185 mg by mouth once daily.       !! - Potential duplicate medications found. Please discuss with provider.              Discharge Diagnosis: Spinal stenosis of lumbar region with neurogenic claudication [M48.062]  Condition on Discharge: Stable with no complications to procedure   Diet on Discharge: Same as before.  Activity: as per instruction  sheet.  Discharge to: Home with a responsible adult.  Follow up: 2-4 weeks       Please call my office or pager at [phone] if experienced any weakness or loss of sensation, fever > 101.5, pain uncontrolled with oral medications, persistent nausea/vomiting/or diarrhea, redness or drainage from the incisions, or any other worrisome concerns. If physician on call was not reached or could not communicate with our office for any reason please go to the nearest emergency department      Osman Eldridge MD PhD

## 2024-07-11 NOTE — H&P
HPI  Patient presenting for Procedure(s) (LRB):  LUMBAR L3/4 MARITZA DIRECT REFERRAL (N/A)     Patient on Anti-coagulation No    No health changes since previous encounter    Past Medical History:   Diagnosis Date    Anxiety     Arthritis     Bipolar 1 disorder     Cataract     COPD (chronic obstructive pulmonary disease)     Depression     bipolar 2    Eyelid lesion     Fibromyalgia     H/O corneal abrasion 2001    left eye with lead-based paint chips    Hyperlipidemia     Hypertension     Lupus     Migraine headache     Scleritis     Thyroid disease      Past Surgical History:   Procedure Laterality Date    HYSTERECTOMY      for sterilization, ovaries intact    TRIGGER FINGER RELEASE Right 7/28/2021    Procedure: RELEASE, TRIGGER FINGER, right ring & small;  Surgeon: Mellissa Guadarrama MD;  Location: [facility];  Service: Orthopedics;  Laterality: Right;  REGIONAL MAC     Review of patient's allergies indicates:   Allergen Reactions    Bactrim [sulfamethoxazole-trimethoprim] Nausea And Vomiting    Sulfa (sulfonamide antibiotics)      Agitation^      No current facility-administered medications for this encounter.     Current Outpatient Medications   Medication Sig    albuterol (PROVENTIL/VENTOLIN HFA) 90 mcg/actuation inhaler Inhale 2 puffs into the lungs every 6 (six) hours as needed for Wheezing. Rescue    alprazolam (XANAX) 2 MG Tab Take 2 mg by mouth 2 (two) times daily as needed (pt takes 1/2 tab BID and 1 tab  at night).    amLODIPine (NORVASC) 5 MG tablet Take 1 tablet (5 mg total) by mouth once daily.    DULoxetine (CYMBALTA) 30 MG capsule Take 30 mg by mouth.    duloxetine (CYMBALTA) 60 MG capsule Take one capsule along with cymbalta 30mg daily to equal 90mg daily    fluticasone-umeclidin-vilanter (TRELEGY ELLIPTA) 100-62.5-25 mcg DsDv Inhale 1 puff into the lungs once daily.    hydroxychloroquine (PLAQUENIL) 200 mg tablet Take 1.5 tablets (300 mg total) by mouth once daily.    levothyroxine (SYNTHROID) 88  MCG tablet TAKE 1 TABLET(88 MCG) BY MOUTH BEFORE BREAKFAST    losartan (COZAAR) 100 MG tablet Take 1 tablet (100 mg total) by mouth once daily.    meloxicam (MOBIC) 15 MG tablet Take 1 tablet (15 mg total) by mouth daily as needed for Pain.    pantoprazole (PROTONIX) 40 MG tablet Take 1 tablet (40 mg total) by mouth once daily.    pregabalin (LYRICA) 150 MG capsule Take 1 capsule (150 mg total) by mouth 2 (two) times daily. Generic is fine    quetiapine (SEROQUEL) 300 MG Tab Take 300 mg by mouth once daily.     rosuvastatin (CRESTOR) 40 MG Tab Take 1 tablet (40 mg total) by mouth every evening.    sumatriptan (IMITREX) 50 MG tablet Take 1 tablet (50 mg total) by mouth every 2 (two) hours as needed for Migraine. Do not take more than 4 tablets total in 24 hours    vitamin D 1000 units Tab Take 185 mg by mouth once daily.       PMHx, PSHx, Allergies, Medications reviewed in epic    ROS negative except pain complaints in HPI    OBJECTIVE:    There were no vitals taken for this visit.    PHYSICAL EXAMINATION:    GENERAL: Well appearing, in no acute distress, alert and oriented x3.  PSYCH:  Mood and affect appropriate.  SKIN: Skin color, texture, turgor normal, no rashes or lesions which will impact the procedure.  CV: RRR with palpation of the radial artery.  PULM: No evidence of respiratory difficulty, symmetric chest rise. Clear to auscultation.  NEURO: Cranial nerves grossly intact.    Plan:    Proceed with procedure as planned Procedure(s) (LRB):  LUMBAR L3/4 MARITZA DIRECT REFERRAL (N/A)    Fredrick Saez  07/11/2024

## 2024-07-14 ENCOUNTER — PATIENT MESSAGE (OUTPATIENT)
Dept: ADMINISTRATIVE | Facility: OTHER | Age: 64
End: 2024-07-14
Payer: MEDICARE

## 2024-07-26 ENCOUNTER — OFFICE VISIT (OUTPATIENT)
Dept: ORTHOPEDICS | Facility: CLINIC | Age: 64
End: 2024-07-26
Payer: MEDICARE

## 2024-07-26 DIAGNOSIS — M48.062 SPINAL STENOSIS OF LUMBAR REGION WITH NEUROGENIC CLAUDICATION: Primary | ICD-10-CM

## 2024-07-26 PROCEDURE — 99442 PR PHYSICIAN TELEPHONE EVALUATION 11-20 MIN: CPT | Mod: 95,,, | Performed by: REGISTERED NURSE

## 2024-07-26 PROCEDURE — 4010F ACE/ARB THERAPY RXD/TAKEN: CPT | Mod: CPTII,95,, | Performed by: REGISTERED NURSE

## 2024-07-29 ENCOUNTER — TELEPHONE (OUTPATIENT)
Dept: ORTHOPEDICS | Facility: CLINIC | Age: 64
End: 2024-07-29
Payer: MEDICARE

## 2024-07-29 ENCOUNTER — TELEPHONE (OUTPATIENT)
Dept: NEUROSURGERY | Facility: CLINIC | Age: 64
End: 2024-07-29
Payer: MEDICARE

## 2024-07-29 DIAGNOSIS — M48.062 SPINAL STENOSIS OF LUMBAR REGION WITH NEUROGENIC CLAUDICATION: Primary | ICD-10-CM

## 2024-07-29 NOTE — TELEPHONE ENCOUNTER
Attempt to contact patient to get her x-ray scheduled. Left message stating that she call back to [phone] to get her appointment scheduled for her x-ray. Thanks.

## 2024-07-29 NOTE — TELEPHONE ENCOUNTER
----- Message from Marsha Armand sent at 7/26/2024  4:38 PM CDT -----  Name of Who is Calling:EDI LINARES [[id number]]                What is the request in detail: Pt calling because she want to est care with you for back pain, she is  Osman Eldridge MD pt but want to be seen by you.Please call back to further assist.                 Can the clinic reply by [facility]: No                What Number to Call Back if not in [facility]: [phone]

## 2024-07-29 NOTE — TELEPHONE ENCOUNTER
Attempt to contact patient regarding her x-ray. Left message stating that her x-ray have been rescheduled for 9:30 am on 08/21/2024. Also left number for patient to return call back to [phone]. Thanks.

## 2024-07-29 NOTE — TELEPHONE ENCOUNTER
Patient stated that is the soonest. Stated to the provider. Stated that would be fine. Patient stated thank you. Thanks.

## 2024-08-01 ENCOUNTER — HOSPITAL ENCOUNTER (OUTPATIENT)
Dept: RADIOLOGY | Facility: OTHER | Age: 64
Discharge: HOME | End: 2024-08-01
Attending: REGISTERED NURSE
Payer: MEDICARE

## 2024-08-01 ENCOUNTER — OFFICE VISIT (OUTPATIENT)
Dept: INTERNAL MEDICINE | Facility: CLINIC | Age: 64
End: 2024-08-01
Attending: FAMILY MEDICINE
Payer: MEDICARE

## 2024-08-01 VITALS
BODY MASS INDEX: 27.34 KG/M2 | SYSTOLIC BLOOD PRESSURE: 130 MMHG | DIASTOLIC BLOOD PRESSURE: 76 MMHG | WEIGHT: 154.31 LBS | HEART RATE: 64 BPM | OXYGEN SATURATION: 97 %

## 2024-08-01 DIAGNOSIS — M48.062 SPINAL STENOSIS OF LUMBAR REGION WITH NEUROGENIC CLAUDICATION: ICD-10-CM

## 2024-08-01 DIAGNOSIS — I10 ESSENTIAL HYPERTENSION, BENIGN: Chronic | ICD-10-CM

## 2024-08-01 DIAGNOSIS — M79.7 FIBROMYALGIA: Chronic | ICD-10-CM

## 2024-08-01 DIAGNOSIS — M06.9 RHEUMATOID ARTHRITIS, INVOLVING UNSPECIFIED SITE, UNSPECIFIED WHETHER RHEUMATOID FACTOR PRESENT: Primary | ICD-10-CM

## 2024-08-01 DIAGNOSIS — J43.1 PANLOBULAR EMPHYSEMA: Chronic | ICD-10-CM

## 2024-08-01 DIAGNOSIS — M32.19 OTHER SYSTEMIC LUPUS ERYTHEMATOSUS WITH OTHER ORGAN INVOLVEMENT: Chronic | ICD-10-CM

## 2024-08-01 PROCEDURE — 99214 OFFICE O/P EST MOD 30 MIN: CPT | Mod: S$GLB,,, | Performed by: FAMILY MEDICINE

## 2024-08-01 PROCEDURE — 1160F RVW MEDS BY RX/DR IN RCRD: CPT | Mod: CPTII,S$GLB,, | Performed by: FAMILY MEDICINE

## 2024-08-01 PROCEDURE — 3078F DIAST BP <80 MM HG: CPT | Mod: CPTII,S$GLB,, | Performed by: FAMILY MEDICINE

## 2024-08-01 PROCEDURE — 1125F AMNT PAIN NOTED PAIN PRSNT: CPT | Mod: CPTII,S$GLB,, | Performed by: FAMILY MEDICINE

## 2024-08-01 PROCEDURE — 1159F MED LIST DOCD IN RCRD: CPT | Mod: CPTII,S$GLB,, | Performed by: FAMILY MEDICINE

## 2024-08-01 PROCEDURE — 3075F SYST BP GE 130 - 139MM HG: CPT | Mod: CPTII,S$GLB,, | Performed by: FAMILY MEDICINE

## 2024-08-01 PROCEDURE — 99999 PR PBB SHADOW E&M-EST. PATIENT-LVL III: CPT | Mod: PBBFAC,,, | Performed by: FAMILY MEDICINE

## 2024-08-01 PROCEDURE — 3288F FALL RISK ASSESSMENT DOCD: CPT | Mod: CPTII,S$GLB,, | Performed by: FAMILY MEDICINE

## 2024-08-01 PROCEDURE — 4010F ACE/ARB THERAPY RXD/TAKEN: CPT | Mod: CPTII,S$GLB,, | Performed by: FAMILY MEDICINE

## 2024-08-01 PROCEDURE — 3008F BODY MASS INDEX DOCD: CPT | Mod: CPTII,S$GLB,, | Performed by: FAMILY MEDICINE

## 2024-08-01 PROCEDURE — 1101F PT FALLS ASSESS-DOCD LE1/YR: CPT | Mod: CPTII,S$GLB,, | Performed by: FAMILY MEDICINE

## 2024-08-01 PROCEDURE — 72082 X-RAY EXAM ENTIRE SPI 2/3 VW: CPT | Mod: 26,,, | Performed by: RADIOLOGY

## 2024-08-01 PROCEDURE — 72082 X-RAY EXAM ENTIRE SPI 2/3 VW: CPT | Mod: TC,FY

## 2024-08-01 RX ORDER — PREGABALIN 150 MG/1
150 CAPSULE ORAL 2 TIMES DAILY
Qty: 180 CAPSULE | Refills: 1 | Status: SHIPPED | OUTPATIENT
Start: 2024-08-01

## 2024-08-01 RX ORDER — HYDROXYCHLOROQUINE SULFATE 200 MG/1
300 TABLET, FILM COATED ORAL DAILY
Qty: 135 TABLET | Refills: 0 | Status: SHIPPED | OUTPATIENT
Start: 2024-08-01

## 2024-08-01 NOTE — PROGRESS NOTES
CHIEF COMPLAINT: Establish a primary care physician    HISTORY OF PRESENT ILLNESS: The patient is a chronically ill 64-year-old black female.  The patient has 2 months LBP with left sciatica.  She has been seen in Orthopedics who ordered an MRI.  She subsequently decided she would like to see NSGY to consider operative intervention for the pain.    She is followed by Rheumatology for Lupus for which she takes Plaquenil    She has well-controlled hypertension    She has COPD and is an active smoker    Previously seen by Dr. Matias.    REVIEW OF SYSTEMS:  GENERAL: No fever, chills, fatigability or weight loss.  SKIN: No rashes, itching or changes in color or texture of skin.  HEAD: No headaches or recent head trauma.  EYES: Visual acuity fine. No photophobia, ocular pain or diplopia.  EARS: Denies ear pain, discharge or vertigo.  NOSE: No loss of smell, no epistaxis or postnasal drip.  MOUTH & THROAT: No hoarseness or change in voice. No excessive gum bleeding.  NODES: Denies swollen glands.  CHEST: Denies ORDONEZ, cyanosis, wheezing, cough and sputum production.  CARDIOVASCULAR: Denies chest pain, PND, orthopnea or reduced exercise tolerance.  ABDOMEN: Appetite fine. No weight loss. Denies diarrhea, abdominal pain, hematemesis or blood in stool.  URINARY: No flank pain, dysuria or hematuria.  PERIPHERAL VASCULAR: No claudication or cyanosis.  MUSCULOSKELETAL:  She has diffuse joint pain  NEUROLOGIC: No history of seizures, paralysis, alteration of gait or coordination.    SOCIAL HISTORY: The patient does smoke.  The patient consumes alcohol socially.  The patient is single.    PHYSICAL EXAMINATION:   Blood pressure 130/76, pulse 64, weight 70 kg (154 lb 5.2 oz), SpO2 97%.  APPEARANCE: Well nourished, well developed, in no acute distress.    HEAD: Normocephalic, atraumatic.  EYES: PERRL. EOMI.  Conjunctivae without injection and  anicteric  NOSE: Mucosa pink. Airway clear.  MOUTH & THROAT: No tonsillar enlargement. No  pharyngeal erythema or exudate. No stridor.  NECK: Supple.   NODES: No cervical, axillary or inguinal lymph node enlargement.  CHEST: Lungs clear to auscultation.  No retractions are noted.  No rales or rhonchi are present.  CARDIOVASCULAR: Normal S1, S2. No rubs, murmurs or gallops.  ABDOMEN: Bowel sounds normal. Not distended. Soft. No tenderness or masses.  No ascites is noted.  MUSCULOSKELETAL:  There is no clubbing, cyanosis, or edema of the extremities x4.  There is full range of motion of the lumbar spine.  There is full range of motion of the extremities x4.  There is no deformity noted.    NEUROLOGIC:       Normal speech development.      Hearing normal.      Normal gait.      Motor and sensory exams grossly normal.  PSYCHIATRIC: Patient is alert and oriented x3.  Thought processes are all normal.  There is no homicidality.  There is no suicidality.  There is no evidence of psychosis.    LABORATORY/RADIOLOGY:   Chart reviewed.      ASSESSMENT:   Low back pain with left-sided sciatica  Lupus on Plaquenil  COPD in a smoker  Hypertension    PLAN:  MRI done  NSGY  Plaq  Return to clinic for preop.

## 2024-08-22 ENCOUNTER — OFFICE VISIT (OUTPATIENT)
Dept: NEUROSURGERY | Facility: CLINIC | Age: 64
End: 2024-08-22
Payer: MEDICARE

## 2024-08-22 VITALS
BODY MASS INDEX: 27.88 KG/M2 | DIASTOLIC BLOOD PRESSURE: 84 MMHG | HEART RATE: 65 BPM | SYSTOLIC BLOOD PRESSURE: 154 MMHG | WEIGHT: 157.44 LBS

## 2024-08-22 DIAGNOSIS — M48.062 NEUROGENIC CLAUDICATION DUE TO LUMBAR SPINAL STENOSIS: Primary | ICD-10-CM

## 2024-08-22 PROCEDURE — 1101F PT FALLS ASSESS-DOCD LE1/YR: CPT | Mod: CPTII,S$GLB,, | Performed by: STUDENT IN AN ORGANIZED HEALTH CARE EDUCATION/TRAINING PROGRAM

## 2024-08-22 PROCEDURE — 1125F AMNT PAIN NOTED PAIN PRSNT: CPT | Mod: CPTII,S$GLB,, | Performed by: STUDENT IN AN ORGANIZED HEALTH CARE EDUCATION/TRAINING PROGRAM

## 2024-08-22 PROCEDURE — 3079F DIAST BP 80-89 MM HG: CPT | Mod: CPTII,S$GLB,, | Performed by: STUDENT IN AN ORGANIZED HEALTH CARE EDUCATION/TRAINING PROGRAM

## 2024-08-22 PROCEDURE — 1159F MED LIST DOCD IN RCRD: CPT | Mod: CPTII,S$GLB,, | Performed by: STUDENT IN AN ORGANIZED HEALTH CARE EDUCATION/TRAINING PROGRAM

## 2024-08-22 PROCEDURE — 3008F BODY MASS INDEX DOCD: CPT | Mod: CPTII,S$GLB,, | Performed by: STUDENT IN AN ORGANIZED HEALTH CARE EDUCATION/TRAINING PROGRAM

## 2024-08-22 PROCEDURE — 3288F FALL RISK ASSESSMENT DOCD: CPT | Mod: CPTII,S$GLB,, | Performed by: STUDENT IN AN ORGANIZED HEALTH CARE EDUCATION/TRAINING PROGRAM

## 2024-08-22 PROCEDURE — 4010F ACE/ARB THERAPY RXD/TAKEN: CPT | Mod: CPTII,S$GLB,, | Performed by: STUDENT IN AN ORGANIZED HEALTH CARE EDUCATION/TRAINING PROGRAM

## 2024-08-22 PROCEDURE — 99203 OFFICE O/P NEW LOW 30 MIN: CPT | Mod: S$GLB,,, | Performed by: STUDENT IN AN ORGANIZED HEALTH CARE EDUCATION/TRAINING PROGRAM

## 2024-08-22 PROCEDURE — 3077F SYST BP >= 140 MM HG: CPT | Mod: CPTII,S$GLB,, | Performed by: STUDENT IN AN ORGANIZED HEALTH CARE EDUCATION/TRAINING PROGRAM

## 2024-08-22 NOTE — PROGRESS NOTES
Neurosurgery  History & Physical    SUBJECTIVE:     Chief Complaint: Back pain and neurogenic claudication    History of Present Illness:  64 F with hx of tobacco abuse (1ppd) and chronic back pain presents for eval of progressive worsened low back and symptoms of neurogenic claudication over the past few months.  She describes constant low back pain.  Back is worse than leg pain.  She describes left leg heaviness as well as pain which radiates into ant shin on left.  She now has pain which radiates into her anterior right thigh.  This pain is made worse with standing/walking.  She notes that leaning forward with her legs extended will alleviate the leg pain.  She hasn't done PT yet.  She had L3/4 MARITZA in July of 2024 with no relief of her pain.    Review of patient's allergies indicates:   Allergen Reactions    Bactrim [sulfamethoxazole-trimethoprim] Nausea And Vomiting    Sulfa (sulfonamide antibiotics)      Agitation^       Current Outpatient Medications   Medication Sig Dispense Refill    albuterol (PROVENTIL/VENTOLIN HFA) 90 mcg/actuation inhaler Inhale 2 puffs into the lungs every 6 (six) hours as needed for Wheezing. Rescue 54 g 2    alprazolam (XANAX) 2 MG Tab Take 2 mg by mouth 2 (two) times daily as needed (pt takes 1/2 tab BID and 1 tab  at night).      amLODIPine (NORVASC) 5 MG tablet Take 1 tablet (5 mg total) by mouth once daily. 90 tablet 3    DULoxetine (CYMBALTA) 30 MG capsule Take 30 mg by mouth.      duloxetine (CYMBALTA) 60 MG capsule Take one capsule along with cymbalta 30mg daily to equal 90mg daily 90 capsule 3    fluticasone-umeclidin-vilanter (TRELEGY ELLIPTA) 100-62.5-25 mcg DsDv Inhale 1 puff into the lungs once daily. 180 each 3    hydroxychloroquine (PLAQUENIL) 200 mg tablet Take 1.5 tablets (300 mg total) by mouth once daily. 135 tablet 0    levothyroxine (SYNTHROID) 88 MCG tablet TAKE 1 TABLET(88 MCG) BY MOUTH BEFORE BREAKFAST 90 tablet 3    losartan (COZAAR) 100 MG tablet Take 1 tablet  (100 mg total) by mouth once daily. 90 tablet 3    meloxicam (MOBIC) 15 MG tablet Take 1 tablet (15 mg total) by mouth daily as needed for Pain. 30 tablet 0    pantoprazole (PROTONIX) 40 MG tablet Take 1 tablet (40 mg total) by mouth once daily. 30 tablet 11    pregabalin (LYRICA) 150 MG capsule Take 1 capsule (150 mg total) by mouth 2 (two) times daily. Generic is fine 180 capsule 1    quetiapine (SEROQUEL) 300 MG Tab Take 300 mg by mouth once daily.       rosuvastatin (CRESTOR) 40 MG Tab Take 1 tablet (40 mg total) by mouth every evening. 90 tablet 3    sumatriptan (IMITREX) 50 MG tablet Take 1 tablet (50 mg total) by mouth every 2 (two) hours as needed for Migraine. Do not take more than 4 tablets total in 24 hours 30 tablet 0    vitamin D 1000 units Tab Take 185 mg by mouth once daily.       No current facility-administered medications for this visit.       Past Medical History:   Diagnosis Date    Anxiety     Arthritis     Bipolar 1 disorder     Cataract     COPD (chronic obstructive pulmonary disease)     Depression     bipolar 2    Eyelid lesion     Fibromyalgia     H/O corneal abrasion 2001    left eye with lead-based paint chips    Hyperlipidemia     Hypertension     Lupus     Migraine headache     Scleritis     Thyroid disease      Past Surgical History:   Procedure Laterality Date    EPIDURAL STEROID INJECTION N/A 7/11/2024    Procedure: LUMBAR L3/4 MARITZA DIRECT REFERRAL;  Surgeon: Osman Eldridge MD;  Location: [facility];  Service: Pain Management;  Laterality: N/A;  [phone]    HYSTERECTOMY      for sterilization, ovaries intact    TRIGGER FINGER RELEASE Right 7/28/2021    Procedure: RELEASE, TRIGGER FINGER, right ring & small;  Surgeon: Mellissa Guadarrama MD;  Location: [facility] OR;  Service: Orthopedics;  Laterality: Right;  REGIONAL MAC     Family History       Problem Relation (Age of Onset)    Arthritis Mother    Breast cancer Maternal Aunt    Cancer Brother, Sister, Brother    Cataracts Mother,  Sister    Colon cancer Brother    Depression Father    Early death Sister    Lupus Mother    No Known Problems Daughter, Daughter, Maternal Uncle, Paternal Aunt, Paternal Uncle, Maternal Grandmother, Maternal Grandfather, Paternal Grandmother, Paternal Grandfather    Rheum arthritis Mother, Sister    Thyroid disease Daughter          Social History     Socioeconomic History    Marital status:    Tobacco Use    Smoking status: Every Day     Current packs/day: 0.00     Average packs/day: 1 pack/day for 46.7 years (46.7 ttl pk-yrs)     Types: Cigarettes     Start date: 5/2/1974     Last attempt to quit: 1/1/2021     Years since quitting: 3.6    Smokeless tobacco: Never   Substance and Sexual Activity    Alcohol use: Yes     Alcohol/week: 5.0 standard drinks of alcohol     Types: 5 Glasses of wine per week     Comment: 0.5 to 1 bottle of wine per day     Drug use: Yes     Types: Marijuana     Comment: occasionally    Sexual activity: Not Currently     Birth control/protection: Abstinence, Surgical     Comment: Divored     Social Determinants of Health     Financial Resource Strain: Medium Risk (6/7/2024)    Overall Financial Resource Strain (CARDIA)     Difficulty of Paying Living Expenses: Somewhat hard   Food Insecurity: No Food Insecurity (6/7/2024)    Hunger Vital Sign     Worried About Running Out of Food in the Last Year: Never true     Ran Out of Food in the Last Year: Never true   Transportation Needs: Unmet Transportation Needs (10/4/2022)    PRAPARE - Transportation     Lack of Transportation (Medical): Yes     Lack of Transportation (Non-Medical): Yes   Physical Activity: Insufficiently Active (6/7/2024)    Exercise Vital Sign     Days of Exercise per Week: 4 days     Minutes of Exercise per Session: 30 min   Stress: Stress Concern Present (6/7/2024)    [employer] - Occupational Stress Questionnaire     Feeling of Stress : To some extent   Housing Stability: High Risk  (10/4/2022)    Housing Stability Vital Sign     Unable to Pay for Housing in the Last Year: No     Number of Places Lived in the Last Year: 3     Unstable Housing in the Last Year: No       Review of Systems  14 point ROS was negative    OBJECTIVE:     Vital Signs  Pulse: 65  BP: (!) 154/84  Pain Score:   7  Weight: 71.4 kg (157 lb 6.5 oz)  Body mass index is 27.88 kg/m².      Physical Exam:    Constitutional: She appears well-developed and well-nourished.     Eyes: Pupils are equal, round, and reactive to light.     Cardiovascular: Normal rate and regular rhythm.     Abdominal: Soft.     Psych/Behavior: She is alert. She is oriented to person, place, and time. She has a normal mood and affect.     Musculoskeletal: Gait is abnormal.        Neck: Range of motion is limited.        Back: Range of motion is limited.        Right Upper Extremities: Muscle strength is 5/5.        Left Upper Extremities: Muscle strength is 5/5.       Right Lower Extremities: Muscle strength is 5/5.        Left Lower Extremities: Muscle strength is 5/5.     Neurological:        Coordination: She has abnormal tandem walking coordination. She has a normal Romberg Test.        Sensory: There is no sensory deficit in the trunk. There is no sensory deficit in the extremities.        DTRs: DTRs are DTRS NORMAL AND SYMMETRICnormal and symmetric.        Cranial nerves: Cranial nerve(s) II, III, IV, V, VI, VII, VIII, IX, X, XI and XII are intact.     No trejo's bilaterally    Diagnostic Results:  Scoli: no mismatch or imbalance  MRI L spine: multilevel advanced DDD from L2-S1.  Moderate central at L2/3, severe central at L3/4.  Mild central with moderate bilateral foraminal stenosis from L4-S1.  Reviewed    ASSESSMENT/PLAN:     64 F smoker who presents with back pain and neurogenic claudication.  I believe she is most likely symptomatic from the severe stenosis at L3/4.  Will plan to get CT L, flex/ex L and get her into PT in the interim.  I  discussed the importance of smoking cessation and will also plan to get a nicotine test at the next fu.

## 2024-08-26 ENCOUNTER — PATIENT MESSAGE (OUTPATIENT)
Dept: NEUROSURGERY | Facility: CLINIC | Age: 64
End: 2024-08-26
Payer: MEDICARE

## 2024-08-28 ENCOUNTER — TELEPHONE (OUTPATIENT)
Dept: NEUROSURGERY | Facility: CLINIC | Age: 64
End: 2024-08-28
Payer: MEDICARE

## 2024-08-28 NOTE — TELEPHONE ENCOUNTER
----- Message from Kate Baxter sent at 8/28/2024  3:49 PM CDT -----  Regarding: Returning a call  Contact: [phone]  Type:  Patient Returning Call    Who Called:Breanna   Who Left Message for Patient:Mayra  Does the patient know what this is regarding?:A reply to msg sent  Would the patient rather a call back or a response via [facility]? Call back  Best Call Back Number:[phone]    Additional Information:

## 2024-08-29 ENCOUNTER — CLINICAL SUPPORT (OUTPATIENT)
Dept: REHABILITATION | Facility: OTHER | Age: 64
End: 2024-08-29
Attending: STUDENT IN AN ORGANIZED HEALTH CARE EDUCATION/TRAINING PROGRAM
Payer: MEDICARE

## 2024-08-29 DIAGNOSIS — M48.062 NEUROGENIC CLAUDICATION DUE TO LUMBAR SPINAL STENOSIS: ICD-10-CM

## 2024-08-29 DIAGNOSIS — R29.898 WEAKNESS OF LEFT LOWER EXTREMITY: Primary | ICD-10-CM

## 2024-08-29 PROCEDURE — 97110 THERAPEUTIC EXERCISES: CPT | Mod: PN

## 2024-08-29 PROCEDURE — 97161 PT EVAL LOW COMPLEX 20 MIN: CPT | Mod: PN

## 2024-08-29 NOTE — PROGRESS NOTES
"[facility]  Physical Therapy Initial Evaluation    Name: Breanna Guido  Clinic Number: [id number]    Therapy Diagnosis:   Encounter Diagnoses   Name Primary?    Neurogenic claudication due to lumbar spinal stenosis     Weakness of left lower extremity Yes     Physician: Kaden Fernandez DO    Physician Orders: Physical Therapy Evaluate and Treat  Medical Diagnosis from Referral: lumbar spinal stenosis with neurogenic claudication  Evaluation Date: 8/29/24  Authorization Period Expiration: 8/22/25  Plan of Care Expiration: 8/29/2024 to 11/29/24  Visit # / Visits authorized: 1/1 (pending additional authorization following initial evaluation)   FOTO: 1/3  on 8/29/24      Time In: 1050a  Time Out: 1140a  Total Billable Time: 50 minutes    Precautions: standard    Subjective     History of current condition - Breanna reports: chronic (years long) mid to low back pain and left lower extremity radiculopathy "heaviness, tingling, giving out" - past 3 weeks the right lower extremity has started to hurt and feel weak    Lower extremity radiculopathy is "entire leg" and she reports toes can curl up with tightness    AM tends to be most painful. Movement helps; she is unable to do her walking for exercise 2nd to fear of falling. She has difficulty with going down stairs 2nd to fear avoidance    Patient able to get relief with left lower extremity propped up      Medical History:   Past Medical History:   Diagnosis Date    Anxiety     Arthritis     Bipolar 1 disorder     Cataract     COPD (chronic obstructive pulmonary disease)     Depression     bipolar 2    Eyelid lesion     Fibromyalgia     H/O corneal abrasion 2001    left eye with lead-based paint chips    Hyperlipidemia     Hypertension     Lupus     Migraine headache     Scleritis     Thyroid disease        Surgical History:   Breanna Guido  has a past surgical history that includes Hysterectomy; Trigger finger release (Right, " "7/28/2021); and Epidural steroid injection (N/A, 7/11/2024).    Medications:   Breanna has a current medication list which includes the following prescription(s): albuterol, alprazolam, amlodipine, duloxetine, duloxetine, fluticasone-umeclidin-vilanter, hydroxychloroquine, levothyroxine, losartan, meloxicam, pantoprazole, pregabalin, quetiapine, rosuvastatin, sumatriptan, and vitamin d.    Allergies:   Review of patient's allergies indicates:   Allergen Reactions    Bactrim [sulfamethoxazole-trimethoprim] Nausea And Vomiting    Sulfa (sulfonamide antibiotics)      Agitation^        Imaging:     FINDINGS:  Spine AP lateral.     Two views.     There is a dextroconvex curvature of the L-spine.  No fracture dislocation bone destruction seen.  No segmentation anomaly seen.  There is DJD.  No acute process seen.     Impression:     DJD and mild scoliosis.    Prior Therapy: yes  Social History: 65 yo female - lives alone with 7 steps to enter  Occupation: retired  Prior Level of Function: no limitation  Current Level of Function: limited with ADLs, household chores, recreational activity    Pain:  Current 7/10, worst 10/10, best 7/10   Location: bilateral mid to low back, bilateral LEs  Description: Aching, Tingling, Numb, and Sharp  Aggravating Factors: prolonged standing/sitting/walking  Easing Factors: rest, medication    Pts goals: Pt would like to return to gardening, walking, cooking with no increase in low back pain/LE pain.    Objective     WNL=within normal limits  WFL=within functional limits  NT=not tested  *=pain    Posture: WNL  Palpation: tenderness to palpation at left > right greater trochanter, posterior hip, SIJ  Sensation: intact  Deep tendon reflexes: NT    Lumbar Active range of motion (%) Pain/dysfunction with movement:   Flexion 75 Left lower extremity "tightness"   Extension 75 Lumbar spine tight   Right side bending 75 Left low back pain    Left side bending 75 Right low back pain    Right " rotation NT    Left rotation NT          Right LE   Left LE      Iliopsoas:  L2 5/5 Iliopsoas: L2 4/5    Quadriceps:  L3 - femoral nerve 4/5 Quadriceps: L3 - femoral nerve 3+/5    Hip adduction:  L3 - obterator 5/5 Hip adduction: L3 - obterator 4/5    Hamstrings:  S2 5/5 Hamstrings: S2 4/5    Ankle DF/EV:  L4 5/5 Ankle DF/EV: L4 5/5    GT Ext:  L5 5/5 GT Ext L5 4/5    Hip Abduction:  L5-S1 - Superior gluteal nerve 4+/5  Hip Abduction: L5-S1 - Superior gluteal nerve 3+/5    Hip extension:  L5-S2 - Inferior gluteal nerve 4+/5 Hip extension: L5-S2 - Inferior gluteal nerve 3+/5    Ankle PF:   S1 - tibial nerve NT Ankle PF S1 - tibial nerve NT         Slump R: negative  Slump L: + for left hip/low back pain     SLR R: negative  SLR L: + for left hip/low back pain       Joint mobility:     Lumbar: hypomobility with PA segmental mobility assessment.        CMS Impairment/Limitation/Restriction for FOTO Survey    Therapist reviewed FOTO scores for Breanna Guido on 8/29/2024.   FOTO documents entered into [employer] - see Media section.    Limitation Score: 62%  Predicted Goal: 44%    Category: Mobility     TREATMENT     Treatment Time In: 1135a  Treatment Time Out: 1145a  Total Treatment time separate from Evaluation: 10 minutes    Therapeutic Exercises were provided for 10 minutes to improve strength and AROM including:    Bridges x10  Prone on elbows x1min  Prone hip extension x10  Cat camel x10  Seated sciatic nerve glide x10    Home Exercises and Patient Education Provided:    Education provided:   - Findings; prognosis and plan of care (POC)  - Home exercise program (HEP)  - Modality options  - Therapist contact information    Written Home Exercises Provided: Yes  Exercises were reviewed and Breanna was able to demonstrate them prior to the end of the session.  Breanna demonstrated good understanding of the education provided.       Assessment     Breanna is a 64 y.o. female referred to outpatient Physical Therapy  with a medical diagnosis of lumbar spinal stenosis with neurogenic claudication. Pt presents to PT with pain, decreased lumbar spine ROM, decreased strength and flexibility, poor posture, and functional deficits with household tasks and recreational activity. These deficits are negatively impacting this patient's ability to complete their work duties and activities of daily living.     Pt prognosis is Good.   Pt will benefit from skilled outpatient Physical Therapy to address the deficits stated above and in the chart below, provide pt/family education, and to maximize pt's level of independence.     Plan of care discussed with patient: Yes  Pt's spiritual, cultural and educational needs considered and pt agreeable to plan of care and goals as stated below:     Anticipated Barriers for therapy: None      Medical Necessity is demonstrated by the following  History  Co-morbidities and personal factors that may impact the plan of care Co-morbidities:   History of fibromyalgia    Personal Factors:   NA     low   Examination  Body Structures and Functions, activity limitations and participation restrictions that may impact the plan of care Body Regions:   back  lower extremities    Body Systems:    ROM  strength  transfers  transitions  motor control    Participation Restrictions:   Walking    Activity limitations:   Learning and applying knowledge  no deficits    General Tasks and Commands  No Deficits    Communication  No Deficits    Mobility  lifting and carrying objects  walking    Self care  washing oneself (bathing, drying, washing hands)  toileting    Domestic Life  No Deficits    Interactions/Relationships  No Deficits    Life Areas  No Deficits    Community and Social Life  No Deficits         low   Clinical Presentation stable and uncomplicated low   Decision Making/ Complexity Score: low     GOALS:  Short Term Goals (4 Weeks):  1. Patient will be compliant with home exercise program to supplement therapy in  promoting functional mobility. (progressing, not met)    2. Patient will perform bending/lifting with good control to demonstrate improved core strength. (progressing, not met)    3. Patient will report no pain during thoracolumbar active range of motion to promote functional mobility.  (progressing, not met)    4. Patient will improve impaired lower extremity manual muscle tests  to >/=4/5 to improve strength for functional tasks. (progressing, not met)        Long Term Goals (6 Weeks):   1. Patient will improve FOTO score to </= 44% limited to decrease perceived limitation with maintaining/changing body position. (progressing, not met)    2. Patient will perform trunk rotation in standing/bending with good control to demonstrate improved core strength.  (progressing, not met)    3. Patient will improve impaired lower extremity manual muscle tests to >/=4+/5 to improve strength for functional tasks.  (progressing, not met)    4. Patient will tolerate standing for 30 minutes with no increase in low back pain to return to PLOF.  (progressing, not met)          Plan     Plan of care Certification: 8/29/2024 to 11/29/24    Outpatient Physical Therapy 2 times weekly for 12 weeks to include the following interventions: Therapeutic Exercises, Manual Therapeutic Technique, Neuromuscular Re Education, Therapeutic Activities. Modalities, Kinesiotape prn, and Functional Dry Needling as needed.    Terrence Strauss, PT,  DPT, OCS

## 2024-09-04 ENCOUNTER — HOSPITAL ENCOUNTER (OUTPATIENT)
Dept: RADIOLOGY | Facility: OTHER | Age: 64
Discharge: HOME | End: 2024-09-04
Attending: STUDENT IN AN ORGANIZED HEALTH CARE EDUCATION/TRAINING PROGRAM
Payer: MEDICARE

## 2024-09-04 ENCOUNTER — CLINICAL SUPPORT (OUTPATIENT)
Dept: REHABILITATION | Facility: OTHER | Age: 64
End: 2024-09-04
Payer: MEDICARE

## 2024-09-04 DIAGNOSIS — M48.062 NEUROGENIC CLAUDICATION DUE TO LUMBAR SPINAL STENOSIS: Primary | ICD-10-CM

## 2024-09-04 DIAGNOSIS — M48.062 NEUROGENIC CLAUDICATION DUE TO LUMBAR SPINAL STENOSIS: ICD-10-CM

## 2024-09-04 DIAGNOSIS — R29.898 WEAKNESS OF LEFT LOWER EXTREMITY: ICD-10-CM

## 2024-09-04 PROCEDURE — 97530 THERAPEUTIC ACTIVITIES: CPT | Mod: PN,CQ

## 2024-09-04 PROCEDURE — 72131 CT LUMBAR SPINE W/O DYE: CPT | Mod: TC

## 2024-09-04 PROCEDURE — 72114 X-RAY EXAM L-S SPINE BENDING: CPT | Mod: 26,,, | Performed by: RADIOLOGY

## 2024-09-04 PROCEDURE — 72131 CT LUMBAR SPINE W/O DYE: CPT | Mod: 26,,, | Performed by: RADIOLOGY

## 2024-09-04 PROCEDURE — 72114 X-RAY EXAM L-S SPINE BENDING: CPT | Mod: TC,FY

## 2024-09-04 PROCEDURE — 97112 NEUROMUSCULAR REEDUCATION: CPT | Mod: PN,CQ

## 2024-09-04 NOTE — PROGRESS NOTES
[facility]   Physical Therapy Treatment Note     Name: Breanna Guido  Clinic Number: [id number]    Therapy Diagnosis:   Encounter Diagnoses   Name Primary?    Neurogenic claudication due to lumbar spinal stenosis Yes    Weakness of left lower extremity      Physician: Kaden Fernandez DO    Visit Date: 9/4/2024    Physician Orders: Physical Therapy Evaluate and Treat  Medical Diagnosis from Referral: lumbar spinal stenosis with neurogenic claudication  Evaluation Date: 8/29/24  Authorization Period Expiration: 8/22/25  Plan of Care Expiration: 8/29/2024 to 11/29/24  Visit # / Visits authorized: 2/20  FOTO: 1/3  on 8/29/24    Precautions: standard    Time In: 0947  Time Out: 1100  Total Billable Time: 38 minutes    SUBJECTIVE     Pt reports: she feels low back/R side pain. States he has been performing her HEP as directed.   She was compliant with home exercise program.  Response to previous treatment: tolerated well, no issues  Functional change: none today    Prior Level of Function: no limitation  Current Level of Function: limited with ADLs, household chores, recreational activity     Pain:  Current 7/10, worst 10/10, best 7/10   Location: bilateral mid to low back, bilateral LEs  Description: Aching, Tingling, Numb, and Sharp  Aggravating Factors: prolonged standing/sitting/walking  Easing Factors: rest, medication     Pts goals: Pt would like to return to gardening, walking, cooking with no increase in low back pain/LE pain.    OBJECTIVE     Objective Measures updated at progress report unless specified.     Treatment     Breanna received the treatments listed below:        neuromuscular re-education activities to improve: Kinesthetic, Sense, Proprioception, and Posture for 30 minutes. The following activities were included:    MHP concurrently supine exercises   +LTR on red ball, x 3 min  +DKTC on red ball, 10x10  +Supine TrA + marching, 20x  +PPT  3 hold x  "20  +Bridging 3 hold x 20  +Supine clams, RTB   +Clams RTB 3x10 R/L  +SLR with pilaties ring press, 2x10 R/L    therapeutic activities to improve functional performance for 8  minutes, including:  +Seated silver SB rollouts, 15x10"  +Seated sciatic nerve glides 20x        Patient Education and Home Exercises     Home Exercises Provided and Patient Education Provided     Education provided:   - Pt education regarding proper technique for there-ex/HEP, as well as rationale for exercise/ POC.       Written Home Exercises Provided: Patient instructed to cont prior HEP. Exercises were reviewed and Breanna was able to demonstrate them prior to the end of the session.  Breanna demonstrated good  understanding of the education provided. See EMR under Patient Instructions for exercises provided during therapy sessions    ASSESSMENT     Pt tolerated exercise well with mild complaints of increased pain. Pt was able to complete trunk/back rotational exercises with decreased muscle guarding. Muscle weakness in paraspinals/core musculature were notable during exercises. Pt was able to demonstrate improved core/transverse abdominis activation after cuing.      Breanna Is progressing well towards her goals.   Pt prognosis is Good.     Pt will continue to benefit from skilled outpatient physical therapy to address the deficits listed in the problem list box on initial evaluation, provide pt/family education and to maximize pt's level of independence in the home and community environment.     Pt's spiritual, cultural and educational needs considered and pt agreeable to plan of care and goals.     Anticipated Barriers for therapy: None    GOALS:  Short Term Goals (4 Weeks):  1. Patient will be compliant with home exercise program to supplement therapy in promoting functional mobility. (progressing, not met)    2. Patient will perform bending/lifting with good control to demonstrate improved core strength. (progressing, not met)  "   3. Patient will report no pain during thoracolumbar active range of motion to promote functional mobility.  (progressing, not met)    4. Patient will improve impaired lower extremity manual muscle tests  to >/=4/5 to improve strength for functional tasks. (progressing, not met)          Long Term Goals (6 Weeks):   1. Patient will improve FOTO score to </= 44% limited to decrease perceived limitation with maintaining/changing body position. (progressing, not met)    2. Patient will perform trunk rotation in standing/bending with good control to demonstrate improved core strength.  (progressing, not met)    3. Patient will improve impaired lower extremity manual muscle tests to >/=4+/5 to improve strength for functional tasks.  (progressing, not met)    4. Patient will tolerate standing for 30 minutes with no increase in low back pain to return to PLOF.  (progressing, not met)      PLAN     Plan of care Certification: 8/29/2024 to 11/29/24    Focus on LE/core strength as well as lumbar ROM with emphasis on ADL performance        Outpatient Physical Therapy 2 times weekly for 12 weeks to include the following interventions: Therapeutic Exercises, Manual Therapeutic Technique, Neuromuscular Re Education, Therapeutic Activities. Modalities, Kinesiotape prn, and Functional Dry Needling as needed.    Timbo Manriquez, PTA

## 2024-09-05 ENCOUNTER — CLINICAL SUPPORT (OUTPATIENT)
Dept: REHABILITATION | Facility: OTHER | Age: 64
End: 2024-09-05
Payer: MEDICARE

## 2024-09-05 DIAGNOSIS — R29.898 WEAKNESS OF LEFT LOWER EXTREMITY: ICD-10-CM

## 2024-09-05 DIAGNOSIS — M48.062 NEUROGENIC CLAUDICATION DUE TO LUMBAR SPINAL STENOSIS: Primary | ICD-10-CM

## 2024-09-05 PROCEDURE — 97530 THERAPEUTIC ACTIVITIES: CPT | Mod: PN,CQ

## 2024-09-05 PROCEDURE — 97112 NEUROMUSCULAR REEDUCATION: CPT | Mod: PN,CQ

## 2024-09-05 NOTE — PROGRESS NOTES
[facility]   Physical Therapy Treatment Note     Name: Breanna Guido  Clinic Number: [id number]    Therapy Diagnosis:   Encounter Diagnoses   Name Primary?    Neurogenic claudication due to lumbar spinal stenosis Yes    Weakness of left lower extremity        Physician: Kaden Fernandez DO    Visit Date: 9/5/2024    Physician Orders: Physical Therapy Evaluate and Treat  Medical Diagnosis from Referral: lumbar spinal stenosis with neurogenic claudication  Evaluation Date: 8/29/24  Authorization Period Expiration: 8/22/25  Plan of Care Expiration: 8/29/2024 to 11/29/24  Visit # / Visits authorized: 3/20  FOTO: 1/3  on 8/29/24    Precautions: standard    Time In: 0940  Time Out: 1040  Total Billable Time: 53 minutes    SUBJECTIVE     Pt reports: she was not too sore after last PT session.   She was compliant with home exercise program.  Response to previous treatment: tolerated well, no issues  Functional change: none today    Prior Level of Function: no limitation  Current Level of Function: limited with ADLs, household chores, recreational activity     Pain:  Current 7/10, worst 10/10, best 7/10   Location: bilateral mid to low back, bilateral LEs  Description: Aching, Tingling, Numb, and Sharp  Aggravating Factors: prolonged standing/sitting/walking  Easing Factors: rest, medication     Pts goals: Pt would like to return to gardening, walking, cooking with no increase in low back pain/LE pain.    OBJECTIVE     Objective Measures updated at progress report unless specified.     Treatment     Breanna received the treatments listed below:        neuromuscular re-education activities to improve: Kinesthetic, Sense, Proprioception, and Posture for 35 minutes. The following activities were included:    MHP concurrently supine exercises   +Nu-Step x 8 min L2 - to improve trunk rotation and musculare endurance  LTR on red ball, x 3 min  DKTC on red ball, 10x10  Supine TrA + marching,  "20x  PPT  3 hold x 20  Bridging 3 hold x 20  Supine clams, RTB   SLR with pilaties ring press, 2x10 R/L    therapeutic activities to improve functional performance for 18 minutes, including:  Seated silver SB rollouts, 15x10"  Seated sciatic nerve glides 20x  +Rows with TrA RTB 2x10  +Pallof press RTB 2x10        Patient Education and Home Exercises     Home Exercises Provided and Patient Education Provided     Education provided:   - Pt education regarding proper technique for there-ex/HEP, as well as rationale for exercise/ POC.       Written Home Exercises Provided: Patient instructed to cont prior HEP. Exercises were reviewed and Breanna was able to demonstrate them prior to the end of the session.  Breanna demonstrated good  understanding of the education provided. See EMR under Patient Instructions for exercises provided during therapy sessions    ASSESSMENT     Pt tolerated exercise well with mild complaints of increased pain. Pt was able to complete trunk/back rotational exercises with decreased muscle guarding. Muscle weakness in paraspinals/core musculature were notable during exercises. Pt was able to demonstrate improved core/transverse abdominis activation after cuing.      Breanna Is progressing well towards her goals.   Pt prognosis is Good.     Pt will continue to benefit from skilled outpatient physical therapy to address the deficits listed in the problem list box on initial evaluation, provide pt/family education and to maximize pt's level of independence in the home and community environment.     Pt's spiritual, cultural and educational needs considered and pt agreeable to plan of care and goals.     Anticipated Barriers for therapy: None    GOALS:  Short Term Goals (4 Weeks):  1. Patient will be compliant with home exercise program to supplement therapy in promoting functional mobility. (progressing, not met)    2. Patient will perform bending/lifting with good control to demonstrate " improved core strength. (progressing, not met)    3. Patient will report no pain during thoracolumbar active range of motion to promote functional mobility.  (progressing, not met)    4. Patient will improve impaired lower extremity manual muscle tests  to >/=4/5 to improve strength for functional tasks. (progressing, not met)          Long Term Goals (6 Weeks):   1. Patient will improve FOTO score to </= 44% limited to decrease perceived limitation with maintaining/changing body position. (progressing, not met)    2. Patient will perform trunk rotation in standing/bending with good control to demonstrate improved core strength.  (progressing, not met)    3. Patient will improve impaired lower extremity manual muscle tests to >/=4+/5 to improve strength for functional tasks.  (progressing, not met)    4. Patient will tolerate standing for 30 minutes with no increase in low back pain to return to PLOF.  (progressing, not met)      PLAN     Plan of care Certification: 8/29/2024 to 11/29/24    Focus on LE/core strength as well as lumbar ROM with emphasis on ADL performance        Outpatient Physical Therapy 2 times weekly for 12 weeks to include the following interventions: Therapeutic Exercises, Manual Therapeutic Technique, Neuromuscular Re Education, Therapeutic Activities. Modalities, Kinesiotape prn, and Functional Dry Needling as needed.    Timbo Manriquez, PTA

## 2024-09-10 ENCOUNTER — PATIENT MESSAGE (OUTPATIENT)
Dept: NEUROSURGERY | Facility: CLINIC | Age: 64
End: 2024-09-10
Payer: MEDICARE

## 2024-09-25 ENCOUNTER — CLINICAL SUPPORT (OUTPATIENT)
Dept: REHABILITATION | Facility: OTHER | Age: 64
End: 2024-09-25
Payer: MEDICARE

## 2024-09-25 ENCOUNTER — LAB VISIT (OUTPATIENT)
Dept: LAB | Facility: OTHER | Age: 64
End: 2024-09-25
Attending: STUDENT IN AN ORGANIZED HEALTH CARE EDUCATION/TRAINING PROGRAM
Payer: MEDICARE

## 2024-09-25 DIAGNOSIS — M48.062 NEUROGENIC CLAUDICATION DUE TO LUMBAR SPINAL STENOSIS: Primary | ICD-10-CM

## 2024-09-25 DIAGNOSIS — R29.898 WEAKNESS OF LEFT LOWER EXTREMITY: ICD-10-CM

## 2024-09-25 DIAGNOSIS — M48.062 NEUROGENIC CLAUDICATION DUE TO LUMBAR SPINAL STENOSIS: ICD-10-CM

## 2024-09-25 PROCEDURE — 80323 ALKALOIDS NOS: CPT | Performed by: STUDENT IN AN ORGANIZED HEALTH CARE EDUCATION/TRAINING PROGRAM

## 2024-09-25 PROCEDURE — 97112 NEUROMUSCULAR REEDUCATION: CPT | Mod: PN,CQ

## 2024-09-25 PROCEDURE — 97530 THERAPEUTIC ACTIVITIES: CPT | Mod: PN,CQ
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
## 2024-09-28 LAB
ANABASINE UR-MCNC: 2 NG/ML
COTININE UR-MCNC: >1200 NG/ML
NICOTINE UR-MCNC: 121 NG/ML
NORNICOTINE UR-MCNC: 35 NG/ML

## 2024-09-30 ENCOUNTER — OFFICE VISIT (OUTPATIENT)
Dept: NEUROSURGERY | Facility: CLINIC | Age: 64
End: 2024-09-30
Payer: MEDICARE

## 2024-09-30 VITALS — DIASTOLIC BLOOD PRESSURE: 74 MMHG | SYSTOLIC BLOOD PRESSURE: 146 MMHG | HEART RATE: 67 BPM

## 2024-09-30 DIAGNOSIS — M48.062 NEUROGENIC CLAUDICATION DUE TO LUMBAR SPINAL STENOSIS: Primary | ICD-10-CM

## 2024-09-30 PROCEDURE — 4010F ACE/ARB THERAPY RXD/TAKEN: CPT | Mod: CPTII,S$GLB,, | Performed by: STUDENT IN AN ORGANIZED HEALTH CARE EDUCATION/TRAINING PROGRAM

## 2024-09-30 PROCEDURE — 3078F DIAST BP <80 MM HG: CPT | Mod: CPTII,S$GLB,, | Performed by: STUDENT IN AN ORGANIZED HEALTH CARE EDUCATION/TRAINING PROGRAM

## 2024-09-30 PROCEDURE — 99213 OFFICE O/P EST LOW 20 MIN: CPT | Mod: S$GLB,,, | Performed by: STUDENT IN AN ORGANIZED HEALTH CARE EDUCATION/TRAINING PROGRAM

## 2024-09-30 PROCEDURE — 1101F PT FALLS ASSESS-DOCD LE1/YR: CPT | Mod: CPTII,S$GLB,, | Performed by: STUDENT IN AN ORGANIZED HEALTH CARE EDUCATION/TRAINING PROGRAM

## 2024-09-30 PROCEDURE — 3288F FALL RISK ASSESSMENT DOCD: CPT | Mod: CPTII,S$GLB,, | Performed by: STUDENT IN AN ORGANIZED HEALTH CARE EDUCATION/TRAINING PROGRAM

## 2024-09-30 PROCEDURE — 3077F SYST BP >= 140 MM HG: CPT | Mod: CPTII,S$GLB,, | Performed by: STUDENT IN AN ORGANIZED HEALTH CARE EDUCATION/TRAINING PROGRAM

## 2024-09-30 NOTE — PROGRESS NOTES
Neurosurgery  Established Patient    SUBJECTIVE:     History of Present Illness:  64 F with hx of tobacco abuse (1ppd) and chronic back pain presents for eval of progressive worsened low back and symptoms of neurogenic claudication over the past few months.  She describes constant low back pain.  Back is worse than leg pain.  She describes left leg heaviness as well as pain which radiates into ant shin on left.  She now has pain which radiates into her anterior right thigh.  This pain is made worse with standing/walking.  She notes that leaning forward with her legs extended will alleviate the leg pain.  She hasn't done PT yet.  She had L3/4 MARITZA in July of 2024 with no relief of her pain.     Interval fu 9/30/24:  Pt presents in fu.  Her back and leg pain are improving with PT.  She is still smoking but not as much and has started wellbutrin to assist with quitting smoking.    Review of patient's allergies indicates:   Allergen Reactions    Bactrim [sulfamethoxazole-trimethoprim] Nausea And Vomiting    Sulfa (sulfonamide antibiotics)      Agitation^       Current Outpatient Medications   Medication Sig Dispense Refill    albuterol (PROVENTIL/VENTOLIN HFA) 90 mcg/actuation inhaler Inhale 2 puffs into the lungs every 6 (six) hours as needed for Wheezing. Rescue 54 g 2    alprazolam (XANAX) 2 MG Tab Take 2 mg by mouth 2 (two) times daily as needed (pt takes 1/2 tab BID and 1 tab  at night).      amLODIPine (NORVASC) 5 MG tablet Take 1 tablet (5 mg total) by mouth once daily. 90 tablet 3    DULoxetine (CYMBALTA) 30 MG capsule Take 30 mg by mouth.      duloxetine (CYMBALTA) 60 MG capsule Take one capsule along with cymbalta 30mg daily to equal 90mg daily 90 capsule 3    fluticasone-umeclidin-vilanter (TRELEGY ELLIPTA) 100-62.5-25 mcg DsDv Inhale 1 puff into the lungs once daily. 180 each 3    hydroxychloroquine (PLAQUENIL) 200 mg tablet Take 1.5 tablets (300 mg total) by mouth once daily. 135 tablet 0    levothyroxine  (SYNTHROID) 88 MCG tablet TAKE 1 TABLET(88 MCG) BY MOUTH BEFORE BREAKFAST 90 tablet 3    losartan (COZAAR) 100 MG tablet Take 1 tablet (100 mg total) by mouth once daily. 90 tablet 3    meloxicam (MOBIC) 15 MG tablet Take 1 tablet (15 mg total) by mouth daily as needed for Pain. 30 tablet 0    pantoprazole (PROTONIX) 40 MG tablet Take 1 tablet (40 mg total) by mouth once daily. 30 tablet 11    pregabalin (LYRICA) 150 MG capsule Take 1 capsule (150 mg total) by mouth 2 (two) times daily. Generic is fine 180 capsule 1    quetiapine (SEROQUEL) 300 MG Tab Take 300 mg by mouth once daily.       rosuvastatin (CRESTOR) 40 MG Tab Take 1 tablet (40 mg total) by mouth every evening. 90 tablet 3    sumatriptan (IMITREX) 50 MG tablet Take 1 tablet (50 mg total) by mouth every 2 (two) hours as needed for Migraine. Do not take more than 4 tablets total in 24 hours 30 tablet 0    vitamin D 1000 units Tab Take 185 mg by mouth once daily.       No current facility-administered medications for this visit.       Past Medical History:   Diagnosis Date    Anxiety     Arthritis     Bipolar 1 disorder     Cataract     COPD (chronic obstructive pulmonary disease)     Depression     bipolar 2    Eyelid lesion     Fibromyalgia     H/O corneal abrasion 2001    left eye with lead-based paint chips    Hyperlipidemia     Hypertension     Lupus     Migraine headache     Scleritis     Thyroid disease      Past Surgical History:   Procedure Laterality Date    EPIDURAL STEROID INJECTION N/A 7/11/2024    Procedure: LUMBAR L3/4 MARITZA DIRECT REFERRAL;  Surgeon: Osman Eldridge MD;  Location: [facility];  Service: Pain Management;  Laterality: N/A;  [phone]    HYSTERECTOMY      for sterilization, ovaries intact    TRIGGER FINGER RELEASE Right 7/28/2021    Procedure: RELEASE, TRIGGER FINGER, right ring & small;  Surgeon: Mellissa uGadarrama MD;  Location: [facility] OR;  Service: Orthopedics;  Laterality: Right;  REGIONAL MAC     Family History        Problem Relation (Age of Onset)    Arthritis Mother    Breast cancer Maternal Aunt    Cancer Brother, Sister, Brother    Cataracts Mother, Sister    Colon cancer Brother    Depression Father    Early death Sister    Lupus Mother    No Known Problems Daughter, Daughter, Maternal Uncle, Paternal Aunt, Paternal Uncle, Maternal Grandmother, Maternal Grandfather, Paternal Grandmother, Paternal Grandfather    Rheum arthritis Mother, Sister    Thyroid disease Daughter          Social History     Socioeconomic History    Marital status:    Tobacco Use    Smoking status: Every Day     Current packs/day: 0.00     Average packs/day: 1 pack/day for 46.7 years (46.7 ttl pk-yrs)     Types: Cigarettes     Start date: 5/2/1974     Last attempt to quit: 1/1/2021     Years since quitting: 3.7    Smokeless tobacco: Never   Substance and Sexual Activity    Alcohol use: Yes     Alcohol/week: 5.0 standard drinks of alcohol     Types: 5 Glasses of wine per week     Comment: 0.5 to 1 bottle of wine per day     Drug use: Yes     Types: Marijuana     Comment: occasionally    Sexual activity: Not Currently     Birth control/protection: Abstinence, Surgical     Comment: Divored     Social Drivers of Health     Financial Resource Strain: Medium Risk (6/7/2024)    Overall Financial Resource Strain (CARDIA)     Difficulty of Paying Living Expenses: Somewhat hard   Food Insecurity: No Food Insecurity (6/7/2024)    Hunger Vital Sign     Worried About Running Out of Food in the Last Year: Never true     Ran Out of Food in the Last Year: Never true   Transportation Needs: Unmet Transportation Needs (10/4/2022)    PRAPARE - Transportation     Lack of Transportation (Medical): Yes     Lack of Transportation (Non-Medical): Yes   Physical Activity: Insufficiently Active (6/7/2024)    Exercise Vital Sign     Days of Exercise per Week: 4 days     Minutes of Exercise per Session: 30 min   Stress: Stress Concern Present (6/7/2024)    [employer]  [place] [employer] - Occupational Stress Questionnaire     Feeling of Stress : To some extent   Housing Stability: High Risk (10/4/2022)    Housing Stability Vital Sign     Unable to Pay for Housing in the Last Year: No     Number of Places Lived in the Last Year: 3     Unstable Housing in the Last Year: No       Review of Systems  14 point ROS was negative    OBJECTIVE:     Vital Signs  Pulse: 67  BP: (!) 146/74  There is no height or weight on file to calculate BMI.    Neurosurgery Physical Exam  Constitutional: She appears well-developed and well-nourished.      Eyes: Pupils are equal, round, and reactive to light.      Cardiovascular: Normal rate and regular rhythm.      Abdominal: Soft.      Psych/Behavior: She is alert. She is oriented to person, place, and time. She has a normal mood and affect.      Musculoskeletal: Gait is abnormal.        Neck: Range of motion is limited.        Back: Range of motion is limited.        Right Upper Extremities: Muscle strength is 5/5.        Left Upper Extremities: Muscle strength is 5/5.       Right Lower Extremities: Muscle strength is 5/5.        Left Lower Extremities: Muscle strength is 5/5.      Neurological:        Coordination: She has abnormal tandem walking coordination. She has a normal Romberg Test.        Sensory: There is no sensory deficit in the trunk. There is no sensory deficit in the extremities.        DTRs: DTRs are DTRS NORMAL AND SYMMETRICnormal and symmetric.        Cranial nerves: Cranial nerve(s) II, III, IV, V, VI, VII, VIII, IX, X, XI and XII are intact.      No trejo's bilaterally    Diagnostic Results:  Flex/ex L: no instability  CT L: multilevel degen with vacuum disc from L2-S1.  Reviewed    Scoli: no mismatch or imbalance  MRI L spine: multilevel advanced DDD from L2-S1.  Moderate central at L2/3, severe central at L3/4.  Mild central with moderate bilateral foraminal stenosis from L4-S1.    ASSESSMENT/PLAN:     64 F smoker  who presents with back pain and neurogenic claudication. I believe she is most likely symptomatic from the severe stenosis at L3/4.  Her updated imaging is described above which shows no instability or deformity.  I believe she would be a candidate for L3/4 decompression should she not continue to improve with PT.  I have continued to recommend smoking cessation as her most recent nicotine test was positive.  Will plan for fu in 3 mo.

## 2024-10-02 ENCOUNTER — DOCUMENTATION ONLY (OUTPATIENT)
Dept: REHABILITATION | Facility: OTHER | Age: 64
End: 2024-10-02
Payer: MEDICARE

## 2024-10-02 NOTE — PROGRESS NOTES
Patient was scheduled for a physical therapy appointment at [facility] location on 10/2/24. Patient failed to appear for the appointment without prior notification today.     Terrence Strauss PT, DPT, OCS

## 2024-10-09 ENCOUNTER — ON-DEMAND VIRTUAL (OUTPATIENT)
Dept: URGENT CARE | Facility: CLINIC | Age: 64
End: 2024-10-09
Payer: MEDICARE

## 2024-10-09 DIAGNOSIS — N39.0 URINARY TRACT INFECTION WITHOUT HEMATURIA, SITE UNSPECIFIED: Primary | ICD-10-CM

## 2024-10-09 RX ORDER — NITROFURANTOIN (MACROCRYSTALS) 100 MG/1
100 CAPSULE ORAL EVERY 12 HOURS
Qty: 20 CAPSULE | Refills: 0 | Status: SHIPPED | OUTPATIENT
Start: 2024-10-09 | End: 2024-10-19

## 2024-10-09 RX ORDER — PHENAZOPYRIDINE HYDROCHLORIDE 200 MG/1
200 TABLET, FILM COATED ORAL 3 TIMES DAILY PRN
Qty: 12 TABLET | Refills: 0 | Status: SHIPPED | OUTPATIENT
Start: 2024-10-09 | End: 2025-10-09

## 2024-10-09 NOTE — PATIENT INSTRUCTIONS
Please return here or go to the Emergency Department for any concerns or worsening of condition.  If you were prescribed antibiotics, please take them to completion.  If you were prescribed a narcotic medication, do not drive or operate heavy equipment or machinery while taking these medications.  Please follow up with your primary care doctor or specialist as needed.  Please drink plenty of fluids.  Please get plenty of rest.  If you were prescribed Pyridium (phenazopyridine), please be aware that if you wear contact lens that this medication may stain your contacts.  While taking this medication it is recommended that you do not wear your contacts until 24 hours after your last dose.    Push fluids aggressively to improve symptoms and wash through the infection.  Cranberry juice can help relieve symptoms  If you  smoke, please stop smoking.    Please follow up with your primary care doctor or specialist as needed.    Damian Lewis MD  [phone]    You must understand that you have received treatment at an Urgent Care facility only, and that you may be  released before all of your medical problems are known or treated. Urgent Care facilities are not equipped to  handle life threatening emergencies. It is recommended that you seek care at an Emergency Department for  further evaluation of worsening or concerning symptoms, or possibly life threatening conditions as  discussed.    Bladder Infection, Female (Adult)    Urine is normally doesn't have any bacteria in it. But bacteria can get into the urinary tract from the skin around the rectum. Or they can travel in the blood from elsewhere in the body. Once they are in your urinary tract, they can cause infection in the urethra (urethritis), the bladder (cystitis), or the kidneys (pyelonephritis).  The most common place for an infection is in the bladder. This is called a bladder infection. This is one of the most common infections in women. Most bladder  "infections are easily treated. They are not serious unless the infection spreads to the kidney.  The phrases "bladder infection," "UTI," and "cystitis" are often used to describe the same thing. But they are not always the same. Cystitis is an inflammation of the bladder. The most common cause of cystitis is an infection.  Symptoms  The infection causes inflammation in the urethra and bladder. This causes many of the symptoms. The most common symptoms of a bladder infection are:  Pain or burning when urinating  Having to urinate more often than usual  Urgent need to urinate  Only a small amount of urine comes out  Blood in urine  Abdominal discomfort. This is usually in the lower abdomen above the pubic bone.  Cloudy urine  Strong- or bad-smelling urine  Unable to urinate (urinary retention)  Unable to hold urine in (urinary incontinence)  Fever  Loss of appetite  Confusion (in older adults)  Causes  Bladder infections are not contagious. You can't get one from someone else, from a toilet seat, or from sharing a bath.  The most common cause of bladder infections is bacteria from the bowels. The bacteria get onto the skin around the opening of the urethra. From there, they can get into the urine and travel up to the bladder, causing inflammation and infection. This usually happens because of:  Wiping improperly after urinating. Always wipe from front to back.  Bowel incontinence  Pregnancy  Procedures such as having a catheter inserted  Older age  Not emptying your bladder. This can allow bacteria a chance to grow in your urine.  Dehydration  Constipation  Sex  Use of a diaphragm for birth control   Treatment  Bladder infections are diagnosed by a urine test. They are treated with antibiotics and usually clear up quickly without complications. Treatment helps prevent a more serious kidney infection.  Medicines  Medicines can help in the treatment of a bladder infection:  Take antibiotics until they are used up, even " if you feel better. It is important to finish them to make sure the infection has cleared.  You can use acetaminophen or ibuprofen for pain, fever, or discomfort, unless another medicine was prescribed. If you have chronic liver or kidney disease, talk with your healthcare provider before using these medicines. Also talk with your provider if you've ever had a stomach ulcer or gastrointestinal bleeding, or are taking blood-thinner medicines.  If you are given phenazopydridine to reduce burning with urination, it will cause your urine to become a bright orange color. This can stain clothing.  Care and prevention  These self-care steps can help prevent future infections:  Drink plenty of fluids to prevent dehydration and flush out your bladder. Do this unless you must restrict fluids for other health reasons, or your doctor told you not to.  Proper cleaning after going to the bathroom is important. Wipe from front to back after using the toilet to prevent the spread of bacteria.  Urinate more often. Don't try to hold urine in for a long time.  Wear loose-fitting clothes and cotton underwear. Avoid tight-fitting pants.  Improve your diet and prevent constipation. Eat more fresh fruit and vegetables, and fiber, and less junk and fatty foods.  Avoid sex until your symptoms are gone.  Avoid caffeine, alcohol, and spicy foods. These can irritate your bladder.  Urinate right after intercourse to flush out your bladder.  If you use birth control pills and have frequent bladder infections, discuss it with your doctor.  Follow-up care  Call your healthcare provider if all symptoms are not gone after 3 days of treatment. This is especially important if you have repeat infections.  If a culture was done, you will be told if your treatment needs to be changed. If directed, you can call to find out the results.  If X-rays were done, you will be told if the results will affect your treatment.  Call 911  Call 911 if any of the  following occur:  Trouble breathing  Hard to wake up or confusion  Fainting or loss of consciousness  Rapid heart rate  When to seek medical advice  Call your healthcare provider right away if any of these occur:  Fever of 100.4ºF (38.0ºC) or higher, or as directed by your healthcare provider  Symptoms are not better by the third day of treatment  Back or belly (abdominal) pain that gets worse  Repeated vomiting, or unable to keep medicine down  Weakness or dizziness  Vaginal discharge  Pain, redness, or swelling in the outer vaginal area (labia)  Date Last Reviewed: 10/1/2016  © 0001-1097 [employer]. [address] [place] [address]. All rights reserved. This information is not intended as a substitute for professional medical care. Always follow your healthcare professional's instructions.

## 2024-10-09 NOTE — PROGRESS NOTES
Subjective:      Patient ID: Breanna Guido is a 64 y.o. female.    Vitals:  vitals were not taken for this visit.     Chief Complaint: Urinary Tract Infection (Frequent urination)      Visit Type: TELE AUDIOVISUAL    Present with the patient at the time of consultation: TELEMED PRESENT WITH PATIENT: None    Past Medical History:   Diagnosis Date    Anxiety     Arthritis     Bipolar 1 disorder     Cataract     COPD (chronic obstructive pulmonary disease)     Depression     bipolar 2    Eyelid lesion     Fibromyalgia     H/O corneal abrasion 2001    left eye with lead-based paint chips    Hyperlipidemia     Hypertension     Lupus     Migraine headache     Scleritis     Thyroid disease      Past Surgical History:   Procedure Laterality Date    EPIDURAL STEROID INJECTION N/A 7/11/2024    Procedure: LUMBAR L3/4 MARITZA DIRECT REFERRAL;  Surgeon: Osman Eldridge MD;  Location: [facility];  Service: Pain Management;  Laterality: N/A;  [phone]    HYSTERECTOMY      for sterilization, ovaries intact    TRIGGER FINGER RELEASE Right 7/28/2021    Procedure: RELEASE, TRIGGER FINGER, right ring & small;  Surgeon: Mellissa Guadarrama MD;  Location: [facility] OR;  Service: Orthopedics;  Laterality: Right;  REGIONAL MAC     Review of patient's allergies indicates:   Allergen Reactions    Bactrim [sulfamethoxazole-trimethoprim] Nausea And Vomiting    Sulfa (sulfonamide antibiotics)      Agitation^     Current Outpatient Medications on File Prior to Visit   Medication Sig Dispense Refill    albuterol (PROVENTIL/VENTOLIN HFA) 90 mcg/actuation inhaler Inhale 2 puffs into the lungs every 6 (six) hours as needed for Wheezing. Rescue 54 g 2    alprazolam (XANAX) 2 MG Tab Take 2 mg by mouth 2 (two) times daily as needed (pt takes 1/2 tab BID and 1 tab  at night).      amLODIPine (NORVASC) 5 MG tablet Take 1 tablet (5 mg total) by mouth once daily. 90 tablet 3    DULoxetine (CYMBALTA) 30 MG capsule Take 30 mg by mouth.      duloxetine  (CYMBALTA) 60 MG capsule Take one capsule along with cymbalta 30mg daily to equal 90mg daily 90 capsule 3    fluticasone-umeclidin-vilanter (TRELEGY ELLIPTA) 100-62.5-25 mcg DsDv Inhale 1 puff into the lungs once daily. 180 each 3    hydroxychloroquine (PLAQUENIL) 200 mg tablet Take 1.5 tablets (300 mg total) by mouth once daily. 135 tablet 0    levothyroxine (SYNTHROID) 88 MCG tablet TAKE 1 TABLET(88 MCG) BY MOUTH BEFORE BREAKFAST 90 tablet 3    losartan (COZAAR) 100 MG tablet Take 1 tablet (100 mg total) by mouth once daily. 90 tablet 3    meloxicam (MOBIC) 15 MG tablet Take 1 tablet (15 mg total) by mouth daily as needed for Pain. 30 tablet 0    pantoprazole (PROTONIX) 40 MG tablet Take 1 tablet (40 mg total) by mouth once daily. 30 tablet 11    pregabalin (LYRICA) 150 MG capsule Take 1 capsule (150 mg total) by mouth 2 (two) times daily. Generic is fine 180 capsule 1    quetiapine (SEROQUEL) 300 MG Tab Take 300 mg by mouth once daily.       rosuvastatin (CRESTOR) 40 MG Tab Take 1 tablet (40 mg total) by mouth every evening. 90 tablet 3    sumatriptan (IMITREX) 50 MG tablet Take 1 tablet (50 mg total) by mouth every 2 (two) hours as needed for Migraine. Do not take more than 4 tablets total in 24 hours 30 tablet 0    vitamin D 1000 units Tab Take 185 mg by mouth once daily.       No current facility-administered medications on file prior to visit.     Family History   Problem Relation Name Age of Onset    Depression Father          suicide    Cataracts Mother Mama     Rheum arthritis Mother Mama     Lupus Mother Mama     Arthritis Mother Mama     Cataracts Sister      Rheum arthritis Sister      Breast cancer Maternal Aunt      Thyroid disease Daughter      No Known Problems Daughter      No Known Problems Daughter      Colon cancer Brother Ger Guido     Cancer Brother Ger Guido     No Known Problems Maternal Uncle      No Known Problems Paternal Aunt      No Known Problems Paternal Uncle      No Known  Problems Maternal Grandmother      No Known Problems Maternal Grandfather      No Known Problems Paternal Grandmother      No Known Problems Paternal Grandfather      Cancer Sister Nat Guido     Cancer Brother Ilia Guido     Early death Sister Jazmyn Bird         Covid-19    Ovarian cancer Neg Hx      Melanoma Neg Hx      Amblyopia Neg Hx      Blindness Neg Hx      Diabetes Neg Hx      Glaucoma Neg Hx      Hypertension Neg Hx      Macular degeneration Neg Hx      Retinal detachment Neg Hx      Strabismus Neg Hx      Stroke Neg Hx         Medications Ordered                [id number] [place] - [address] AT [facility] [address]   [address], [place] [address]    Telephone: [phone]   Fax: [phone]   Hours: Not open 24 hours                         E-Prescribed (2 of 2)              nitrofurantoin (MACRODANTIN) 100 MG capsule    Sig: Take 1 capsule (100 mg total) by mouth every 12 (twelve) hours. for 10 days       Start: 10/9/24     Quantity: 20 capsule Refills: 0                         phenazopyridine (PYRIDIUM) 200 MG tablet    Sig: Take 1 tablet (200 mg total) by mouth 3 (three) times daily as needed for Pain.       Start: 10/9/24     Quantity: 12 tablet Refills: 0                           [facility] Peq Odvv Intake    10/9/2024  2:43 PM CDT - Filed by Patient   What is your current physical address in the event of a medical emergency? [address] [place] [address]   Are you able to take your vital signs? Yes   Systolic Blood Pressure: 106   Diastolic Blood Pressure: 66   Weight: 150   Height: 60   Pulse: 79   Temperature: 98   Respiration rate:    Pulse Oxygen:    Please attach any relevant images or files    Is your employer contracted with [facility]? No         Frequent Urination    Urinary Tract Infection   This is a new problem. The current episode started in the past 7 days. The problem has been gradually worsening.  The quality of the pain is described as burning and stabbing. The pain is at a severity of 2/10. The pain is mild. There has been no fever. She is Sexually active. There is No history of pyelonephritis. Associated symptoms include frequency, hematuria and urgency. She has tried nothing for the symptoms. The treatment provided no relief.       Constitution: Negative.   HENT: Negative.     Cardiovascular: Negative.    Eyes: Negative.    Respiratory: Negative.     Gastrointestinal: Negative.    Endocrine: negative.   Genitourinary:  Positive for frequency, urgency and hematuria.   Musculoskeletal: Negative.    Skin: Negative.    Allergic/Immunologic: Negative.    Neurological: Negative.    Hematologic/Lymphatic: Negative.    Psychiatric/Behavioral: Negative.          Objective:   The physical exam was conducted virtually.  Physical Exam   Constitutional: She is oriented to person, place, and time.   HENT:   Head: Normocephalic and atraumatic.   Eyes: Conjunctivae are normal.   Pulmonary/Chest: Effort normal. No respiratory distress.   Musculoskeletal: Normal range of motion.         General: Normal range of motion.   Neurological: no focal deficit. She is alert and oriented to person, place, and time.   Psychiatric: Her behavior is normal. Mood, judgment and thought content normal.   Vitals reviewed.      Assessment:     1. Urinary tract infection without hematuria, site unspecified        Plan:       Urinary tract infection without hematuria, site unspecified  -     nitrofurantoin (MACRODANTIN) 100 MG capsule; Take 1 capsule (100 mg total) by mouth every 12 (twelve) hours. for 10 days  Dispense: 20 capsule; Refill: 0  -     phenazopyridine (PYRIDIUM) 200 MG tablet; Take 1 tablet (200 mg total) by mouth 3 (three) times daily as needed for Pain.  Dispense: 12 tablet; Refill: 0    Please return here or go to the Emergency Department for any concerns or worsening of condition.  If you were prescribed antibiotics, please  take them to completion.  If you were prescribed a narcotic medication, do not drive or operate heavy equipment or machinery while taking these medications.  Please follow up with your primary care doctor or specialist as needed.  Please drink plenty of fluids.  Please get plenty of rest.  If you were prescribed Pyridium (phenazopyridine), please be aware that if you wear contact lens that this medication may stain your contacts.  While taking this medication it is recommended that you do not wear your contacts until 24 hours after your last dose.    Push fluids aggressively to improve symptoms and wash through the infection.  Cranberry juice can help relieve symptoms  If you  smoke, please stop smoking.    Please follow up with your primary care doctor or specialist as needed.    Damian Lewis MD  [phone]    You must understand that you have received treatment at an Urgent Care facility only, and that you may be  released before all of your medical problems are known or treated. Urgent Care facilities are not equipped to  handle life threatening emergencies. It is recommended that you seek care at an Emergency Department for  further evaluation of worsening or concerning symptoms, or possibly life threatening conditions as  discussed.

## 2024-10-15 ENCOUNTER — E-VISIT (OUTPATIENT)
Dept: FAMILY MEDICINE | Facility: CLINIC | Age: 64
End: 2024-10-15
Payer: MEDICARE

## 2024-10-15 ENCOUNTER — PATIENT MESSAGE (OUTPATIENT)
Dept: INTERNAL MEDICINE | Facility: CLINIC | Age: 64
End: 2024-10-15
Payer: MEDICARE

## 2024-10-15 DIAGNOSIS — R35.0 URINE FREQUENCY: Primary | ICD-10-CM

## 2024-10-15 DIAGNOSIS — F17.210 CIGARETTE SMOKER: ICD-10-CM

## 2024-10-15 DIAGNOSIS — Z12.31 ENCOUNTER FOR SCREENING MAMMOGRAM FOR MALIGNANT NEOPLASM OF BREAST: Primary | ICD-10-CM

## 2024-10-15 RX ORDER — NITROFURANTOIN 25; 75 MG/1; MG/1
100 CAPSULE ORAL 2 TIMES DAILY
Qty: 10 CAPSULE | Refills: 0 | Status: SHIPPED | OUTPATIENT
Start: 2024-10-15 | End: 2024-10-20

## 2024-10-15 NOTE — PROGRESS NOTES
Patient ID: Breanna Guido is a 64 y.o. female.    Chief Complaint: General Illness (Entered automatically based on patient selection in [employer].)    The patient initiated a request through [employer] on 10/15/2024 for evaluation and management with a chief complaint of General Illness (Entered automatically based on patient selection in [employer].)     I evaluated the questionnaire submission on 10/15/24.    [facility]    10/15/2024  2:26 PM CDT - Filed by Patient   What do you need help with? Urinary Symptoms   Do you agree to participate in an E-Visit? Yes   If you have any of the following symptoms, please present to your local emergency room or call 911:  I acknowledge   What is the main issue you would like addressed today? Womens health   What symptoms do you currently have? Change in urine appearance or smell   When did your symptoms first appear? 10/5/2024   List what you have done or taken to help your symptoms. Meds   Please indicate whether you have had the following symptoms during the past 24 hours     Urgent urination (a sudden and uncontrollable urge to urinate) Severe   Frequent urination of small amounts of urine (going to the toilet very often) Severe   Burning pain when urinating None   Incomplete bladder emptying (still feel like you need to urinate again after urination) None   Pain not associated with urination in the lower abdomen below the belly button) None   What does your urine look like? Clear   Blood seen in the urine None   Flank pain (pain in one or both sides of the lower back) Moderate   Abnormal Vaginal Discharge (abnormal amount, color and/or odor) None   Discharge from the urethra (urinary opening) without urination None   High body temperature/fever? Yes, mild-99.6 F-100.2 F   Please rate how much discomfort you have experience because of the symptoms in the past 24 hours: Moderate   Please indicate how the symptoms have interfered with your every  day activities/work in the past 24 hours: Mild   Please indicate how these symptoms have interfered with your social activities (visiting people, meeting with friends, etc.) in the past 24 hours? Mild   Are you a diabetic? No   Please indicate whether you have the following at the time of completion of this questionnaire: None of the above   Provide any additional information you feel is important.    Please attach any relevant images or files (if you have performed a home test for UTI, please submit a photo of results)    Are you able to take your vital signs? No         Encounter Diagnosis   Name Primary?    Urine frequency Yes        No orders of the defined types were placed in this encounter.     Medications Ordered This Encounter   Medications    nitrofurantoin, macrocrystal-monohydrate, (MACROBID) 100 MG capsule     Sig: Take 1 capsule (100 mg total) by mouth 2 (two) times daily. for 5 days     Dispense:  10 capsule     Refill:  0        No follow-ups on file.      E-Visit Time Tracking:    Day 1 Time (in minutes): 12    Total Time (in minutes): 12

## 2024-10-16 ENCOUNTER — TELEPHONE (OUTPATIENT)
Dept: NEUROSURGERY | Facility: CLINIC | Age: 64
End: 2024-10-16
Payer: MEDICARE

## 2024-10-16 DIAGNOSIS — M48.062 NEUROGENIC CLAUDICATION DUE TO LUMBAR SPINAL STENOSIS: Primary | ICD-10-CM

## 2024-10-24 ENCOUNTER — PATIENT MESSAGE (OUTPATIENT)
Dept: INTERNAL MEDICINE | Facility: CLINIC | Age: 64
End: 2024-10-24
Payer: MEDICARE

## 2024-10-24 ENCOUNTER — TELEPHONE (OUTPATIENT)
Dept: INTERNAL MEDICINE | Facility: CLINIC | Age: 64
End: 2024-10-24
Payer: MEDICARE

## 2024-10-24 DIAGNOSIS — F17.210 CIGARETTE SMOKER: Primary | ICD-10-CM

## 2024-10-30 ENCOUNTER — HOSPITAL ENCOUNTER (OUTPATIENT)
Dept: RADIOLOGY | Facility: OTHER | Age: 64
Discharge: HOME | End: 2024-10-30
Attending: FAMILY MEDICINE
Payer: MEDICARE

## 2024-10-30 DIAGNOSIS — Z12.31 ENCOUNTER FOR SCREENING MAMMOGRAM FOR MALIGNANT NEOPLASM OF BREAST: ICD-10-CM

## 2024-10-30 PROCEDURE — 77067 SCR MAMMO BI INCL CAD: CPT | Mod: TC

## 2024-10-30 PROCEDURE — 77063 BREAST TOMOSYNTHESIS BI: CPT | Mod: TC

## 2024-11-16 DIAGNOSIS — M32.19 OTHER SYSTEMIC LUPUS ERYTHEMATOSUS WITH OTHER ORGAN INVOLVEMENT: Chronic | ICD-10-CM

## 2024-11-16 DIAGNOSIS — G43.909 MIGRAINE WITHOUT STATUS MIGRAINOSUS, NOT INTRACTABLE, UNSPECIFIED MIGRAINE TYPE: ICD-10-CM

## 2024-11-16 NOTE — TELEPHONE ENCOUNTER
No care due was identified.  [facility] Embedded Care Due Messages. Reference number: [id number].   11/16/2024 10:26:04 AM CST

## 2024-11-16 NOTE — TELEPHONE ENCOUNTER
Care Due:                  Date            Visit Type   Department     Provider  --------------------------------------------------------------------------------                                EP -                              PRIMARY      [facility] INTERNAL  Damian Pulido  Last Visit: 08-      CARE ([facility])   [facility]  Next Visit: None Scheduled  None         None Found                                                            Last  Test          Frequency    Reason                     Performed    Due Date  --------------------------------------------------------------------------------    Office Visit  6 months...  hydroxychloroquine.......  08- 01-    CBC.........  6 months...  hydroxychloroquine.......  05- 11-    CMP.........  6 months...  hydroxychloroquine.......  05- 11-    [employer] Embedded Care Due Messages. Reference number: [id number].   11/16/2024 10:24:34 AM CST

## 2024-11-17 ENCOUNTER — PATIENT MESSAGE (OUTPATIENT)
Dept: INTERNAL MEDICINE | Facility: CLINIC | Age: 64
End: 2024-11-17
Payer: MEDICARE

## 2024-11-17 DIAGNOSIS — J43.9 PULMONARY EMPHYSEMA, UNSPECIFIED EMPHYSEMA TYPE: Chronic | ICD-10-CM

## 2024-11-17 DIAGNOSIS — Z87.891 HISTORY OF TOBACCO USE: ICD-10-CM

## 2024-11-17 DIAGNOSIS — Z72.0 TOBACCO ABUSE: ICD-10-CM

## 2024-11-17 DIAGNOSIS — E03.9 ACQUIRED HYPOTHYROIDISM: Primary | Chronic | ICD-10-CM

## 2024-11-17 DIAGNOSIS — N83.209 CYST OF OVARY, UNSPECIFIED LATERALITY: ICD-10-CM

## 2024-11-18 ENCOUNTER — PATIENT MESSAGE (OUTPATIENT)
Dept: OBSTETRICS AND GYNECOLOGY | Facility: CLINIC | Age: 64
End: 2024-11-18
Payer: MEDICARE

## 2024-11-18 DIAGNOSIS — N83.202 BILATERAL OVARIAN CYSTS: Primary | ICD-10-CM

## 2024-11-18 DIAGNOSIS — N83.201 BILATERAL OVARIAN CYSTS: Primary | ICD-10-CM

## 2024-11-18 RX ORDER — SUMATRIPTAN 50 MG/1
50 TABLET, FILM COATED ORAL
Qty: 30 TABLET | Refills: 0 | Status: SHIPPED | OUTPATIENT
Start: 2024-11-18

## 2024-11-18 NOTE — TELEPHONE ENCOUNTER
Refill Routing Note   Medication(s) are not appropriate for processing by [facility] for the following reason(s):        Outside of protocol    [facility] action(s):  Route   Requires appointment : Yes     Requires labs : Yes             Appointments  past 12m or future 3m with PCP    Date Provider   Last Visit   8/1/2024 Damina Lewis MD   Next Visit   11/16/2024 Damian Lewis MD   ED visits in past 90 days: 0        Note composed:8:35 AM 11/18/2024

## 2024-11-19 ENCOUNTER — TELEPHONE (OUTPATIENT)
Dept: OBSTETRICS AND GYNECOLOGY | Facility: CLINIC | Age: 64
End: 2024-11-19
Payer: MEDICARE

## 2024-11-20 ENCOUNTER — HOSPITAL ENCOUNTER (OUTPATIENT)
Dept: RADIOLOGY | Facility: OTHER | Age: 64
Discharge: HOME | End: 2024-11-20
Attending: OBSTETRICS & GYNECOLOGY
Payer: MEDICARE

## 2024-11-20 ENCOUNTER — PATIENT MESSAGE (OUTPATIENT)
Dept: OBSTETRICS AND GYNECOLOGY | Facility: CLINIC | Age: 64
End: 2024-11-20
Payer: MEDICARE

## 2024-11-20 DIAGNOSIS — N83.202 BILATERAL OVARIAN CYSTS: ICD-10-CM

## 2024-11-20 DIAGNOSIS — N83.201 BILATERAL OVARIAN CYSTS: ICD-10-CM

## 2024-11-20 DIAGNOSIS — E03.9 ACQUIRED HYPOTHYROIDISM: Chronic | ICD-10-CM

## 2024-11-20 PROCEDURE — 76856 US EXAM PELVIC COMPLETE: CPT | Mod: 26,,, | Performed by: RADIOLOGY

## 2024-11-20 PROCEDURE — 76830 TRANSVAGINAL US NON-OB: CPT | Mod: 26,,, | Performed by: RADIOLOGY

## 2024-11-20 PROCEDURE — 76856 US EXAM PELVIC COMPLETE: CPT | Mod: TC

## 2024-11-20 RX ORDER — LEVOTHYROXINE SODIUM 88 UG/1
88 TABLET ORAL
Qty: 90 TABLET | Refills: 3 | Status: SHIPPED | OUTPATIENT
Start: 2024-11-20

## 2024-11-20 NOTE — TELEPHONE ENCOUNTER
No care due was identified.  [facility] Embedded Care Due Messages. Reference number: [id number].   11/20/2024 8:36:09 AM CST

## 2024-11-20 NOTE — TELEPHONE ENCOUNTER
Refill Encounter    PCP Visits: Recent Visits  Date Type Provider Dept   08/01/24 Office Visit Damian Lewis MD [facility]   Showing recent visits within past 360 days and meeting all other requirements  Future Appointments  No visits were found meeting these conditions.  Showing future appointments within next 720 days and meeting all other requirements     Last 3 Blood Pressure:   BP Readings from Last 3 Encounters:   09/30/24 (!) 146/74   08/22/24 (!) 154/84   08/01/24 130/76     Preferred Pharmacy:   [id number] - [place] - [address] AT [facility] [address] [place] [address]  Phone: [phone] Fax: [phone]    Requested RX:  Requested Prescriptions     Pending Prescriptions Disp Refills    levothyroxine (SYNTHROID) 88 MCG tablet 90 tablet 3     Sig: Take 1 tablet (88 mcg total) by mouth before breakfast.      RX Route: Normal

## 2024-11-21 ENCOUNTER — PATIENT MESSAGE (OUTPATIENT)
Dept: RHEUMATOLOGY | Facility: CLINIC | Age: 64
End: 2024-11-21
Payer: MEDICARE

## 2024-11-21 DIAGNOSIS — M32.19 OTHER SYSTEMIC LUPUS ERYTHEMATOSUS WITH OTHER ORGAN INVOLVEMENT: Chronic | ICD-10-CM

## 2024-11-21 RX ORDER — HYDROXYCHLOROQUINE SULFATE 200 MG/1
300 TABLET, FILM COATED ORAL DAILY
Qty: 135 TABLET | Refills: 2 | Status: SHIPPED | OUTPATIENT
Start: 2024-11-21

## 2024-11-21 RX ORDER — HYDROXYCHLOROQUINE SULFATE 200 MG/1
300 TABLET, FILM COATED ORAL DAILY
Qty: 135 TABLET | Refills: 0 | OUTPATIENT
Start: 2024-11-21

## 2024-11-21 NOTE — TELEPHONE ENCOUNTER
Patient will need to contact her rheumatologist regarding this.  I no longer prescribe this medication.

## 2024-11-21 NOTE — TELEPHONE ENCOUNTER
Refill Routing Note   Medication(s) are not appropriate for processing by [facility] for the following reason(s):     DDI not previously overridden by current provider--after initial override, the Refill Center will be able to continue overrides      Other    [facility] action(s):  Quick Discontinue   Requires appointment : Yes   Requires labs : Yes      Medication Therapy Plan: provider denied, pt needs to get refilld from rheumatologist; QDC      Appointments  past 12m or future 3m with PCP    Date Provider   Last Visit   8/1/2024 Damian Lewis MD   Next Visit   11/16/2024 Damian Lewis MD   ED visits in past 90 days: 0        Note composed:11:59 AM 11/21/2024

## 2024-11-21 NOTE — TELEPHONE ENCOUNTER
Refill Decision Note   Breanna Guido  is requesting a refill authorization.  Brief Assessment and Rationale for Refill:  Quick Discontinue     Medication Therapy Plan:  provider denied, pt needs to get refilld from rheumatologist; QDC      Extended chart review required: Yes   Comments:     Note composed:1:57 PM 11/21/2024

## 2024-12-05 DIAGNOSIS — E78.2 MIXED HYPERLIPIDEMIA: ICD-10-CM

## 2024-12-05 DIAGNOSIS — I10 ESSENTIAL HYPERTENSION, BENIGN: Primary | ICD-10-CM

## 2024-12-05 DIAGNOSIS — E03.9 ACQUIRED HYPOTHYROIDISM: Chronic | ICD-10-CM

## 2024-12-05 RX ORDER — AMLODIPINE BESYLATE 5 MG/1
5 TABLET ORAL DAILY
Qty: 90 TABLET | Refills: 3 | Status: SHIPPED | OUTPATIENT
Start: 2024-12-05

## 2024-12-05 RX ORDER — LOSARTAN POTASSIUM 100 MG/1
100 TABLET ORAL DAILY
Qty: 90 TABLET | Refills: 3 | Status: SHIPPED | OUTPATIENT
Start: 2024-12-05

## 2024-12-05 RX ORDER — ROSUVASTATIN CALCIUM 40 MG/1
40 TABLET, COATED ORAL NIGHTLY
Qty: 90 TABLET | Refills: 3 | Status: SHIPPED | OUTPATIENT
Start: 2024-12-05

## 2024-12-05 NOTE — TELEPHONE ENCOUNTER
No care due was identified.  [facility] Embedded Care Due Messages. Reference number: [id number].   12/05/2024 1:44:03 PM CST

## 2024-12-05 NOTE — TELEPHONE ENCOUNTER
----- Message from Med Assistant Santana sent at 12/5/2024  1:19 PM CST -----  Type: RX Refill Request    Who Called:  Pt   Have you contacted your pharmacy:  Yes , for a week the pharmacy has been trying to reach staff   Refill    RX Name and Strength:  losartan (COZAAR) 100 MG tablet  amLODIPine (NORVASC) 5 MG tablet  rosuvastatin (CRESTOR) 40 MG Tab    Preferred Pharmacy with phone number:    [id number] - [place]  [address] AT [facility] [address]  [address] [address]  Phone: [phone] Fax: [phone]     Local or Mail Order:  Local   Would the patient rather a call back or a response via [facility]?  Callback   Best Call Back Number:  Telephone Information:  Mobile          [phone]     Additional Information:    Thank you.

## 2024-12-06 ENCOUNTER — PATIENT MESSAGE (OUTPATIENT)
Dept: INTERNAL MEDICINE | Facility: CLINIC | Age: 64
End: 2024-12-06
Payer: MEDICARE

## 2024-12-06 DIAGNOSIS — J44.9 CHRONIC OBSTRUCTIVE PULMONARY DISEASE, UNSPECIFIED COPD TYPE: ICD-10-CM

## 2024-12-06 NOTE — TELEPHONE ENCOUNTER
No care due was identified.  [facility] Embedded Care Due Messages. Reference number: [id number].   12/06/2024 2:05:31 PM CST   1-2 cups/cans per day

## 2024-12-06 NOTE — TELEPHONE ENCOUNTER
Refill Encounter    PCP Visits: Recent Visits  Date Type Provider Dept   08/01/24 Office Visit Damian Lewis MD [facility]   Showing recent visits within past 360 days and meeting all other requirements  Future Appointments  No visits were found meeting these conditions.  Showing future appointments within next 720 days and meeting all other requirements     Last 3 Blood Pressure:   BP Readings from Last 3 Encounters:   09/30/24 (!) 146/74   08/22/24 (!) 154/84   08/01/24 130/76     Preferred Pharmacy:   [id number] - [place] - [address] AT [facility] [address]  [address] [address]  Phone: [phone] Fax: [phone]    Requested RX:  Requested Prescriptions     Pending Prescriptions Disp Refills    albuterol (PROVENTIL/VENTOLIN HFA) 90 mcg/actuation inhaler 54 g 2     Sig: Inhale 2 puffs into the lungs every 6 (six) hours as needed for Wheezing. Rescue    pantoprazole (PROTONIX) 40 MG tablet 30 tablet 11     Sig: Take 1 tablet (40 mg total) by mouth once daily.    fluticasone-umeclidin-vilanter (TRELEGY ELLIPTA) 100-62.5-25 mcg DsDv 180 each 3     Sig: Inhale 1 puff into the lungs once daily.      RX Route: Normal

## 2024-12-09 RX ORDER — FLUTICASONE FUROATE, UMECLIDINIUM BROMIDE AND VILANTEROL TRIFENATATE 100; 62.5; 25 UG/1; UG/1; UG/1
1 POWDER RESPIRATORY (INHALATION) DAILY
Qty: 180 EACH | Refills: 3 | Status: SHIPPED | OUTPATIENT
Start: 2024-12-09

## 2024-12-09 RX ORDER — PANTOPRAZOLE SODIUM 40 MG/1
40 TABLET, DELAYED RELEASE ORAL DAILY
Qty: 30 TABLET | Refills: 11 | Status: SHIPPED | OUTPATIENT
Start: 2024-12-09 | End: 2025-12-09

## 2024-12-09 RX ORDER — ALBUTEROL SULFATE 90 UG/1
2 INHALANT RESPIRATORY (INHALATION) EVERY 6 HOURS PRN
Qty: 54 G | Refills: 2 | Status: SHIPPED | OUTPATIENT
Start: 2024-12-09

## 2024-12-19 ENCOUNTER — TELEPHONE (OUTPATIENT)
Dept: INTERNAL MEDICINE | Facility: CLINIC | Age: 64
End: 2024-12-19
Payer: MEDICARE

## 2024-12-23 ENCOUNTER — TELEPHONE (OUTPATIENT)
Dept: NEUROSURGERY | Facility: CLINIC | Age: 64
End: 2024-12-23
Payer: MEDICARE

## 2024-12-23 DIAGNOSIS — M48.062 NEUROGENIC CLAUDICATION DUE TO LUMBAR SPINAL STENOSIS: Primary | ICD-10-CM

## 2024-12-30 ENCOUNTER — TELEPHONE (OUTPATIENT)
Dept: INTERNAL MEDICINE | Facility: CLINIC | Age: 64
End: 2024-12-30
Payer: MEDICARE

## 2024-12-30 NOTE — TELEPHONE ENCOUNTER
----- Message from Med Assistant Santana sent at 12/30/2024  8:42 AM CST -----  Who called:  Pt   What is the request in detail:  Discuss insurance possibly being out of network so dr bosch can't be her pcp anymore   Can the clinic reply by [facility]? No  no  Would the patient rather a call back or a response via [facility]? Call back  Callback   Best call back number:  Telephone Information:  Mobile          [phone]     Additional Information:    Thank you.

## 2024-12-30 NOTE — TELEPHONE ENCOUNTER
Sent portal msg with [facility] of [facility] insurance for pt to see if provider still is in network. Advised her to contact insurance if provider is not in network.

## 2025-01-08 ENCOUNTER — OFFICE VISIT (OUTPATIENT)
Dept: URGENT CARE | Facility: CLINIC | Age: 65
End: 2025-01-08
Payer: MEDICARE

## 2025-01-08 VITALS
HEIGHT: 63 IN | TEMPERATURE: 98 F | DIASTOLIC BLOOD PRESSURE: 96 MMHG | HEART RATE: 85 BPM | SYSTOLIC BLOOD PRESSURE: 160 MMHG | BODY MASS INDEX: 29.59 KG/M2 | OXYGEN SATURATION: 98 % | RESPIRATION RATE: 18 BRPM | WEIGHT: 167 LBS

## 2025-01-08 DIAGNOSIS — T14.90XA TRAUMA: ICD-10-CM

## 2025-01-08 DIAGNOSIS — S20.211D CONTUSION OF RIB ON RIGHT SIDE, SUBSEQUENT ENCOUNTER: Primary | ICD-10-CM

## 2025-01-08 PROCEDURE — 71100 X-RAY EXAM RIBS UNI 2 VIEWS: CPT | Mod: RT,S$GLB,, | Performed by: RADIOLOGY

## 2025-01-08 PROCEDURE — 99214 OFFICE O/P EST MOD 30 MIN: CPT | Mod: S$GLB,,, | Performed by: FAMILY MEDICINE

## 2025-01-08 NOTE — PATIENT INSTRUCTIONS
CONTINUE ALTERNATING TYLENOL AND ALLEVE HAS YOU ARE.    CONTINUE WITH THE WARM SOAKS.    Make sure that you follow up with your primary care doctor in the next 2-5 days if needed .  Go to or return to Urgent Care if signs or symptoms change and certainly if you have worsening and/or severe symptoms go to the nearest emergency department for further evaluation.

## 2025-01-08 NOTE — PROGRESS NOTES
"Subjective:      Patient ID: Breanna Guido is a 64 y.o. female.    Vitals:  height is 5' 3" (1.6 m) and weight is 75.8 kg (167 lb). Her oral temperature is 98 °F (36.7 °C). Her blood pressure is 160/96 (abnormal) and her pulse is 85. Her respiration is 18 and oxygen saturation is 98%.     Chief Complaint: Rib pain    Patient presents with c.o trauma that occurred on 11/25/24. Patient states she lives next to a commerical building and a window fell out of the buidling when she was walking down the side of the house. Patient had her medical alert necklace on. She pushed it to alert ems which took a while as patient lives in [facility]. Patient was transported to [facility] and had negative xrays and cts. Pateint reports continued pain the right rib area. Patient has not yet followed up with pcp since the incident. Patient has been placing pain patches to the area which has helped alleviate the pain some.  She reports there can be several good days in a row and then it can hurt again.  No episode of re-injury or strain since that initial incident.  Sometimes alternates Alleve and Tylenol.    Other  Episode onset: 11/25/24. Pertinent negatives include no abdominal pain, nausea or rash. The symptoms are aggravated by bending. Treatments tried: pain patches. The treatment provided mild relief.       Gastrointestinal:  Negative for abdominal pain and nausea.   Skin:  Negative for rash.      Objective:     Physical Exam   Constitutional: She is oriented to person, place, and time. She appears well-developed.  Non-toxic appearance. She does not appear ill. No distress.   HENT:   Head: Normocephalic and atraumatic.   Ears:   Right Ear: Tympanic membrane and external ear normal.   Left Ear: Tympanic membrane and external ear normal.   Cardiovascular: Normal rate and regular rhythm.   Pulmonary/Chest: Effort normal. No stridor. No respiratory distress. She has no wheezes. She has no rhonchi. She has no rales.   Abdominal: " Normal appearance.   Musculoskeletal:      Comments: Generally moving around very well.  Point tenderness is elicited along the ribs in her right axillary line just below her armpit.  She is able to demonstrate full and painless range of motion of her right shoulder.  Peripheral pulses present and equal   Neurological: She is alert and oriented to person, place, and time.   Skin: Skin is not diaphoretic.   Psychiatric: Her behavior is normal. Thought content normal.   Nursing note and vitals reviewed.    RIGHT RIB XRAY: No radiographic evidence of rib fracture. Visible portions of the right lung appear unremarkable.   Assessment:     1. Contusion of rib on right side, subsequent encounter    2. Trauma      -- patient reassured  Plan:       Contusion of rib on right side, subsequent encounter    Trauma  -     X-Ray Ribs 2 View Right; Future; Expected date: 01/08/2025    CONTINUE ALTERNATING TYLENOL AND ALLEVE HAS YOU ARE.    CONTINUE WITH THE WARM SOAKS.    Make sure that you follow up with your primary care doctor in the next 2-5 days if needed .  Go to or return to Urgent Care if signs or symptoms change and certainly if you have worsening and/or severe symptoms go to the nearest emergency department for further evaluation

## 2025-01-15 ENCOUNTER — HOSPITAL ENCOUNTER (OUTPATIENT)
Dept: RADIOLOGY | Facility: OTHER | Age: 65
Discharge: HOME | End: 2025-01-15
Attending: FAMILY MEDICINE
Payer: MEDICARE

## 2025-01-15 ENCOUNTER — OFFICE VISIT (OUTPATIENT)
Dept: INTERNAL MEDICINE | Facility: CLINIC | Age: 65
End: 2025-01-15
Attending: FAMILY MEDICINE
Payer: MEDICARE

## 2025-01-15 VITALS
HEART RATE: 77 BPM | SYSTOLIC BLOOD PRESSURE: 108 MMHG | BODY MASS INDEX: 29.67 KG/M2 | OXYGEN SATURATION: 96 % | HEIGHT: 63 IN | WEIGHT: 167.44 LBS | DIASTOLIC BLOOD PRESSURE: 74 MMHG

## 2025-01-15 DIAGNOSIS — Z87.891 HISTORY OF TOBACCO USE: ICD-10-CM

## 2025-01-15 DIAGNOSIS — I10 ESSENTIAL HYPERTENSION, BENIGN: ICD-10-CM

## 2025-01-15 DIAGNOSIS — G31.84 MCI (MILD COGNITIVE IMPAIRMENT): ICD-10-CM

## 2025-01-15 DIAGNOSIS — E03.9 ACQUIRED HYPOTHYROIDISM: ICD-10-CM

## 2025-01-15 DIAGNOSIS — Z00.00 ENCOUNTER FOR ANNUAL HEALTH EXAMINATION: Primary | ICD-10-CM

## 2025-01-15 DIAGNOSIS — R79.9 ABNORMAL FINDING OF BLOOD CHEMISTRY, UNSPECIFIED: ICD-10-CM

## 2025-01-15 PROCEDURE — 1100F PTFALLS ASSESS-DOCD GE2>/YR: CPT | Mod: CPTII,S$GLB,, | Performed by: FAMILY MEDICINE

## 2025-01-15 PROCEDURE — 1159F MED LIST DOCD IN RCRD: CPT | Mod: CPTII,S$GLB,, | Performed by: FAMILY MEDICINE

## 2025-01-15 PROCEDURE — 71271 CT THORAX LUNG CANCER SCR C-: CPT | Mod: TC

## 2025-01-15 PROCEDURE — 99215 OFFICE O/P EST HI 40 MIN: CPT | Mod: S$GLB,,, | Performed by: FAMILY MEDICINE

## 2025-01-15 PROCEDURE — 3074F SYST BP LT 130 MM HG: CPT | Mod: CPTII,S$GLB,, | Performed by: FAMILY MEDICINE

## 2025-01-15 PROCEDURE — 1160F RVW MEDS BY RX/DR IN RCRD: CPT | Mod: CPTII,S$GLB,, | Performed by: FAMILY MEDICINE

## 2025-01-15 PROCEDURE — 3008F BODY MASS INDEX DOCD: CPT | Mod: CPTII,S$GLB,, | Performed by: FAMILY MEDICINE

## 2025-01-15 PROCEDURE — 99999 PR PBB SHADOW E&M-EST. PATIENT-LVL V: CPT | Mod: PBBFAC,,, | Performed by: FAMILY MEDICINE

## 2025-01-15 PROCEDURE — 3078F DIAST BP <80 MM HG: CPT | Mod: CPTII,S$GLB,, | Performed by: FAMILY MEDICINE

## 2025-01-15 PROCEDURE — 71271 CT THORAX LUNG CANCER SCR C-: CPT | Mod: 26,,, | Performed by: RADIOLOGY

## 2025-01-15 PROCEDURE — 1125F AMNT PAIN NOTED PAIN PRSNT: CPT | Mod: CPTII,S$GLB,, | Performed by: FAMILY MEDICINE

## 2025-01-15 PROCEDURE — 3288F FALL RISK ASSESSMENT DOCD: CPT | Mod: CPTII,S$GLB,, | Performed by: FAMILY MEDICINE

## 2025-01-15 PROCEDURE — G2211 COMPLEX E/M VISIT ADD ON: HCPCS | Mod: S$GLB,,, | Performed by: FAMILY MEDICINE

## 2025-01-15 NOTE — PROGRESS NOTES
"CHIEF COMPLAINT:      HISTORY OF PRESENT ILLNESS: The patient is a chronically ill 64-year-old black female.  The patient has months LBP with left sciatica.      She has concerns about dementia.    She is followed by Rheumatology for Lupus for which she takes Plaquenil    She has well-controlled hypertension    She has COPD and is an active smoker    Previously seen by Dr. Matias.    REVIEW OF SYSTEMS:  GENERAL: No fever, chills, fatigability or weight loss.  SKIN: No rashes, itching or changes in color or texture of skin.  HEAD: No headaches or recent head trauma.  EYES: Visual acuity fine. No photophobia, ocular pain or diplopia.  EARS: Denies ear pain, discharge or vertigo.  NOSE: No loss of smell, no epistaxis or postnasal drip.  MOUTH & THROAT: No hoarseness or change in voice. No excessive gum bleeding.  NODES: Denies swollen glands.  CHEST: Denies ORDONEZ, cyanosis, wheezing, cough and sputum production.  CARDIOVASCULAR: Denies chest pain, PND, orthopnea or reduced exercise tolerance.  ABDOMEN: Appetite fine. No weight loss. Denies diarrhea, abdominal pain, hematemesis or blood in stool.  URINARY: No flank pain, dysuria or hematuria.  PERIPHERAL VASCULAR: No claudication or cyanosis.  MUSCULOSKELETAL:  She has diffuse joint pain  NEUROLOGIC: No history of seizures, paralysis, alteration of gait or coordination.    SOCIAL HISTORY: The patient does smoke.  The patient consumes alcohol socially.  The patient is single.    PHYSICAL EXAMINATION:   Blood pressure 108/74, pulse 77, height 5' 3" (1.6 m), weight 76 kg (167 lb 7 oz), SpO2 96%.  APPEARANCE: Well nourished, well developed, in no acute distress.    HEAD: Normocephalic, atraumatic.  EYES: PERRL. EOMI.  Conjunctivae without injection and  anicteric  NOSE: Mucosa pink. Airway clear.  MOUTH & THROAT: No tonsillar enlargement. No pharyngeal erythema or exudate. No stridor.  NECK: Supple.   NODES: No cervical, axillary or inguinal lymph node enlargement.  CHEST: Lungs " clear to auscultation.  No retractions are noted.  No rales or rhonchi are present.  CARDIOVASCULAR: Normal S1, S2. No rubs, murmurs or gallops.  ABDOMEN: Bowel sounds normal. Not distended. Soft. No tenderness or masses.  No ascites is noted.  MUSCULOSKELETAL:  There is no clubbing, cyanosis, or edema of the extremities x4.  There is full range of motion of the lumbar spine.  There is full range of motion of the extremities x4.  There is no deformity noted.    NEUROLOGIC:       Normal speech development.      Hearing normal.      Normal gait.      Motor and sensory exams grossly normal.  PSYCHIATRIC: Patient is alert and oriented x3.  Thought processes are all normal.  There is no homicidality.  There is no suicidality.  There is no evidence of psychosis.    LABORATORY/RADIOLOGY:   Chart reviewed.      ASSESSMENT:   MCI  Low back pain with left-sided sciatica  Lupus on Plaquenil  COPD in a smoker  Hypertension    PLAN:  Neuro referral  BELL Kinsey  Return to clinic for preop.

## 2025-02-20 ENCOUNTER — PATIENT MESSAGE (OUTPATIENT)
Dept: INTERNAL MEDICINE | Facility: CLINIC | Age: 65
End: 2025-02-20
Payer: MEDICARE

## 2025-02-22 DIAGNOSIS — Z00.00 ENCOUNTER FOR MEDICARE ANNUAL WELLNESS EXAM: ICD-10-CM

## 2025-03-06 DIAGNOSIS — Z23 NEED FOR MEASLES-MUMPS-RUBELLA (MMR) VACCINE: Primary | ICD-10-CM

## 2025-03-11 DIAGNOSIS — M32.19 OTHER SYSTEMIC LUPUS ERYTHEMATOSUS WITH OTHER ORGAN INVOLVEMENT: Chronic | ICD-10-CM

## 2025-03-11 DIAGNOSIS — M79.7 FIBROMYALGIA: Chronic | ICD-10-CM

## 2025-03-11 RX ORDER — PREGABALIN 150 MG/1
150 CAPSULE ORAL 2 TIMES DAILY
Qty: 180 CAPSULE | Refills: 1 | Status: SHIPPED | OUTPATIENT
Start: 2025-03-11

## 2025-03-11 NOTE — TELEPHONE ENCOUNTER
No care due was identified.  [facility] Embedded Care Due Messages. Reference number: [id number].   3/11/2025 12:17:32 PM CDT

## 2025-03-11 NOTE — TELEPHONE ENCOUNTER
Refill Encounter    PCP Visits: Recent Visits  Date Type Provider Dept   01/15/25 Office Visit Damian Lewis MD [facility]   08/01/24 Office Visit Damian Lewis MD [facility]   Showing recent visits within past 360 days and meeting all other requirements  Future Appointments  No visits were found meeting these conditions.  Showing future appointments within next 720 days and meeting all other requirements      Last 3 Blood Pressure:   BP Readings from Last 3 Encounters:   01/15/25 108/74   01/08/25 (!) 160/96   09/30/24 (!) 146/74     Preferred Pharmacy:   [id number] - [place] - [address] AT [facility] [address]  [address] [address]  Phone: [phone] Fax: [phone]    Requested RX:  Requested Prescriptions     Pending Prescriptions Disp Refills    pregabalin (LYRICA) 150 MG capsule 180 capsule 1     Sig: Take 1 capsule (150 mg total) by mouth 2 (two) times daily. Generic is fine      RX Route: Normal

## 2025-03-17 ENCOUNTER — PATIENT MESSAGE (OUTPATIENT)
Dept: RHEUMATOLOGY | Facility: CLINIC | Age: 65
End: 2025-03-17
Payer: MEDICARE

## 2025-03-18 ENCOUNTER — PATIENT MESSAGE (OUTPATIENT)
Dept: RHEUMATOLOGY | Facility: CLINIC | Age: 65
End: 2025-03-18
Payer: MEDICARE

## 2025-03-18 RX ORDER — HYDROXYCHLOROQUINE SULFATE 200 MG/1
300 TABLET, FILM COATED ORAL DAILY
Qty: 135 TABLET | Refills: 1 | Status: SHIPPED | OUTPATIENT
Start: 2025-03-18

## 2025-03-18 NOTE — TELEPHONE ENCOUNTER
Last eye exam with Dr.Robert Knight 7/11/24, pt overdue for follow up and labs for . Message to pt over the portal to reach out to us to schedule.

## 2025-05-28 ENCOUNTER — RESULTS FOLLOW-UP (OUTPATIENT)
Dept: RHEUMATOLOGY | Facility: CLINIC | Age: 65
End: 2025-05-28

## 2025-05-28 ENCOUNTER — LAB VISIT (OUTPATIENT)
Dept: LAB | Facility: OTHER | Age: 65
End: 2025-05-28
Attending: INTERNAL MEDICINE
Payer: MEDICARE

## 2025-05-28 DIAGNOSIS — M32.19 OTHER SYSTEMIC LUPUS ERYTHEMATOSUS WITH OTHER ORGAN INVOLVEMENT: ICD-10-CM

## 2025-05-28 LAB
ABSOLUTE EOSINOPHIL (OHS): 0.04 K/UL
ABSOLUTE MONOCYTE (OHS): 0.4 K/UL (ref 0.3–1)
ABSOLUTE NEUTROPHIL COUNT (OHS): 3.74 K/UL (ref 1.8–7.7)
ALBUMIN SERPL BCP-MCNC: 4.1 G/DL (ref 3.5–5.2)
ALP SERPL-CCNC: 59 UNIT/L (ref 40–150)
ALT SERPL W/O P-5'-P-CCNC: 17 UNIT/L (ref 10–44)
ANION GAP (OHS): 9 MMOL/L (ref 8–16)
AST SERPL-CCNC: 18 UNIT/L (ref 11–45)
BASOPHILS # BLD AUTO: 0.05 K/UL
BASOPHILS NFR BLD AUTO: 0.9 %
BILIRUB SERPL-MCNC: 0.3 MG/DL (ref 0.1–1)
BILIRUB UR QL STRIP.AUTO: NEGATIVE
BUN SERPL-MCNC: 12 MG/DL (ref 8–23)
C3 SERPL-MCNC: 153 MG/DL (ref 50–180)
C4 COMPLEMENT (OHS): 38 MG/DL (ref 11–44)
CALCIUM SERPL-MCNC: 9.5 MG/DL (ref 8.7–10.5)
CHLORIDE SERPL-SCNC: 108 MMOL/L (ref 95–110)
CLARITY UR: CLEAR
CO2 SERPL-SCNC: 24 MMOL/L (ref 23–29)
COLOR UR AUTO: YELLOW
CREAT SERPL-MCNC: 0.8 MG/DL (ref 0.5–1.4)
CRP SERPL-MCNC: 3.3 MG/L
ERYTHROCYTE [DISTWIDTH] IN BLOOD BY AUTOMATED COUNT: 13.2 % (ref 11.5–14.5)
ERYTHROCYTE [SEDIMENTATION RATE] IN BLOOD BY PHOTOMETRIC METHOD: 42 MM/HR
GFR SERPLBLD CREATININE-BSD FMLA CKD-EPI: >60 ML/MIN/1.73/M2
GLUCOSE SERPL-MCNC: 108 MG/DL (ref 70–110)
GLUCOSE UR QL STRIP: NEGATIVE
HCT VFR BLD AUTO: 42.9 % (ref 37–48.5)
HGB BLD-MCNC: 14.5 GM/DL (ref 12–16)
HGB UR QL STRIP: NEGATIVE
IMM GRANULOCYTES # BLD AUTO: 0.01 K/UL (ref 0–0.04)
IMM GRANULOCYTES NFR BLD AUTO: 0.2 % (ref 0–0.5)
KETONES UR QL STRIP: NEGATIVE
LEUKOCYTE ESTERASE UR QL STRIP: NEGATIVE
LYMPHOCYTES # BLD AUTO: 1.6 K/UL (ref 1–4.8)
MCH RBC QN AUTO: 31.3 PG (ref 27–31)
MCHC RBC AUTO-ENTMCNC: 33.8 G/DL (ref 32–36)
MCV RBC AUTO: 93 FL (ref 82–98)
NITRITE UR QL STRIP: NEGATIVE
NUCLEATED RBC (/100WBC) (OHS): 0 /100 WBC
PH UR STRIP: 7 [PH]
PLATELET # BLD AUTO: 211 K/UL (ref 150–450)
PMV BLD AUTO: 10.6 FL (ref 9.2–12.9)
POTASSIUM SERPL-SCNC: 3.9 MMOL/L (ref 3.5–5.1)
PROT SERPL-MCNC: 8.4 GM/DL (ref 6–8.4)
PROT UR QL STRIP: NEGATIVE
RBC # BLD AUTO: 4.64 M/UL (ref 4–5.4)
RELATIVE EOSINOPHIL (OHS): 0.7 %
RELATIVE LYMPHOCYTE (OHS): 27.4 % (ref 18–48)
RELATIVE MONOCYTE (OHS): 6.8 % (ref 4–15)
RELATIVE NEUTROPHIL (OHS): 64 % (ref 38–73)
SODIUM SERPL-SCNC: 141 MMOL/L (ref 136–145)
SP GR UR STRIP: 1.01
UROBILINOGEN UR STRIP-ACNC: NEGATIVE EU/DL
WBC # BLD AUTO: 5.84 K/UL (ref 3.9–12.7)

## 2025-05-28 PROCEDURE — 86140 C-REACTIVE PROTEIN: CPT

## 2025-05-28 PROCEDURE — 86160 COMPLEMENT ANTIGEN: CPT | Mod: 59

## 2025-05-28 PROCEDURE — 81003 URINALYSIS AUTO W/O SCOPE: CPT

## 2025-05-28 PROCEDURE — 36415 COLL VENOUS BLD VENIPUNCTURE: CPT

## 2025-05-28 PROCEDURE — 80053 COMPREHEN METABOLIC PANEL: CPT

## 2025-05-28 PROCEDURE — 86225 DNA ANTIBODY NATIVE: CPT

## 2025-05-28 PROCEDURE — 85652 RBC SED RATE AUTOMATED: CPT

## 2025-05-28 PROCEDURE — 85025 COMPLETE CBC W/AUTO DIFF WBC: CPT

## 2025-05-28 PROCEDURE — 86160 COMPLEMENT ANTIGEN: CPT

## 2025-05-29 LAB
DSDNA ANTIBODY (OHS): NORMAL
DSDNA ANTIBODY TITER (OHS): NORMAL

## 2025-06-04 ENCOUNTER — APPOINTMENT (OUTPATIENT)
Dept: LAB | Facility: HOSPITAL | Age: 65
End: 2025-06-04
Attending: INTERNAL MEDICINE
Payer: MEDICARE

## 2025-06-04 ENCOUNTER — OFFICE VISIT (OUTPATIENT)
Dept: RHEUMATOLOGY | Facility: CLINIC | Age: 65
End: 2025-06-04
Payer: MEDICARE

## 2025-06-04 VITALS
DIASTOLIC BLOOD PRESSURE: 87 MMHG | SYSTOLIC BLOOD PRESSURE: 144 MMHG | HEART RATE: 74 BPM | WEIGHT: 153 LBS | HEIGHT: 63 IN | BODY MASS INDEX: 27.11 KG/M2

## 2025-06-04 DIAGNOSIS — R10.9 ABDOMINAL PAIN, UNSPECIFIED ABDOMINAL LOCATION: Primary | ICD-10-CM

## 2025-06-04 DIAGNOSIS — M32.19 OTHER SYSTEMIC LUPUS ERYTHEMATOSUS WITH OTHER ORGAN INVOLVEMENT: Chronic | ICD-10-CM

## 2025-06-04 LAB
CREAT UR-MCNC: 255 MG/DL (ref 15–325)
PROT UR-MCNC: 13 MG/DL
PROT/CREAT UR: 0.05 MG/G{CREAT}

## 2025-06-04 PROCEDURE — 3288F FALL RISK ASSESSMENT DOCD: CPT | Mod: CPTII,S$GLB,, | Performed by: INTERNAL MEDICINE

## 2025-06-04 PROCEDURE — 3077F SYST BP >= 140 MM HG: CPT | Mod: CPTII,S$GLB,, | Performed by: INTERNAL MEDICINE

## 2025-06-04 PROCEDURE — 4010F ACE/ARB THERAPY RXD/TAKEN: CPT | Mod: CPTII,S$GLB,, | Performed by: INTERNAL MEDICINE

## 2025-06-04 PROCEDURE — 99214 OFFICE O/P EST MOD 30 MIN: CPT | Mod: S$GLB,,, | Performed by: INTERNAL MEDICINE

## 2025-06-04 PROCEDURE — 3079F DIAST BP 80-89 MM HG: CPT | Mod: CPTII,S$GLB,, | Performed by: INTERNAL MEDICINE

## 2025-06-04 PROCEDURE — 1101F PT FALLS ASSESS-DOCD LE1/YR: CPT | Mod: CPTII,S$GLB,, | Performed by: INTERNAL MEDICINE

## 2025-06-04 PROCEDURE — 3044F HG A1C LEVEL LT 7.0%: CPT | Mod: CPTII,S$GLB,, | Performed by: INTERNAL MEDICINE

## 2025-06-04 PROCEDURE — 99999 PR PBB SHADOW E&M-EST. PATIENT-LVL III: CPT | Mod: PBBFAC,,, | Performed by: INTERNAL MEDICINE

## 2025-06-04 PROCEDURE — 3008F BODY MASS INDEX DOCD: CPT | Mod: CPTII,S$GLB,, | Performed by: INTERNAL MEDICINE

## 2025-06-04 PROCEDURE — 1125F AMNT PAIN NOTED PAIN PRSNT: CPT | Mod: CPTII,S$GLB,, | Performed by: INTERNAL MEDICINE

## 2025-06-04 RX ORDER — HYDROXYCHLOROQUINE SULFATE 200 MG/1
300 TABLET, FILM COATED ORAL DAILY
Qty: 135 TABLET | Refills: 1 | Status: SHIPPED | OUTPATIENT
Start: 2025-06-04

## 2025-06-04 ASSESSMENT — SYSTEMIC LUPUS ERYTHEMATOSUS DISEASE ACTIVITY INDEX (SLEDAI)
TOTAL_SCORE: 4
TOTAL_SCORE: 0

## 2025-06-25 ENCOUNTER — OFFICE VISIT (OUTPATIENT)
Dept: GASTROENTEROLOGY | Facility: CLINIC | Age: 65
End: 2025-06-25
Payer: MEDICARE

## 2025-06-25 VITALS
DIASTOLIC BLOOD PRESSURE: 80 MMHG | BODY MASS INDEX: 26.64 KG/M2 | HEIGHT: 63 IN | SYSTOLIC BLOOD PRESSURE: 129 MMHG | HEART RATE: 70 BPM | WEIGHT: 150.38 LBS

## 2025-06-25 DIAGNOSIS — R10.9 ABDOMINAL PAIN, UNSPECIFIED ABDOMINAL LOCATION: ICD-10-CM

## 2025-06-25 PROCEDURE — 1160F RVW MEDS BY RX/DR IN RCRD: CPT | Mod: CPTII,S$GLB,,

## 2025-06-25 PROCEDURE — 3044F HG A1C LEVEL LT 7.0%: CPT | Mod: CPTII,S$GLB,,

## 2025-06-25 PROCEDURE — 3074F SYST BP LT 130 MM HG: CPT | Mod: CPTII,S$GLB,,

## 2025-06-25 PROCEDURE — 3288F FALL RISK ASSESSMENT DOCD: CPT | Mod: CPTII,S$GLB,,

## 2025-06-25 PROCEDURE — 1159F MED LIST DOCD IN RCRD: CPT | Mod: CPTII,S$GLB,,

## 2025-06-25 PROCEDURE — 4010F ACE/ARB THERAPY RXD/TAKEN: CPT | Mod: CPTII,S$GLB,,

## 2025-06-25 PROCEDURE — 3079F DIAST BP 80-89 MM HG: CPT | Mod: CPTII,S$GLB,,

## 2025-06-25 PROCEDURE — 1101F PT FALLS ASSESS-DOCD LE1/YR: CPT | Mod: CPTII,S$GLB,,

## 2025-06-25 PROCEDURE — 99203 OFFICE O/P NEW LOW 30 MIN: CPT | Mod: S$GLB,,,

## 2025-06-25 PROCEDURE — 3008F BODY MASS INDEX DOCD: CPT | Mod: CPTII,S$GLB,,

## 2025-06-25 PROCEDURE — 99999 PR PBB SHADOW E&M-EST. PATIENT-LVL IV: CPT | Mod: PBBFAC,,,

## 2025-06-25 PROCEDURE — 1126F AMNT PAIN NOTED NONE PRSNT: CPT | Mod: CPTII,S$GLB,,

## 2025-06-25 NOTE — PROGRESS NOTES
Gastroenterology Clinic Consultation Note    Patient ID: Breanna Guido is a 65 y.o. female.        Chief Complaint: No chief complaint on file.    CHIEF COMPLAINT:  Patient presents today for follow up of GI symptoms that started after dining out on May 11th.    RECENT GASTROINTESTINAL EPISODE:  Referred by Rheumatology for abdominal pain.  She experienced acute GI symptoms beginning May 11th after a graduation dinner, lasting approximately 3 weeks. Symptoms included severe abdominal pain, significant bloating, increased bowel movements (3-4 times daily compared to usual daily), trapped gas, and one episode of vomiting. All symptoms have since resolved. She denies ongoing abdominal pain, diarrhea, blood in stool, reflux, or weight loss.    She has a history of H. Pylori infection in 2022 contracted after consuming street food in [place], which was treated by PCP with a medication cocktail. She describes this previous infection as significantly more severe than her recent episode. She also has a 3-year history of asymptomatic ovarian cysts that have not required intervention, and COPD.    She is a current smoker who has attempted multiple smoking cessation methods including medications, nicotine patches, and smoking cessation programs. She denies current respiratory symptoms such as shortness of breath.    ROS:  Review of Systems   Constitutional:  Negative for chills and fever.   Respiratory:  Negative for cough and shortness of breath.    Cardiovascular:  Negative for chest pain.   Gastrointestinal:  Negative for abdominal pain, blood in stool, constipation, diarrhea, heartburn, melena, nausea and vomiting.   Skin:  Negative for rash.   Neurological:  Negative for seizures, loss of consciousness and weakness.        Medical History:  has a past medical history of Anxiety, Arthritis, Bipolar 1 disorder, Cataract, COPD (chronic obstructive pulmonary disease), Depression, Eyelid  "lesion, Fibromyalgia, H/O corneal abrasion (2001), Hyperlipidemia, Hypertension, Lupus, Migraine headache, Scleritis, and Thyroid disease.    Surgical History:  has a past surgical history that includes Hysterectomy; Trigger finger release (Right, 7/28/2021); and Epidural steroid injection (N/A, 7/11/2024).    Family History: family history includes Arthritis in her mother; Breast cancer in her maternal aunt; Breast cancer (age of onset: 46) in her daughter; Cancer in her brother, brother, and sister; Cataracts in her mother and sister; Colon cancer in her brother; Depression in her father; Early death in her sister; Lupus in her mother; No Known Problems in her daughter, daughter, maternal grandfather, maternal grandmother, maternal uncle, paternal aunt, paternal grandfather, paternal grandmother, and paternal uncle; Rheum arthritis in her mother and sister; Thyroid disease in her daughter..       Review of patient's allergies indicates:   Allergen Reactions    Bactrim [sulfamethoxazole-trimethoprim] Nausea And Vomiting    Sulfa (sulfonamide antibiotics)      Agitation^       Medications Ordered Prior to Encounter[1]    Labs:  Lab Results   Component Value Date    WBC 5.84 05/28/2025    HGB 14.5 05/28/2025    HCT 42.9 05/28/2025     05/28/2025    CRP 3.3 05/28/2025    CHOL 135 01/15/2025    TRIG 62 01/15/2025    HDL 46 01/15/2025    ALKPHOS 59 05/28/2025    LIPASE 12 06/21/2022    ALT 17 05/28/2025    AST 18 05/28/2025     05/28/2025    K 3.9 05/28/2025     05/28/2025    CREATININE 0.8 05/28/2025    BUN 12 05/28/2025    CO2 24 05/28/2025    TSH 2.284 01/15/2025    HGBA1C 5.4 01/15/2025       Vital Signs:  Ht 5' 3" (1.6 m)   Wt 68.2 kg (150 lb 5.7 oz)   BMI 26.63 kg/m²   Body mass index is 26.63 kg/m².    Physical Exam:  Physical Exam  Vitals and nursing note reviewed.   Constitutional:       General: She is not in acute distress.     Appearance: Normal appearance. She is not ill-appearing. "   HENT:      Head: Normocephalic and atraumatic.   Eyes:      Extraocular Movements: Extraocular movements intact.   Cardiovascular:      Rate and Rhythm: Normal rate and regular rhythm.   Pulmonary:      Effort: Pulmonary effort is normal. No respiratory distress.   Abdominal:      General: Bowel sounds are normal.      Palpations: Abdomen is soft.      Tenderness: There is no abdominal tenderness.   Neurological:      General: No focal deficit present.      Mental Status: She is alert and oriented to person, place, and time.   Psychiatric:         Mood and Affect: Mood normal.         Behavior: Behavior normal.         Imaging reviewed: CT Abdomen Pelvis 11/25/2024  Impression    No evidence of abdominopelvic injury. Preliminary Report Dictated By: LLOYD MORENO MD    Electronically Signed By: Rodrigo Schmidt MD 11/25/2024 8:30 PM CST  Narrative      FINDINGS:  01. LIVER: Normal.  02. SPLEEN: Calcified granulomas are noted.  03. PANCREAS: Normal.  04. BILIARY TREE: The gallbladder is normal. The biliary tree is not dilated.  05. ADRENALS: Normal.  06. KIDNEYS: No evidence of calcification, hydronephrosis or solid renal mass. Bilateral simple renal cysts are present.  07. LYMPHADENOPATHY/RETROPERITONEUM:The aorta is normal caliber with scattered atherosclerotic changes including major side branches. No lymphadenopathy.  08. BOWEL: There are scattered diverticula throughout the colon without evidence of diverticulitis. Appendix is normal.  09. PELVIC VISCERA: Status post hysterectomy. No pelvic mass or collection.  10. PELVIC LYMPH NODES: No lymphadenopathy.  11. PERITONEUM/ABDOMINAL WALL: No collections. No free air. No hemoperitoneum.  12. SKELETAL: Multilevel degenerative changes throughout the thoracolumbar spine, including vacuum phenomenon at the levels of L2-S1. No evidence of displaced fracture or dislocation.  13. VISUALIZED LOWER THORAX: Clear aside from minimal dependent subsegmental atelectatic  changes.    Exam End: 11/25/24 20:02       Endoscopy reviewed: Colonoscopy 05/22/2018    Assessment & Plan  1. Abdominal pain, unspecified abdominal location      ABDOMINAL PAIN:  - Recent GI symptoms resolved, likely due to mild gastroenteritis or food poisoning from recent restaurant meal.  - Evaluated for potential complications such as reflux, blood in stool, or weight loss; none reported.  - Educated on warning signs that would warrant further investigation (e.g., abdominal pain, diarrhea, nausea, vomiting, blood in stool or vomit).  - Considered possibility of H. pylori based on similar previous episode in 2022, but current symptoms cleared without intervention.  - Discussed H. pylori symptoms and effects.    FOLLOW UP:  - Contact the office if symptoms return for potential stool studies or further evaluation.         CAMILA JIMENEZ-JACOBO  Gastroenterology Department  [facility]  [phone]     This note was generated with the assistance of ambient listening technology. Verbal consent was obtained by the patient and accompanying visitor(s) for the recording of patient appointment to facilitate this note. I attest to having reviewed and edited the generated note for accuracy, though some syntax or spelling errors may persist. Please contact the author of this note for any clarification.          [1]   Current Outpatient Medications on File Prior to Visit   Medication Sig Dispense Refill    albuterol (PROVENTIL/VENTOLIN HFA) 90 mcg/actuation inhaler Inhale 2 puffs into the lungs every 6 (six) hours as needed for Wheezing. Rescue 54 g 2    alprazolam (XANAX) 2 MG Tab Take 2 mg by mouth 2 (two) times daily as needed (pt takes 1/2 tab BID and 1 tab  at night).      amLODIPine (NORVASC) 5 MG tablet Take 1 tablet (5 mg total) by mouth once daily. 90 tablet 3    DULoxetine (CYMBALTA) 30 MG capsule Take 30 mg by mouth.      duloxetine (CYMBALTA) 60 MG capsule Take one capsule along with  cymbalta 30mg daily to equal 90mg daily 90 capsule 3    fluticasone-umeclidin-vilanter (TRELEGY ELLIPTA) 100-62.5-25 mcg DsDv Inhale 1 puff into the lungs once daily. 180 each 3    hydroxychloroquine (PLAQUENIL) 200 mg tablet Take 1.5 tablets (300 mg total) by mouth once daily. 135 tablet 1    levothyroxine (SYNTHROID) 88 MCG tablet Take 1 tablet (88 mcg total) by mouth before breakfast. 90 tablet 3    losartan (COZAAR) 100 MG tablet Take 1 tablet (100 mg total) by mouth once daily. 90 tablet 3    pantoprazole (PROTONIX) 40 MG tablet Take 1 tablet (40 mg total) by mouth once daily. 30 tablet 11    phenazopyridine (PYRIDIUM) 200 MG tablet Take 1 tablet (200 mg total) by mouth 3 (three) times daily as needed for Pain. 12 tablet 0    pregabalin (LYRICA) 150 MG capsule Take 1 capsule (150 mg total) by mouth 2 (two) times daily. Generic is fine 180 capsule 1    quetiapine (SEROQUEL) 300 MG Tab Take 300 mg by mouth once daily.       rosuvastatin (CRESTOR) 40 MG Tab Take 1 tablet (40 mg total) by mouth every evening. 90 tablet 3    sumatriptan (IMITREX) 50 MG tablet Take 1 tablet (50 mg total) by mouth every 2 (two) hours as needed for Migraine. Do not take more than 4 tablets total in 24 hours 30 tablet 0    vitamin D 1000 units Tab Take 185 mg by mouth once daily.       No current facility-administered medications on file prior to visit.

## 2025-06-26 ENCOUNTER — PATIENT MESSAGE (OUTPATIENT)
Dept: HEMATOLOGY/ONCOLOGY | Facility: CLINIC | Age: 65
End: 2025-06-26
Payer: MEDICARE

## 2025-08-28 ENCOUNTER — OFFICE VISIT (OUTPATIENT)
Dept: OPTOMETRY | Facility: CLINIC | Age: 65
End: 2025-08-28
Payer: MEDICARE

## 2025-08-28 DIAGNOSIS — J44.9 CHRONIC OBSTRUCTIVE PULMONARY DISEASE, UNSPECIFIED COPD TYPE: ICD-10-CM

## 2025-08-28 DIAGNOSIS — E03.9 ACQUIRED HYPOTHYROIDISM: Chronic | ICD-10-CM

## 2025-08-28 DIAGNOSIS — Z79.899 LONG-TERM USE OF PLAQUENIL: Primary | ICD-10-CM

## 2025-08-28 DIAGNOSIS — M79.7 FIBROMYALGIA: Chronic | ICD-10-CM

## 2025-08-28 DIAGNOSIS — I10 ESSENTIAL HYPERTENSION, BENIGN: ICD-10-CM

## 2025-08-28 DIAGNOSIS — M32.8 OTHER FORMS OF SYSTEMIC LUPUS ERYTHEMATOSUS, UNSPECIFIED ORGAN INVOLVEMENT STATUS: Chronic | ICD-10-CM

## 2025-08-28 DIAGNOSIS — E78.2 MIXED HYPERLIPIDEMIA: ICD-10-CM

## 2025-08-28 DIAGNOSIS — H25.13 NUCLEAR SCLEROSIS, BILATERAL: ICD-10-CM

## 2025-08-28 PROCEDURE — 99999 PR PBB SHADOW E&M-EST. PATIENT-LVL III: CPT | Mod: PBBFAC,,, | Performed by: OPTOMETRIST

## 2025-08-28 RX ORDER — FLUTICASONE FUROATE, UMECLIDINIUM BROMIDE AND VILANTEROL TRIFENATATE 100; 62.5; 25 UG/1; UG/1; UG/1
1 POWDER RESPIRATORY (INHALATION) DAILY
Qty: 180 EACH | Refills: 1 | Status: SHIPPED | OUTPATIENT
Start: 2025-08-28

## 2025-08-28 RX ORDER — AMLODIPINE BESYLATE 5 MG/1
5 TABLET ORAL DAILY
Qty: 90 TABLET | Refills: 1 | Status: SHIPPED | OUTPATIENT
Start: 2025-08-28

## 2025-08-28 RX ORDER — ROSUVASTATIN CALCIUM 40 MG/1
40 TABLET, COATED ORAL NIGHTLY
Qty: 90 TABLET | Refills: 1 | Status: SHIPPED | OUTPATIENT
Start: 2025-08-28

## 2025-08-28 RX ORDER — LOSARTAN POTASSIUM 100 MG/1
100 TABLET ORAL DAILY
Qty: 90 TABLET | Refills: 1 | Status: SHIPPED | OUTPATIENT
Start: 2025-08-28

## 2025-08-28 RX ORDER — ALBUTEROL SULFATE 90 UG/1
2 INHALANT RESPIRATORY (INHALATION) EVERY 6 HOURS PRN
Qty: 54 G | Refills: 1 | Status: SHIPPED | OUTPATIENT
Start: 2025-08-28

## 2025-08-29 RX ORDER — PANTOPRAZOLE SODIUM 40 MG/1
40 TABLET, DELAYED RELEASE ORAL DAILY
Qty: 90 TABLET | Refills: 1 | Status: SHIPPED | OUTPATIENT
Start: 2025-08-29 | End: 2026-08-29

## 2025-09-03 RX ORDER — PREGABALIN 150 MG/1
150 CAPSULE ORAL 2 TIMES DAILY
Qty: 180 CAPSULE | Refills: 1 | Status: SHIPPED | OUTPATIENT
Start: 2025-09-03

## (undated) DEVICE — SPONGE COTTON TRAY 4X4IN

## (undated) DEVICE — DRESSING N ADH OIL EMUL 3X3

## (undated) DEVICE — APPLICATOR CHLORAPREP ORN 26ML

## (undated) DEVICE — SUT 4/0 18IN ETHILON BL P3

## (undated) DEVICE — CORD BIPOLAR 12 FOOT

## (undated) DEVICE — PACK UPPER EXTREMITY BAPTIST

## (undated) DEVICE — DRAPE STERI-DRAPE 1000 17X11IN

## (undated) DEVICE — FORCEP STRAIGHT DISP

## (undated) DEVICE — TOURNIQUET SB QC DP 18X4IN

## (undated) DEVICE — BANDAGE ELASTIC 2X5 VELCRO ST

## (undated) DEVICE — GLOVE BIOGEL PI MICRO INDIC 7

## (undated) DEVICE — GLOVE BIOGEL ECLIPSE SZ 7

## (undated) DEVICE — PAD UNDERPAD 30X30

## (undated) DEVICE — SYR B-D DISP CONTROL 10CC100/C

## (undated) DEVICE — GAUZE DERMACEA LOW PLY 3X4YRDS

## (undated) DEVICE — SOL 9P NACL IRR PIC IL

## (undated) DEVICE — NDL SAFETY 22G X 1.5 ECLIPSE

## (undated) DEVICE — SOL PVP-I SCRUB 7.5% 4OZ